# Patient Record
Sex: MALE | Race: BLACK OR AFRICAN AMERICAN | Employment: OTHER | URBAN - METROPOLITAN AREA
[De-identification: names, ages, dates, MRNs, and addresses within clinical notes are randomized per-mention and may not be internally consistent; named-entity substitution may affect disease eponyms.]

---

## 2018-08-28 ENCOUNTER — HOSPITAL ENCOUNTER (OUTPATIENT)
Dept: RADIATION ONCOLOGY | Age: 68
Discharge: HOME | End: 2018-08-28
Payer: OTHER GOVERNMENT

## 2018-08-28 ENCOUNTER — TELEPHONE (OUTPATIENT)
Dept: RADIATION ONCOLOGY | Age: 68
End: 2018-08-28

## 2018-08-28 VITALS
WEIGHT: 172.8 LBS | HEIGHT: 69 IN | TEMPERATURE: 98.4 F | DIASTOLIC BLOOD PRESSURE: 83 MMHG | BODY MASS INDEX: 25.59 KG/M2 | HEART RATE: 78 BPM | SYSTOLIC BLOOD PRESSURE: 131 MMHG

## 2018-08-28 DIAGNOSIS — C80.1 CANCER (HCC): Primary | ICD-10-CM

## 2018-08-28 PROCEDURE — 99205 OFFICE O/P NEW HI 60 MIN: CPT | Performed by: RADIOLOGY

## 2018-08-28 PROCEDURE — 99205 OFFICE O/P NEW HI 60 MIN: CPT

## 2018-08-28 RX ORDER — METOPROLOL TARTRATE 50 MG/1
50 TABLET, FILM COATED ORAL 2 TIMES DAILY
COMMUNITY

## 2018-08-28 RX ORDER — CLOPIDOGREL BISULFATE 75 MG/1
75 TABLET ORAL DAILY
COMMUNITY

## 2018-08-28 RX ORDER — HYDROCODONE BITARTRATE AND ACETAMINOPHEN 5; 325 MG/1; MG/1
1 TABLET ORAL EVERY 6 HOURS PRN
COMMUNITY
End: 2020-09-18 | Stop reason: ALTCHOICE

## 2018-08-28 RX ORDER — PANTOPRAZOLE SODIUM 40 MG/1
40 TABLET, DELAYED RELEASE ORAL DAILY
COMMUNITY

## 2018-08-28 RX ORDER — ACETAMINOPHEN 160 MG
1000 TABLET,DISINTEGRATING ORAL
COMMUNITY
End: 2020-09-18

## 2018-08-28 RX ORDER — OXYCODONE HYDROCHLORIDE 10 MG/1
10 TABLET ORAL EVERY 6 HOURS PRN
Refills: 0 | COMMUNITY
Start: 2018-07-25

## 2018-08-28 NOTE — PROGRESS NOTES
patient  5. Call-light/bell within patient's reach  6. Chair/bed in low position, stretcher/bed with siderails up except when performing patient care activities  7. Educate patient/family/caregiver on falls prevention  8. Falls risk precaution (Yellow sticker Level III) placed on patient chart       MALNUTRITION RISK SCREEN    Instructions:  Assess the patient and enter the appropriate indicators that are present for nutrition risk identification. Total the numbers entered and assign a risk score. Follow the appropriate action for total score listed below. Assessment   Date  8/28/2018     1. Have you lost weight without trying?                                   a. No (0)                       b. Unsure (2)                   0                                  c. Yes-  If yes, how much weight (kg) have                                       you lost?                                        a. 0.5-5.0(1)                                  b. >5.0-10.0 (2)                                  c. >10.0-15.0 (3)                                  D. >15.0-20.0 (4) 0        2. Have you been eating poorly because of a decreased appetite? No (0)                                        Yes (1)   0    3. Do you have a diagnosis of head and neck cancer? A. Yes (1)  B. No (0)  0   TOTAL 0      4. If score 0 or 1 not at risk of malnutrition                  >/=2 at risk of malnutrition, see below          Score of 0-1: No action  Score 2 or greater:  1. For Non-Diabetic Patient: Recommend adding Ensure Complete  2xdaily and provide              patient with Ensure wellness bag with coupons; For Diabetic Patient, Recommend adding Glucerna Shake 2xdaily and provide patient with Glucerna Wellness bag with coupons  2.  Route to the dietitian via [facility]    · Are you having  difficulty performing daily routine tasks  due to fatigue or weakness (ie: bathing/showering, dressing, housework, meal prep, work, child Para Epng): No     · Do you have any arm flexibility/ROM restrictions, swelling or pain that limit activity: No     · Any changes in memory, attention/focus that impact daily activities: No     · Do you avoid participation in leisure/social activity due weakness, fatigue or pain: No     IF ANY OF THE ABOVE ARE ANSWERED YES:   a. Referral request for OT sent to NP? No   · If NO, then why: na   b. NP Name: sasha        PT ASSESSMENT FOR REFERRAL    · Have you had any recent falls in past 2 months: No     · Do you have difficulty  going up/down stairs: No     · Are you having difficulty walking: No     · Do you often hold onto furniture/environmental supports or feel off balance when you are walking: No     · Do you need to take rest breaks when you are walking: No     · Any pain on scale of 1-10 that limits your mobility: No na/10    IF ANY OF THE ABOVE ARE ANSWERED YES:   a. Referral request for PT sent to NP? No   · If NO, then why: na   b. NP Name: sasha         Distress Screening Assessment   1. Completed by the patient? Yes  2. Reviewed by RN? No  3. Triggers met for immediate intervention (score of 6 or more)? No   If Yes: a. What intervention provided: Patient did mention he might need help with transportation assistance. I will send my note to Joan Caceres the Transportation Navigator for assistance. b. Referral made to ? Yes           Patient education given on radiation therapy to the pelvis/prostate. The patient expresses understanding and acceptance of instructions.  Niurka Mejia 8/28/2018 1:22 PM           Niurka Mejia

## 2018-08-28 NOTE — TELEPHONE ENCOUNTER
Patient called with CT Sim appointment for 9/5/2018 @ 9am. Parking instructions given to patient with office location.

## 2018-09-03 ENCOUNTER — HOSPITAL ENCOUNTER (OUTPATIENT)
Dept: RADIATION ONCOLOGY | Age: 68
Discharge: HOME | End: 2018-09-03
Payer: OTHER GOVERNMENT

## 2018-09-05 ENCOUNTER — TELEPHONE (OUTPATIENT)
Dept: ONCOLOGY | Age: 68
End: 2018-09-05

## 2018-09-05 PROCEDURE — 77334 RADIATION TREATMENT AID(S): CPT

## 2018-09-05 NOTE — PROGRESS NOTES
Radiation Oncology      [facility] Juan[facility] [address]   [phone]       Referring Physician: Dr. Josefa Garcia    [facility]ician: [facility]    Diagnosis: [facility]    Service: Radiation Oncology consultation on 9/7/18        HPI:        Mr. Krystal Khan is a pleasant 79year old man with a history of urinary frequency. 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 No nodules were palpable (cT1c), SG II-B. We reviewed the course. Please see the [facility] chart scanned into the Media tab. Román declined any consideration for Lupron but there is less significant benefit in his case anyway with low- IR disease per the NCCN. RECS as below. KPS 80. PSA:    (i) 4/10/18: 8.3        -----      [facility] PATH 4/27/18:    -right gland, 3+4=7, 3/6 cores at 5, 10 and 50% of the tissue  -left- benign      -----          Past Medical History:   Diagnosis Date    Arthritis     CAD (coronary artery disease)     Cancer ([facility] [address]     Hyperlipidemia     Hypertension        Past Surgical History:   Procedure Laterality Date    CORONARY ANGIOPLASTY WITH STENT PLACEMENT      FOOT SURGERY      lt foot    FOOT SURGERY      rt foot as well    PROSTATE BIOPSY           History reviewed. No pertinent family history.         Current Outpatient Prescriptions   Medication Sig Dispense Refill    metoprolol tartrate (LOPRESSOR) 50 MG tablet Take 50 mg by mouth 2 times daily      clopidogrel (PLAVIX) 75 MG tablet Take 75 mg by mouth daily      pantoprazole (PROTONIX) 40 MG tablet Take 40 mg by mouth daily      Cholecalciferol (VITAMIN D3) 2000 units CAPS Take 1,000 Units by mouth      Cetirizine HCl (ZYRTEC PO) Take by mouth      aspirin 81 MG tablet Take 81 mg by mouth daily      HYDROcodone-acetaminophen (NORCO) 5-325 MG per tablet Take 1 tablet by mouth every 6 hours as needed for

## 2018-09-05 NOTE — TELEPHONE ENCOUNTER
Met with patient during his CT Simulation appointment with Dr. Minerva Roberts for radiation therapy for his prostate cancer diagnosis. Introduced myself and explained my role with cancer patients receiving treatment within our cancer centers. He had his consult in [facility] and met with Maria Luisa Oneal at that facility. Patient was friendly and receptive. Reviewed need for full bladder before CT Sim and daily radiation appointments. Patient verbalized understanding. He states that he did drink a bunch of water but by the time he got to the hospital he had to use the rest room. He was given water to drink to ensure that he has a full bladder for his CT simulation appointment. He denies any needs at this time. Provided contact information and instructed patient to call if any assistance is needed during her radiation therapy treatments. Patient appreciative of visit. Will continue to follow.

## 2018-09-07 NOTE — PATIENT INSTRUCTIONS
Paul Plascencia MD [place] [address] Oncology  Cell: [phone]    [facility]:  [phone]   FAX: [phone] [address] [facility]:  [address] [place] [address] Avenue:    [phone]  [address] [place] [address] Road:  [phone]   FAX:  [phone]  Email: [email or web address]

## 2018-09-13 PROCEDURE — 77301 RADIOTHERAPY DOSE PLAN IMRT: CPT

## 2018-09-13 PROCEDURE — 77300 RADIATION THERAPY DOSE PLAN: CPT

## 2018-09-13 PROCEDURE — 77338 DESIGN MLC DEVICE FOR IMRT: CPT

## 2018-09-19 ENCOUNTER — TELEPHONE (OUTPATIENT)
Dept: ONCOLOGY | Age: 68
End: 2018-09-19

## 2018-09-19 ENCOUNTER — TELEPHONE (OUTPATIENT)
Dept: RADIATION ONCOLOGY | Age: 68
End: 2018-09-19

## 2018-09-19 PROCEDURE — 77385 HC NTSTY MODUL RAD TX DLVR SMPL: CPT

## 2018-09-19 NOTE — TELEPHONE ENCOUNTER
Met with patient following  his daily radiation therapy treatment for follow up. Today was the patient's first radiation treatment. Upon inquiring, states that he did well with the treatment today. He states that he had to hurry to get to the bathroom once his treatment was finished. Explained that the first treatment is a little longer due to extra pictures needed and tomorrows would be a little quicker. Provided support and encouragement. Declines any current needs for assistance. Patient appreciative of visit. Will continue to follow.

## 2018-09-19 NOTE — TELEPHONE ENCOUNTER
Spoke with pt re: ACS programs, resources, and role of ACS navigator. Pt stated that he has no needs at this time but would follow up if needs arise. Provided with ACS navigator's contact information.

## 2018-09-20 PROCEDURE — 77385 HC NTSTY MODUL RAD TX DLVR SMPL: CPT

## 2018-09-21 PROCEDURE — 77385 HC NTSTY MODUL RAD TX DLVR SMPL: CPT

## 2018-09-24 PROCEDURE — 77385 HC NTSTY MODUL RAD TX DLVR SMPL: CPT

## 2018-09-25 PROCEDURE — 77336 RADIATION PHYSICS CONSULT: CPT

## 2018-09-25 PROCEDURE — 77385 HC NTSTY MODUL RAD TX DLVR SMPL: CPT

## 2018-09-26 ENCOUNTER — HOSPITAL ENCOUNTER (OUTPATIENT)
Dept: RADIATION ONCOLOGY | Age: 68
Discharge: HOME | End: 2018-09-26
Payer: OTHER GOVERNMENT

## 2018-09-26 VITALS
WEIGHT: 174 LBS | SYSTOLIC BLOOD PRESSURE: 128 MMHG | BODY MASS INDEX: 25.7 KG/M2 | HEART RATE: 59 BPM | DIASTOLIC BLOOD PRESSURE: 88 MMHG

## 2018-09-26 PROCEDURE — 77385 HC NTSTY MODUL RAD TX DLVR SMPL: CPT

## 2018-09-26 NOTE — PROGRESS NOTES
Pablo Crouch  9/26/2018  Wt Readings from Last 3 Encounters:   09/26/18 174 lb (78.9 kg)   08/28/18 172 lb 12.8 oz (78.4 kg)     Body mass index is 25.7 kg/m². Treatment Area:prostate     Patient was seen today for weekly visit. Comfort Alteration  KPS:90%  Fatigue: Mild    Nutritional Alteration  Anorexia: No  Nausea: No  Vomiting: No     Elimination Alterations  Constipation: no  Diarrhea:  no  Urinary Frequency/Urgency: No  Urinary Retention: No  Dysuria: No  Urinary Incontinence: No  Proctitis: No  Nocturia: Yes #/night: 2     Skin Alteration   Sensation:no    Radiation Dermatitis:  no    Emotional  Coping: effective    Sexuality Alteration  absent    Injury, potential bleeding or infection: no    Lab Results   Component Value Date    WBC 9.8 03/01/2016     03/01/2016         /88   Pulse 59   Wt 174 lb (78.9 kg)   BMI 25.70 kg/m²   BP within normal range? yes   -if no, manually recheck in 5-10 min  NEW BP reading:  BP within normal range? yes   -if no, notify attending provider for further instruction      Assessment/Plan: Patient is having some burning and he was instructed to use AZO to help with that.      Nani Jennings

## 2018-09-26 NOTE — PROGRESS NOTES
Fly Velazquez  9/26/2018  12:19 PM      Chief Complaint   Patient presents with    Cancer          Wt Readings from Last 3 Encounters:   09/26/18 174 lb (78.9 kg)   08/28/18 172 lb 12.8 oz (78.4 kg)       Subjective: At 10.8 Marilynn Doles to the prostate and proximal seminal vesicles, patient is doing extremely well with no urinary obstructive symptoms with the exception of some minimal dysuria on initiation of micturition which disappears once the stream has been established. He denies any GI symptoms with no flatulence and lucency frequency of bowel movements or watery diarrhea      Objective: Abdomen: Normal bowel sounds soft nontender nondistended no organomegaly  Skin: No radiation-induced dermatitis      Assessment: Patient is tolerating his radiation treatment exceedingly well without any ill effects      Plan: Continue current management skin care instructions given by nursing staff and Aquaphor was provided.       Electronically signed by Ariela Gutierrez MD on 9/26/18 at 12:19 PM

## 2018-09-27 PROCEDURE — 77385 HC NTSTY MODUL RAD TX DLVR SMPL: CPT

## 2018-09-28 PROCEDURE — 77385 HC NTSTY MODUL RAD TX DLVR SMPL: CPT

## 2018-10-01 ENCOUNTER — HOSPITAL ENCOUNTER (OUTPATIENT)
Dept: RADIATION ONCOLOGY | Age: 68
Discharge: HOME | End: 2018-10-01
Payer: OTHER GOVERNMENT

## 2018-10-01 PROCEDURE — 77385 HC NTSTY MODUL RAD TX DLVR SMPL: CPT

## 2018-10-02 PROCEDURE — 77336 RADIATION PHYSICS CONSULT: CPT

## 2018-10-02 PROCEDURE — 77385 HC NTSTY MODUL RAD TX DLVR SMPL: CPT

## 2018-10-03 PROCEDURE — 77385 HC NTSTY MODUL RAD TX DLVR SMPL: CPT

## 2018-10-04 ENCOUNTER — HOSPITAL ENCOUNTER (OUTPATIENT)
Dept: RADIATION ONCOLOGY | Age: 68
End: 2018-10-04
Payer: OTHER GOVERNMENT

## 2018-10-04 PROCEDURE — 77385 HC NTSTY MODUL RAD TX DLVR SMPL: CPT

## 2018-10-05 ENCOUNTER — APPOINTMENT (OUTPATIENT)
Dept: RADIATION ONCOLOGY | Age: 68
End: 2018-10-05
Payer: OTHER GOVERNMENT

## 2018-10-08 PROCEDURE — 77385 HC NTSTY MODUL RAD TX DLVR SMPL: CPT

## 2018-10-09 PROCEDURE — 77385 HC NTSTY MODUL RAD TX DLVR SMPL: CPT

## 2018-10-10 ENCOUNTER — HOSPITAL ENCOUNTER (OUTPATIENT)
Dept: RADIATION ONCOLOGY | Age: 68
Discharge: HOME | End: 2018-10-10
Payer: OTHER GOVERNMENT

## 2018-10-10 VITALS
WEIGHT: 174 LBS | HEART RATE: 76 BPM | SYSTOLIC BLOOD PRESSURE: 148 MMHG | TEMPERATURE: 98.3 F | OXYGEN SATURATION: 96 % | BODY MASS INDEX: 25.7 KG/M2 | DIASTOLIC BLOOD PRESSURE: 88 MMHG

## 2018-10-10 DIAGNOSIS — C80.1 CANCER (HCC): Primary | ICD-10-CM

## 2018-10-10 PROCEDURE — 99999 PR OFFICE/OUTPT VISIT,PROCEDURE ONLY: CPT | Performed by: RADIOLOGY

## 2018-10-10 PROCEDURE — 77336 RADIATION PHYSICS CONSULT: CPT

## 2018-10-10 PROCEDURE — 77385 HC NTSTY MODUL RAD TX DLVR SMPL: CPT

## 2018-10-10 PROCEDURE — 77386 HC NTSTY MODUL RAD TX DLVR CPLX: CPT

## 2018-10-10 RX ORDER — AMLODIPINE BESYLATE 10 MG/1
10 TABLET ORAL DAILY
COMMUNITY

## 2018-10-11 PROCEDURE — 77385 HC NTSTY MODUL RAD TX DLVR SMPL: CPT

## 2018-10-12 PROCEDURE — 77385 HC NTSTY MODUL RAD TX DLVR SMPL: CPT

## 2018-10-15 PROCEDURE — 77385 HC NTSTY MODUL RAD TX DLVR SMPL: CPT

## 2018-10-16 ENCOUNTER — TELEPHONE (OUTPATIENT)
Dept: ONCOLOGY | Age: 68
End: 2018-10-16

## 2018-10-16 PROCEDURE — 77385 HC NTSTY MODUL RAD TX DLVR SMPL: CPT

## 2018-10-17 ENCOUNTER — HOSPITAL ENCOUNTER (OUTPATIENT)
Dept: RADIATION ONCOLOGY | Age: 68
Discharge: HOME | End: 2018-10-17
Payer: OTHER GOVERNMENT

## 2018-10-17 VITALS
HEART RATE: 90 BPM | BODY MASS INDEX: 25.59 KG/M2 | SYSTOLIC BLOOD PRESSURE: 138 MMHG | DIASTOLIC BLOOD PRESSURE: 90 MMHG | TEMPERATURE: 98.1 F | WEIGHT: 173.3 LBS | OXYGEN SATURATION: 96 %

## 2018-10-17 DIAGNOSIS — C80.1 CANCER (HCC): Primary | ICD-10-CM

## 2018-10-17 PROCEDURE — 77385 HC NTSTY MODUL RAD TX DLVR SMPL: CPT

## 2018-10-17 PROCEDURE — 77336 RADIATION PHYSICS CONSULT: CPT

## 2018-10-17 PROCEDURE — 99999 PR OFFICE/OUTPT VISIT,PROCEDURE ONLY: CPT | Performed by: RADIOLOGY

## 2018-10-18 PROCEDURE — 77385 HC NTSTY MODUL RAD TX DLVR SMPL: CPT

## 2018-10-19 PROCEDURE — 77385 HC NTSTY MODUL RAD TX DLVR SMPL: CPT

## 2018-10-22 PROCEDURE — 77385 HC NTSTY MODUL RAD TX DLVR SMPL: CPT

## 2018-10-23 PROCEDURE — 77385 HC NTSTY MODUL RAD TX DLVR SMPL: CPT

## 2018-10-24 ENCOUNTER — HOSPITAL ENCOUNTER (OUTPATIENT)
Dept: RADIATION ONCOLOGY | Age: 68
Discharge: HOME | End: 2018-10-24
Payer: OTHER GOVERNMENT

## 2018-10-24 VITALS
TEMPERATURE: 98 F | HEART RATE: 67 BPM | DIASTOLIC BLOOD PRESSURE: 82 MMHG | SYSTOLIC BLOOD PRESSURE: 128 MMHG | BODY MASS INDEX: 25.47 KG/M2 | WEIGHT: 172.5 LBS | OXYGEN SATURATION: 97 %

## 2018-10-24 PROCEDURE — 77385 HC NTSTY MODUL RAD TX DLVR SMPL: CPT

## 2018-10-24 PROCEDURE — 77336 RADIATION PHYSICS CONSULT: CPT

## 2018-10-25 PROCEDURE — 77385 HC NTSTY MODUL RAD TX DLVR SMPL: CPT

## 2018-10-26 PROCEDURE — 77385 HC NTSTY MODUL RAD TX DLVR SMPL: CPT

## 2018-10-29 PROCEDURE — 77385 HC NTSTY MODUL RAD TX DLVR SMPL: CPT

## 2018-10-30 PROCEDURE — 77385 HC NTSTY MODUL RAD TX DLVR SMPL: CPT

## 2018-10-31 ENCOUNTER — HOSPITAL ENCOUNTER (OUTPATIENT)
Dept: RADIATION ONCOLOGY | Age: 68
Discharge: HOME | End: 2018-10-31
Payer: OTHER GOVERNMENT

## 2018-10-31 ENCOUNTER — TELEPHONE (OUTPATIENT)
Dept: ONCOLOGY | Age: 68
End: 2018-10-31

## 2018-10-31 VITALS
OXYGEN SATURATION: 97 % | SYSTOLIC BLOOD PRESSURE: 132 MMHG | BODY MASS INDEX: 25.55 KG/M2 | DIASTOLIC BLOOD PRESSURE: 84 MMHG | HEART RATE: 86 BPM | TEMPERATURE: 98.6 F | WEIGHT: 173 LBS

## 2018-10-31 DIAGNOSIS — C80.1 CANCER (HCC): Primary | ICD-10-CM

## 2018-10-31 PROCEDURE — 77336 RADIATION PHYSICS CONSULT: CPT

## 2018-10-31 PROCEDURE — 99999 PR OFFICE/OUTPT VISIT,PROCEDURE ONLY: CPT | Performed by: RADIOLOGY

## 2018-10-31 PROCEDURE — 77385 HC NTSTY MODUL RAD TX DLVR SMPL: CPT

## 2018-10-31 NOTE — PROGRESS NOTES
DEPARTMENT OF RADIATION ONCOLOGY ON TREATMENT VISIT         10/31/2018      NAME:  Rhiannon Green    YOB: 1950      Diagnosis: prostate CA      SUBJECTIVE:   Rhiannon Green has now received 5400 cGy in 30/44 fractions directed to the prostate and SV. Past medical, surgical, social and family histories reviewed and updated as indicated. Pain: controlled      ALLERGIES:  Simvastatin         Current Outpatient Prescriptions   Medication Sig Dispense Refill    amLODIPine (NORVASC) 10 MG tablet Take 10 mg by mouth daily      oxyCODONE HCl (OXY-IR) 10 MG immediate release tablet TK 1 T PO Q 6 H PRN P  0    metoprolol tartrate (LOPRESSOR) 50 MG tablet Take 50 mg by mouth 2 times daily      clopidogrel (PLAVIX) 75 MG tablet Take 75 mg by mouth daily      pantoprazole (PROTONIX) 40 MG tablet Take 40 mg by mouth daily      Cholecalciferol (VITAMIN D3) 2000 units CAPS Take 1,000 Units by mouth      Cetirizine HCl (ZYRTEC PO) Take by mouth      aspirin 81 MG tablet Take 81 mg by mouth daily      HYDROcodone-acetaminophen (NORCO) 5-325 MG per tablet Take 1 tablet by mouth every 6 hours as needed for Pain. [facility] Pramoxine-HC 1-2.5 % LOTN lotion Apply topically 3 times daily as needed       No current facility-administered medications for this encounter. OBJECTIVE:  Alert and fully ambulatory. Pleasant and conversant. Physical Examination: General appearance - alert, well appearing, and in no distress. Wt Readings from Last 3 Encounters:   10/31/18 173 lb (78.5 kg)   10/24/18 172 lb 8 oz (78.2 kg)   10/17/18 173 lb 4.8 oz (78.6 kg)         ASSESSMENT/PLAN:     Patient is tolerating treatments well with expected toxicities. RBA were reviewed prior to first fraction and PRN. Current and planned dose reviewed. Goals of treatment and potential side effects were reviewed with the patient PRN.  Treatment imaging has been personally reviewed for accuracy and

## 2018-11-01 ENCOUNTER — HOSPITAL ENCOUNTER (OUTPATIENT)
Dept: RADIATION ONCOLOGY | Age: 68
Discharge: HOME | End: 2018-11-01
Payer: OTHER GOVERNMENT

## 2018-11-01 PROCEDURE — 77385 HC NTSTY MODUL RAD TX DLVR SMPL: CPT

## 2018-11-02 PROCEDURE — 77385 HC NTSTY MODUL RAD TX DLVR SMPL: CPT

## 2018-11-05 PROCEDURE — 77385 HC NTSTY MODUL RAD TX DLVR SMPL: CPT

## 2018-11-06 PROCEDURE — 77385 HC NTSTY MODUL RAD TX DLVR SMPL: CPT

## 2018-11-06 PROCEDURE — 77412 RADIATION TX DELIVERY LVL 3: CPT

## 2018-11-07 ENCOUNTER — HOSPITAL ENCOUNTER (OUTPATIENT)
Dept: RADIATION ONCOLOGY | Age: 68
Discharge: HOME | End: 2018-11-07
Payer: OTHER GOVERNMENT

## 2018-11-07 DIAGNOSIS — C80.1 CANCER (HCC): Primary | ICD-10-CM

## 2018-11-07 PROCEDURE — 77385 HC NTSTY MODUL RAD TX DLVR SMPL: CPT

## 2018-11-07 PROCEDURE — 99999 PR OFFICE/OUTPT VISIT,PROCEDURE ONLY: CPT | Performed by: RADIOLOGY

## 2018-11-07 PROCEDURE — 77336 RADIATION PHYSICS CONSULT: CPT

## 2018-11-07 NOTE — PROGRESS NOTES
precision. Questions answered to apparent satisfaction. Treatments will continue as planned. Deanna Ridley.  Anita Estrella MD MS DABR  Radiation Oncologist        [facility] ([address] [place] [address]): [phone] /// FAX: [phone]  [place] [facility]): [phone] /// FAX: [phone]  [facility] Debi Gowers): [phone] /// FAX: [phone]

## 2018-11-09 PROCEDURE — 77385 HC NTSTY MODUL RAD TX DLVR SMPL: CPT

## 2018-11-12 ENCOUNTER — TELEPHONE (OUTPATIENT)
Dept: ONCOLOGY | Age: 68
End: 2018-11-12

## 2018-11-12 PROCEDURE — 77385 HC NTSTY MODUL RAD TX DLVR SMPL: CPT

## 2018-11-13 PROCEDURE — 77385 HC NTSTY MODUL RAD TX DLVR SMPL: CPT

## 2018-11-14 ENCOUNTER — HOSPITAL ENCOUNTER (OUTPATIENT)
Dept: RADIATION ONCOLOGY | Age: 68
Discharge: HOME | End: 2018-11-14
Payer: OTHER GOVERNMENT

## 2018-11-14 VITALS
SYSTOLIC BLOOD PRESSURE: 138 MMHG | TEMPERATURE: 98.2 F | BODY MASS INDEX: 25.33 KG/M2 | DIASTOLIC BLOOD PRESSURE: 72 MMHG | OXYGEN SATURATION: 96 % | WEIGHT: 171.5 LBS | HEART RATE: 74 BPM

## 2018-11-14 DIAGNOSIS — C80.1 CANCER (HCC): Primary | ICD-10-CM

## 2018-11-14 PROCEDURE — 99999 PR OFFICE/OUTPT VISIT,PROCEDURE ONLY: CPT | Performed by: RADIOLOGY

## 2018-11-14 PROCEDURE — 77385 HC NTSTY MODUL RAD TX DLVR SMPL: CPT

## 2018-11-14 NOTE — PROGRESS NOTES
Emmett Onofre  11/14/2018  Wt Readings from Last 3 Encounters:   11/14/18 171 lb 8 oz (77.8 kg)   10/31/18 173 lb (78.5 kg)   10/24/18 172 lb 8 oz (78.2 kg)     Body mass index is 25.33 kg/m². Treatment Area:prostate, proximal SV    Patient was seen today for weekly visit. Comfort Alteration  KPS:90%  Fatigue: None    Nutritional Alteration  Anorexia: No  Nausea: yes  Vomiting: No     Elimination Alterations  Constipation: yes  Diarrhea:  no  Urinary Frequency/Urgency: Yes  Urinary Retention: Yes  Dysuria: Yes  Urinary Incontinence: No  Proctitis: No  Nocturia: Yes #/night: 3     Skin Alteration   Sensation:pressure related to need to move bowels    Radiation Dermatitis:  na    Emotional  Coping: effective    Sexuality Alteration  na    Injury, potential bleeding or infection: na    Lab Results   Component Value Date    WBC 9.8 03/01/2016     03/01/2016         /72   Pulse 74   Temp 98.2 °F (36.8 °C) (Oral)   Wt 171 lb 8 oz (77.8 kg)   SpO2 96%   BMI 25.33 kg/m²   BP within normal range? yes        Assessment/Plan:  Patient has received 39/44 fractions, 7020 cGy/7920. Patient states having pressure to rectum related to not have \"good\" bowel movements-only able to have very small BMs-patient feels \"backed up\".   Grzegorz Fox

## 2018-11-14 NOTE — PATIENT INSTRUCTIONS
Continue daily fractionated radiation therapy as scheduled. Please see weekly OTV note and intial consultation letter in [facility] for clinical details. Judy Dial.  Sherine Quinones MD [address] [place] [address]  Radiation Oncology  [place]:  [phone]   FAX:    [phone] [facility]: [address] [place] [address] Road:  [phone]

## 2018-11-15 PROCEDURE — 77385 HC NTSTY MODUL RAD TX DLVR SMPL: CPT

## 2018-11-15 PROCEDURE — 77336 RADIATION PHYSICS CONSULT: CPT

## 2018-11-16 PROCEDURE — 77385 HC NTSTY MODUL RAD TX DLVR SMPL: CPT

## 2018-11-19 PROCEDURE — 77385 HC NTSTY MODUL RAD TX DLVR SMPL: CPT

## 2018-11-20 PROCEDURE — 77385 HC NTSTY MODUL RAD TX DLVR SMPL: CPT

## 2018-11-21 ENCOUNTER — HOSPITAL ENCOUNTER (OUTPATIENT)
Dept: RADIATION ONCOLOGY | Age: 68
Discharge: HOME | End: 2018-11-21
Payer: OTHER GOVERNMENT

## 2018-11-21 ENCOUNTER — TELEPHONE (OUTPATIENT)
Dept: ONCOLOGY | Age: 68
End: 2018-11-21

## 2018-11-21 VITALS — DIASTOLIC BLOOD PRESSURE: 62 MMHG | HEART RATE: 75 BPM | OXYGEN SATURATION: 98 % | SYSTOLIC BLOOD PRESSURE: 108 MMHG

## 2018-11-21 DIAGNOSIS — C80.1 CANCER (HCC): Primary | ICD-10-CM

## 2018-11-21 PROCEDURE — 77336 RADIATION PHYSICS CONSULT: CPT

## 2018-11-21 PROCEDURE — 99999 PR OFFICE/OUTPT VISIT,PROCEDURE ONLY: CPT | Performed by: RADIOLOGY

## 2018-11-21 PROCEDURE — 77385 HC NTSTY MODUL RAD TX DLVR SMPL: CPT

## 2019-01-08 ENCOUNTER — HOSPITAL ENCOUNTER (OUTPATIENT)
Dept: RADIATION ONCOLOGY | Age: 69
Discharge: HOME | End: 2019-01-08
Payer: OTHER GOVERNMENT

## 2019-01-08 ENCOUNTER — TELEPHONE (OUTPATIENT)
Dept: RADIATION ONCOLOGY | Age: 69
End: 2019-01-08

## 2019-01-08 VITALS
BODY MASS INDEX: 25.41 KG/M2 | RESPIRATION RATE: 16 BRPM | DIASTOLIC BLOOD PRESSURE: 78 MMHG | WEIGHT: 172.1 LBS | SYSTOLIC BLOOD PRESSURE: 138 MMHG

## 2019-01-08 DIAGNOSIS — C61 PROSTATE CANCER (HCC): ICD-10-CM

## 2019-01-08 PROCEDURE — 99999 PR OFFICE/OUTPT VISIT,PROCEDURE ONLY: CPT | Performed by: NURSE PRACTITIONER

## 2020-07-16 ENCOUNTER — APPOINTMENT (OUTPATIENT)
Dept: GENERAL RADIOLOGY | Age: 70
End: 2020-07-16
Payer: MEDICARE

## 2020-07-16 ENCOUNTER — HOSPITAL ENCOUNTER (EMERGENCY)
Age: 70
Discharge: HOME | End: 2020-07-16
Payer: MEDICARE

## 2020-07-16 VITALS
SYSTOLIC BLOOD PRESSURE: 154 MMHG | OXYGEN SATURATION: 97 % | HEART RATE: 73 BPM | TEMPERATURE: 97.6 F | WEIGHT: 172 LBS | RESPIRATION RATE: 16 BRPM | DIASTOLIC BLOOD PRESSURE: 90 MMHG | BODY MASS INDEX: 25.4 KG/M2

## 2020-07-16 LAB
ALBUMIN SERPL-MCNC: 4.9 G/DL (ref 3.5–5.2)
ALP BLD-CCNC: 80 U/L (ref 40–129)
ALT SERPL-CCNC: 66 U/L (ref 0–40)
ANION GAP SERPL CALCULATED.3IONS-SCNC: 16 MMOL/L (ref 7–16)
AST SERPL-CCNC: 84 U/L (ref 0–39)
BASOPHILS ABSOLUTE: 0.06 E9/L (ref 0–0.2)
BASOPHILS RELATIVE PERCENT: 0.6 % (ref 0–2)
BILIRUB SERPL-MCNC: 0.7 MG/DL (ref 0–1.2)
BUN BLDV-MCNC: 9 MG/DL (ref 8–23)
CALCIUM SERPL-MCNC: 9.8 MG/DL (ref 8.6–10.2)
CHLORIDE BLD-SCNC: 100 MMOL/L (ref 98–107)
CO2: 23 MMOL/L (ref 22–29)
CREAT SERPL-MCNC: 0.9 MG/DL (ref 0.7–1.2)
EOSINOPHILS ABSOLUTE: 0.27 E9/L (ref 0.05–0.5)
EOSINOPHILS RELATIVE PERCENT: 2.9 % (ref 0–6)
GFR AFRICAN AMERICAN: >60
GFR NON-AFRICAN AMERICAN: >60 ML/MIN/1.73
GLUCOSE BLD-MCNC: 106 MG/DL (ref 74–99)
HCT VFR BLD CALC: 43 % (ref 37–54)
HEMOGLOBIN: 14.3 G/DL (ref 12.5–16.5)
IMMATURE GRANULOCYTES #: 0.03 E9/L
IMMATURE GRANULOCYTES %: 0.3 % (ref 0–5)
LYMPHOCYTES ABSOLUTE: 2.71 E9/L (ref 1.5–4)
LYMPHOCYTES RELATIVE PERCENT: 29.2 % (ref 20–42)
MCH RBC QN AUTO: 32.4 PG (ref 26–35)
MCHC RBC AUTO-ENTMCNC: 33.3 % (ref 32–34.5)
MCV RBC AUTO: 97.5 FL (ref 80–99.9)
MONOCYTES ABSOLUTE: 1.3 E9/L (ref 0.1–0.95)
MONOCYTES RELATIVE PERCENT: 14 % (ref 2–12)
NEUTROPHILS ABSOLUTE: 4.92 E9/L (ref 1.8–7.3)
NEUTROPHILS RELATIVE PERCENT: 53 % (ref 43–80)
PDW BLD-RTO: 12.2 FL (ref 11.5–15)
PLATELET # BLD: 277 E9/L (ref 130–450)
PMV BLD AUTO: 10 FL (ref 7–12)
POTASSIUM SERPL-SCNC: 3.9 MMOL/L (ref 3.5–5)
RBC # BLD: 4.41 E12/L (ref 3.8–5.8)
SODIUM BLD-SCNC: 139 MMOL/L (ref 132–146)
TOTAL PROTEIN: 8.8 G/DL (ref 6.4–8.3)
TROPONIN: <0.01 NG/ML (ref 0–0.03)
WBC # BLD: 9.3 E9/L (ref 4.5–11.5)

## 2020-07-16 PROCEDURE — 80053 COMPREHEN METABOLIC PANEL: CPT

## 2020-07-16 PROCEDURE — 85025 COMPLETE CBC W/AUTO DIFF WBC: CPT

## 2020-07-16 PROCEDURE — 99283 EMERGENCY DEPT VISIT LOW MDM: CPT

## 2020-07-16 PROCEDURE — 71046 X-RAY EXAM CHEST 2 VIEWS: CPT

## 2020-07-16 PROCEDURE — 93005 ELECTROCARDIOGRAM TRACING: CPT | Performed by: PHYSICIAN ASSISTANT

## 2020-07-16 PROCEDURE — 84484 ASSAY OF TROPONIN QUANT: CPT

## 2020-07-16 NOTE — ED NOTES
Bed: [facility].  Expected date:   Expected time:   Means of arrival:   Comments:  Triage      Edwige Christina RN  07/16/20 [id number]

## 2020-07-16 NOTE — ED PROVIDER NOTES
[facility]     Department of Emergency Medicine   ED  Provider Note  Admit Date/RoomTime: 7/16/2020  6:06 PM  ED Room: [facility]   Chief Complaint   Hypertension (states BP has been up all day, on and off lightheadedness, states he is compliant with all medication)    History of Present Illness   Source of history provided by:  patient. History/Exam Limitations: none. Inocencio Carl is a 78 y.o. old male who has a past medical history of:   Past Medical History:   Diagnosis Date    Arthritis     CAD (coronary artery disease)     [facility] [address]     Hyperlipidemia     Hypertension      presents to the emergency department by private vehicle, for evaluation of high blood pressure, which began when he checked it several times today prior to arrival.  Since onset the symptoms have been intermittent. He has a prior history of hypertension. Severity:  How High: 180s/104. Symptoms:  Mild. Current Pregnancy:  NA.  HTN Treatment: compliant. Recent Use of:  No OTC or illegal substances. Associated Signs & Symptoms:    []   Abdominal Pain     []   Back Pain     []   Hematuria     []   Chest Pain     []   Shortness of Breath     []   Palpitations     []   Headache     [x]   Dizziness     []   Blurred Vision     ROS    Pertinent positives and negatives are stated within HPI, all other systems reviewed and are negative. Past Surgical History:   Procedure Laterality Date    CORONARY ANGIOPLASTY WITH STENT PLACEMENT      FOOT SURGERY      lt foot    FOOT SURGERY      rt foot as well    PROSTATE BIOPSY       Social History:  reports that he quit smoking about 6 years ago. He has a 40.00 pack-year smoking history. He has never used smokeless tobacco. He reports current alcohol use. He reports that he does not use drugs. Family History: family history is not on file.   Allergies: Simvastatin    Physical Exam         ED Triage Vitals   BP Temp Temp src Pulse Resp SpO2 Height Weight   07/16/20 1533 07/16/20 1533 -- 07/16/20 1521 07/16/20 1533 07/16/20 1521 -- 07/16/20 1533   (!) 167/91 97.3 °F (36.3 °C)  100 15 95 %  172 lb (78 kg)      Oxygen Saturation Interpretation: Normal.    Constitutional:  Alert, development consistent with age. Eyes:  PERRL, EOMI, no discharge or conjunctival injection. Ears:  External ears without lesions. Throat:   Airway patient. Neck:  Normal ROM. Supple. Respiratory:  Clear to auscultation and breath sounds equal.  CV:  Regular rate and rhythm, normal heart sounds, without pathological murmurs, ectopy, gallops, or rubs. Integument:  Normal turgor. Warm, dry, without visible rash, unless noted elsewhere. Lymphatics: No lymphangitis or adenopathy noted. Neurological:  Oriented. Motor functions intact.     Lab / Imaging Results   (All laboratory and radiology results have been personally reviewed by myself)  Labs:  Results for orders placed or performed during the hospital encounter of 07/16/20   Comprehensive Metabolic Panel   Result Value Ref Range    Sodium 139 132 - 146 mmol/L    Potassium 3.9 3.5 - 5.0 mmol/L    Chloride 100 98 - 107 mmol/L    CO2 23 22 - 29 mmol/L    Anion Gap 16 7 - 16 mmol/L    Glucose 106 (H) 74 - 99 mg/dL    BUN 9 8 - 23 mg/dL    CREATININE 0.9 0.7 - 1.2 mg/dL    GFR Non-African American >60 >=60 mL/min/1.73    GFR African American >60     Calcium 9.8 8.6 - 10.2 mg/dL    Total Protein 8.8 (H) 6.4 - 8.3 g/dL    Alb 4.9 3.5 - 5.2 g/dL    Total Bilirubin 0.7 0.0 - 1.2 mg/dL    Alkaline Phosphatase 80 40 - 129 U/L    ALT 66 (H) 0 - 40 U/L    AST 84 (H) 0 - 39 U/L   CBC Auto Differential   Result Value Ref Range    WBC 9.3 4.5 - 11.5 E9/L    RBC 4.41 3.80 - 5.80 E12/L    Hemoglobin 14.3 12.5 - 16.5 g/dL    Hematocrit 43.0 37.0 - 54.0 %    MCV 97.5 80.0 - 99.9 fL    MCH 32.4 26.0 - 35.0 pg    MCHC 33.3 32.0 - 34.5 %    RDW 12.2 11.5 - 15.0 fL    Platelets 294 035 - 292 E9/L    MPV 10.0 7.0 - 12.0 fL    Neutrophils % 53.0 43.0 - 80.0 %    Immature Granulocytes % 0.3 0.0 - 5.0 %    Lymphocytes % 29.2 20.0 - 42.0 %    Monocytes % 14.0 (H) 2.0 - 12.0 %    Eosinophils % 2.9 0.0 - 6.0 %    Basophils % 0.6 0.0 - 2.0 %    Neutrophils Absolute 4.92 1.80 - 7.30 E9/L    Immature Granulocytes # 0.03 E9/L    Lymphocytes Absolute 2.71 1.50 - 4.00 E9/L    Monocytes Absolute 1.30 (H) 0.10 - 0.95 E9/L    Eosinophils Absolute 0.27 0.05 - 0.50 E9/L    Basophils Absolute 0.06 0.00 - 0.20 E9/L   Troponin   Result Value Ref Range    Troponin <0.01 0.00 - 0.03 ng/mL     Imaging: All Radiology results interpreted by Radiologist unless otherwise noted. XR CHEST STANDARD (2 VW)   Final Result   No acute cardiopulmonary findings. EKG #1:  Interpreted by emergency department physician unless otherwise noted. Time:  1537    Rate: 83  Rhythm: Sinus. Interpretation: no acute changes. Comparison: was normal.    ED Course / Medical Decision Making   Medications - No data to display     Re-Evaluations:  7/16/20      Time: 1815    Saw the patient for the first time. Results discussed. He is currently asymptomatic. Consultations:             None    Procedures:   none    MDM: Patient presents to the emergency department for hypertension, with a blood pressure reading of 167/91 in the emergency department. Patient is well-appearing and during my history and physical examination at 1815, he was asymptomatic. He has been encouraged to call his family doctor tomorrow to schedule a close follow-up appointment. Specific conditions for emergent return have been discussed and the patient verbalized understanding to return immediately for new or worsening symptoms. Counseling:   I have spoken with the patient and discussed todays results, in addition to providing specific details for the plan of care and counseling regarding the diagnosis and prognosis and are agreeable with the plan. Assessment      1. Elevated blood pressure reading    2.  Lightheadedness      This patient's ED course included: a personal history and physicial examination  This patient has remained hemodynamically stable, improved and been closely monitored during their ED course. Plan   Discharge to home. Patient condition is good. New Medications     New Prescriptions    No medications on file     Electronically signed by Bebeto Jones   DD: 7/16/20  **This report was transcribed using voice recognition software. Every effort was made to ensure accuracy; however, inadvertent computerized transcription errors may be present.   END OF PROVIDER NOTE        Adolph Hoang PA-C  07/16/20 [id number]

## 2020-07-17 LAB
EKG ATRIAL RATE: 83 BPM
EKG P AXIS: 72 DEGREES
EKG P-R INTERVAL: 164 MS
EKG Q-T INTERVAL: 366 MS
EKG QRS DURATION: 76 MS
EKG QTC CALCULATION (BAZETT): 430 MS
EKG R AXIS: 64 DEGREES
EKG T AXIS: 39 DEGREES
EKG VENTRICULAR RATE: 83 BPM

## 2020-07-17 PROCEDURE — 93010 ELECTROCARDIOGRAM REPORT: CPT | Performed by: INTERNAL MEDICINE

## 2020-08-19 ENCOUNTER — HOSPITAL ENCOUNTER (OUTPATIENT)
Dept: ULTRASOUND IMAGING | Age: 70
Discharge: HOME | End: 2020-08-21
Payer: MEDICARE

## 2020-08-19 PROCEDURE — 76770 US EXAM ABDO BACK WALL COMP: CPT

## 2020-09-17 ENCOUNTER — TELEPHONE (OUTPATIENT)
Dept: CARDIOLOGY CLINIC | Age: 70
End: 2020-09-17

## 2020-09-18 ENCOUNTER — OFFICE VISIT (OUTPATIENT)
Dept: CARDIOLOGY CLINIC | Age: 70
End: 2020-09-18
Payer: MEDICARE

## 2020-09-18 VITALS
RESPIRATION RATE: 16 BRPM | OXYGEN SATURATION: 97 % | DIASTOLIC BLOOD PRESSURE: 78 MMHG | SYSTOLIC BLOOD PRESSURE: 138 MMHG | HEIGHT: 69 IN | WEIGHT: 163.2 LBS | HEART RATE: 80 BPM | BODY MASS INDEX: 24.17 KG/M2

## 2020-09-18 PROCEDURE — 93000 ELECTROCARDIOGRAM COMPLETE: CPT | Performed by: INTERNAL MEDICINE

## 2020-09-18 PROCEDURE — 99215 OFFICE O/P EST HI 40 MIN: CPT | Performed by: INTERNAL MEDICINE

## 2020-09-18 RX ORDER — ROSUVASTATIN CALCIUM 20 MG/1
20 TABLET, COATED ORAL DAILY
Qty: 90 TABLET | Refills: 3 | Status: CANCELLED | OUTPATIENT
Start: 2020-09-18

## 2020-09-18 RX ORDER — TAMSULOSIN HYDROCHLORIDE 0.4 MG/1
0.4 CAPSULE ORAL 2 TIMES DAILY
COMMUNITY

## 2020-09-18 SDOH — HEALTH STABILITY: MENTAL HEALTH: HOW OFTEN DO YOU HAVE A DRINK CONTAINING ALCOHOL?: 4 OR MORE TIMES A WEEK

## 2020-09-18 SDOH — HEALTH STABILITY: MENTAL HEALTH: HOW MANY STANDARD DRINKS CONTAINING ALCOHOL DO YOU HAVE ON A TYPICAL DAY?: 1 OR 2

## 2020-09-18 NOTE — PATIENT INSTRUCTIONS
1. Lipid panel  2. Liver function test  3. After those are resulted we will start Lipitor 10 mg daily  4. We will recheck your lab work to make sure everything stays okay  5. Echocardiogram (heart ultrasound)  6.  See you back in a year

## 2020-09-18 NOTE — PROGRESS NOTES
[address] [facility]   Heart and Vascular [place]   Clinic Note     Date:9/19/20   Patient Margareth Davenport  YOB: 1950  Age: 78 y.o. Primary Care Provider: Susan Alvarez MD    Subjective     This is a pleasant 71-year-old -American male Who is a new patient to cardiology. He has been doing well. He walks up to 2 miles without dyspnea or chest discomfort. He can go up 6 flights of stairs which he does routinely at work without issues. He has no palpitations, edema, orthopnea, PND, syncope/presyncope. He is compliant with his medical regimen    He has occasional bright red blood per rectum which she has had since radiation therapy to his prostate cancer. A focused history review includes:  1. MI 2002 s/p PCI. Details unknown  2. Hypertension  3. Hyperlipidemia  4. Prostate cancer stage IIB s/p radiotherapy done 11/2018. Occasional hematochezia  5. COPD  6. Stroke 2009 had diplopia; occasional still some issues  7. Former smoker 50 pack-yrs; quit 2013  8. 2-3 beers 3-4 times a week  9. Cocaine use in early 2000s  10. History of hepatitis C  11.  No AAA by ultrasound 8/2020    Past History    Past Medical History:         Diagnosis Date    Arthritis     CAD (coronary artery disease)     [address] (chronic obstructive pulmonary disease) ([address]     Hyperlipidemia     Hypertension     MI (myocardial infarction) ([address]     [facility] [facility] [address] [facility]           Social History:    Social History     Tobacco History     Smoking Status  Former Smoker Quit date  8/28/2013 Smoking Frequency  1 pack/day for 40 years (40 pk yrs)    Smokeless Tobacco Use  Never Used          Alcohol History     Alcohol Use Status  Yes Comment  1-2 beers daily           Drug Use     Drug Use Status  No          Sexual Activity     Sexually Active  Not Asked                    Family History:       Problem Relation Age of Onset    Stroke Mother     Diabetes Mother     Heart Attack Father    Destinee Wen Heart Attack Brother          Review of Systems   General ROS: No weight loss fevers or chills  Psychological ROS: No new depression or anxiety or altered mood  Ophthalmic ROS: No new vision changes or diplopia  Respiratory ROS: No cough, wheezing, shortness of breath, or hemoptysis  Cardiovascular ROS: See HPI  Gastrointestinal ROS: No nausea, vomiting, constipation, diarrhea, hematemesis, melena, or hematochezia  Genito-Urinary ROS: No dysuria, hematuria, or new incontinence  Musculoskeletal ROS: No new muscle pain, joint pain, joint swelling, or back pain  Neurological ROS: No new numbness or paresthesias, no focal weakness, no altered speech, no new memory loss  Dermatological ROS: No new rashes, no pruritus, no skin masses        Medications     Current Outpatient Medications   Medication Sig Dispense Refill    tamsulosin (FLOMAX) 0.4 MG capsule Take 0.4 mg by mouth daily      Multiple Vitamin (MULTI-VITAMIN DAILY PO) Take by mouth daily      mineral oil-hydrophilic petrolatum (AQUAPHOR) ointment Apply topically as needed for Dry Skin A      amLODIPine (NORVASC) 10 MG tablet Take 10 mg by mouth daily      oxyCODONE HCl (OXY-IR) 10 MG immediate release tablet TK 1 T PO Q 6 H PRN P  0    metoprolol tartrate (LOPRESSOR) 50 MG tablet Take 50 mg by mouth 2 times daily      clopidogrel (PLAVIX) 75 MG tablet Take 75 mg by mouth daily      pantoprazole (PROTONIX) 40 MG tablet Take 40 mg by mouth daily      Cetirizine HCl (ZYRTEC PO) Take by mouth daily       aspirin 81 MG tablet Take 81 mg by mouth daily       No current facility-administered medications for this visit.             Physical Examination      /78   Pulse 80   Resp 16   Ht 5' 9\" (1.753 m)   Wt 163 lb 3.2 oz (74 kg)   SpO2 97%   BMI 24.10 kg/m²     General: No acute distress, appears as stated age, nonicteric  Head: Atraumatic, no gross abnormalities or bruises  Neck: Supple and nontender, no carotid bruits, no JVP  Lungs: Clear to auscultation bilaterally, no wheezes, rales, or rhonchi  Heart: Regular rate and rhythm, no murmurs, rubs, or gallops  Abdomen: Soft, nontender, nondistended, normal bowel sounds  Extremities: No obvious deformities, no cyanosis, no edema  Neurological: Alert and oriented x3, EOMI, moving all extremities x4  Psychological: Normal mood and affect, cooperative  Skin: Color, texture, and turgor normal for age          Labs/Imaging/Diagnostics     Lab Results   Component Value Date     07/16/2020    K 3.9 07/16/2020     07/16/2020    CO2 23 07/16/2020    BUN 9 07/16/2020    CREATININE 0.9 07/16/2020    GLUCOSE 106 07/16/2020    CALCIUM 9.8 07/16/2020        Estimated Creatinine Clearance: 79 mL/min (based on SCr of 0.9 mg/dL). Lab Results   Component Value Date    WBC 9.3 07/16/2020    HGB 14.3 07/16/2020    HCT 43.0 07/16/2020    MCV 97.5 07/16/2020     07/16/2020       Lab Results   Component Value Date    ALT 66 (H) 07/16/2020    AST 84 (H) 07/16/2020    ALKPHOS 80 07/16/2020    BILITOT 0.7 07/16/2020       Lab Results   Component Value Date    LABALBU 4.9 07/16/2020       No results found for: CHOL  No results found for: TRIG  No results found for: HDL  No results found for: LDLCHOLESTEROL, LDLCALC  No results found for: LABVLDL, VLDL  No results found for: CHOLHDLRATIO    Lab Results   Component Value Date    TROPONINI <0.01 07/16/2020       No results found for: BNP      No results found for: LABA1C  No results found for: EAG     Pertinent Cardiovascular Studies:  EKG: Normal sinus rhythm with age-indeterminate inferior infarct. 2 unifocal PVCs that appear to originate from the basal inferior wall       Assessment and Plan:        72-year-old -American male with a history above. He appears to have good functional capacity and has no symptoms of angina and no signs or symptoms of heart failure.   He has occasional PVCs that appear to originate from a basal inferior focus probably related to his old MI. He reports having had hepatotoxicity with Rosuvastatin and simvastatin although the details are unknown. He says he had hepatitis C that is cured. Diagnosis Orders   1. Coronary artery disease involving native coronary artery of native heart without angina pectoris  EKG 12 Lead    Hepatic Function Panel    CK    LIPID PANEL    ECHO COMPLETE   2. History of MI (myocardial infarction)     3. Asymptomatic PVCs     4. Essential hypertension     5. Dyslipidemia        · Baseline CK and LFTs. If normal then start atorvastatin 10 mg daily with a goal of reaching 80 mg daily by doubling the dose every 2 weeks and checking follow-up LFTs every 4 weeks until maximum dose is reached. · Lipid panel  · Echocardiogram due to the history of myocardial infarction, hypertension, PVCs     Follow up with me in 12 months    We appreciate the opportunity to participate in His care!       Gina Randolph MD  Interventional Cardiology/Structural Heart Disease  Cell: [phone]

## 2020-09-19 PROBLEM — I10 ESSENTIAL HYPERTENSION: Status: ACTIVE | Noted: 2020-09-19

## 2020-09-19 PROBLEM — I25.10 CORONARY ARTERY DISEASE INVOLVING NATIVE CORONARY ARTERY OF NATIVE HEART WITHOUT ANGINA PECTORIS: Status: ACTIVE | Noted: 2020-09-19

## 2020-09-19 PROBLEM — I49.3 ASYMPTOMATIC PVCS: Status: ACTIVE | Noted: 2020-09-19

## 2020-09-19 PROBLEM — E78.5 DYSLIPIDEMIA: Status: ACTIVE | Noted: 2020-09-19

## 2020-09-19 PROBLEM — I25.2 HISTORY OF MI (MYOCARDIAL INFARCTION): Status: ACTIVE | Noted: 2020-09-19

## 2020-09-21 ENCOUNTER — HOSPITAL ENCOUNTER (OUTPATIENT)
Age: 70
Discharge: HOME | End: 2020-09-21
Payer: MEDICARE

## 2020-09-21 LAB
ALBUMIN SERPL-MCNC: 4.5 G/DL (ref 3.5–5.2)
ALP BLD-CCNC: 79 U/L (ref 40–129)
ALT SERPL-CCNC: 63 U/L (ref 0–40)
AST SERPL-CCNC: 93 U/L (ref 0–39)
BILIRUB SERPL-MCNC: 0.8 MG/DL (ref 0–1.2)
BILIRUBIN DIRECT: 0.3 MG/DL (ref 0–0.3)
BILIRUBIN, INDIRECT: 0.5 MG/DL (ref 0–1)
CHOLESTEROL, TOTAL: 170 MG/DL (ref 0–199)
HDLC SERPL-MCNC: 64 MG/DL
LDL CHOLESTEROL CALCULATED: 88 MG/DL (ref 0–99)
TOTAL CK: 359 U/L (ref 20–200)
TOTAL PROTEIN: 8.3 G/DL (ref 6.4–8.3)
TRIGL SERPL-MCNC: 90 MG/DL (ref 0–149)
VLDLC SERPL CALC-MCNC: 18 MG/DL

## 2020-09-21 PROCEDURE — 36415 COLL VENOUS BLD VENIPUNCTURE: CPT

## 2020-09-21 PROCEDURE — 80061 LIPID PANEL: CPT

## 2020-09-21 PROCEDURE — 80076 HEPATIC FUNCTION PANEL: CPT

## 2020-09-21 PROCEDURE — 82550 ASSAY OF CK (CPK): CPT

## 2020-09-22 NOTE — RESULT ENCOUNTER NOTE
Let us hold off on starting a statin at this point. Will forward this to Dr. Payton Feliciano for appropriate work-up for the elevated CK and transaminases.

## 2020-09-30 ENCOUNTER — TELEPHONE (OUTPATIENT)
Dept: VASCULAR SURGERY | Age: 70
End: 2020-09-30

## 2020-09-30 ENCOUNTER — OFFICE VISIT (OUTPATIENT)
Dept: VASCULAR SURGERY | Age: 70
End: 2020-09-30
Payer: MEDICARE

## 2020-09-30 VITALS
WEIGHT: 164 LBS | DIASTOLIC BLOOD PRESSURE: 62 MMHG | BODY MASS INDEX: 24.29 KG/M2 | RESPIRATION RATE: 16 BRPM | SYSTOLIC BLOOD PRESSURE: 120 MMHG | HEIGHT: 69 IN

## 2020-09-30 PROCEDURE — G8420 CALC BMI NORM PARAMETERS: HCPCS | Performed by: SURGERY

## 2020-09-30 PROCEDURE — 99204 OFFICE O/P NEW MOD 45 MIN: CPT | Performed by: SURGERY

## 2020-09-30 PROCEDURE — 4040F PNEUMOC VAC/ADMIN/RCVD: CPT | Performed by: SURGERY

## 2020-09-30 PROCEDURE — 3017F COLORECTAL CA SCREEN DOC REV: CPT | Performed by: SURGERY

## 2020-09-30 PROCEDURE — 1036F TOBACCO NON-USER: CPT | Performed by: SURGERY

## 2020-09-30 PROCEDURE — 1123F ACP DISCUSS/DSCN MKR DOCD: CPT | Performed by: SURGERY

## 2020-09-30 PROCEDURE — G8427 DOCREV CUR MEDS BY ELIG CLIN: HCPCS | Performed by: SURGERY

## 2020-10-28 ENCOUNTER — HOSPITAL ENCOUNTER (OUTPATIENT)
Dept: ULTRASOUND IMAGING | Age: 70
Discharge: HOME | End: 2020-10-30
Payer: MEDICARE

## 2020-10-28 ENCOUNTER — TELEPHONE (OUTPATIENT)
Dept: CARDIOLOGY | Age: 70
End: 2020-10-28

## 2020-10-28 ENCOUNTER — HOSPITAL ENCOUNTER (OUTPATIENT)
Dept: INTERVENTIONAL RADIOLOGY/VASCULAR | Age: 70
Discharge: HOME | End: 2020-10-30
Payer: MEDICARE

## 2020-10-28 PROCEDURE — 93922 UPR/L XTREMITY ART 2 LEVELS: CPT

## 2020-10-28 PROCEDURE — 93880 EXTRACRANIAL BILAT STUDY: CPT | Performed by: RADIOLOGY

## 2020-10-28 PROCEDURE — 76775 US EXAM ABDO BACK WALL LIM: CPT

## 2020-10-28 PROCEDURE — 93880 EXTRACRANIAL BILAT STUDY: CPT

## 2020-10-28 PROCEDURE — 76775 US EXAM ABDO BACK WALL LIM: CPT | Performed by: RADIOLOGY

## 2020-10-30 ENCOUNTER — HOSPITAL ENCOUNTER (OUTPATIENT)
Dept: CARDIOLOGY | Age: 70
Discharge: HOME | End: 2020-10-30
Payer: MEDICARE

## 2020-10-30 ENCOUNTER — TELEPHONE (OUTPATIENT)
Dept: VASCULAR SURGERY | Age: 70
End: 2020-10-30

## 2020-10-30 LAB
LV EF: 53 %
LVEF MODALITY: NORMAL

## 2020-10-30 PROCEDURE — 93306 TTE W/DOPPLER COMPLETE: CPT

## 2020-10-30 NOTE — TELEPHONE ENCOUNTER
Left message that JOSÉ MIGUEL was normal, no aneurysm on aorta ultrasound and no significant carotid artery stenosis. Asked him to call with any questions or increasing symptoms.

## 2020-12-31 PROBLEM — M79.604 PAIN IN BOTH LOWER EXTREMITIES: Chronic | Status: ACTIVE | Noted: 2020-12-31

## 2020-12-31 PROBLEM — M79.605 PAIN IN BOTH LOWER EXTREMITIES: Chronic | Status: ACTIVE | Noted: 2020-12-31

## 2020-12-31 NOTE — PROGRESS NOTES
Vascular Surgery Outpatient Consultation        Reason for Consult:    Chief Complaint   Patient presents with   Bibi Kirkpatrick     new pt. PAD       Requesting Physician:  No referring provider defined for this encounter. Chief Complaint   Patient presents with   Bibi Leggett Consultation     new pt. PAD       HISTORY OF PRESENT ILLNESS:                The patient is a 78 y.o. male who is referred for evaluation of patient with a history of intermittent lower extremity pain and discomfort. Overall he is doing well. He denies any lower extremity rest pain or ulcerations. He was sent over for an evaluation for potential peripheral vascular disease. He denies any chest pain shortness of breath or discomfort. He denies any right-sided left-sided weakness numbness or vision changes. He denies any lower extremity ulcerations. He gets some intermittent pain and discomfort with walking but is not lifestyle limiting. He has no other current issues or current complaints. .    Past Medical History:        Diagnosis Date    Arthritis     CAD (coronary artery disease)     [facility]     Hyperlipidemia     Hypertension     MI [facility]myocardial infarction) [facility]     Prostate cancer ([facility] [facility]      Past Surgical History:        Procedure Laterality Date    CORONARY ANGIOPLASTY WITH STENT PLACEMENT  2002    @ [facility]    FOOT SURGERY      lt foot    FOOT SURGERY      rt foot as well    PROSTATE BIOPSY       Current Medications:   Prior to Admission medications    Medication Sig Start Date End Date Taking?  Authorizing Provider   tamsulosin (FLOMAX) 0.4 MG capsule Take 0.4 mg by mouth daily   Yes Historical Provider, MD   Multiple Vitamin (MULTI-VITAMIN DAILY PO) Take by mouth daily   Yes Historical Provider, MD   mineral oil-hydrophilic petrolatum (AQUAPHOR) ointment Apply topically as needed for Dry Skin A   Yes Historical Provider, MD   amLODIPine (NORVASC) 10 MG tablet Take 10 mg by mouth daily Yes Historical Provider, MD   oxyCODONE HCl (OXY-IR) 10 MG immediate release tablet TK 1 T PO Q 6 H PRN P 18  Yes Historical Provider, MD   metoprolol tartrate (LOPRESSOR) 50 MG tablet Take 50 mg by mouth 2 times daily   Yes Historical Provider, MD   clopidogrel (PLAVIX) 75 MG tablet Take 75 mg by mouth daily   Yes Historical Provider, MD   pantoprazole (PROTONIX) 40 MG tablet Take 40 mg by mouth daily   Yes Historical Provider, MD   Cetirizine HCl (ZYRTEC PO) Take by mouth daily    Yes Historical Provider, MD   aspirin 81 MG tablet Take 81 mg by mouth daily   Yes Historical Provider, MD     Allergies:  Crestor [rosuvastatin] and Simvastatin    Social History     Socioeconomic History    Marital status: Single     Spouse name: Not on file    Number of children: Not on file    Years of education: Not on file    Highest education level: Not on file   Occupational History    Not on file   Social Needs    Financial resource strain: Not on file    Food insecurity     Worry: Not on file     Inability: Not on file    Transportation needs     Medical: Not on file     Non-medical: Not on file   Tobacco Use    Smoking status: Former Smoker     Packs/day: 1.00     Years: 40.00     Pack years: 40.00     Types: Cigarettes     Quit date: 2013     Years since quittin.3    Smokeless tobacco: Never Used   Substance and Sexual Activity    Alcohol use: Yes     Frequency: 4 or more times a week     Drinks per session: 1 or 2     Binge frequency: Never     Comment: 1-2 beers daily     Drug use: Not Currently     Types: Cocaine     Comment: none since     Sexual activity: Not on file   Lifestyle    Physical activity     Days per week: Not on file     Minutes per session: Not on file    Stress: Not on file   Relationships    Social connections     Talks on phone: Not on file     Gets together: Not on file     Attends Rastafari service: Not on file     Active member of club or organization: Not on file Attends meetings of clubs or organizations: Not on file     Relationship status: Not on file    Intimate partner violence     Fear of current or ex partner: Not on file     Emotionally abused: Not on file     Physically abused: Not on file     Forced sexual activity: Not on file   Other Topics Concern    Not on file   Social History Narrative    Drinks 2 cups of coffee daily.          Family History   Problem Relation Age of Onset    Stroke Mother     Diabetes Mother     Heart Attack Father     Heart Attack Brother        REVIEW OF SYSTEMS (New symptoms):    Eyes:      Blurred vision:  No [x]/Yes []               Diplopia:   No [x]/Yes []               Vision loss:       No [x]/Yes []   Ears, nose, throat:             Hearing loss:    No [x]/Yes []      Vertigo:   No [x]/Yes []                       Swallowing problem:  No [x]/Yes []               Nose bleeds:   No [x]/Yes []      Voice hoarseness:  No [x]/Yes []  Respiratory:             Cough:   No [x]/Yes []      Pleuritic chest pain:  No [x]/Yes []                        Dyspnea:   No [x]/Yes []      Wheezing:   No [x]/Yes []  Cardiovascular:             Angina:   No [x]/Yes []      Palpitations:   No [x]/Yes []          Claudication:    No [x]/Yes []      Leg swelling:   No [x]/Yes []  Gastrointestinal:             Nausea or vomiting:  No [x]/Yes []               Abdominal pain:  No [x]/Yes []                     Intestinal bleeding: No [x]/Yes []  Musculoskeletal:             Leg pain:   No [x]/Yes []      Back pain:   No [x]/Yes []                    Weakness:   No [x]/Yes []  Neurologic:             Numbness:   No [x]/Yes []      Paralysis:   No [x]/Yes []                       Headaches:   No [x]/Yes []  Hematologic, lymphatic:   Anemia:   No [x]/Yes []              Bleeding or bruising:  No [x]/Yes []              Fevers or chills: No [x]/Yes []  Endocrine:             Temp intolerance:   No [x]/Yes []                       Polydipsia, polyuria:  No [x]/Yes []  Skin:              Rash:    No [x]/Yes []      Ulcers:   No [x]/Yes []              Abnorm pigment: No [x]/Yes []  :              Frequency/urgency:  No [x]/Yes []      Hematuria:    No [x]/Yes []                      Incontinence:    No [x]/Yes []  History of present illness  PHYSICAL EXAM:  Vitals:    20 1027   BP: 120/62   Resp: 16     General Appearance: alert and oriented to person, place and time, well developed and well- nourished, in no acute distress  Skin: warm and dry, no rash or erythema  Head: normocephalic and atraumatic  Eyes: extraocular eye movements intact, conjunctivae normal  ENT: external ear and ear canal normal bilaterally, nose without deformity  Pulmonary/Chest: clear to auscultation bilaterally- no wheezes, rales or rhonchi, normal air movement, no respiratory distress  Cardiovascular: normal rate, regular rhythm, normal S1 and S2, no murmurs, no carotid bruits  Abdomen: soft, non-tender, non-distended, normal bowel sounds, no masses or organomegaly  Musculoskeletal: normal range of motion, no joint swelling, deformity or tenderness  Neurologic: no cranial nerve deficit, gait, coordination and speech normal  Extremities: no leg edema bilaterally    PULSE EXAM      Right      Left   Brachial 3 3   Radial 3 3   Femoral 3 3   Popliteal     Dorsalis Pedis 1 1   Posterior Tibial 1 1   (3=normal, 2=diminished, 1=barely palpable, 4=widened)  Narrative   Patient MRN:  [id number]   : 1950   Age: 78 years   Gender: Male   Order Date:  10/28/2020 10:40 AM   EXAM: US CAROTID ARTERY BILATERAL   NUMBER OF IMAGES:  51   INDICATION: I65.23 Bilateral carotid artery stenosis    carotid artery stenosis   What reading provider will be dictating this exam?->[facility]   COMPARISON: None       FINDINGS:   Velocities are within normal limits   ICA\CCA ratios are  within normal limits   The right vertebral artery doppler images demonstrate  antegrade flow.       The left vertebral artery doppler images demonstrate  antegrade flow. Grey scale images demonstrate mild plaque identified in the right and   left carotid arteries.               Impression   Atherosclerotic disease. No hemodynamically significant stenosis is   identified   Estimated stenosis by NASCET criteria in the proximal right carotid   artery is between 0% and 49%. Estimated stenosis by NASCET criteria in the proximal left carotid   artery is between 0% and 49%.             Problem List Items Addressed This Visit     Pain in both lower extremities (Chronic)          #1 lower extremity leg pain and discomfort. This is not lifestyle limiting. He has normal ABIs. With multiphasic waveforms and preserved digital pressures bilaterally. #2 mild carotid disease. Bilateral less than 50%. I personally reviewed all the imaging. He has some calcific atherosclerotic disease noted on B-mode imaging. But the velocities are essentially unremarkable. His aortic scan was also unremarkable for aortic aneurysmal disease    He can follow-up with his primary care physician. They can call if there is any questions or concerns or any issues. No follow-ups on file.

## 2021-02-15 ENCOUNTER — APPOINTMENT (OUTPATIENT)
Dept: CT IMAGING | Age: 71
End: 2021-02-15
Payer: MEDICARE

## 2021-02-15 ENCOUNTER — HOSPITAL ENCOUNTER (EMERGENCY)
Age: 71
Discharge: HOME | End: 2021-02-15
Attending: EMERGENCY MEDICINE
Payer: MEDICARE

## 2021-02-15 VITALS
DIASTOLIC BLOOD PRESSURE: 85 MMHG | HEART RATE: 70 BPM | HEIGHT: 69 IN | OXYGEN SATURATION: 97 % | SYSTOLIC BLOOD PRESSURE: 148 MMHG | WEIGHT: 164 LBS | RESPIRATION RATE: 15 BRPM | TEMPERATURE: 97.6 F | BODY MASS INDEX: 24.29 KG/M2

## 2021-02-15 DIAGNOSIS — N30.01 ACUTE CYSTITIS WITH HEMATURIA: Primary | ICD-10-CM

## 2021-02-15 LAB
ALBUMIN SERPL-MCNC: 4.3 G/DL (ref 3.5–5.2)
ALP BLD-CCNC: 76 U/L (ref 40–129)
ALT SERPL-CCNC: 55 U/L (ref 0–40)
ANION GAP SERPL CALCULATED.3IONS-SCNC: 10 MMOL/L (ref 7–16)
AST SERPL-CCNC: 87 U/L (ref 0–39)
BACTERIA: NORMAL /HPF
BASOPHILS ABSOLUTE: 0.08 E9/L (ref 0–0.2)
BASOPHILS RELATIVE PERCENT: 0.9 % (ref 0–2)
BILIRUB SERPL-MCNC: 0.8 MG/DL (ref 0–1.2)
BILIRUBIN URINE: NEGATIVE
BLOOD, URINE: ABNORMAL
BUN BLDV-MCNC: 10 MG/DL (ref 8–23)
CALCIUM SERPL-MCNC: 9.1 MG/DL (ref 8.6–10.2)
CHLORIDE BLD-SCNC: 99 MMOL/L (ref 98–107)
CLARITY: ABNORMAL
CO2: 27 MMOL/L (ref 22–29)
COLOR: ABNORMAL
CREAT SERPL-MCNC: 1 MG/DL (ref 0.7–1.2)
EOSINOPHILS ABSOLUTE: 0.71 E9/L (ref 0.05–0.5)
EOSINOPHILS RELATIVE PERCENT: 8.1 % (ref 0–6)
GFR AFRICAN AMERICAN: >60
GFR NON-AFRICAN AMERICAN: >60 ML/MIN/1.73
GLUCOSE BLD-MCNC: 93 MG/DL (ref 74–99)
GLUCOSE URINE: NEGATIVE MG/DL
HCT VFR BLD CALC: 40.3 % (ref 37–54)
HEMOGLOBIN: 13.8 G/DL (ref 12.5–16.5)
IMMATURE GRANULOCYTES #: 0.04 E9/L
IMMATURE GRANULOCYTES %: 0.5 % (ref 0–5)
KETONES, URINE: ABNORMAL MG/DL
LEUKOCYTE ESTERASE, URINE: ABNORMAL
LYMPHOCYTES ABSOLUTE: 3.06 E9/L (ref 1.5–4)
LYMPHOCYTES RELATIVE PERCENT: 35.1 % (ref 20–42)
MCH RBC QN AUTO: 33.3 PG (ref 26–35)
MCHC RBC AUTO-ENTMCNC: 34.2 % (ref 32–34.5)
MCV RBC AUTO: 97.3 FL (ref 80–99.9)
MONOCYTES ABSOLUTE: 1.16 E9/L (ref 0.1–0.95)
MONOCYTES RELATIVE PERCENT: 13.3 % (ref 2–12)
NEUTROPHILS ABSOLUTE: 3.67 E9/L (ref 1.8–7.3)
NEUTROPHILS RELATIVE PERCENT: 42.1 % (ref 43–80)
NITRITE, URINE: POSITIVE
PDW BLD-RTO: 11.9 FL (ref 11.5–15)
PH UA: 7 (ref 5–9)
PLATELET # BLD: 264 E9/L (ref 130–450)
PMV BLD AUTO: 10 FL (ref 7–12)
POTASSIUM SERPL-SCNC: 3.7 MMOL/L (ref 3.5–5)
PROTEIN UA: >=300 MG/DL
RBC # BLD: 4.14 E12/L (ref 3.8–5.8)
RBC UA: NORMAL /HPF (ref 0–2)
SODIUM BLD-SCNC: 136 MMOL/L (ref 132–146)
SPECIFIC GRAVITY UA: <=1.005 (ref 1–1.03)
TOTAL PROTEIN: 8.2 G/DL (ref 6.4–8.3)
UROBILINOGEN, URINE: 2 E.U./DL
WBC # BLD: 8.7 E9/L (ref 4.5–11.5)
WBC UA: NORMAL /HPF (ref 0–5)

## 2021-02-15 PROCEDURE — 6370000000 HC RX 637 (ALT 250 FOR IP): Performed by: STUDENT IN AN ORGANIZED HEALTH CARE EDUCATION/TRAINING PROGRAM

## 2021-02-15 PROCEDURE — 85025 COMPLETE CBC W/AUTO DIFF WBC: CPT

## 2021-02-15 PROCEDURE — 36415 COLL VENOUS BLD VENIPUNCTURE: CPT

## 2021-02-15 PROCEDURE — 6360000004 HC RX CONTRAST MEDICATION: Performed by: RADIOLOGY

## 2021-02-15 PROCEDURE — 74177 CT ABD & PELVIS W/CONTRAST: CPT

## 2021-02-15 PROCEDURE — 51798 US URINE CAPACITY MEASURE: CPT

## 2021-02-15 PROCEDURE — 2580000003 HC RX 258: Performed by: STUDENT IN AN ORGANIZED HEALTH CARE EDUCATION/TRAINING PROGRAM

## 2021-02-15 PROCEDURE — 80053 COMPREHEN METABOLIC PANEL: CPT

## 2021-02-15 PROCEDURE — 99284 EMERGENCY DEPT VISIT MOD MDM: CPT

## 2021-02-15 PROCEDURE — 81001 URINALYSIS AUTO W/SCOPE: CPT

## 2021-02-15 RX ORDER — CEFDINIR 300 MG/1
300 CAPSULE ORAL 2 TIMES DAILY
Qty: 14 CAPSULE | Refills: 0 | Status: SHIPPED | OUTPATIENT
Start: 2021-02-15 | End: 2021-02-22

## 2021-02-15 RX ORDER — 0.9 % SODIUM CHLORIDE 0.9 %
1000 INTRAVENOUS SOLUTION INTRAVENOUS ONCE
Status: COMPLETED | OUTPATIENT
Start: 2021-02-15 | End: 2021-02-15

## 2021-02-15 RX ORDER — CEFDINIR 300 MG/1
300 CAPSULE ORAL ONCE
Status: COMPLETED | OUTPATIENT
Start: 2021-02-15 | End: 2021-02-15

## 2021-02-15 RX ADMIN — CEFDINIR 300 MG: 300 CAPSULE ORAL at 18:53

## 2021-02-15 RX ADMIN — SODIUM CHLORIDE 1000 ML: 9 INJECTION, SOLUTION INTRAVENOUS at 18:33

## 2021-02-15 RX ADMIN — IOPAMIDOL 75 ML: 755 INJECTION, SOLUTION INTRAVENOUS at 17:36

## 2021-02-15 ASSESSMENT — ENCOUNTER SYMPTOMS
ABDOMINAL DISTENTION: 1
NAUSEA: 0
SORE THROAT: 0
DIARRHEA: 0
CHEST TIGHTNESS: 0
PHOTOPHOBIA: 0
BLOOD IN STOOL: 0
ABDOMINAL PAIN: 0
BACK PAIN: 0
CONSTIPATION: 0
SHORTNESS OF BREATH: 0
RHINORRHEA: 0
VOMITING: 0
ANAL BLEEDING: 0

## 2021-02-15 NOTE — ED PROVIDER NOTES
Neurological: Negative for dizziness, light-headedness and headaches. Psychiatric/Behavioral: Negative for confusion. All other systems reviewed and are negative. Physical Exam  Vitals signs and nursing note reviewed. Constitutional:       General: He is not in acute distress. Appearance: Normal appearance. He is not ill-appearing. HENT:      Head: Normocephalic and atraumatic. Mouth/Throat:      Mouth: Mucous membranes are moist.   Eyes:      Extraocular Movements: Extraocular movements intact. Pupils: Pupils are equal, round, and reactive to light. Cardiovascular:      Rate and Rhythm: Normal rate and regular rhythm. Pulses: Normal pulses. Pulmonary:      Effort: Pulmonary effort is normal.      Breath sounds: Normal breath sounds. No wheezing or rhonchi. Abdominal:      General: There is distension. Palpations: Abdomen is soft. Tenderness: There is no abdominal tenderness. There is no guarding or rebound. Musculoskeletal:      Right lower leg: No edema. Left lower leg: No edema. Lymphadenopathy:      Cervical: No cervical adenopathy. Skin:     General: Skin is warm and dry. Neurological:      Mental Status: He is alert and oriented to person, place, and time. Psychiatric:         Mood and Affect: Mood normal.         Behavior: Behavior normal.          Procedures     MDM  Number of Diagnoses or Management Options  Acute cystitis with hematuria  Diagnosis management comments: Patient is a 62-year-old male that presents emergency department for evaluation of hematuria. Patient resting comfortably in no obvious distress on exam.  Does have bright red blood in the urine however it is clear appearing at this time. Of postvoid residual showed urine residual 126. Urinalysis consistent with urinary tract infection. Patient was started on Omnicef. Remainder blood work was unremarkable as unremarkable.   Discussed possible [facility] placement however patient is adamantly against this and will follow up with urology as an outpatient. Symptoms for return to the emergency room were discussed with patient.            --------------------------------------------- PAST HISTORY ---------------------------------------------  Past Medical History:  has a past medical history of Arthritis, CAD (coronary artery disease), [facility] Hyperlipidemia, Hypertension, MI (myocardial infarction) ([facility] and Prostate cancer ([facility] Past Surgical History:  has a past surgical history that includes Prostate biopsy; Foot surgery; Foot surgery; and Coronary angioplasty with stent (2002). Social History:  reports that he quit smoking about 7 years ago. His smoking use included cigarettes. He has a 40.00 pack-year smoking history. He has never used smokeless tobacco. He reports current alcohol use. He reports previous drug use. Drug: Cocaine. Family History: family history includes Diabetes in his mother; Heart Attack in his brother and father; Stroke in his mother. The patients home medications have been reviewed.     Allergies: Crestor [rosuvastatin] and Simvastatin    -------------------------------------------------- RESULTS -------------------------------------------------  Labs:  Results for orders placed or performed during the hospital encounter of 02/15/21   CBC Auto Differential   Result Value Ref Range    WBC 8.7 4.5 - 11.5 E9/L    RBC 4.14 3.80 - 5.80 E12/L    Hemoglobin 13.8 12.5 - 16.5 g/dL    Hematocrit 40.3 37.0 - 54.0 %    MCV 97.3 80.0 - 99.9 fL    MCH 33.3 26.0 - 35.0 pg    MCHC 34.2 32.0 - 34.5 %    RDW 11.9 11.5 - 15.0 fL    Platelets 461 044 - 346 E9/L    MPV 10.0 7.0 - 12.0 fL    Neutrophils % 42.1 (L) 43.0 - 80.0 %    Immature Granulocytes % 0.5 0.0 - 5.0 %    Lymphocytes % 35.1 20.0 - 42.0 %    Monocytes % 13.3 (H) 2.0 - 12.0 %    Eosinophils % 8.1 (H) 0.0 - 6.0 %    Basophils % 0.9 0.0 - 2.0 %    Neutrophils Absolute 3. 67 1.80 - 7.30 E9/L    Immature Granulocytes # 0.04 E9/L    Lymphocytes Absolute 3.06 1.50 - 4.00 E9/L    Monocytes Absolute 1.16 (H) 0.10 - 0.95 E9/L    Eosinophils Absolute 0.71 (H) 0.05 - 0.50 E9/L    Basophils Absolute 0.08 0.00 - 0.20 E9/L   Comprehensive Metabolic Panel   Result Value Ref Range    Sodium 136 132 - 146 mmol/L    Potassium 3.7 3.5 - 5.0 mmol/L    Chloride 99 98 - 107 mmol/L    CO2 27 22 - 29 mmol/L    Anion Gap 10 7 - 16 mmol/L    Glucose 93 74 - 99 mg/dL    BUN 10 8 - 23 mg/dL    CREATININE 1.0 0.7 - 1.2 mg/dL    GFR Non-African American >60 >=60 mL/min/1.73    GFR African American >60     Calcium 9.1 8.6 - 10.2 mg/dL    Total Protein 8.2 6.4 - 8.3 g/dL    Albumin 4.3 3.5 - 5.2 g/dL    Total Bilirubin 0.8 0.0 - 1.2 mg/dL    Alkaline Phosphatase 76 40 - 129 U/L    ALT 55 (H) 0 - 40 U/L    AST 87 (H) 0 - 39 U/L   Urinalysis   Result Value Ref Range    Color, UA BLOODY Straw/Yellow    Clarity, UA SL CLOUDY Clear    Glucose, Ur Negative Negative mg/dL    Bilirubin Urine Negative Negative    Ketones, Urine TRACE (A) Negative mg/dL    Specific Gravity, UA <=1.005 1.005 - 1.030    Blood, Urine LARGE (A) Negative    pH, UA 7.0 5.0 - 9.0    Protein, UA >=300 (A) Negative mg/dL    Urobilinogen, Urine 2.0 (A) <2.0 E.U./dL    Nitrite, Urine POSITIVE (A) Negative    Leukocyte Esterase, Urine MODERATE (A) Negative   Microscopic Urinalysis   Result Value Ref Range    WBC, UA 2-5 0 - 5 /HPF    RBC, UA PACKED 0 - 2 /HPF    Bacteria, UA NONE SEEN None Seen /HPF       Radiology:  CT ABDOMEN PELVIS W IV CONTRAST Additional Contrast? None   Final Result   Dilated fluid-filled small bowel loops concerning for ileus/enteritis. There   is no bowel obstruction.    Enlarged prostate gland with mass effect on the urinary bladder which is   somewhat thickened and hazy concerning for cystitis.          ------------------------- NURSING NOTES AND VITALS REVIEWED ---------------------------  Date / Time Roomed:  2/15/2021 discussed the case, including pertinent history (medical, surgical, family and social) and exam findings with the Resident and the Nurse assigned to Adan Cuellar. I have personally performed and/or participated in the history, exam, medical decision making, and procedures and agree with all pertinent clinical information. I have reviewed my findings and recommendations with Adan Cuellar and members of family present at the time of disposition. I, Dr. Keyla Nick am the primary physician of record for this patient. MDM: The patient is 73 y.o. male  with a past medical history of       Diagnosis Date    Arthritis     CAD (coronary artery disease)     [facility]ronic obstructive [facility]) [facility]     Hyperlipidemia     Hypertension     MI (myocardial infarction) ([facility]     [facility]      presenting to the emergency department with a chief complaint of hematuria. Differential diagnosis includes but not limited to, urinary tract infection, anemia. The patient did have labs which were reviewed, CBC unremarkable, CMP unremarkable, patient with urinary tract infection revealing acute cystitis. Patient will be discharged with antibiotics. Follow-up outpatient. My findings/plan: The encounter diagnosis was Acute cystitis with hematuria.   Discharge Medication List as of 2/15/2021  6:48 PM      START taking these medications    Details   cefdinir (OMNICEF) 300 MG capsule Take 1 capsule by mouth 2 times daily for 7 days, Disp-14 capsule, R-0Print           Gretta Bolden, [address] [place] [address],   02/19/21 6198

## 2021-02-19 ASSESSMENT — ENCOUNTER SYMPTOMS
EYE REDNESS: 0
RECTAL PAIN: 0

## 2021-02-28 ENCOUNTER — HOSPITAL ENCOUNTER (EMERGENCY)
Age: 71
Discharge: HOME | End: 2021-02-28
Attending: EMERGENCY MEDICINE
Payer: MEDICARE

## 2021-02-28 VITALS
BODY MASS INDEX: 24.29 KG/M2 | DIASTOLIC BLOOD PRESSURE: 92 MMHG | WEIGHT: 164 LBS | HEIGHT: 69 IN | OXYGEN SATURATION: 98 % | TEMPERATURE: 97 F | HEART RATE: 115 BPM | RESPIRATION RATE: 16 BRPM | SYSTOLIC BLOOD PRESSURE: 140 MMHG

## 2021-02-28 DIAGNOSIS — N30.01 ACUTE CYSTITIS WITH HEMATURIA: Primary | ICD-10-CM

## 2021-02-28 DIAGNOSIS — R33.9 URINARY RETENTION: ICD-10-CM

## 2021-02-28 LAB
ALBUMIN SERPL-MCNC: 4.4 G/DL (ref 3.5–5.2)
ALP BLD-CCNC: 91 U/L (ref 40–129)
ALT SERPL-CCNC: 64 U/L (ref 0–40)
ANION GAP SERPL CALCULATED.3IONS-SCNC: 17 MMOL/L (ref 7–16)
AST SERPL-CCNC: 93 U/L (ref 0–39)
ATYPICAL LYMPHOCYTE RELATIVE PERCENT: 1.7 % (ref 0–4)
BACTERIA: ABNORMAL /HPF
BASOPHILS ABSOLUTE: 0.09 E9/L (ref 0–0.2)
BASOPHILS RELATIVE PERCENT: 0.9 % (ref 0–2)
BILIRUB SERPL-MCNC: 0.8 MG/DL (ref 0–1.2)
BILIRUBIN URINE: ABNORMAL
BLOOD, URINE: ABNORMAL
BUN BLDV-MCNC: 7 MG/DL (ref 8–23)
CALCIUM SERPL-MCNC: 9.5 MG/DL (ref 8.6–10.2)
CHLORIDE BLD-SCNC: 103 MMOL/L (ref 98–107)
CLARITY: ABNORMAL
CO2: 20 MMOL/L (ref 22–29)
COLOR: ABNORMAL
CREAT SERPL-MCNC: 1 MG/DL (ref 0.7–1.2)
EOSINOPHILS ABSOLUTE: 0.26 E9/L (ref 0.05–0.5)
EOSINOPHILS RELATIVE PERCENT: 2.6 % (ref 0–6)
GFR AFRICAN AMERICAN: >60
GFR NON-AFRICAN AMERICAN: >60 ML/MIN/1.73
GLUCOSE BLD-MCNC: 104 MG/DL (ref 74–99)
GLUCOSE URINE: NEGATIVE MG/DL
HCT VFR BLD CALC: 43.3 % (ref 37–54)
HEMOGLOBIN: 14.3 G/DL (ref 12.5–16.5)
KETONES, URINE: ABNORMAL MG/DL
LACTIC ACID: 3 MMOL/L (ref 0.5–2.2)
LEUKOCYTE ESTERASE, URINE: ABNORMAL
LYMPHOCYTES ABSOLUTE: 4.9 E9/L (ref 1.5–4)
LYMPHOCYTES RELATIVE PERCENT: 47 % (ref 20–42)
MAGNESIUM: 1.5 MG/DL (ref 1.6–2.6)
MCH RBC QN AUTO: 31.9 PG (ref 26–35)
MCHC RBC AUTO-ENTMCNC: 33 % (ref 32–34.5)
MCV RBC AUTO: 96.7 FL (ref 80–99.9)
MONOCYTES ABSOLUTE: 1 E9/L (ref 0.1–0.95)
MONOCYTES RELATIVE PERCENT: 9.6 % (ref 2–12)
NEUTROPHILS ABSOLUTE: 3.8 E9/L (ref 1.8–7.3)
NEUTROPHILS RELATIVE PERCENT: 38.3 % (ref 43–80)
NITRITE, URINE: POSITIVE
PDW BLD-RTO: 11.8 FL (ref 11.5–15)
PH UA: 6.5 (ref 5–9)
PLATELET # BLD: 308 E9/L (ref 130–450)
PMV BLD AUTO: 10.1 FL (ref 7–12)
POLYCHROMASIA: ABNORMAL
POTASSIUM REFLEX MAGNESIUM: 3.3 MMOL/L (ref 3.5–5)
PROTEIN UA: >=300 MG/DL
RBC # BLD: 4.48 E12/L (ref 3.8–5.8)
RBC UA: ABNORMAL /HPF (ref 0–2)
SODIUM BLD-SCNC: 140 MMOL/L (ref 132–146)
SPECIFIC GRAVITY UA: 1.01 (ref 1–1.03)
TOTAL PROTEIN: 8.8 G/DL (ref 6.4–8.3)
UROBILINOGEN, URINE: 4 E.U./DL
WBC # BLD: 10 E9/L (ref 4.5–11.5)
WBC UA: ABNORMAL /HPF (ref 0–5)

## 2021-02-28 PROCEDURE — 83605 ASSAY OF LACTIC ACID: CPT

## 2021-02-28 PROCEDURE — 99283 EMERGENCY DEPT VISIT LOW MDM: CPT

## 2021-02-28 PROCEDURE — 51798 US URINE CAPACITY MEASURE: CPT

## 2021-02-28 PROCEDURE — 83735 ASSAY OF MAGNESIUM: CPT

## 2021-02-28 PROCEDURE — 81001 URINALYSIS AUTO W/SCOPE: CPT

## 2021-02-28 PROCEDURE — 87040 BLOOD CULTURE FOR BACTERIA: CPT

## 2021-02-28 PROCEDURE — 85025 COMPLETE CBC W/AUTO DIFF WBC: CPT

## 2021-02-28 PROCEDURE — 6370000000 HC RX 637 (ALT 250 FOR IP): Performed by: STUDENT IN AN ORGANIZED HEALTH CARE EDUCATION/TRAINING PROGRAM

## 2021-02-28 PROCEDURE — 87088 URINE BACTERIA CULTURE: CPT

## 2021-02-28 PROCEDURE — 80053 COMPREHEN METABOLIC PANEL: CPT

## 2021-02-28 RX ORDER — SULFAMETHOXAZOLE AND TRIMETHOPRIM 800; 160 MG/1; MG/1
1 TABLET ORAL 2 TIMES DAILY
COMMUNITY
Start: 2021-02-15 | End: 2021-02-28

## 2021-02-28 RX ORDER — POTASSIUM CHLORIDE 20 MEQ/1
20 TABLET, EXTENDED RELEASE ORAL ONCE
Status: COMPLETED | OUTPATIENT
Start: 2021-02-28 | End: 2021-02-28

## 2021-02-28 RX ORDER — CEFDINIR 300 MG/1
300 CAPSULE ORAL 2 TIMES DAILY
Qty: 14 CAPSULE | Refills: 0 | Status: SHIPPED | OUTPATIENT
Start: 2021-02-28 | End: 2021-03-07

## 2021-02-28 RX ORDER — CEFDINIR 300 MG/1
300 CAPSULE ORAL 2 TIMES DAILY
COMMUNITY
End: 2021-02-28

## 2021-02-28 RX ADMIN — POTASSIUM CHLORIDE 20 MEQ: 1500 TABLET, EXTENDED RELEASE ORAL at 18:38

## 2021-02-28 ASSESSMENT — ENCOUNTER SYMPTOMS
NAUSEA: 0
COUGH: 0
EYE DISCHARGE: 0
VOMITING: 0
CONSTIPATION: 0
BACK PAIN: 0
RHINORRHEA: 0
ABDOMINAL PAIN: 1
CHEST TIGHTNESS: 0
SINUS PAIN: 0
DIARRHEA: 0
ABDOMINAL DISTENTION: 0
SHORTNESS OF BREATH: 0
SINUS PRESSURE: 0
ANAL BLEEDING: 0

## 2021-02-28 NOTE — ED TRIAGE NOTES
FIRST PROVIDER CONTACT ASSESSMENT NOTE      Department of Emergency Medicine   Admit Date: No admission date for patient encounter. Chief Complaint: No chief complaint on file. History of Present Illness:    Murphy Mayfield is a 73 y.o. male who presents to the ED for urinary retention and hematuria. He states he started passing blood about a week ago and then it stopped. It resumed this morning and now he cannot urinate and was passing clots.          -----------------END OF FIRST PROVIDER CONTACT ASSESSMENT NOTE--------------  Electronically signed by Katarina Echevarria PA-C   DD: 2/28/21

## 2021-02-28 NOTE — ED PROVIDER NOTES
HPI     Patient is a 73 y.o. [place] a past medical history of prostate cancer who presents with a chief complaint of difficulty urinating  This has been occurring for few weeks. Patient states that it gets better with nothing. Patient states that it gets worse with nothing. Patient states that it is severe in severity. Patient presents with chief complaint of hematuria. Patient has known prostate cancer. Patient stated that he has been passing blood clots for the past couple weeks. Patient notes that he was in the ED 2 weeks ago and was sent home on antibiotics. Patient states that she was able to urinate this morning. Patient was able to urinate since being in the ED. Notes that there was multiple blood clots. Patient states that otherwise he feels well. States that his abdomen is now benign. Patient [place] any fevers, chills, nausea, vomiting, chest pain, shortness of breath, abdominal pain, change in in bowel habits. Patient states that he has a urology appointment on the fifth. Review of Systems   Constitutional: Negative for activity change, appetite change, fatigue and fever. HENT: Negative for congestion, rhinorrhea, sinus pressure and sinus pain. Eyes: Negative for discharge. Respiratory: Negative for cough, chest tightness and shortness of breath. Cardiovascular: Negative for chest pain and palpitations. Gastrointestinal: Positive for abdominal pain. Negative for abdominal distention, anal bleeding, constipation, diarrhea, nausea and vomiting. Endocrine: Negative for polydipsia and polyuria. Genitourinary: Positive for frequency. Negative for decreased urine volume, difficulty urinating, enuresis, flank pain and urgency. Musculoskeletal: Negative for arthralgias, back pain and neck stiffness. Skin: Negative for rash and wound. Neurological: Negative for dizziness, weakness, light-headedness and headaches.    Psychiatric/Behavioral: Negative for agitation, behavioral problems and confusion. Physical Exam  Vitals signs and nursing note reviewed. Constitutional:       Appearance: He is well-developed. HENT:      Head: Normocephalic and atraumatic. Eyes:      Conjunctiva/sclera: Conjunctivae normal.   Neck:      Musculoskeletal: Normal range of motion and neck supple. Cardiovascular:      Rate and Rhythm: Normal rate and regular rhythm. Heart sounds: Normal heart sounds. No murmur. Pulmonary:      Effort: Pulmonary effort is normal. No respiratory distress. Breath sounds: Normal breath sounds. No wheezing or rales. Abdominal:      General: Bowel sounds are normal.      Palpations: Abdomen is soft. Tenderness: There is no abdominal tenderness. There is no guarding or rebound. Comments: Patient's abdomen is benign. No obvious palpable mass in the lower quadrant. Musculoskeletal:         General: No tenderness or deformity. Skin:     General: Skin is warm and dry. Neurological:      Mental Status: He is alert and oriented to person, place, and time. Cranial Nerves: No cranial nerve deficit. Coordination: Coordination normal.          Procedures     MDM     ED Course as of Feb 28 2124   Sun Feb 28, 2021   1600 Patient was bladder scanned 20 minutes post void. Approximately a 200 cc of fluid. [JM]   5629 Patient had a another urination while in the ED. Significantly bloody. [JM]   3579 Spoke contact Novemberpoke to urology who stated that patient can be discharged with follow up. [JM]      ED Course User Index  [JM] Ralph Arriaga MD      Patient is a 73 y.o. male presenting with hematuria and decreased bladder frequency. Patient states that he has a history of prostate cancer. Patient still with this issue in the past.  Patient basic work-up. Patient's kidney function is benign. Patient noted to have a acute cystitis. Patient was previously on Bactrim. Patient will be switched to [facility].   Patient had a post void bladder scan that indicated approximately 200 cc of urine 45 minutes after urinating. Will discuss case with urology. Patient was able to urinate again while in the ED. Urology stated that patient follow-up as an outpatient. Patient was noted to have a UTI. Patient stated that his previous urinary tract infection improved significantly with Omnicef. Patient will be discharged home on [address] Patient will follow closely with his urologist.    Patient was given return precautions. Patient will follow up with their primary care provider. Patient is agreeable to this plan. Patient has remained stable throughout their stay in the ED. Patient was seen and evaluated by myself and my attending Joel Garcia DO. Assessment and Plan discussed with attending provider, please see attestation for final plan of care. This note was done using dictation software and there may be some grammatical errors associated with this. Zeyad Abdullahi MD         ED Course as of Feb 28 2126   Sun Feb 28, 2021   1600 Patient was bladder scanned 20 minutes post void. Approximately a 200 cc of fluid. []   0464 Patient had a another urination while in the ED. Significantly bloody. []   1223 Spoke contact Novemberpo to urology who stated that patient can be discharged with follow up. []      ED Course User Index  [JM] Zeyad Abdullahi MD       --------------------------------------------- PAST HISTORY ---------------------------------------------  Past Medical History:  has a past medical history of Arthritis, CAD (coronary artery disease), COPD (chronic obstructive pulmonary disease) ([facility] Hyperlipidemia, Hypertension, MI (myocardial infarction) ([facility] and [facility] Past Surgical History:  has a past surgical history that includes Prostate biopsy; Foot surgery; Foot surgery; and Coronary angioplasty with stent (2002). Social History:  reports that he quit smoking about 7 years ago. His smoking use included cigarettes. He has a 40.00 pack-year smoking history. He has never used smokeless tobacco. He reports current alcohol use. He reports previous drug use. Drug: Cocaine. Family History: family history includes Diabetes in his mother; Heart Attack in his brother and father; Stroke in his mother. The patients home medications have been reviewed.     Allergies: Crestor [rosuvastatin] and Simvastatin    -------------------------------------------------- RESULTS -------------------------------------------------  Labs:  Results for orders placed or performed during the hospital encounter of 02/28/21   CBC Auto Differential   Result Value Ref Range    WBC 10.0 4.5 - 11.5 E9/L    RBC 4.48 3.80 - 5.80 E12/L    Hemoglobin 14.3 12.5 - 16.5 g/dL    Hematocrit 43.3 37.0 - 54.0 %    MCV 96.7 80.0 - 99.9 fL    MCH 31.9 26.0 - 35.0 pg    MCHC 33.0 32.0 - 34.5 %    RDW 11.8 11.5 - 15.0 fL    Platelets 413 325 - 530 E9/L    MPV 10.1 7.0 - 12.0 fL    Neutrophils % 38.3 (L) 43.0 - 80.0 %    Lymphocytes % 47.0 (H) 20.0 - 42.0 %    Monocytes % 9.6 2.0 - 12.0 %    Eosinophils % 2.6 0.0 - 6.0 %    Basophils % 0.9 0.0 - 2.0 %    Neutrophils Absolute 3.80 1.80 - 7.30 E9/L    Lymphocytes Absolute 4.90 (H) 1.50 - 4.00 E9/L    Monocytes Absolute 1.00 (H) 0.10 - 0.95 E9/L    Eosinophils Absolute 0.26 0.05 - 0.50 E9/L    Basophils Absolute 0.09 0.00 - 0.20 E9/L    Atypical Lymphocytes Relative 1.7 0.0 - 4.0 %    Polychromasia 1+    Comprehensive Metabolic Panel w/ Reflex to MG   Result Value Ref Range    Sodium 140 132 - 146 mmol/L    Potassium reflex Magnesium 3.3 (L) 3.5 - 5.0 mmol/L    Chloride 103 98 - 107 mmol/L    CO2 20 (L) 22 - 29 mmol/L    Anion Gap 17 (H) 7 - 16 mmol/L    Glucose 104 (H) 74 - 99 mg/dL    BUN 7 (L) 8 - 23 mg/dL    CREATININE 1.0 0.7 - 1.2 mg/dL    GFR Non-African American >60 >=60 mL/min/1.73    GFR African American >60     Calcium 9.5 8.6 - 10.2 mg/dL    Total Protein 8.8 (H) 6.4 - 8.3 g/dL Albumin 4.4 3.5 - 5.2 g/dL    Total Bilirubin 0.8 0.0 - 1.2 mg/dL    Alkaline Phosphatase 91 40 - 129 U/L    ALT 64 (H) 0 - 40 U/L    AST 93 (H) 0 - 39 U/L   Urinalysis, reflex to microscopic   Result Value Ref Range    Color, UA RED (A) Straw/Yellow    Clarity, UA CLOUDY (A) Clear    Glucose, Ur Negative Negative mg/dL    Bilirubin Urine MODERATE (A) Negative    Ketones, Urine TRACE (A) Negative mg/dL    Specific Gravity, UA 1.010 1.005 - 1.030    Blood, Urine LARGE (A) Negative    pH, UA 6.5 5.0 - 9.0    Protein, UA >=300 (A) Negative mg/dL    Urobilinogen, Urine 4.0 (A) <2.0 E.U./dL    Nitrite, Urine POSITIVE (A) Negative    Leukocyte Esterase, Urine MODERATE (A) Negative   Lactic Acid, Plasma   Result Value Ref Range    Lactic Acid 3.0 (H) 0.5 - 2.2 mmol/L   Microscopic Urinalysis   Result Value Ref Range    WBC, UA 10-20 (A) 0 - 5 /HPF    RBC, UA PACKED 0 - 2 /HPF    Bacteria, UA MODERATE (A) None Seen /HPF   Magnesium   Result Value Ref Range    Magnesium 1.5 (L) 1.6 - 2.6 mg/dL       Radiology:  No orders to display       ------------------------- NURSING NOTES AND VITALS REVIEWED ---------------------------  Date / Time Roomed:  2/28/2021  3:25 PM  ED Bed Assignment:  39/39    The nursing notes within the ED encounter and vital signs as below have been reviewed. BP (!) 140/92   Pulse 115   Temp 97 °F (36.1 °C)   Resp 16   Ht 5' 9\" (1.753 m)   Wt 164 lb (74.4 kg)   SpO2 98%   BMI 24.22 kg/m²   Oxygen Saturation Interpretation: Normal      ------------------------------------------ PROGRESS NOTES ------------------------------------------  9:26 PM EST  I have spoken with the patient and discussed todays results, in addition to providing specific details for the plan of care and counseling regarding the diagnosis and prognosis. Their questions are answered at this time and they are agreeable with the plan. I discussed at length with them reasons for immediate return here for re evaluation.  They will followup with their primary care physician by calling their office tomorrow. --------------------------------- ADDITIONAL PROVIDER NOTES ---------------------------------  At this time the patient is without objective evidence of an acute process requiring hospitalization or inpatient management. They have remained hemodynamically stable throughout their entire ED visit and are stable for discharge with outpatient follow-up. The plan has been discussed in detail and they are aware of the specific conditions for emergent return, as well as the importance of follow-up. Discharge Medication List as of 2/28/2021  6:29 PM          Diagnosis:  1. Acute cystitis with hematuria    2. Urinary retention        Disposition:  Patient's disposition: Discharge to home  Patient's condition is stable.        Tio Moe MD  Resident  02/28/21 3065

## 2021-03-02 LAB — URINE CULTURE, ROUTINE: NORMAL

## 2021-03-05 LAB
BLOOD CULTURE, ROUTINE: NORMAL
CULTURE, BLOOD 2: NORMAL

## 2021-03-12 RX ORDER — ALBUTEROL SULFATE 90 UG/1
1 AEROSOL, METERED RESPIRATORY (INHALATION) 4 TIMES DAILY PRN
COMMUNITY
Start: 2021-02-10

## 2021-03-12 RX ORDER — FLUTICASONE PROPIONATE 50 MCG
1 SPRAY, SUSPENSION (ML) NASAL DAILY
COMMUNITY
Start: 2020-10-27

## 2021-03-12 RX ORDER — SILDENAFIL 100 MG/1
TABLET, FILM COATED ORAL
COMMUNITY
Start: 2020-05-15

## 2021-03-12 NOTE — PROGRESS NOTES
Have you been tested for COVID  No    To be completed 03/17/21       Have you been told you were positive for COVID No  Have you had any known exposure to someone that is positive for COVID No  Do you have a cough                   No              Do you have shortness of breath No                 Do you have a sore throat            No                Are you having chills                    No                Are you having muscle aches. No                      Please come to the hospital wearing a mask and have your significant other wear a mask as well. Both of you should check your temperature before leaving to come here,  if it is 100 or higher please call [phone] for instruction.

## 2021-03-12 NOTE — PROGRESS NOTES
[facility] PRE-ADMISSION TESTING INSTRUCTIONS    The Preadmission Testing patient is instructed accordingly using the following criteria (check applicable):    ARRIVAL INSTRUCTIONS:  [x] Parking the day of Surgery is located in the Main Entrance lot. Upon entering the door, make an immediate right to the surgery reception desk    [x] Bring photo ID and insurance card    [] Bring in a copy of Living will or Durable Power of  papers. [x] Please be sure to arrange for responsible adult to provide transportation to and from the hospital    [x] Please arrange for responsible adult to be with you for the 24 hour period post procedure due to having anesthesia      GENERAL INSTRUCTIONS:    [x] Nothing by mouth after midnight, including gum, candy, mints or water    [x] You may brush your teeth, but do not swallow any water    [x] Take medications as instructed with 1-2 oz of water    [x] Stop herbal supplements and vitamins 5 days prior to procedure    [x] Follow preop dosing of blood thinners per physician instructions    [] Take 1/2 dose of evening insulin, but no insulin after midnight    [] No oral diabetic medications after midnight    [] If diabetic and have low blood sugar or feel symptomatic, take 1-2oz apple juice only    [x] Bring inhalers day of surgery    [] Bring C-PAP/ Bi-Pap day of surgery    [] Bring urine specimen day of surgery    [x] Shower or bath with soap, lather and rinse well, AM of Surgery, no lotion, powders or creams to surgical site    [] Follow bowel prep as instructed per surgeon    [x] No tobacco products within 24 hours of surgery     [x] No alcohol or illegal drug use within 24 hours of surgery.     [x] Jewelry, body piercing's, eyeglasses, contact lenses and dentures are not permitted into surgery (bring cases)      [] Please do not wear any nail polish, make up or hair products on the day of surgery    [x] You can expect a call the business day prior to procedure to notify you if your arrival time changes    [x] If you receive a survey after surgery we would greatly appreciate your comments    [] Parent/guardian of a minor must accompany their child and remain on the premises  the entire time they are under our care     [] Pediatric patients may bring favorite toy, blanket or comfort item with them    [] A caregiver or family member must remain with the patient during their stay if they are mentally handicapped, have dementia, disoriented or unable to use a call light or would be a safety concern if left unattended    [x] Please notify surgeon if you develop any illness between now and time of surgery (cold, cough, sore throat, fever, nausea, vomiting) or any signs of infections  including skin, wounds, and dental.    [x]  The Outpatient Pharmacy is available to fill your prescription here on your day of surgery, ask your preop nurse for details    [] Other instructions    EDUCATIONAL MATERIALS PROVIDED:    [] PAT Preoperative Education Packet/Booklet     [] Medication List    [] Transfusion bracelet applied with instructions    [] Shower with soap, lather and rinse well, and use CHG wipes provided the evening before surgery as instructed    [] Incentive spirometer with instructions

## 2021-03-16 ENCOUNTER — PREP FOR PROCEDURE (OUTPATIENT)
Dept: UROLOGY | Age: 71
End: 2021-03-16

## 2021-03-16 RX ORDER — SODIUM CHLORIDE 0.9 % (FLUSH) 0.9 %
10 SYRINGE (ML) INJECTION EVERY 12 HOURS SCHEDULED
Status: CANCELLED | OUTPATIENT
Start: 2021-03-16

## 2021-03-16 RX ORDER — SODIUM CHLORIDE 0.9 % (FLUSH) 0.9 %
10 SYRINGE (ML) INJECTION PRN
Status: CANCELLED | OUTPATIENT
Start: 2021-03-16

## 2021-03-16 RX ORDER — SODIUM CHLORIDE 9 MG/ML
INJECTION, SOLUTION INTRAVENOUS CONTINUOUS
Status: CANCELLED | OUTPATIENT
Start: 2021-03-16

## 2021-03-17 ENCOUNTER — HOSPITAL ENCOUNTER (OUTPATIENT)
Age: 71
Discharge: HOME | End: 2021-03-19
Payer: MEDICARE

## 2021-03-17 DIAGNOSIS — Z01.818 PREOP TESTING: ICD-10-CM

## 2021-03-17 PROCEDURE — U0003 INFECTIOUS AGENT DETECTION BY NUCLEIC ACID (DNA OR RNA); SEVERE ACUTE RESPIRATORY SYNDROME CORONAVIRUS 2 (SARS-COV-2) (CORONAVIRUS DISEASE [COVID-19]), AMPLIFIED PROBE TECHNIQUE, MAKING USE OF HIGH THROUGHPUT TECHNOLOGIES AS DESCRIBED BY CMS-2020-01-R: HCPCS

## 2021-03-18 LAB
SARS-COV-2: NOT DETECTED
SOURCE: NORMAL

## 2021-03-22 ENCOUNTER — ANESTHESIA EVENT (OUTPATIENT)
Dept: OPERATING ROOM | Age: 71
End: 2021-03-22
Payer: MEDICARE

## 2021-03-22 ENCOUNTER — ANESTHESIA (OUTPATIENT)
Dept: OPERATING ROOM | Age: 71
End: 2021-03-22
Payer: MEDICARE

## 2021-03-22 ENCOUNTER — HOSPITAL ENCOUNTER (OUTPATIENT)
Age: 71
Setting detail: OUTPATIENT SURGERY
Discharge: HOME | End: 2021-03-22
Attending: STUDENT IN AN ORGANIZED HEALTH CARE EDUCATION/TRAINING PROGRAM | Admitting: STUDENT IN AN ORGANIZED HEALTH CARE EDUCATION/TRAINING PROGRAM
Payer: MEDICARE

## 2021-03-22 ENCOUNTER — HOSPITAL ENCOUNTER (OUTPATIENT)
Dept: GENERAL RADIOLOGY | Age: 71
Discharge: HOME | End: 2021-03-24
Attending: STUDENT IN AN ORGANIZED HEALTH CARE EDUCATION/TRAINING PROGRAM
Payer: MEDICARE

## 2021-03-22 VITALS
RESPIRATION RATE: 16 BRPM | SYSTOLIC BLOOD PRESSURE: 142 MMHG | HEART RATE: 82 BPM | BODY MASS INDEX: 24.73 KG/M2 | DIASTOLIC BLOOD PRESSURE: 82 MMHG | HEIGHT: 69 IN | TEMPERATURE: 96.8 F | OXYGEN SATURATION: 96 % | WEIGHT: 167 LBS

## 2021-03-22 VITALS — SYSTOLIC BLOOD PRESSURE: 115 MMHG | OXYGEN SATURATION: 97 % | DIASTOLIC BLOOD PRESSURE: 71 MMHG

## 2021-03-22 DIAGNOSIS — Z01.818 PREOP TESTING: Primary | ICD-10-CM

## 2021-03-22 DIAGNOSIS — R73.09 OTHER ABNORMAL GLUCOSE: ICD-10-CM

## 2021-03-22 PROCEDURE — 3600000012 HC SURGERY LEVEL 2 ADDTL 15MIN: Performed by: STUDENT IN AN ORGANIZED HEALTH CARE EDUCATION/TRAINING PROGRAM

## 2021-03-22 PROCEDURE — 7100000010 HC PHASE II RECOVERY - FIRST 15 MIN: Performed by: STUDENT IN AN ORGANIZED HEALTH CARE EDUCATION/TRAINING PROGRAM

## 2021-03-22 PROCEDURE — 2500000003 HC RX 250 WO HCPCS: Performed by: NURSE ANESTHETIST, CERTIFIED REGISTERED

## 2021-03-22 PROCEDURE — 2709999900 HC NON-CHARGEABLE SUPPLY: Performed by: STUDENT IN AN ORGANIZED HEALTH CARE EDUCATION/TRAINING PROGRAM

## 2021-03-22 PROCEDURE — C1769 GUIDE WIRE: HCPCS | Performed by: STUDENT IN AN ORGANIZED HEALTH CARE EDUCATION/TRAINING PROGRAM

## 2021-03-22 PROCEDURE — 6360000002 HC RX W HCPCS: Performed by: NURSE PRACTITIONER

## 2021-03-22 PROCEDURE — 88305 TISSUE EXAM BY PATHOLOGIST: CPT

## 2021-03-22 PROCEDURE — 2720000010 HC SURG SUPPLY STERILE: Performed by: STUDENT IN AN ORGANIZED HEALTH CARE EDUCATION/TRAINING PROGRAM

## 2021-03-22 PROCEDURE — 6360000002 HC RX W HCPCS: Performed by: NURSE ANESTHETIST, CERTIFIED REGISTERED

## 2021-03-22 PROCEDURE — 7100000011 HC PHASE II RECOVERY - ADDTL 15 MIN: Performed by: STUDENT IN AN ORGANIZED HEALTH CARE EDUCATION/TRAINING PROGRAM

## 2021-03-22 PROCEDURE — 3700000001 HC ADD 15 MINUTES (ANESTHESIA): Performed by: STUDENT IN AN ORGANIZED HEALTH CARE EDUCATION/TRAINING PROGRAM

## 2021-03-22 PROCEDURE — 3600000002 HC SURGERY LEVEL 2 BASE: Performed by: STUDENT IN AN ORGANIZED HEALTH CARE EDUCATION/TRAINING PROGRAM

## 2021-03-22 PROCEDURE — 3700000000 HC ANESTHESIA ATTENDED CARE: Performed by: STUDENT IN AN ORGANIZED HEALTH CARE EDUCATION/TRAINING PROGRAM

## 2021-03-22 PROCEDURE — 6370000000 HC RX 637 (ALT 250 FOR IP): Performed by: ANESTHESIOLOGY

## 2021-03-22 PROCEDURE — 3209999900 FLUORO FOR SURGICAL PROCEDURES

## 2021-03-22 PROCEDURE — 2580000003 HC RX 258: Performed by: NURSE ANESTHETIST, CERTIFIED REGISTERED

## 2021-03-22 PROCEDURE — C1758 CATHETER, URETERAL: HCPCS | Performed by: STUDENT IN AN ORGANIZED HEALTH CARE EDUCATION/TRAINING PROGRAM

## 2021-03-22 RX ORDER — SODIUM CHLORIDE 9 MG/ML
INJECTION, SOLUTION INTRAVENOUS CONTINUOUS
Status: DISCONTINUED | OUTPATIENT
Start: 2021-03-22 | End: 2021-03-22 | Stop reason: HOSPADM

## 2021-03-22 RX ORDER — SODIUM CHLORIDE 9 MG/ML
INJECTION, SOLUTION INTRAVENOUS CONTINUOUS PRN
Status: DISCONTINUED | OUTPATIENT
Start: 2021-03-22 | End: 2021-03-22

## 2021-03-22 RX ORDER — FENTANYL CITRATE 50 UG/ML
INJECTION, SOLUTION INTRAMUSCULAR; INTRAVENOUS PRN
Status: DISCONTINUED | OUTPATIENT
Start: 2021-03-22 | End: 2021-03-22 | Stop reason: SDUPTHER

## 2021-03-22 RX ORDER — SODIUM CHLORIDE 0.9 % (FLUSH) 0.9 %
10 SYRINGE (ML) INJECTION PRN
Status: DISCONTINUED | OUTPATIENT
Start: 2021-03-22 | End: 2021-03-22 | Stop reason: HOSPADM

## 2021-03-22 RX ORDER — SODIUM CHLORIDE 9 MG/ML
INJECTION, SOLUTION INTRAVENOUS CONTINUOUS PRN
Status: DISCONTINUED | OUTPATIENT
Start: 2021-03-22 | End: 2021-03-22 | Stop reason: SDUPTHER

## 2021-03-22 RX ORDER — LIDOCAINE HYDROCHLORIDE 20 MG/ML
INJECTION, SOLUTION EPIDURAL; INFILTRATION; INTRACAUDAL; PERINEURAL PRN
Status: DISCONTINUED | OUTPATIENT
Start: 2021-03-22 | End: 2021-03-22 | Stop reason: SDUPTHER

## 2021-03-22 RX ORDER — PROPOFOL 10 MG/ML
INJECTION, EMULSION INTRAVENOUS PRN
Status: DISCONTINUED | OUTPATIENT
Start: 2021-03-22 | End: 2021-03-22 | Stop reason: SDUPTHER

## 2021-03-22 RX ORDER — PROPOFOL 10 MG/ML
INJECTION, EMULSION INTRAVENOUS CONTINUOUS PRN
Status: DISCONTINUED | OUTPATIENT
Start: 2021-03-22 | End: 2021-03-22 | Stop reason: SDUPTHER

## 2021-03-22 RX ORDER — OXYCODONE HYDROCHLORIDE 5 MG/1
10 TABLET ORAL ONCE
Status: COMPLETED | OUTPATIENT
Start: 2021-03-22 | End: 2021-03-22

## 2021-03-22 RX ORDER — SODIUM CHLORIDE 0.9 % (FLUSH) 0.9 %
10 SYRINGE (ML) INJECTION EVERY 12 HOURS SCHEDULED
Status: DISCONTINUED | OUTPATIENT
Start: 2021-03-22 | End: 2021-03-22 | Stop reason: HOSPADM

## 2021-03-22 RX ADMIN — PROPOFOL 150 MCG/KG/MIN: 10 INJECTION, EMULSION INTRAVENOUS at 10:30

## 2021-03-22 RX ADMIN — FENTANYL CITRATE 50 MCG: 50 INJECTION, SOLUTION INTRAMUSCULAR; INTRAVENOUS at 10:45

## 2021-03-22 RX ADMIN — PROPOFOL 50 MG: 10 INJECTION, EMULSION INTRAVENOUS at 10:45

## 2021-03-22 RX ADMIN — LIDOCAINE HYDROCHLORIDE 20 MG: 20 INJECTION, SOLUTION EPIDURAL; INFILTRATION; INTRACAUDAL; PERINEURAL at 10:38

## 2021-03-22 RX ADMIN — OXYCODONE HYDROCHLORIDE 10 MG: 5 TABLET ORAL at 11:43

## 2021-03-22 RX ADMIN — FENTANYL CITRATE 50 MCG: 50 INJECTION, SOLUTION INTRAMUSCULAR; INTRAVENOUS at 10:38

## 2021-03-22 RX ADMIN — SODIUM CHLORIDE: 9 INJECTION, SOLUTION INTRAVENOUS at 09:36

## 2021-03-22 RX ADMIN — Medication 2 G: at 10:30

## 2021-03-22 RX ADMIN — PROPOFOL 50 MG: 10 INJECTION, EMULSION INTRAVENOUS at 10:30

## 2021-03-22 RX ADMIN — LIDOCAINE HYDROCHLORIDE 40 MG: 20 INJECTION, SOLUTION EPIDURAL; INFILTRATION; INTRACAUDAL; PERINEURAL at 10:45

## 2021-03-22 ASSESSMENT — PULMONARY FUNCTION TESTS
PIF_VALUE: 1
PIF_VALUE: 2
PIF_VALUE: 1
PIF_VALUE: 2

## 2021-03-22 ASSESSMENT — PAIN SCALES - GENERAL: PAINLEVEL_OUTOF10: 7

## 2021-03-22 ASSESSMENT — PAIN DESCRIPTION - DESCRIPTORS: DESCRIPTORS: BURNING

## 2021-03-22 ASSESSMENT — PAIN - FUNCTIONAL ASSESSMENT: PAIN_FUNCTIONAL_ASSESSMENT: 0-10

## 2021-03-22 NOTE — ANESTHESIA PRE PROCEDURE
mouth daily    Yes Historical Provider, MD   aspirin 81 MG tablet Take 81 mg by mouth daily   Yes Historical Provider, MD       Current medications:    Current Facility-Administered Medications   Medication Dose Route Frequency Provider Last Rate Last Admin    0.9 % sodium chloride infusion   Intravenous Continuous DAREN Enamorado CNP        ceFAZolin (ANCEF) 2000 mg in sterile water 20 mL IV syringe  2,000 mg Intravenous On Call to [address], APRN - CNP        sodium chloride flush 0.9 % injection 10 mL  10 mL Intravenous 2 times per day DAREN Enamorado CNP        sodium chloride flush 0.9 % injection 10 mL  10 mL Intravenous PRN DAREN Enamorado CNP         Facility-Administered Medications Ordered in Other Encounters   Medication Dose Route Frequency Provider Last Rate Last Admin    0.9 % sodium chloride infusion    Continuous PRN DAREN Amaya CRNA   New Bag at 03/22/21 0845       Allergies:     Allergies   Allergen Reactions    Simvastatin Other (See Comments)     Cramps per pt    Crestor [Rosuvastatin]        Problem List:    Patient Active Problem List   Diagnosis Code    Prostate cancer ([facility] [address] C61    Dyslipidemia E78.5    Essential hypertension I10    Asymptomatic PVCs I49.3    History of MI (myocardial infarction) I25.2    Coronary artery disease involving native coronary artery of native heart without angina pectoris I25.10    Pain in both lower extremities M79.604, M79.605       Past Medical History:        Diagnosis Date    Acid reflux     Arthritis     CAD (coronary artery disease)     follows with PCP    Cerebral artery occlusion with cerebral infarction ([facility] [address]     COPD (chronic obstructive pulmonary disease) ([facility] [facility]     Hematuria     Hyperlipidemia     Hypertension     MI (myocardial infarction) ([facility] [address]     Prostate cancer ([facility] [address]     Weak urinary stream     Wheezing        Past Surgical History:        Procedure Laterality Date    CORONARY ANGIOPLASTY WITH STENT PLACEMENT      @ [address] [employer] [address]      lt foot    FOOT SURGERY      rt foot as well    PROSTATE BIOPSY         Social History:    Social History     Tobacco Use    Smoking status: Former Smoker     Packs/day: 1.00     Years: 40.00     Pack years: 40.00     Types: Cigarettes     Quit date: 2013     Years since quittin.5    Smokeless tobacco: Never Used   Substance Use Topics    Alcohol use: Yes     Frequency: 4 or more times a week     Drinks per session: 1 or 2     Binge frequency: Never     Comment: 1-2 beers daily                                 Counseling given: Not Answered      Vital Signs (Current):   Vitals:    21 1014 21 0925   BP:  (!) 157/92   Pulse:  75   Resp:  18   Temp:  97.1 °F (36.2 °C)   TempSrc:  Temporal   SpO2:  97%   Weight: 167 lb (75.8 kg) 167 lb (75.8 kg)   Height: 5' 9\" (1.753 m) 5' 9\" (1.753 m)                                              BP Readings from Last 3 Encounters:   21 (!) 157/92   21 (!) 140/92   02/15/21 (!) 148/85       NPO Status:                                                                                 BMI:   Wt Readings from Last 3 Encounters:   21 167 lb (75.8 kg)   21 164 lb (74.4 kg)   02/15/21 164 lb (74.4 kg)     Body mass index is 24.66 kg/m².     CBC:   Lab Results   Component Value Date    WBC 10.0 2021    RBC 4.48 2021    HGB 14.3 2021    HCT 43.3 2021    MCV 96.7 2021    RDW 11.8 2021     2021       CMP:   Lab Results   Component Value Date     2021    K 3.3 2021     2021    CO2 20 2021    BUN 7 2021    CREATININE 1.0 2021    GFRAA >60 2021    LABGLOM >60 2021    GLUCOSE 104 2021    PROT 8.8 2021    CALCIUM 9.5 2021    BILITOT 0.8 2021    ALKPHOS 91 2021    AST 93 2021    ALT 64 2021       POC Tests: No results for input(s): POCGLU, POCNA, POCK, POCCL, POCBUN, POCHEMO, POCHCT in the last 72 hours. Coags:   Lab Results   Component Value Date    PROTIME 12.4 03/01/2016    INR 1.1 03/01/2016    APTT 29.1 03/01/2016       HCG (If Applicable): No results found for: PREGTESTUR, PREGSERUM, HCG, HCGQUANT     ABGs: No results found for: PHART, PO2ART, BCM8BBE, DXB9VVR, BEART, C1MNLZRH     Type & Screen (If Applicable):  No results found for: LABABO, LABRH    Drug/Infectious Status (If Applicable):  No results found for: HIV, HEPCAB    COVID-19 Screening (If Applicable):   Lab Results   Component Value Date    COVID19 Not Detected 03/17/2021           Anesthesia Evaluation  Patient summary reviewed  Airway: Mallampati: II  TM distance: >3 FB   Neck ROM: full  Mouth opening: > = 3 FB Dental:    (+) upper dentures and lower dentures      Pulmonary: breath sounds clear to auscultation  (+) COPD:                            ROS comment: Former smoker   Cardiovascular:  Exercise tolerance: no interval change,   (+) hypertension:, past MI: > 6 months, CAD: obstructive, CABG/stent:, hyperlipidemia      ECG reviewed  Rhythm: regular  Rate: normal  Echocardiogram reviewed                  Neuro/Psych:   (+) CVA: no interval change,             GI/Hepatic/Renal:   (+) GERD: well controlled,      (-) no renal disease and no morbid obesity       Endo/Other:        (-) diabetes mellitus, hypothyroidism               Abdominal:         (-) obese     Vascular: negative vascular ROS.  + PVD, aortic or cerebral, . Anesthesia Plan      MAC     ASA 3       Induction: intravenous. MIPS: Postoperative opioids intended and Prophylactic antiemetics administered. Anesthetic plan and risks discussed with patient and spouse. Plan discussed with CRNA.                   Carola Petit MD   3/22/2021

## 2021-03-22 NOTE — PROGRESS NOTES
Discharge and rodrigeus  instructions provided to patient and cousin, verbalized understanding. Vs stable.

## 2021-03-22 NOTE — OP NOTE
then used [facility] dilators over the wire inserted with 12 [employer] [address]ia and went all the way up to 24 [employer] [address]ia. At that time we were then able to place the cystoscope through this dense stricture and into the bladder. Full cystoscopic examination of the bladder revealed inflammatory masslike structure on the floor of the bladder in between the ureteral orifices. I was able to identify the right ureteral orifice however I was not able to identify the left ureteral orifice. There was no other tumors or abnormality seen within the bladder. Retrograde pyelogram on the right did not reveal any filling defects no hydronephrosis no hydroureter. This was a normal right retrograde pyelogram.  I was unable to find the left ureteral orifice and therefore a left ureteral retrograde pyelogram was not performed. At that time I then exchanged the cystoscope for the transurethral resectoscope which was placed under direct visualization using the visual obturator. At that time I then did resect the bladder mass on the floor of the bladder down to the level of the detrusor. These fragments were then sent for pathologic review. Hemostasis was maintained. I did not do an extensive resection as I am not sure whether this is radiation-induced changes or bladder tumor versus prostate cancer going up into the bladder. At that time should he need repeat resection based on pathology we will can come back. At that time I then left the patient's bladder full maintain the 0.035 Glidewire and a 20 Western Kia [place] catheter was placed over the wire for clear urine. This was irrigated and found to be in the bladder and with clear urine return. At that time is to terminate the procedure the patient taught the procedure well there is no complication the patient was awoke from anesthesia and taken the PACU in stable condition. Plan:    Bladder tissue for pathologic diagnosis.   Follow-up on Thursday for Millan removal  Follow-up with me in 1 week    Desiree Albright MD    Electronically signed by Rajwinder Jett MD on 3/22/2021 at 11:23 AM

## 2021-03-22 NOTE — ANESTHESIA PRE PROCEDURE
mouth daily    Yes Historical Provider, MD   aspirin 81 MG tablet Take 81 mg by mouth daily   Yes Historical Provider, MD       Current medications:    Current Facility-Administered Medications   Medication Dose Route Frequency Provider Last Rate Last Admin    0.9 % sodium chloride infusion   Intravenous Continuous DAREN Enamorado CNP        ceFAZolin (ANCEF) 2000 mg in sterile water 20 mL IV syringe  2,000 mg Intravenous On Call to [address], APRN - CNP        sodium chloride flush 0.9 % injection 10 mL  10 mL Intravenous 2 times per day DAREN Enamorado CNP        sodium chloride flush 0.9 % injection 10 mL  10 mL Intravenous PRN DAREN Enamorado CNP         Facility-Administered Medications Ordered in Other Encounters   Medication Dose Route Frequency Provider Last Rate Last Admin    0.9 % sodium chloride infusion    Continuous PRN DAREN Adams CRNA   New Bag at 03/22/21 0845       Allergies:     Allergies   Allergen Reactions    Simvastatin Other (See Comments)     Cramps per pt    Crestor [Rosuvastatin]        Problem List:    Patient Active Problem List   Diagnosis Code    Prostate cancer ([facility] [address] C61    Dyslipidemia E78.5    Essential hypertension I10    Asymptomatic PVCs I49.3    History of MI (myocardial infarction) I25.2    Coronary artery disease involving native coronary artery of native heart without angina pectoris I25.10    Pain in both lower extremities M79.604, M79.605       Past Medical History:        Diagnosis Date    Acid reflux     Arthritis     CAD (coronary artery disease)     follows with PCP    Cerebral artery occlusion with cerebral infarction ([facility] [address]     COPD (chronic obstructive pulmonary disease) ([facility] [address]     Hematuria     Hyperlipidemia     Hypertension     MI (myocardial infarction) ([facility] [address]     Prostate cancer ([facility] [address]     Weak urinary stream     Wheezing        Past Surgical History:        Procedure Laterality Date    [facility]    FOOT SURGERY      lt foot    FOOT SURGERY      rt foot as well    PROSTATE BIOPSY         Social History:    Social History     Tobacco Use    Smoking status: Former Smoker     Packs/day: 1.00     Years: 40.00     Pack years: 40.00     Types: Cigarettes     Quit date: 2013     Years since quittin.5    Smokeless tobacco: Never Used   Substance Use Topics    Alcohol use: Yes     Frequency: 4 or more times a week     Drinks per session: 1 or 2     Binge frequency: Never     Comment: 1-2 beers daily                                 Counseling given: Not Answered      Vital Signs (Current):   Vitals:    21 1014 21 0925   BP:  (!) 157/92   Pulse:  75   Resp:  18   Temp:  97.1 °F (36.2 °C)   TempSrc:  Temporal   SpO2:  97%   Weight: 167 lb (75.8 kg) 167 lb (75.8 kg)   Height: 5' 9\" (1.753 m) 5' 9\" (1.753 m)                                              BP Readings from Last 3 Encounters:   21 (!) 157/92   21 (!) 140/92   02/15/21 (!) 148/85       NPO Status: Time of last liquid consumption: 2300                                                                               BMI:   Wt Readings from Last 3 Encounters:   21 167 lb (75.8 kg)   21 164 lb (74.4 kg)   02/15/21 164 lb (74.4 kg)     Body mass index is 24.66 kg/m².     CBC:   Lab Results   Component Value Date    WBC 10.0 2021    RBC 4.48 2021    HGB 14.3 2021    HCT 43.3 2021    MCV 96.7 2021    RDW 11.8 2021     2021       CMP:   Lab Results   Component Value Date     2021    K 3.3 2021     2021    CO2 20 2021    BUN 7 2021    CREATININE 1.0 2021    GFRAA >60 2021    LABGLOM >60 2021    GLUCOSE 104 2021    PROT 8.8 2021    CALCIUM 9.5 2021    BILITOT 0.8 2021    ALKPHOS 91 2021    AST 93 2021    ALT 64 2021 POC Tests: No results for input(s): POCGLU, POCNA, POCK, POCCL, POCBUN, POCHEMO, POCHCT in the last 72 hours. Coags:   Lab Results   Component Value Date    PROTIME 12.4 03/01/2016    INR 1.1 03/01/2016    APTT 29.1 03/01/2016       HCG (If Applicable): No results found for: PREGTESTUR, PREGSERUM, HCG, HCGQUANT     ABGs: No results found for: PHART, PO2ART, BNJ5HME, ATA9EWT, BEART, B6XTRJHK     Type & Screen (If Applicable):  No results found for: LABABO, LABRH    Drug/Infectious Status (If Applicable):  No results found for: HIV, HEPCAB    COVID-19 Screening (If Applicable):   Lab Results   Component Value Date    COVID19 Not Detected 03/17/2021           Anesthesia Evaluation  Patient summary reviewed no history of anesthetic complications:   Airway: Mallampati: I  TM distance: >3 FB   Neck ROM: full   Dental:    (+) lower dentures and upper dentures      Pulmonary: breath sounds clear to auscultation  (+) COPD:                            ROS comment: Former Smoker   Cardiovascular:    (+) hypertension:, valvular problems/murmurs: MR, past MI: > 6 months, CAD: obstructive, CABG/stent:, hyperlipidemia        Rhythm: regular  Rate: normal                    Neuro/Psych:   (+) CVA:,             GI/Hepatic/Renal:   (+) GERD:,           Endo/Other:    (+) : arthritis: OA., malignancy/cancer (Prostate cancer ([facility] [address]). Abdominal:           Vascular: negative vascular ROS. Anesthesia Plan      MAC     ASA 3       Induction: intravenous. Anesthetic plan and risks discussed with patient. Plan discussed with CRNA.                   Jeremiah Colbert MD   3/22/2021

## 2021-03-22 NOTE — H&P
Sera Fields is a 73 y.o. male with prostate cancer status post radiation at the [address] [facility] [address] On office cystoscopy the patient had a dense urethral stricture and I was unable to pass the cystoscope through this. I did discuss with the patient the need for dilation due to his gross hematuria. All risk benefits alternatives were discussed with the patient informed consent was obtained and signed please see paper H&P for full details    Past Medical History:   Diagnosis Date    Acid reflux     Arthritis     CAD (coronary artery disease)     follows with PCP    Cerebral artery occlusion with cerebral infarction ([facility] [address]     COPD (chronic obstructive pulmonary disease) ([facility] [facility]     Hematuria     Hyperlipidemia     Hypertension     MI (myocardial infarction) ([facility] [address]     Prostate cancer ([facility] [address]     Weak urinary stream     Wheezing        Past Surgical History:   Procedure Laterality Date    CORONARY ANGIOPLASTY WITH STENT PLACEMENT  2002    [facility]    FOOT SURGERY      lt foot    FOOT SURGERY      rt foot as well    PROSTATE BIOPSY         Family History   Problem Relation Age of Onset    Stroke Mother     Diabetes Mother     Heart Attack Father     Heart Attack Brother        Prior to Admission medications    Medication Sig Start Date End Date Taking? Authorizing Provider   albuterol sulfate  (90 Base) MCG/ACT inhaler INHALE 1 PUFF BY MOUTH FOUR TIMES A DAY AS NEEDED FOR SHORTNESS OF BREATH OR WHEEZING. 2/10/21  Yes Historical Provider, MD   fluticasone (FLONASE) 50 MCG/ACT nasal spray USE 1 SPRAY IN EACH NOSTRIL EVERY DAY 10/27/20  Yes Historical Provider, MD   sildenafil (VIAGRA) 100 MG tablet TAKE ONE TABLET BY MOUTH AN HOUR_BEFORE SEX.  (NO MORE THAN 1 DOSE PER 24 HOURS) NO NITRATES 5/15/20  Yes Historical Provider, MD   mupirocin (BACTROBAN) 2 % ointment APPLY A SUFFICIENT AMOUNT EXTERNALLY THREE TIMES A DAY TO THE WOUND AS DIRECTED 7/21/20  Yes Historical Provider, MD   tamsulosin (FLOMAX) 0.4 MG capsule Take 0.4 mg by mouth daily   Yes Historical Provider, MD   Multiple Vitamin (MULTI-VITAMIN DAILY PO) Take 1 tablet by mouth daily Ld    Yes Historical Provider, MD   mineral oil-hydrophilic petrolatum (AQUAPHOR) ointment Apply topically as needed for Dry Skin A   Yes Historical Provider, MD   amLODIPine (NORVASC) 10 MG tablet Take 10 mg by mouth daily   Yes Historical Provider, MD   oxyCODONE HCl (OXY-IR) 10 MG immediate release tablet TK 1 T PO Q 6 H PRN P 18  Yes Historical Provider, MD   metoprolol tartrate (LOPRESSOR) 50 MG tablet Take 50 mg by mouth 2 times daily   Yes Historical Provider, MD   clopidogrel (PLAVIX) 75 MG tablet Take 75 mg by mouth daily   Yes Historical Provider, MD   pantoprazole (PROTONIX) 40 MG tablet Take 40 mg by mouth daily   Yes Historical Provider, MD   Cetirizine HCl (ZYRTEC PO) Take by mouth daily    Yes Historical Provider, MD   aspirin 81 MG tablet Take 81 mg by mouth daily   Yes Historical Provider, MD        Allergies: Simvastatin and Crestor [rosuvastatin]    Social History     Tobacco Use    Smoking status: Former Smoker     Packs/day: 1.00     Years: 40.00     Pack years: 40.00     Types: Cigarettes     Quit date: 2013     Years since quittin.5    Smokeless tobacco: Never Used   Substance Use Topics    Alcohol use: Yes     Frequency: 4 or more times a week     Drinks per session: 1 or 2     Binge frequency: Never     Comment: 1-2 beers daily         Review of Systems:  Respiratory: negative for cough and hemoptysis  Cardiovascular: negative for chest pain and dyspnea  Gastrointestinal: negative for abdominal pain, diarrhea, nausea and vomiting  Genitourinary: as per HPI, otherwise negative.   Derm: negative for rash and skin lesion(s)  Neurological: negative for seizures and tremors  Endocrine: negative for diabetic symptoms including polydipsia and polyuria  All other systems negative    Physical Exam:  Vitals:    21 0925   BP: (!) 157/92   Pulse: 75 Resp: 18   Temp: 97.1 °F (36.2 °C)   SpO2: 97%      Skin:  Warm and dry. No rash or bruises  HEENT:  PERRLA, EOMI  Neck:  No JVD, No thyromegaly  Cardiac:  RRR  Lungs:  No audible wheezes, symmetric respirations, non-labored  Abdomen: Soft, non-tender, non-distended  Extremities:  No clubbing, edema or cyanosis  Neurological:  Moves all extremities, normal DTR    Lab Results   Component Value Date    WBC 10.0 02/28/2021    HGB 14.3 02/28/2021    HCT 43.3 02/28/2021    MCV 96.7 02/28/2021     02/28/2021       Lab Results   Component Value Date    CREATININE 1.0 02/28/2021       No results found for: PSA    No results for input(s): LABURIN in the last 72 hours. No results for input(s): BC in the last 72 hours. No results for input(s): Jerlyn Sly in the last 72 hours. Assessment and Plan:  1.   Cystoscopy, retrograde pyelograms, urethral dilation    Isaías Morrell MD

## 2021-03-23 NOTE — ANESTHESIA POSTPROCEDURE EVALUATION
Department of Anesthesiology  Postprocedure Note    Patient: Shanita Denney  MRN: [id number]  YOB: 1950  Date of evaluation: 3/22/2021  Time:  8:52 PM     Procedure Summary     Date: 03/22/21 Room / Location: [facility] / [facility]    Anesthesia Start: 1030 Anesthesia Stop: 1114    Procedure: CYSTOSCOPY, RETROGRADE PYELOGRAM, URETHRAL DIALATION, TURBT (N/A ) Diagnosis: (URETHRAL STRICTURE)    Surgeons: Archie Campuzano MD Responsible Provider: Clayton Vazquez MD    Anesthesia Type: MAC ASA Status: 3          Anesthesia Type: MAC    Jose Phase I: Jose Score: 10    Jose Phase II: Jose Score: 10    Last vitals: Reviewed and per EMR flowsheets.        Anesthesia Post Evaluation    Patient location during evaluation: PACU  Patient participation: complete - patient participated  Level of consciousness: awake and alert  Airway patency: patent  Nausea & Vomiting: no nausea and no vomiting  Complications: no  Cardiovascular status: hemodynamically stable  Respiratory status: acceptable  Hydration status: stable

## 2021-05-12 ENCOUNTER — HOSPITAL ENCOUNTER (EMERGENCY)
Age: 71
Discharge: HOME | End: 2021-05-13
Payer: MEDICARE

## 2021-05-12 DIAGNOSIS — R31.0 GROSS HEMATURIA: Primary | ICD-10-CM

## 2021-05-12 DIAGNOSIS — R33.9 URINARY RETENTION: ICD-10-CM

## 2021-05-12 PROCEDURE — 51702 INSERT TEMP BLADDER CATH: CPT

## 2021-05-12 PROCEDURE — 99284 EMERGENCY DEPT VISIT MOD MDM: CPT

## 2021-05-12 RX ORDER — LIDOCAINE HYDROCHLORIDE 20 MG/ML
JELLY TOPICAL PRN
Status: DISCONTINUED | OUTPATIENT
Start: 2021-05-12 | End: 2021-05-13 | Stop reason: HOSPADM

## 2021-05-12 ASSESSMENT — PAIN SCALES - GENERAL: PAINLEVEL_OUTOF10: 10

## 2021-05-13 ENCOUNTER — HOSPITAL ENCOUNTER (INPATIENT)
Age: 71
LOS: 1 days | Discharge: HOME | DRG: 700 | End: 2021-05-14
Attending: EMERGENCY MEDICINE | Admitting: INTERNAL MEDICINE
Payer: MEDICARE

## 2021-05-13 VITALS
DIASTOLIC BLOOD PRESSURE: 82 MMHG | SYSTOLIC BLOOD PRESSURE: 146 MMHG | HEART RATE: 97 BPM | OXYGEN SATURATION: 97 % | TEMPERATURE: 97.2 F | RESPIRATION RATE: 18 BRPM

## 2021-05-13 DIAGNOSIS — D64.9 ANEMIA, UNSPECIFIED TYPE: ICD-10-CM

## 2021-05-13 DIAGNOSIS — R31.0 HEMATURIA, GROSS: Primary | ICD-10-CM

## 2021-05-13 PROBLEM — R31.9 HEMATURIA: Status: ACTIVE | Noted: 2021-05-13

## 2021-05-13 LAB
ALBUMIN SERPL-MCNC: 4 G/DL (ref 3.5–5.2)
ALP BLD-CCNC: 66 U/L (ref 40–129)
ALT SERPL-CCNC: 41 U/L (ref 0–40)
ANION GAP SERPL CALCULATED.3IONS-SCNC: 11 MMOL/L (ref 7–16)
APTT: 24.5 SEC (ref 24.5–35.1)
AST SERPL-CCNC: 80 U/L (ref 0–39)
BACTERIA: ABNORMAL /HPF
BASOPHILS ABSOLUTE: 0.04 E9/L (ref 0–0.2)
BASOPHILS RELATIVE PERCENT: 0.4 % (ref 0–2)
BILIRUB SERPL-MCNC: 0.8 MG/DL (ref 0–1.2)
BILIRUBIN URINE: ABNORMAL
BLOOD, URINE: ABNORMAL
BUN BLDV-MCNC: 8 MG/DL (ref 6–23)
CALCIUM SERPL-MCNC: 8.8 MG/DL (ref 8.6–10.2)
CHLORIDE BLD-SCNC: 102 MMOL/L (ref 98–107)
CLARITY: ABNORMAL
CO2: 22 MMOL/L (ref 22–29)
COLOR: ABNORMAL
CREAT SERPL-MCNC: 1 MG/DL (ref 0.7–1.2)
EOSINOPHILS ABSOLUTE: 0.03 E9/L (ref 0.05–0.5)
EOSINOPHILS RELATIVE PERCENT: 0.3 % (ref 0–6)
GFR AFRICAN AMERICAN: >60
GFR NON-AFRICAN AMERICAN: >60 ML/MIN/1.73
GLUCOSE BLD-MCNC: 110 MG/DL (ref 74–99)
GLUCOSE URINE: 100 MG/DL
HCT VFR BLD CALC: 26.1 % (ref 37–54)
HEMOGLOBIN: 8.6 G/DL (ref 12.5–16.5)
IMMATURE GRANULOCYTES #: 0.05 E9/L
IMMATURE GRANULOCYTES %: 0.5 % (ref 0–5)
INR BLD: 1.4
KETONES, URINE: 15 MG/DL
LEUKOCYTE ESTERASE, URINE: ABNORMAL
LYMPHOCYTES ABSOLUTE: 1.96 E9/L (ref 1.5–4)
LYMPHOCYTES RELATIVE PERCENT: 19 % (ref 20–42)
MCH RBC QN AUTO: 32.1 PG (ref 26–35)
MCHC RBC AUTO-ENTMCNC: 33 % (ref 32–34.5)
MCV RBC AUTO: 97.4 FL (ref 80–99.9)
MONOCYTES ABSOLUTE: 1.22 E9/L (ref 0.1–0.95)
MONOCYTES RELATIVE PERCENT: 11.8 % (ref 2–12)
NEUTROPHILS ABSOLUTE: 7.02 E9/L (ref 1.8–7.3)
NEUTROPHILS RELATIVE PERCENT: 68 % (ref 43–80)
NITRITE, URINE: POSITIVE
PDW BLD-RTO: 12.6 FL (ref 11.5–15)
PH UA: 6.5 (ref 5–9)
PLATELET # BLD: 288 E9/L (ref 130–450)
PMV BLD AUTO: 9.8 FL (ref 7–12)
POTASSIUM REFLEX MAGNESIUM: 4.1 MMOL/L (ref 3.5–5)
PROTEIN UA: >=300 MG/DL
PROTHROMBIN TIME: 14.6 SEC (ref 9.3–12.4)
RBC # BLD: 2.68 E12/L (ref 3.8–5.8)
RBC UA: ABNORMAL /HPF (ref 0–2)
SODIUM BLD-SCNC: 135 MMOL/L (ref 132–146)
SPECIFIC GRAVITY UA: 1.01 (ref 1–1.03)
TOTAL PROTEIN: 7.2 G/DL (ref 6.4–8.3)
UROBILINOGEN, URINE: >=8 E.U./DL
WBC # BLD: 10.3 E9/L (ref 4.5–11.5)
WBC UA: ABNORMAL /HPF (ref 0–5)

## 2021-05-13 PROCEDURE — 99223 1ST HOSP IP/OBS HIGH 75: CPT | Performed by: INTERNAL MEDICINE

## 2021-05-13 PROCEDURE — 80053 COMPREHEN METABOLIC PANEL: CPT

## 2021-05-13 PROCEDURE — 85025 COMPLETE CBC W/AUTO DIFF WBC: CPT

## 2021-05-13 PROCEDURE — 96374 THER/PROPH/DIAG INJ IV PUSH: CPT

## 2021-05-13 PROCEDURE — 1200000000 HC SEMI PRIVATE

## 2021-05-13 PROCEDURE — 6370000000 HC RX 637 (ALT 250 FOR IP): Performed by: INTERNAL MEDICINE

## 2021-05-13 PROCEDURE — 97165 OT EVAL LOW COMPLEX 30 MIN: CPT

## 2021-05-13 PROCEDURE — 51702 INSERT TEMP BLADDER CATH: CPT

## 2021-05-13 PROCEDURE — 87088 URINE BACTERIA CULTURE: CPT

## 2021-05-13 PROCEDURE — 6370000000 HC RX 637 (ALT 250 FOR IP): Performed by: NURSE PRACTITIONER

## 2021-05-13 PROCEDURE — G0378 HOSPITAL OBSERVATION PER HR: HCPCS

## 2021-05-13 PROCEDURE — 99284 EMERGENCY DEPT VISIT MOD MDM: CPT

## 2021-05-13 PROCEDURE — 2580000003 HC RX 258: Performed by: INTERNAL MEDICINE

## 2021-05-13 PROCEDURE — 96376 TX/PRO/DX INJ SAME DRUG ADON: CPT

## 2021-05-13 PROCEDURE — 6360000002 HC RX W HCPCS: Performed by: INTERNAL MEDICINE

## 2021-05-13 PROCEDURE — 85610 PROTHROMBIN TIME: CPT

## 2021-05-13 PROCEDURE — 81001 URINALYSIS AUTO W/SCOPE: CPT

## 2021-05-13 PROCEDURE — 85730 THROMBOPLASTIN TIME PARTIAL: CPT

## 2021-05-13 PROCEDURE — 97535 SELF CARE MNGMENT TRAINING: CPT

## 2021-05-13 RX ORDER — HYDROCODONE BITARTRATE AND ACETAMINOPHEN 5; 325 MG/1; MG/1
1 TABLET ORAL ONCE
Status: COMPLETED | OUTPATIENT
Start: 2021-05-13 | End: 2021-05-13

## 2021-05-13 RX ORDER — ACETAMINOPHEN 650 MG/1
650 SUPPOSITORY RECTAL EVERY 6 HOURS PRN
Status: DISCONTINUED | OUTPATIENT
Start: 2021-05-13 | End: 2021-05-14 | Stop reason: HOSPADM

## 2021-05-13 RX ORDER — METOPROLOL TARTRATE 50 MG/1
50 TABLET, FILM COATED ORAL 2 TIMES DAILY
Status: DISCONTINUED | OUTPATIENT
Start: 2021-05-13 | End: 2021-05-14 | Stop reason: HOSPADM

## 2021-05-13 RX ORDER — ACETAMINOPHEN 325 MG/1
650 TABLET ORAL EVERY 6 HOURS PRN
Status: DISCONTINUED | OUTPATIENT
Start: 2021-05-13 | End: 2021-05-14 | Stop reason: HOSPADM

## 2021-05-13 RX ORDER — FLUTICASONE PROPIONATE 50 MCG
1 SPRAY, SUSPENSION (ML) NASAL DAILY
Status: DISCONTINUED | OUTPATIENT
Start: 2021-05-13 | End: 2021-05-14 | Stop reason: HOSPADM

## 2021-05-13 RX ORDER — ALBUTEROL SULFATE 2.5 MG/3ML
2.5 SOLUTION RESPIRATORY (INHALATION) 4 TIMES DAILY PRN
Status: DISCONTINUED | OUTPATIENT
Start: 2021-05-13 | End: 2021-05-14 | Stop reason: HOSPADM

## 2021-05-13 RX ORDER — ONDANSETRON 2 MG/ML
4 INJECTION INTRAMUSCULAR; INTRAVENOUS EVERY 6 HOURS PRN
Status: DISCONTINUED | OUTPATIENT
Start: 2021-05-13 | End: 2021-05-14 | Stop reason: HOSPADM

## 2021-05-13 RX ORDER — PANTOPRAZOLE SODIUM 40 MG/1
40 TABLET, DELAYED RELEASE ORAL DAILY
Status: DISCONTINUED | OUTPATIENT
Start: 2021-05-13 | End: 2021-05-14 | Stop reason: HOSPADM

## 2021-05-13 RX ORDER — TAMSULOSIN HYDROCHLORIDE 0.4 MG/1
0.4 CAPSULE ORAL 2 TIMES DAILY
Status: DISCONTINUED | OUTPATIENT
Start: 2021-05-13 | End: 2021-05-14 | Stop reason: HOSPADM

## 2021-05-13 RX ORDER — SODIUM CHLORIDE 0.9 % (FLUSH) 0.9 %
5-40 SYRINGE (ML) INJECTION EVERY 12 HOURS SCHEDULED
Status: DISCONTINUED | OUTPATIENT
Start: 2021-05-13 | End: 2021-05-14 | Stop reason: HOSPADM

## 2021-05-13 RX ORDER — SODIUM CHLORIDE 9 MG/ML
INJECTION, SOLUTION INTRAVENOUS PRN
Status: DISCONTINUED | OUTPATIENT
Start: 2021-05-13 | End: 2021-05-14 | Stop reason: HOSPADM

## 2021-05-13 RX ORDER — AMLODIPINE BESYLATE 10 MG/1
10 TABLET ORAL DAILY
Status: DISCONTINUED | OUTPATIENT
Start: 2021-05-13 | End: 2021-05-14 | Stop reason: HOSPADM

## 2021-05-13 RX ORDER — PROMETHAZINE HYDROCHLORIDE 25 MG/1
12.5 TABLET ORAL EVERY 6 HOURS PRN
Status: DISCONTINUED | OUTPATIENT
Start: 2021-05-13 | End: 2021-05-14 | Stop reason: HOSPADM

## 2021-05-13 RX ORDER — ALBUTEROL SULFATE 90 UG/1
1 AEROSOL, METERED RESPIRATORY (INHALATION) 4 TIMES DAILY PRN
Status: DISCONTINUED | OUTPATIENT
Start: 2021-05-13 | End: 2021-05-13 | Stop reason: CLARIF

## 2021-05-13 RX ORDER — ATROPA BELLADONNA AND OPIUM 16.2; 6 MG/1; MG/1
60 SUPPOSITORY RECTAL EVERY 8 HOURS PRN
Status: DISCONTINUED | OUTPATIENT
Start: 2021-05-13 | End: 2021-05-14 | Stop reason: HOSPADM

## 2021-05-13 RX ORDER — POLYETHYLENE GLYCOL 3350 17 G/17G
17 POWDER, FOR SOLUTION ORAL DAILY PRN
Status: DISCONTINUED | OUTPATIENT
Start: 2021-05-13 | End: 2021-05-14 | Stop reason: HOSPADM

## 2021-05-13 RX ORDER — SODIUM CHLORIDE 0.9 % (FLUSH) 0.9 %
5-40 SYRINGE (ML) INJECTION PRN
Status: DISCONTINUED | OUTPATIENT
Start: 2021-05-13 | End: 2021-05-14 | Stop reason: HOSPADM

## 2021-05-13 RX ORDER — SODIUM CHLORIDE 9 MG/ML
25 INJECTION, SOLUTION INTRAVENOUS PRN
Status: DISCONTINUED | OUTPATIENT
Start: 2021-05-13 | End: 2021-05-14 | Stop reason: HOSPADM

## 2021-05-13 RX ORDER — MORPHINE SULFATE 2 MG/ML
1 INJECTION, SOLUTION INTRAMUSCULAR; INTRAVENOUS
Status: DISCONTINUED | OUTPATIENT
Start: 2021-05-13 | End: 2021-05-14 | Stop reason: HOSPADM

## 2021-05-13 RX ORDER — ASPIRIN 81 MG/1
81 TABLET, CHEWABLE ORAL DAILY
Status: DISCONTINUED | OUTPATIENT
Start: 2021-05-13 | End: 2021-05-14 | Stop reason: HOSPADM

## 2021-05-13 RX ADMIN — TAMSULOSIN HYDROCHLORIDE 0.4 MG: 0.4 CAPSULE ORAL at 15:16

## 2021-05-13 RX ADMIN — Medication 10 ML: at 21:58

## 2021-05-13 RX ADMIN — HYDROCODONE BITARTRATE AND ACETAMINOPHEN 1 TABLET: 5; 325 TABLET ORAL at 01:06

## 2021-05-13 RX ADMIN — METOPROLOL TARTRATE 50 MG: 50 TABLET, FILM COATED ORAL at 20:29

## 2021-05-13 RX ADMIN — MORPHINE SULFATE 1 MG: 2 INJECTION, SOLUTION INTRAMUSCULAR; INTRAVENOUS at 20:29

## 2021-05-13 RX ADMIN — MORPHINE SULFATE 1 MG: 2 INJECTION, SOLUTION INTRAMUSCULAR; INTRAVENOUS at 16:58

## 2021-05-13 RX ADMIN — TAMSULOSIN HYDROCHLORIDE 0.4 MG: 0.4 CAPSULE ORAL at 20:29

## 2021-05-13 RX ADMIN — ATROPA BELLADONNA AND OPIUM 60 MG: 16.2; 6 SUPPOSITORY RECTAL at 17:58

## 2021-05-13 RX ADMIN — LIDOCAINE HYDROCHLORIDE: 20 JELLY TOPICAL at 00:45

## 2021-05-13 ASSESSMENT — ENCOUNTER SYMPTOMS
DIARRHEA: 0
CONSTIPATION: 0
BLOOD IN STOOL: 0
VOMITING: 0
SORE THROAT: 0
SHORTNESS OF BREATH: 0
TROUBLE SWALLOWING: 0
COUGH: 0
NAUSEA: 0
RHINORRHEA: 0
ABDOMINAL PAIN: 0
WHEEZING: 0

## 2021-05-13 ASSESSMENT — PAIN DESCRIPTION - LOCATION: LOCATION: ABDOMEN

## 2021-05-13 ASSESSMENT — PAIN DESCRIPTION - ORIENTATION: ORIENTATION: LOWER;MID

## 2021-05-13 ASSESSMENT — PAIN DESCRIPTION - PAIN TYPE: TYPE: ACUTE PAIN

## 2021-05-13 ASSESSMENT — PAIN SCALES - GENERAL
PAINLEVEL_OUTOF10: 7
PAINLEVEL_OUTOF10: 0

## 2021-05-13 ASSESSMENT — PAIN DESCRIPTION - ONSET: ONSET: ON-GOING

## 2021-05-13 ASSESSMENT — PAIN DESCRIPTION - PROGRESSION: CLINICAL_PROGRESSION: NOT CHANGED

## 2021-05-13 ASSESSMENT — PAIN - FUNCTIONAL ASSESSMENT: PAIN_FUNCTIONAL_ASSESSMENT: PREVENTS OR INTERFERES SOME ACTIVE ACTIVITIES AND ADLS

## 2021-05-13 ASSESSMENT — PAIN DESCRIPTION - FREQUENCY: FREQUENCY: CONTINUOUS

## 2021-05-13 ASSESSMENT — PAIN DESCRIPTION - DESCRIPTORS: DESCRIPTORS: SORE

## 2021-05-13 NOTE — H&P
OF SYSTEMS:  CONSTITUTIONAL:  negative for  fevers and chills  EYES:  negative for  contacts and glasses  HEENT:  negative for  hearing loss and tinnitus  RESPIRATORY:  negative for  dry cough and cough with sputum  CARDIOVASCULAR:  negative for  chest pain, dyspnea  GASTROINTESTINAL:  negative for nausea and vomiting  HEMATOLOGIC/LYMPHATIC:  negative for easy bruising and bleeding  ENDOCRINE:  negative for heat intolerance  MUSCULOSKELETAL:  negative for  myalgias and arthralgias  NEUROLOGICAL:  negative for headaches and syncope  BEHAVIOR/PSYCH:  negative for poor appetite and increased appetite  PHYSICAL EXAM:    Vitals:  BP (!) 141/84   Pulse 94   Temp 98 °F (36.7 °C)   Resp 18   Ht 5' 9\" (1.753 m)   Wt 167 lb (75.8 kg)   SpO2 96%   BMI 24.66 kg/m²     CONSTITUTIONAL:  awake, alert, cooperative, no apparent distress, and appears stated age  EYES:  Lids and lashes normal, pupils equal, round and reactive to light, extra ocular muscles intact, sclera clear, conjunctiva normal  ENT:  Normocephalic, without obvious abnormality, atraumatic, sinuses nontender on palpation, external ears without lesions, oral pharynx with moist mucus membranes, tonsils without erythema or exudates, gums normal and good dentition.   NECK:  Supple, symmetrical, trachea midline, no adenopathy, thyroid symmetric, not enlarged and no tenderness, skin normal  LUNGS:  No increased work of breathing, good air exchange, clear to auscultation bilaterally, no crackles or wheezing  CARDIOVASCULAR:  Normal apical impulse, regular rate and rhythm, normal S1 and S2, no S3 or S4, and no murmur noted  ABDOMEN:  No scars, normal bowel sounds, soft, non-distended, non-tender, no masses palpated, no hepatosplenomegally  MUSCULOSKELETAL:  there is no redness, warmth, or swelling of the joints  NEUROLOGIC:  No focal neuro deficit  SKIN:  no bruising or bleeding    DATA:  CBC:   Lab Results   Component Value Date    WBC 10.3 05/13/2021    RBC 2.68 05/13/2021    HGB 8.6 05/13/2021    HCT 26.1 05/13/2021    MCV 97.4 05/13/2021    MCH 32.1 05/13/2021    MCHC 33.0 05/13/2021    RDW 12.6 05/13/2021     05/13/2021    MPV 9.8 05/13/2021     CMP:    Lab Results   Component Value Date     05/13/2021    K 4.1 05/13/2021     05/13/2021    CO2 22 05/13/2021    BUN 8 05/13/2021    CREATININE 1.0 05/13/2021    GFRAA >60 05/13/2021    LABGLOM >60 05/13/2021    GLUCOSE 110 05/13/2021    PROT 7.2 05/13/2021    LABALBU 4.0 05/13/2021    CALCIUM 8.8 05/13/2021    BILITOT 0.8 05/13/2021    ALKPHOS 66 05/13/2021    AST 80 05/13/2021    ALT 41 05/13/2021     ASSESSMENT AND PLAN:        1. Hematuria: this may be related to complication of radiation. On bladder irrigation system  Continue to monitor  Noted drop in patient hgb about 6 units since February 2021  H/h q12 hours as patient's vital is stable. 2. Acute blood loss anemia:  Type and screen  Continue to monitor  Will transfuse if patient's hgb drops. Monitor patient's vital    3. Hypertension Essential: resume home medications    4. Hx of CAD/CVA:  Hold plavix for now due to Hematuria    5. GERD: on PPI    6.   BPH

## 2021-05-13 NOTE — ED PROVIDER NOTES
FIRST PROVIDER CONTACT ASSESSMENT NOTE      Department of Emergency Medicine   5/12/21  11:18 PM EDT    Chief Complaint: Urinary Retention (unable to urinate since this afternoon, hx of urinary issues, procedure in March. )      History of Present Illness:   Timo Franco is a 73 y.o. male who presents to the ED for    Medical History:  has a past medical history of Acid reflux, Arthritis, CAD (coronary artery disease), Cerebral artery occlusion with cerebral infarction ([address] COPD (chronic obstructive pulmonary disease) ([address] Hematuria, Hyperlipidemia, Hypertension, MI (myocardial infarction) ([address] Prostate cancer ([address] Weak urinary stream, and Wheezing. Surgical History:  has a past surgical history that includes Prostate biopsy; Foot surgery; Foot surgery; Coronary angioplasty with stent (2002); and Cystoscopy (N/A, 3/22/2021). Social History:  reports that he quit smoking about 7 years ago. His smoking use included cigarettes. He has a 40.00 pack-year smoking history. He has never used smokeless tobacco. He reports current alcohol use. He reports previous drug use. Drug: Cocaine. Family History: family history includes Diabetes in his mother; Heart Attack in his brother and father; Stroke in his mother.     *ALLERGIES*     Simvastatin and Crestor [rosuvastatin]     Physical Exam:      VS:  Pulse 126   Temp 97.2 °F (36.2 °C)   Resp 18   SpO2 97%      Initial Plan of Care:  Initiate Treatment-Testing, Proceed toTreatment Area When Bed Available for ED Attending/MLP to Continue Care    -----------------END OF FIRST PROVIDER CONTACT ASSESSMENT NOTE--------------  Electronically signed by DAREN Baugh CNP   DD: 5/12/21             DAREN Hunt CNP  05/12/21 6358

## 2021-05-13 NOTE — CONSULTS
5/13/2021 4:48 PM  Service: Urology  Group: [facility] (Chris/Omid/Madeleine)    Lucita Ziegler  [id number]     Chief Complaint:    Bharat Celis Hematuria    History of Present Illness: The patient is a 73 y.o. male patient who presented to the ER today with complaints of gross hematuria. He had been seen in the ER yesterday evening with urinary retention and a Millan catheter was inserted for approximately 650 mL of urine. He then presented to our office this morning with complaints of gross hematuria and clot retention. His Millan catheter was changed out to a [address] [employer] three-way Millan catheter. He was hand irrigated in the office but unable to clear with the gross hematuria. He was then told to present to the hospital for admission overnight for CBI and irrigations. He has history of prostate cancer diagnosed in 2018 by the [place]. He underwent radiation therapy. PSA at time of diagnosis was 8.3. On 3/22/2021 he underwent cystoscopy, urethral dilation, and TURBT. Pathology from this was negative.        Past Medical History:   Diagnosis Date    Acid reflux     Arthritis     CAD (coronary artery disease)     follows with PCP    Cerebral artery occlusion with cerebral infarction ([address]     COPD (chronic obstructive pulmonary disease) (HCC)     Hematuria     Hyperlipidemia     Hypertension     MI (myocardial infarction) ([address]     Prostate cancer ([address]     Weak urinary stream     Wheezing          Past Surgical History:   Procedure Laterality Date    CORONARY ANGIOPLASTY WITH STENT PLACEMENT  2002    @ [facility]    CYSTOSCOPY N/A 3/22/2021    CYSTOSCOPY, RETROGRADE PYELOGRAM, URETHRAL DIALATION, TURBT performed by Juan Carlos Ty MD at [address] [place] [address] foot    FOOT SURGERY      rt foot as well    PROSTATE BIOPSY         Medications Prior to Admission:    Medications Prior to Admission: albuterol sulfate  (90 Base) MCG/ACT inhaler, Inhale 1 puff into the lungs 4 times daily as needed for Wheezing or Shortness of Breath   fluticasone (FLONASE) 50 MCG/ACT nasal spray, 1 spray by Nasal route daily   sildenafil (VIAGRA) 100 MG tablet, TAKE ONE TABLET BY MOUTH AN HOUR_BEFORE SEX. (NO MORE THAN 1 DOSE PER 24 HOURS) NO NITRATES  tamsulosin (FLOMAX) 0.4 MG capsule, Take 0.4 mg by mouth 2 times daily   Multiple Vitamin (MULTI-VITAMIN DAILY PO), Take 1 tablet by mouth daily   mineral oil-hydrophilic petrolatum (AQUAPHOR) ointment, Apply topically as needed for Dry Skin A  amLODIPine (NORVASC) 10 MG tablet, Take 10 mg by mouth daily  oxyCODONE HCl (OXY-IR) 10 MG immediate release tablet, Take 10 mg by mouth every 6 hours as needed for Pain.   metoprolol tartrate (LOPRESSOR) 50 MG tablet, Take 50 mg by mouth 2 times daily  clopidogrel (PLAVIX) 75 MG tablet, Take 75 mg by mouth daily  pantoprazole (PROTONIX) 40 MG tablet, Take 40 mg by mouth daily  Cetirizine HCl (ZYRTEC PO), Take 10 mg by mouth daily   aspirin 81 MG tablet, Take 81 mg by mouth daily    Allergies:    Simvastatin and Crestor [rosuvastatin]    Social History:    reports that he quit smoking about 7 years ago. His smoking use included cigarettes. He started smoking about 50 years ago. He has a 40.00 pack-year smoking history. He has never used smokeless tobacco. He reports current alcohol use. He reports previous drug use. Family History:   Non-contributory to this Urological problem  family history includes Diabetes in his mother; Heart Attack in his brother and father; Stroke in his mother.     Review of Systems:  Constitutional: No fever or chills   Respiratory: negative for cough and hemoptysis  Cardiovascular: negative for chest pain and dyspnea  Gastrointestinal: negative for abdominal pain, diarrhea, nausea and vomiting   Derm: negative for rash and skin lesion(s)  Neurological: negative for seizures and tremors  Musculoskeletal: Negative    Psychiatric: Negative   : As above in the HPI, otherwise negative  All other reviews are negative    Physical Exam:     Vitals:  BP (!) 152/77   Pulse 98   Temp 98.2 °F (36.8 °C) (Oral)   Resp 18   Ht 5' 9\" (1.753 m)   Wt 160 lb 8 oz (72.8 kg)   SpO2 98%   BMI 23.70 kg/m²     General:  Awake, alert, oriented X 3. No apparent distress. HEENT:  Normocephalic, atraumatic. Lungs:  Respirations symmetric and non-labored. Abdomen:  soft, nontender, no masses  Extremities:  No clubbing, cyanosis, or edema  Skin:  Warm and dry, no open lesions or rashes  Neuro: There are no motor or sensory deficits in the 4 quadrant extremities   Rectal: deferred  Genitourinary:  Millan catheter draining light pink urine with CBI at moderate rate  Labs:     Recent Labs     05/13/21  1106   WBC 10.3   RBC 2.68*   HGB 8.6*   HCT 26.1*   MCV 97.4   MCH 32.1   MCHC 33.0   RDW 12.6      MPV 9.8         Recent Labs     05/13/21  1106   CREATININE 1.0           Assessment/plan:  Urinary retention   Prostate Cancer s/p Radiation at the [place]   Gross Hematuria   Urethral Stricture     Creatinine stable  Continue to watch the hemoglobin  Transfusions per primary  UA reviewed  Urine culture pending  Antibiotics per primary  Cont the CBI  Cont the manual irrigations every 2 hours and PRN   Hold anticoagulation if okay with primary  We will continue to follow      Electronically signed by DAREN Moran CNP on 5/13/2021 at 4:48 PM     Agree with above  Seen and examined  Agree with the plan and treatment    [place], DO

## 2021-05-13 NOTE — ED NOTES
Manual bladder irrigation performed. Produced minimal blood clots. CBI started per order. Urine pink and catheter patent.       Fitz Yoon RN  05/13/21 6581

## 2021-05-13 NOTE — PROGRESS NOTES
urinary catheter. Patient able to don L sock with increased time. Mod I / Independent - with use of AE, as needed/appropriate   Bathing Min A  Mod I / Independent - with use of AE/DME, as needed/appropriate   Toileting SBA  Mod I / Independent   Bed Mobility  Supine-to-Sit: SBA      Functional Transfers Sit-to-Stand: SBA   from EOB and toilet  Cues given to maximize safety. Independent   Functional Mobility SBA   (without device) within patient's room and bathroom. Assistance given with management of IV pole. Mod I / Independent with functional mobility (with device, as needed/appropriate) in order to maximize independence with ADLs/IADLs and other functional tasks. Balance Sitting: Good  (at EOB)  Standing: Good-  (without device)  Good dynamic standing balance during completion of ADLs/IADLs and other functional tasks. Activity Tolerance Fair+  Patient will demonstrate Good understanding and consistent implementation of energy conservation techniques and work simplification techniques into ADL/IADL routines. Visual/  Perceptual WFL     N/A   B UE Strength 4/5  Patient will demonstrate 4+/5 B UE strength in order to maximize independence with ADLs/IADLs and functional transfers. Long-Term Goal: Patient will increase functional independence to PLOF in order to allow patient to live in least restrictive environment. ROM: Additional Information:    R UE  WFL    L UE WFL      Hearing: [facility]  Sensation:No complaints of numbness/tingling in B UEs. Tone: WFL  Edema: No    Comments: RN approved patient's participation in [address] [place] [address] activities. Upon arrival, patient supine in bed. At end of session, patient seated in bedside jim with call light and phone within reach, waffle cushion in place, B LEs elevated on ottoman, and all lines and tubes intact.  Patient would benefit from continued skilled OT to increase safety and independence with completion of ADL/IADL tasks for functional independence and quality of safety during transfers and ADLs   []Splinting/positioning needs to maintain joint/skin integrity and prevent contractures   [x]Therapeutic activity to improve functional performance during ADLs/IADLs  [x]Therapeutic exercise to improve tolerance and functional strength for ADLs/IADLs  [x]Balance retraining/tolerance tasks for facilitation of postural control with dynamic challenges during ADLs   [x]Neuromuscular re-education: facilitate righting/equilibrium reactions, midline orientation, scapular stability/mobility, normalize muscle tone, and facilitate active functional movement/attention  []Delirium prevention/treatment   []Positioning to improve functional independence and prevent skin breakdown   []Other:     Rehab Potential: Good for established goals. Patient / Family Goal: Patient noted that he anticipates returning home. Patient and/or family were instructed on functional diagnosis, prognosis/goals, and OT plan of care. Demonstrated Good understanding. Eval Complexity: Low    Time In: 1603  Time Out: 1624  Total Treatment Time: 10 minutes      Minutes Units   OT Eval Low 88496 11 1   OT Eval Medium 35230     OT Eval High 09514     OT Re-Eval [id number]     Therapeutic Ex 46061     Therapeutic Activities 17851     ADL/Self Care 66069 10 1   Orthotic Management 26244     Neuro Re-Ed 79879     Non-Billable Time N/A ---     Evaluation time includes thorough review of current medical information, gathering information on past medical history/social history and prior level of function, completion of standardized testing/informal observation of tasks, assessment of data, and education on plan of care and goals. Shayla Becker, OTR/L  License Number: [id number]

## 2021-05-13 NOTE — ED PROVIDER NOTES
82-year-old male presented to ER with hematuria. Patient stated that he saw Dr. Hank Lowery  office this morning. He had catheter placed, and was recommended follow-up in the ER. Patient stated that he has been having a lot of discomfort. He has a history of prostate cancer. With radiation done November 2018. Patient also stated of some urgency and left-sided back pain. He was seen at [facility] yesterday and was recommended to follow-up with urology. The patient does complain of hematuria. This is moderate in severity. Nothing makes it better. Nothing makes worse. He did have catheter placed prior to arrival.  This been constant since onset. He denies fever, chills, chest pain, shortness of breath, cough, fall, injury,           Review of Systems   Constitutional: Negative for chills and fever. HENT: Negative for rhinorrhea, sneezing, sore throat and trouble swallowing. Eyes: Negative for visual disturbance. Respiratory: Negative for cough, shortness of breath and wheezing. Cardiovascular: Negative for chest pain, palpitations and leg swelling. Gastrointestinal: Negative for abdominal pain, blood in stool, constipation, diarrhea, nausea and vomiting. Endocrine: Negative for polyuria. Genitourinary: Positive for dysuria and hematuria. Musculoskeletal: Negative for arthralgias and myalgias. Skin: Negative for rash and wound. Neurological: Negative for dizziness, tremors and light-headedness. Psychiatric/Behavioral: Negative for behavioral problems and sleep disturbance. Physical Exam  Vitals signs and nursing note reviewed. Constitutional:       Appearance: Normal appearance. HENT:      Head: Normocephalic and atraumatic. Right Ear: There is no impacted cerumen. Left Ear: There is no impacted cerumen. Nose: Nose normal.      Mouth/Throat:      Mouth: Mucous membranes are moist.   Eyes:      General: No scleral icterus.   Neck:      Musculoskeletal: Normal range of motion and neck supple. Cardiovascular:      Rate and Rhythm: Normal rate and regular rhythm. Pulses: Normal pulses. Heart sounds: Normal heart sounds. No murmur. No friction rub. No gallop. Pulmonary:      Effort: Pulmonary effort is normal.      Breath sounds: Normal breath sounds. No wheezing, rhonchi or rales. Abdominal:      General: Abdomen is flat. Bowel sounds are normal.      Palpations: Abdomen is soft. Tenderness: There is abdominal tenderness in the right lower quadrant, suprapubic area and left lower quadrant. Comments: Catheter in place with large gross hematuria. Musculoskeletal: Normal range of motion. Right lower leg: No edema. Left lower leg: No edema. Skin:     General: Skin is warm. Findings: No lesion or rash. Neurological:      General: No focal deficit present. Mental Status: He is alert and oriented to person, place, and time. Psychiatric:         Mood and Affect: Mood normal.         Behavior: Behavior normal.          Procedures     MDM         Patient presents to the ED for gross hematuria. Differential diagnoses included but not limited to UTI. Workup in the ED revealed hemoglobin 8.6. Catheter was irrigated. Patient requires continued workup and management of their symptoms and will be admitted to the hospital for further evaluation and treatment.         --------------------------------------------- PAST HISTORY ---------------------------------------------  Past Medical History:  has a past medical history of Acid reflux, Arthritis, CAD (coronary artery disease), Cerebral artery occlusion with cerebral infarction ([facility] COPD (chronic obstructive pulmonary disease) ([facility] Hematuria, Hyperlipidemia, Hypertension, MI (myocardial infarction) ([facility] Prostate cancer ([facility] Weak urinary stream, and Wheezing. Past Surgical History:  has a past surgical history that includes Prostate biopsy; Foot surgery;  Foot surgery; Coronary decision making and emergency management    This patient has remained hemodynamically stable during their ED course. Diagnosis:  1. Hematuria, gross    2. Anemia, unspecified type        Disposition:  Patient's disposition: Admit to med/surg floor  Patient's condition is stable. Savi Hines MD  Resident  05/13/21 7971    ATTENDING PROVIDER ATTESTATION:     Gianni Metcalf presented to the emergency department for evaluation of Hematuria (has a rodrigues in)    I have reviewed and discussed the case, including pertinent history (medical, surgical, family and social) and exam findings with the Resident and the Nurse assigned to Gianni Tea. I have personally performed and/or participated in the history, exam, medical decision making, and procedures and agree with all pertinent clinical information. I have reviewed my findings and recommendations with Gianni JoseTea and members of family present at the time of disposition. I, Dr. Tierra Wesley am the primary physician of record for this patient. MDM: The patient is 73 y.o. male  with a past medical history of       Diagnosis Date    Acid reflux     Arthritis     CAD (coronary artery disease)     follows with PCP    Cerebral artery occlusion with cerebral infarction ([address]     COPD (chronic obstructive pulmonary disease) (HCC)     Hematuria     Hyperlipidemia     Hypertension     MI (myocardial infarction) ([address]     Prostate cancer [facility]     Weak urinary stream     Wheezing      presenting to the emergency department with a chief complaint of hematuria. Differential diagnosis includes but not limited to, radiation prostatitis, anemia. The patient did have Rodrigues catheter which was irrigated in the emergency department, the patient had a CBC remarkable for anemia hemoglobin 8.6, CMP unremarkable. The patient will be admitted for further treatment and evaluation. My findings/plan: The primary encounter diagnosis was Hematuria, gross.  A diagnosis of Anemia, unspecified type was also pertinent to this visit.   Discharge Medication List as of 5/14/2021  6:27 PM        Jenn Post, [address], DO  05/17/21 182

## 2021-05-13 NOTE — PROGRESS NOTES
Database complete. Medications reconciled. Care plans and education initiated. Known to Dr. Setph Lindquist for Urology. Known to Dr. Nancy Rose at [address] [place] [address].

## 2021-05-13 NOTE — ED PROVIDER NOTES
[address]  Department of Emergency Medicine   ED  Encounter Note  Admit Date/RoomTime: 2021 11:25 PM  ED Room:     NAME: Marie Castillo  : 1950  MRN: [id number]     Chief Complaint:  Urinary Retention (unable to urinate since this afternoon, hx of urinary issues, procedure in March. )    History of Present Illness       Marie Castillo is a 73 y.o. old male who presents to the emergency department by private vehicle, for hematuria and urinary retention, which occured 10 hour(s) prior to arrival.  Since onset the symptoms have been gradually worsening and moderate to severe in severity. Symptoms are associated with bladder pain. He has a history of Cystoscopy and urethral dilation. He denies any headache, fever, chills, chest pain, shortness of breath, cough, abdominal pain, nausea, vomiting, diarrhea, constipation, rash, or trauma. ROS   Pertinent positives and negatives are stated within HPI, all other systems reviewed and are negative. Past Medical History:  has a past medical history of Acid reflux, Arthritis, CAD (coronary artery disease), Cerebral artery occlusion with cerebral infarction ([address] COPD (chronic obstructive pulmonary disease) ([address] Hematuria, Hyperlipidemia, Hypertension, MI (myocardial infarction) ([address] Prostate cancer ([address] Weak urinary stream, and Wheezing. Surgical History:  has a past surgical history that includes Prostate biopsy; Foot surgery; Foot surgery; Coronary angioplasty with stent (); and Cystoscopy (N/A, 3/22/2021). Social History:  reports that he quit smoking about 7 years ago. His smoking use included cigarettes. He has a 40.00 pack-year smoking history. He has never used smokeless tobacco. He reports current alcohol use. He reports previous drug use. Drug: Cocaine. Family History: family history includes Diabetes in his mother; Heart Attack in his brother and father; Stroke in his mother. Allergies: Simvastatin and Crestor [rosuvastatin]    Physical Exam   Oxygen Saturation Interpretation: Normal.        ED Triage Vitals   BP Temp Temp src Pulse Resp SpO2 Height Weight   05/12/21 2318 05/12/21 2302 -- 05/12/21 2302 05/12/21 2302 05/12/21 2302 -- --   (!) 163/84 97.2 °F (36.2 °C)  126 18 97 %           Constitutional:  Alert, development consistent with age. HEENT:  NC/NT. Airway patent. Neck:  Normal ROM. Supple. Respiratory:  Lungs Clear to auscultation and breath sounds equal.  CV:  Regular rate and rhythm, normal heart sounds, without pathological murmurs, ectopy, gallops, or rubs. GI:  normal appearing, non-distended with no visible hernias. Bowel sounds: normal bowel sounds. Tenderness: No abdominal tenderness, guarding, rebound, rigidity or pulsatile mass. .        Liver: non-tender. Spleen:  non-tender and non-palpable. : (chaperone present during examination). not indicated / deferred. Back: CVA Tenderness: No.  Integument:  Normal turgor. Warm, dry, without visible rash, unless noted elsewhere. Lymphatics: No lymphangitis or adenopathy noted. Neurological:  Oriented. Motor functions intact.     Lab / Imaging Results   (All laboratory and radiology results have been personally reviewed by myself)  Labs:  Results for orders placed or performed during the hospital encounter of 05/12/21   Urinalysis   Result Value Ref Range    Color, UA RED (A) Straw/Yellow    Clarity, UA TURBID (A) Clear    Glucose, Ur 100 (A) Negative mg/dL    Bilirubin Urine MODERATE (A) Negative    Ketones, Urine 15 (A) Negative mg/dL    Specific Gravity, UA 1.010 1.005 - 1.030    Blood, Urine LARGE (A) Negative    pH, UA 6.5 5.0 - 9.0    Protein, UA >=300 (A) Negative mg/dL    Urobilinogen, Urine >=8.0 (A) <2.0 E.U./dL    Nitrite, Urine POSITIVE (A) Negative    Leukocyte Esterase, Urine MODERATE (A) Negative   Microscopic Urinalysis   Result Value Ref Range    WBC, UA NONE 0 - 5 /HPF    RBC, UA PACKED 0 - 2 /HPF    Bacteria, UA FEW (A) None Seen /HPF       Imaging: All Radiology results interpreted by Radiologist unless otherwise noted. No orders to display     ED Course / Medical Decision Making     Medications   lidocaine (XYLOCAINE) 2 % jelly ( Topical Given 5/13/21 0045)   HYDROcodone-acetaminophen (NORCO) 5-325 MG per tablet 1 tablet (1 tablet Oral Given 5/13/21 0106)        Re-examination:  5/12/21       Time: 0040    Patients symptoms are improving with treatment. 12 Lao Millan catheter placed by nursing staff. 500 mls of bloody urine drainage with one small clot noted. 0200: Patient resting in no distress. States he is more comfortable. Another 150 mL of cherry colored urine drained. It is becoming clear. No clots noted. Consults:   None    Procedures:   none    Medical Decision Making:    Patient presented with urinary retention. Millan catheter was placed and approximately 650 mL of urine was drained. At first there was gross blood and urine became clear as it drained. Patient symptoms greatly improved with treatment. He appeared well nontoxic. Urinalysis was negative for acute urinary tract infection. He is instructed on Millan catheter care at home. He is established with a urologist.  He is appropriate for discharge outpatient follow-up. Instructed to return to the emergency department any new or worsening symptoms. Counseling:  I reviewed today's visit with the patient in addition to providing specific details for the plan of care and counseling regarding the diagnosis and prognosis. Questions are answered at this time and are agreeable with the plan. Assessment      1. Gross hematuria    2. Urinary retention      Plan   Disposition:   Discharge home.   Patient condition is good    New Medications     New Prescriptions    No medications on file     Electronically signed by DAREN Figueredo CNP   DD: 5/12/21  **This report was transcribed using voice recognition software. Every effort was made to ensure accuracy; however, inadvertent computerized transcription errors may be present.   END OF ED PROVIDER NOTE         DAREN Cox CNP  05/13/21 5982

## 2021-05-13 NOTE — ED NOTES
Called to triage, patient unable to wait to go to bathroom, stating he had to have bm and has not had urine output since this afternoon      Maxim Currie RN  05/12/21 3425

## 2021-05-14 VITALS
SYSTOLIC BLOOD PRESSURE: 129 MMHG | HEART RATE: 99 BPM | HEIGHT: 69 IN | RESPIRATION RATE: 16 BRPM | TEMPERATURE: 98.2 F | BODY MASS INDEX: 23.77 KG/M2 | WEIGHT: 160.5 LBS | OXYGEN SATURATION: 98 % | DIASTOLIC BLOOD PRESSURE: 78 MMHG

## 2021-05-14 LAB
ABO/RH: NORMAL
ALBUMIN SERPL-MCNC: 3.7 G/DL (ref 3.5–5.2)
ALP BLD-CCNC: 57 U/L (ref 40–129)
ALT SERPL-CCNC: 32 U/L (ref 0–40)
ANION GAP SERPL CALCULATED.3IONS-SCNC: 11 MMOL/L (ref 7–16)
ANISOCYTOSIS: ABNORMAL
ANTIBODY SCREEN: NORMAL
AST SERPL-CCNC: 67 U/L (ref 0–39)
BASOPHILS ABSOLUTE: 0.07 E9/L (ref 0–0.2)
BASOPHILS RELATIVE PERCENT: 0.9 % (ref 0–2)
BILIRUB SERPL-MCNC: 0.9 MG/DL (ref 0–1.2)
BUN BLDV-MCNC: 7 MG/DL (ref 6–23)
CALCIUM SERPL-MCNC: 8.4 MG/DL (ref 8.6–10.2)
CHLORIDE BLD-SCNC: 103 MMOL/L (ref 98–107)
CO2: 24 MMOL/L (ref 22–29)
CREAT SERPL-MCNC: 1 MG/DL (ref 0.7–1.2)
EOSINOPHILS ABSOLUTE: 0.07 E9/L (ref 0.05–0.5)
EOSINOPHILS RELATIVE PERCENT: 0.9 % (ref 0–6)
GFR AFRICAN AMERICAN: >60
GFR NON-AFRICAN AMERICAN: >60 ML/MIN/1.73
GLUCOSE BLD-MCNC: 115 MG/DL (ref 74–99)
HCT VFR BLD CALC: 22.3 % (ref 37–54)
HCT VFR BLD CALC: 25.7 % (ref 37–54)
HEMOGLOBIN: 7.5 G/DL (ref 12.5–16.5)
HEMOGLOBIN: 8.3 G/DL (ref 12.5–16.5)
HYPOCHROMIA: ABNORMAL
LYMPHOCYTES ABSOLUTE: 3.04 E9/L (ref 1.5–4)
LYMPHOCYTES RELATIVE PERCENT: 40.3 % (ref 20–42)
MAGNESIUM: 1.4 MG/DL (ref 1.6–2.6)
MCH RBC QN AUTO: 32.3 PG (ref 26–35)
MCHC RBC AUTO-ENTMCNC: 33.6 % (ref 32–34.5)
MCV RBC AUTO: 96.1 FL (ref 80–99.9)
MONOCYTES ABSOLUTE: 0.99 E9/L (ref 0.1–0.95)
MONOCYTES RELATIVE PERCENT: 13.2 % (ref 2–12)
NEUTROPHILS ABSOLUTE: 3.42 E9/L (ref 1.8–7.3)
NEUTROPHILS RELATIVE PERCENT: 44.7 % (ref 43–80)
NUCLEATED RED BLOOD CELLS: 0 /100 WBC
PDW BLD-RTO: 12.6 FL (ref 11.5–15)
PLATELET # BLD: 226 E9/L (ref 130–450)
PMV BLD AUTO: 9.4 FL (ref 7–12)
POIKILOCYTES: ABNORMAL
POLYCHROMASIA: ABNORMAL
POTASSIUM REFLEX MAGNESIUM: 3.5 MMOL/L (ref 3.5–5)
RBC # BLD: 2.32 E12/L (ref 3.8–5.8)
SODIUM BLD-SCNC: 138 MMOL/L (ref 132–146)
TARGET CELLS: ABNORMAL
TOTAL PROTEIN: 6.4 G/DL (ref 6.4–8.3)
WBC # BLD: 7.6 E9/L (ref 4.5–11.5)

## 2021-05-14 PROCEDURE — 85025 COMPLETE CBC W/AUTO DIFF WBC: CPT

## 2021-05-14 PROCEDURE — 6370000000 HC RX 637 (ALT 250 FOR IP): Performed by: INTERNAL MEDICINE

## 2021-05-14 PROCEDURE — 2580000003 HC RX 258: Performed by: INTERNAL MEDICINE

## 2021-05-14 PROCEDURE — 6360000002 HC RX W HCPCS: Performed by: INTERNAL MEDICINE

## 2021-05-14 PROCEDURE — 86900 BLOOD TYPING SEROLOGIC ABO: CPT

## 2021-05-14 PROCEDURE — 86923 COMPATIBILITY TEST ELECTRIC: CPT

## 2021-05-14 PROCEDURE — G0378 HOSPITAL OBSERVATION PER HR: HCPCS

## 2021-05-14 PROCEDURE — 96376 TX/PRO/DX INJ SAME DRUG ADON: CPT

## 2021-05-14 PROCEDURE — 86901 BLOOD TYPING SEROLOGIC RH(D): CPT

## 2021-05-14 PROCEDURE — 80053 COMPREHEN METABOLIC PANEL: CPT

## 2021-05-14 PROCEDURE — 97161 PT EVAL LOW COMPLEX 20 MIN: CPT

## 2021-05-14 PROCEDURE — 86850 RBC ANTIBODY SCREEN: CPT

## 2021-05-14 PROCEDURE — 99239 HOSP IP/OBS DSCHRG MGMT >30: CPT | Performed by: INTERNAL MEDICINE

## 2021-05-14 PROCEDURE — 36415 COLL VENOUS BLD VENIPUNCTURE: CPT

## 2021-05-14 PROCEDURE — 85014 HEMATOCRIT: CPT

## 2021-05-14 PROCEDURE — 85018 HEMOGLOBIN: CPT

## 2021-05-14 PROCEDURE — 83735 ASSAY OF MAGNESIUM: CPT

## 2021-05-14 RX ADMIN — ASPIRIN 81 MG: 81 TABLET, CHEWABLE ORAL at 08:20

## 2021-05-14 RX ADMIN — MORPHINE SULFATE 1 MG: 2 INJECTION, SOLUTION INTRAMUSCULAR; INTRAVENOUS at 02:50

## 2021-05-14 RX ADMIN — FLUTICASONE PROPIONATE 1 SPRAY: 50 SPRAY, METERED NASAL at 12:01

## 2021-05-14 RX ADMIN — AMLODIPINE BESYLATE 10 MG: 10 TABLET ORAL at 08:19

## 2021-05-14 RX ADMIN — TAMSULOSIN HYDROCHLORIDE 0.4 MG: 0.4 CAPSULE ORAL at 08:19

## 2021-05-14 RX ADMIN — PANTOPRAZOLE SODIUM 40 MG: 40 TABLET, DELAYED RELEASE ORAL at 08:19

## 2021-05-14 RX ADMIN — MORPHINE SULFATE 1 MG: 2 INJECTION, SOLUTION INTRAMUSCULAR; INTRAVENOUS at 09:45

## 2021-05-14 RX ADMIN — Medication 10 ML: at 08:20

## 2021-05-14 RX ADMIN — METOPROLOL TARTRATE 50 MG: 50 TABLET, FILM COATED ORAL at 08:19

## 2021-05-14 RX ADMIN — POLYETHYLENE GLYCOL 3350 17 G: 17 POWDER, FOR SOLUTION ORAL at 12:00

## 2021-05-14 RX ADMIN — MORPHINE SULFATE 1 MG: 2 INJECTION, SOLUTION INTRAMUSCULAR; INTRAVENOUS at 05:52

## 2021-05-14 RX ADMIN — SODIUM CHLORIDE, PRESERVATIVE FREE 10 ML: 5 INJECTION INTRAVENOUS at 16:43

## 2021-05-14 RX ADMIN — MORPHINE SULFATE 1 MG: 2 INJECTION, SOLUTION INTRAMUSCULAR; INTRAVENOUS at 16:42

## 2021-05-14 ASSESSMENT — PAIN - FUNCTIONAL ASSESSMENT: PAIN_FUNCTIONAL_ASSESSMENT: ACTIVITIES ARE NOT PREVENTED

## 2021-05-14 ASSESSMENT — PAIN DESCRIPTION - ORIENTATION: ORIENTATION: LOWER;MID

## 2021-05-14 ASSESSMENT — PAIN SCALES - GENERAL
PAINLEVEL_OUTOF10: 6
PAINLEVEL_OUTOF10: 6
PAINLEVEL_OUTOF10: 7

## 2021-05-14 ASSESSMENT — PAIN DESCRIPTION - FREQUENCY: FREQUENCY: CONTINUOUS

## 2021-05-14 ASSESSMENT — PAIN DESCRIPTION - DESCRIPTORS: DESCRIPTORS: DISCOMFORT;SORE

## 2021-05-14 ASSESSMENT — PAIN DESCRIPTION - PROGRESSION
CLINICAL_PROGRESSION: NOT CHANGED
CLINICAL_PROGRESSION: NOT CHANGED

## 2021-05-14 ASSESSMENT — PAIN DESCRIPTION - ONSET: ONSET: ON-GOING

## 2021-05-14 ASSESSMENT — PAIN DESCRIPTION - LOCATION: LOCATION: ABDOMEN

## 2021-05-14 ASSESSMENT — PAIN DESCRIPTION - PAIN TYPE: TYPE: ACUTE PAIN

## 2021-05-14 NOTE — CARE COORDINATION
Met with  This pleasant pt; states lives alone independently; PCP ; pharmacy [facility]. admitted for hematuria; rodrigues inserted; CBI; cleared. Rodrigues d/c'd today. if able to void, plan for discharge back to home. Family can  Transport . Will follow. [place].

## 2021-05-14 NOTE — PROGRESS NOTES
Spoke with Dr. Stacey Lawson when he was on the unit. States to stop CBI and that rodrigues catheter can be removed later today if no blood/clots in urine. States that pt is ok to be discharged later today per urology.

## 2021-05-14 NOTE — DISCHARGE SUMMARY
[facility] Physician Discharge Summary       No follow-up provider specified. Activity level: As tolerated     Dispo: home    Condition on discharge: Stable     Patient ID:  Sacha Oleary  [id number]  73 y.o.  1950    Admit date: 5/13/2021    Discharge date and time:  5/14/2021  4:45 PM    Admission Diagnoses: Active Problems:    Hematuria  Resolved Problems:    * No resolved hospital problems. *      Discharge Diagnoses: Active Problems:    Hematuria  Resolved Problems:    * No resolved hospital problems. *      Consults:  IP CONSULT TO IV TEAM      Hospital Course:   Patient Sacha Oleary is a 73 y.o. presented with Hematuria [R31.9] that has been going on for 2 months, followed with urology who wanted the patient to be admitted for eval.    1.  Hematuria, questioning radiation cystitis in the setting of plavix, CBI was place and hematuria resolved, patient was evaluated by urology who was ok with discharging the patient after removal of Millan on plavix to follow up as outpatient for cystoscopy. 2.  Acute on chronic anemia, due to hematuria, presented with hg of 8.6 down from baseline 13-14, trended down to 7.5 repeated was stable at 8.3, patient didn't required blood transfusion. 3.  HTN, BP is controlled, continue on amlodipine and metoprolol  4. Hx of CAD and cVA, was on placvix, that was held during this admission, urology was ok with resuming plavix, the patient is hesitant to resume it. Continue asa and BB, patient is not on statin due to allergies.     Discharge Exam:    General Appearance: alert and oriented to person, place and time and in no acute distress  Skin: warm and dry  Head: normocephalic and atraumatic  Eyes: pupils equal, round, and reactive to light, extraocular eye movements intact, conjunctivae normal  Neck: neck supple and non tender without mass   Pulmonary/Chest: clear to auscultation bilaterally- no wheezes, rales or rhonchi, normal air movement, no respiratory distress  Cardiovascular: normal rate, normal S1 and S2 and no carotid bruits  Abdomen: soft, non-tender, non-distended, normal bowel sounds, no masses or organomegaly  Extremities: no cyanosis, no clubbing and no edema  Neurologic: no cranial nerve deficit and speech normal    I/O last 3 completed shifts: In: 250 [P.O.:240; I.V.:10]  Out: 12850 [Urine:29154]  I/O this shift:  In: -   Out: 400 [Urine:400]      LABS:  Recent Labs     05/13/21  1106 05/14/21  0100    138   K 4.1 3.5    103   CO2 22 24   BUN 8 7   CREATININE 1.0 1.0   GLUCOSE 110* 115*   CALCIUM 8.8 8.4*       Recent Labs     05/13/21  1106 05/14/21  0100 05/14/21  1215   WBC 10.3 7.6  --    RBC 2.68* 2.32*  --    HGB 8.6* 7.5* 8.3*   HCT 26.1* 22.3* 25.7*   MCV 97.4 96.1  --    MCH 32.1 32.3  --    MCHC 33.0 33.6  --    RDW 12.6 12.6  --     226  --    MPV 9.8 9.4  --        No results for input(s): POCGLU in the last 72 hours. Imaging:  No results found.     Patient Instructions:      Medication List      CONTINUE taking these medications    albuterol sulfate  (90 Base) MCG/ACT inhaler     amLODIPine 10 MG tablet  Commonly known as: NORVASC     aspirin 81 MG tablet     clopidogrel 75 MG tablet  Commonly known as: PLAVIX     fluticasone 50 MCG/ACT nasal spray  Commonly known as: FLONASE     metoprolol tartrate 50 MG tablet  Commonly known as: LOPRESSOR     mineral oil-hydrophilic petrolatum ointment     MULTI-VITAMIN DAILY PO     oxyCODONE HCl 10 MG immediate release tablet  Commonly known as: OXY-IR     pantoprazole 40 MG tablet  Commonly known as: PROTONIX     sildenafil 100 MG tablet  Commonly known as: VIAGRA     tamsulosin 0.4 MG capsule  Commonly known as: FLOMAX     ZYRTEC PO              Note that more than 30 minutes was spent in preparing discharge papers, discussing discharge with patient, medication review, etc.    Signed:  Electronically signed by Og Isidro MD on 5/14/2021 at 4:45 PM

## 2021-05-14 NOTE — PROGRESS NOTES
Physical Therapy    Facility/Department: [address] MED SURG/TELE  Initial Assessment    NAME: Marie Castillo  : 1950  MRN: [id number]    Date of Service: 2021      Attending Provider:  Raisa Goddard MD    Evaluating PT:  Jose Ann. Landa Merlin., P.T. Room #:  0530/0530-A  Diagnosis:  Hematuria  Precautions:  CBI    SUBJECTIVE:    Pt lives with alone in a 3rd floor apt with 6+6 stairs and 2 rails to enter. Pt ambulated with no AD PTA. OBJECTIVE:   Initial Evaluation  Date: 21   Was pt agreeable to Eval/treatment? yes   Does pt have pain? No c/o pain   Bed Mobility  NA, pt was found and left sitting up in chair   Transfers Sit to stand: Independent  Stand to sit: Independent  Stand pivot: Independent   Ambulation   300 feet with no AD Independent    Stair negotiation: ascended and descended 4 steps with 1 rail Independent    AM-PAC 6 Clicks 64/11     BLE ROM is WFL. BLE strength is grossly WFL. Sensation:  Pt denies numbness and tingling to extremities  Edema:  None noted  Balance: sitting is Independent and standing with no AD is Independent   Endurance: fair+    ASSESSMENT:    Comments:  Pt is Independent with functional mobility at this time and has no skilled PT needs. Pt was left sitting up in chair on waffle cushion with call light left by patient. Pt's/ family goals   1. To go home. Patient and or family understand(s) diagnosis, prognosis, and plan of care. PLAN OF CARE:    PT will discharge pt from our service at this time. Time in  07:30  Time out  07:40    Evaluation Time includes thorough review of current medical information, gathering information on past medical history/social history and prior level of function, completion of standardized testing/informal observation of tasks, assessment of data and education on plan of care and goals.     CPT codes:  [x] Low Complexity PT evaluation 34919  [] Moderate Complexity PT evaluation 28779  [] High Complexity PT evaluation [id number]  [] PT Re-evaluation [id number]  [] Gait training 14766 ** minutes  [] Manual therapy 61156 ** minutes  [] Therapeutic activities 74215 ** minutes  [] Therapeutic exercises 22548 ** minutes  [] Neuromuscular reeducation 01601 ** minutes     Timothy B. Landa Merlin., P.T.   License Number: [id number]

## 2021-05-14 NOTE — PLAN OF CARE
Problem: Pain:  Goal: Pain level will decrease  Description: Pain level will decrease  Outcome: Completed  Goal: Control of acute pain  Description: Control of acute pain  Outcome: Completed  Goal: Control of chronic pain  Description: Control of chronic pain  Outcome: Completed     Problem: Falls - Risk of:  Goal: Will remain free from falls  Description: Will remain free from falls  Outcome: Completed  Goal: Absence of physical injury  Description: Absence of physical injury  Outcome: Completed     Problem: Urinary Elimination - Impaired  Goal: No urinary complication  Outcome: Completed     Problem: Bleeding:  Goal: Absence of active bleeding  Outcome: Completed

## 2021-05-14 NOTE — PROGRESS NOTES
[facility] Progress Note    Admitting Date and Time: 5/13/2021 10:02 AM  Admit Dx: Hematuria [R31.9]    Subjective:  Patient is being followed for Hematuria [R31.9]   Pt feels ok, no more hematuria    ROS: denies fever, chills, cp, sob, n/v, HA unless stated above.       amLODIPine  10 mg Oral Daily    aspirin  81 mg Oral Daily    fluticasone  1 spray Nasal Daily    metoprolol tartrate  50 mg Oral BID    pantoprazole  40 mg Oral Daily    tamsulosin  0.4 mg Oral BID    sodium chloride flush  5-40 mL Intravenous 2 times per day     sodium chloride flush, 5-40 mL, PRN  sodium chloride, 25 mL, PRN  promethazine, 12.5 mg, Q6H PRN    Or  ondansetron, 4 mg, Q6H PRN  polyethylene glycol, 17 g, Daily PRN  acetaminophen, 650 mg, Q6H PRN    Or  acetaminophen, 650 mg, Q6H PRN  sodium chloride, , PRN  albuterol, 2.5 mg, 4x Daily PRN  opium-belladonna, 60 mg, Q8H PRN  morphine, 1 mg, Q3H PRN         Objective:    /78   Pulse 99   Temp 98.2 °F (36.8 °C) (Oral)   Resp 16   Ht 5' 9\" (1.753 m)   Wt 160 lb 8 oz (72.8 kg)   SpO2 98%   BMI 23.70 kg/m²     General Appearance: alert and oriented x3  Skin: warm and dry  Head: normocephalic and atraumatic  Eyes: pupils equal, round, and reactive to light, extraocular eye movements intact, conjunctivae normal  Neck: neck supple and non tender without mass   Pulmonary/Chest: clear to auscultation bilaterally- no wheezes, rales or rhonchi, normal air movement, no respiratory distress  Cardiovascular: normal rate and rhythm, normal S1 and S2 and no carotid bruits  Abdomen: soft, non-tender, non-distended, normal bowel sounds, no masses or organomegaly  Extremities: no cyanosis, no clubbing and +2 edema  Neurologic: no cranial nerve deficit and speech normal        Recent Labs     05/13/21  1106 05/14/21  0100    138   K 4.1 3.5    103   CO2 22 24   BUN 8 7   CREATININE 1.0 1.0   GLUCOSE 110* 115*   CALCIUM 8.8 8.4*       Recent Labs 05/13/21  1106 05/14/21  0100   WBC 10.3 7.6   RBC 2.68* 2.32*   HGB 8.6* 7.5*   HCT 26.1* 22.3*   MCV 97.4 96.1   MCH 32.1 32.3   MCHC 33.0 33.6   RDW 12.6 12.6    226   MPV 9.8 9.4         Assessment:    Active Problems:    Hematuria  Resolved Problems:    * No resolved hospital problems. *      Plan:  1. 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 2.  Acute on chronic anemia, due to hematuria, presented with hg of 8.6 down from baseline 13-14, continues to trend down to 7.5, transfuse if below 7.  3.  HTN, BP is controlled, continue on amlodipine and metoprolol  4. Hx of CAD and cVA, was on placvix, we will hold. Continue asa and BB, patient is not on statin      NOTE: This report was transcribed using voice recognition software. Every effort was made to ensure accuracy; however, inadvertent computerized transcription errors may be present.   Electronically signed by Gissell Hinkle MD on 5/14/2021 at 9:41 AM

## 2021-05-15 LAB
BLOOD BANK DISPENSE STATUS: NORMAL
BLOOD BANK PRODUCT CODE: NORMAL
BPU ID: NORMAL
DESCRIPTION BLOOD BANK: NORMAL
URINE CULTURE, ROUTINE: NORMAL

## 2021-05-29 LAB — URINE CULTURE, ROUTINE: NORMAL

## 2021-10-25 ENCOUNTER — HOSPITAL ENCOUNTER (OUTPATIENT)
Age: 71
Discharge: HOME | End: 2021-10-25
Payer: MEDICARE

## 2021-10-25 LAB
ALBUMIN SERPL-MCNC: 4.5 G/DL (ref 3.5–5.2)
ALP BLD-CCNC: 89 U/L (ref 40–129)
ALT SERPL-CCNC: 64 U/L (ref 0–40)
ANION GAP SERPL CALCULATED.3IONS-SCNC: 18 MMOL/L (ref 7–16)
AST SERPL-CCNC: 121 U/L (ref 0–39)
BILIRUB SERPL-MCNC: 1 MG/DL (ref 0–1.2)
BUN BLDV-MCNC: 9 MG/DL (ref 6–23)
CALCIUM SERPL-MCNC: 9.4 MG/DL (ref 8.6–10.2)
CHLORIDE BLD-SCNC: 99 MMOL/L (ref 98–107)
CO2: 20 MMOL/L (ref 22–29)
CREAT SERPL-MCNC: 1.4 MG/DL (ref 0.7–1.2)
FERRITIN: 16 NG/ML
FOLATE: >20 NG/ML (ref 4.8–24.2)
GFR AFRICAN AMERICAN: >60
GFR NON-AFRICAN AMERICAN: >60 ML/MIN/1.73
GLUCOSE BLD-MCNC: 116 MG/DL (ref 74–99)
HBA1C MFR BLD: 5 % (ref 4–5.6)
HCT VFR BLD CALC: 32 % (ref 37–54)
HEMOGLOBIN: 9.6 G/DL (ref 12.5–16.5)
IRON SATURATION: 10 % (ref 20–55)
IRON: 48 MCG/DL (ref 59–158)
MAGNESIUM: 1.1 MG/DL (ref 1.6–2.6)
MCH RBC QN AUTO: 21.1 PG (ref 26–35)
MCHC RBC AUTO-ENTMCNC: 30 % (ref 32–34.5)
MCV RBC AUTO: 70.3 FL (ref 80–99.9)
PDW BLD-RTO: 20 FL (ref 11.5–15)
PLATELET # BLD: 308 E9/L (ref 130–450)
PMV BLD AUTO: 10.4 FL (ref 7–12)
POTASSIUM SERPL-SCNC: 3.9 MMOL/L (ref 3.5–5)
RBC # BLD: 4.55 E12/L (ref 3.8–5.8)
SODIUM BLD-SCNC: 137 MMOL/L (ref 132–146)
TOTAL IRON BINDING CAPACITY: 483 MCG/DL (ref 250–450)
TOTAL PROTEIN: 8.7 G/DL (ref 6.4–8.3)
VITAMIN B-12: 841 PG/ML (ref 211–946)
WBC # BLD: 7.8 E9/L (ref 4.5–11.5)

## 2021-10-25 PROCEDURE — 82728 ASSAY OF FERRITIN: CPT

## 2021-10-25 PROCEDURE — 83540 ASSAY OF IRON: CPT

## 2021-10-25 PROCEDURE — 83735 ASSAY OF MAGNESIUM: CPT

## 2021-10-25 PROCEDURE — 36415 COLL VENOUS BLD VENIPUNCTURE: CPT

## 2021-10-25 PROCEDURE — 82746 ASSAY OF FOLIC ACID SERUM: CPT

## 2021-10-25 PROCEDURE — 82607 VITAMIN B-12: CPT

## 2021-10-25 PROCEDURE — 85027 COMPLETE CBC AUTOMATED: CPT

## 2021-10-25 PROCEDURE — 83036 HEMOGLOBIN GLYCOSYLATED A1C: CPT

## 2021-10-25 PROCEDURE — 83550 IRON BINDING TEST: CPT

## 2021-10-25 PROCEDURE — 80053 COMPREHEN METABOLIC PANEL: CPT

## 2022-05-10 ENCOUNTER — HOSPITAL ENCOUNTER (OUTPATIENT)
Age: 72
Discharge: HOME | End: 2022-05-10
Payer: MEDICARE

## 2022-05-10 LAB — PROSTATE SPECIFIC ANTIGEN: 0.15 NG/ML (ref 0–4)

## 2022-05-10 PROCEDURE — 36415 COLL VENOUS BLD VENIPUNCTURE: CPT

## 2022-05-10 PROCEDURE — 84153 ASSAY OF PSA TOTAL: CPT

## 2022-09-26 ENCOUNTER — HOSPITAL ENCOUNTER (OUTPATIENT)
Age: 72
Discharge: HOME | End: 2022-09-26
Payer: MEDICARE

## 2022-09-26 LAB
ALBUMIN SERPL-MCNC: 4.3 G/DL (ref 3.5–5.2)
ALP BLD-CCNC: 106 U/L (ref 40–129)
ALT SERPL-CCNC: 51 U/L (ref 0–40)
ANION GAP SERPL CALCULATED.3IONS-SCNC: 13 MMOL/L (ref 7–16)
AST SERPL-CCNC: 118 U/L (ref 0–39)
BILIRUB SERPL-MCNC: 1 MG/DL (ref 0–1.2)
BUN BLDV-MCNC: 6 MG/DL (ref 6–23)
CALCIUM SERPL-MCNC: 9.3 MG/DL (ref 8.6–10.2)
CHLORIDE BLD-SCNC: 101 MMOL/L (ref 98–107)
CHOLESTEROL, TOTAL: 170 MG/DL (ref 0–199)
CO2: 24 MMOL/L (ref 22–29)
CREAT SERPL-MCNC: 1 MG/DL (ref 0.7–1.2)
GFR AFRICAN AMERICAN: >60
GFR NON-AFRICAN AMERICAN: >60 ML/MIN/1.73
GLUCOSE BLD-MCNC: 96 MG/DL (ref 74–99)
HBA1C MFR BLD: 5 % (ref 4–5.6)
HCT VFR BLD CALC: 41.4 % (ref 37–54)
HDLC SERPL-MCNC: 65 MG/DL
HEMOGLOBIN: 14.3 G/DL (ref 12.5–16.5)
LDL CHOLESTEROL CALCULATED: 84 MG/DL (ref 0–99)
MAGNESIUM: 1.5 MG/DL (ref 1.6–2.6)
MCH RBC QN AUTO: 33.9 PG (ref 26–35)
MCHC RBC AUTO-ENTMCNC: 34.5 % (ref 32–34.5)
MCV RBC AUTO: 98.1 FL (ref 80–99.9)
PDW BLD-RTO: 12.4 FL (ref 11.5–15)
PLATELET # BLD: 209 E9/L (ref 130–450)
PMV BLD AUTO: 10.3 FL (ref 7–12)
POTASSIUM SERPL-SCNC: 3.8 MMOL/L (ref 3.5–5)
PROSTATE SPECIFIC ANTIGEN: 0.13 NG/ML (ref 0–4)
RBC # BLD: 4.22 E12/L (ref 3.8–5.8)
SODIUM BLD-SCNC: 138 MMOL/L (ref 132–146)
TOTAL PROTEIN: 8.8 G/DL (ref 6.4–8.3)
TRIGL SERPL-MCNC: 103 MG/DL (ref 0–149)
VLDLC SERPL CALC-MCNC: 21 MG/DL
WBC # BLD: 6.2 E9/L (ref 4.5–11.5)

## 2022-09-26 PROCEDURE — 80053 COMPREHEN METABOLIC PANEL: CPT

## 2022-09-26 PROCEDURE — 80061 LIPID PANEL: CPT

## 2022-09-26 PROCEDURE — 84153 ASSAY OF PSA TOTAL: CPT

## 2022-09-26 PROCEDURE — 83735 ASSAY OF MAGNESIUM: CPT

## 2022-09-26 PROCEDURE — 83036 HEMOGLOBIN GLYCOSYLATED A1C: CPT

## 2022-09-26 PROCEDURE — 36415 COLL VENOUS BLD VENIPUNCTURE: CPT

## 2022-09-26 PROCEDURE — 85027 COMPLETE CBC AUTOMATED: CPT

## 2022-09-26 PROCEDURE — 83695 ASSAY OF LIPOPROTEIN(A): CPT

## 2022-09-26 PROCEDURE — G0103 PSA SCREENING: HCPCS

## 2022-09-28 LAB — LIPOPROTEIN (A): 31 MG/DL

## 2022-11-04 ENCOUNTER — HOSPITAL ENCOUNTER (OUTPATIENT)
Age: 72
Discharge: HOME | End: 2022-11-04
Payer: MEDICARE

## 2022-11-04 LAB — PROSTATE SPECIFIC ANTIGEN: 0.1 NG/ML (ref 0–4)

## 2022-11-04 PROCEDURE — 84153 ASSAY OF PSA TOTAL: CPT

## 2022-11-04 PROCEDURE — 36415 COLL VENOUS BLD VENIPUNCTURE: CPT

## 2023-03-20 NOTE — PROGRESS NOTES
[facility] PRE-ADMISSION TESTING INSTRUCTIONS    The Preadmission Testing patient is instructed accordingly using the following criteria (check applicable):    ARRIVAL INSTRUCTIONS:  [x] Parking the day of Surgery is located in the Main Entrance lot. Upon entering the door, make an immediate right to the surgery reception desk    [x] Bring photo ID and insurance card    [] Bring in a copy of Living will or Durable Power of  papers. [x] Please be sure to arrange for responsible adult to provide transportation to and from the hospital    [x] Please arrange for responsible adult to be with you for the 24 hour period post procedure due to having anesthesia    [x] If you awake am of surgery not feeling well or have temperature >100 please call [phone]    GENERAL INSTRUCTIONS:    [x] Nothing by mouth after midnight, including gum, candy, mints or water    [x] You may brush your teeth, but do not swallow any water    [x] Take medications as instructed with 1-2 oz of water    [x] Stop herbal supplements and vitamins 5 days prior to procedure    [x] Follow preop dosing of blood thinners per physician instructions    [] Take 1/2 dose of evening insulin, but no insulin after midnight    [] No oral diabetic medications after midnight    [] If diabetic and have low blood sugar or feel symptomatic, take 1-2oz apple juice only    [] Bring inhalers day of surgery    [] Bring C-PAP/ Bi-Pap day of surgery    [] Bring urine specimen day of surgery    [x] Shower or bath with soap, lather and rinse well, AM of Surgery, no lotion, powders or creams to surgical site    [] Follow bowel prep as instructed per surgeon    [x] No tobacco products within 24 hours of surgery     [x] No alcohol or illegal drug use within 24 hours of surgery.     [x] Jewelry, body piercing's, eyeglasses, contact lenses and dentures are not permitted into surgery (bring cases)      [] Please do not wear any nail polish,

## 2023-03-21 ENCOUNTER — PREP FOR PROCEDURE (OUTPATIENT)
Dept: UROLOGY | Age: 73
End: 2023-03-21

## 2023-03-21 RX ORDER — SODIUM CHLORIDE 9 MG/ML
INJECTION, SOLUTION INTRAVENOUS PRN
Status: CANCELLED | OUTPATIENT
Start: 2023-03-21

## 2023-03-21 RX ORDER — SODIUM CHLORIDE 0.9 % (FLUSH) 0.9 %
5-40 SYRINGE (ML) INJECTION EVERY 12 HOURS SCHEDULED
Status: CANCELLED | OUTPATIENT
Start: 2023-03-21

## 2023-03-21 RX ORDER — SODIUM CHLORIDE 0.9 % (FLUSH) 0.9 %
5-40 SYRINGE (ML) INJECTION PRN
Status: CANCELLED | OUTPATIENT
Start: 2023-03-21

## 2023-03-21 RX ORDER — SODIUM CHLORIDE 9 MG/ML
INJECTION, SOLUTION INTRAVENOUS CONTINUOUS
Status: CANCELLED | OUTPATIENT
Start: 2023-03-21

## 2023-03-22 ENCOUNTER — HOSPITAL ENCOUNTER (OUTPATIENT)
Age: 73
Setting detail: OUTPATIENT SURGERY
Discharge: HOME | End: 2023-03-22
Attending: STUDENT IN AN ORGANIZED HEALTH CARE EDUCATION/TRAINING PROGRAM | Admitting: STUDENT IN AN ORGANIZED HEALTH CARE EDUCATION/TRAINING PROGRAM
Payer: MEDICARE

## 2023-03-22 ENCOUNTER — HOSPITAL ENCOUNTER (OUTPATIENT)
Dept: GENERAL RADIOLOGY | Age: 73
Setting detail: OUTPATIENT SURGERY
Discharge: HOME | End: 2023-03-24
Attending: STUDENT IN AN ORGANIZED HEALTH CARE EDUCATION/TRAINING PROGRAM
Payer: MEDICARE

## 2023-03-22 ENCOUNTER — ANESTHESIA EVENT (OUTPATIENT)
Dept: OPERATING ROOM | Age: 73
End: 2023-03-22
Payer: MEDICARE

## 2023-03-22 ENCOUNTER — ANESTHESIA (OUTPATIENT)
Dept: OPERATING ROOM | Age: 73
End: 2023-03-22
Payer: MEDICARE

## 2023-03-22 VITALS
SYSTOLIC BLOOD PRESSURE: 122 MMHG | OXYGEN SATURATION: 98 % | TEMPERATURE: 97.5 F | HEIGHT: 69 IN | WEIGHT: 162 LBS | BODY MASS INDEX: 23.99 KG/M2 | RESPIRATION RATE: 20 BRPM | DIASTOLIC BLOOD PRESSURE: 73 MMHG | HEART RATE: 65 BPM

## 2023-03-22 DIAGNOSIS — R52 PAIN: ICD-10-CM

## 2023-03-22 PROCEDURE — 6360000002 HC RX W HCPCS: Performed by: NURSE ANESTHETIST, CERTIFIED REGISTERED

## 2023-03-22 PROCEDURE — 6360000002 HC RX W HCPCS: Performed by: STUDENT IN AN ORGANIZED HEALTH CARE EDUCATION/TRAINING PROGRAM

## 2023-03-22 PROCEDURE — 2709999900 HC NON-CHARGEABLE SUPPLY: Performed by: STUDENT IN AN ORGANIZED HEALTH CARE EDUCATION/TRAINING PROGRAM

## 2023-03-22 PROCEDURE — 3700000001 HC ADD 15 MINUTES (ANESTHESIA): Performed by: STUDENT IN AN ORGANIZED HEALTH CARE EDUCATION/TRAINING PROGRAM

## 2023-03-22 PROCEDURE — 2580000003 HC RX 258: Performed by: NURSE PRACTITIONER

## 2023-03-22 PROCEDURE — 7100000001 HC PACU RECOVERY - ADDTL 15 MIN: Performed by: STUDENT IN AN ORGANIZED HEALTH CARE EDUCATION/TRAINING PROGRAM

## 2023-03-22 PROCEDURE — 7100000000 HC PACU RECOVERY - FIRST 15 MIN: Performed by: STUDENT IN AN ORGANIZED HEALTH CARE EDUCATION/TRAINING PROGRAM

## 2023-03-22 PROCEDURE — 7100000010 HC PHASE II RECOVERY - FIRST 15 MIN: Performed by: STUDENT IN AN ORGANIZED HEALTH CARE EDUCATION/TRAINING PROGRAM

## 2023-03-22 PROCEDURE — 6360000004 HC RX CONTRAST MEDICATION: Performed by: STUDENT IN AN ORGANIZED HEALTH CARE EDUCATION/TRAINING PROGRAM

## 2023-03-22 PROCEDURE — 2500000003 HC RX 250 WO HCPCS: Performed by: NURSE ANESTHETIST, CERTIFIED REGISTERED

## 2023-03-22 PROCEDURE — 6370000000 HC RX 637 (ALT 250 FOR IP): Performed by: STUDENT IN AN ORGANIZED HEALTH CARE EDUCATION/TRAINING PROGRAM

## 2023-03-22 PROCEDURE — 3700000000 HC ANESTHESIA ATTENDED CARE: Performed by: STUDENT IN AN ORGANIZED HEALTH CARE EDUCATION/TRAINING PROGRAM

## 2023-03-22 PROCEDURE — 3600000013 HC SURGERY LEVEL 3 ADDTL 15MIN: Performed by: STUDENT IN AN ORGANIZED HEALTH CARE EDUCATION/TRAINING PROGRAM

## 2023-03-22 PROCEDURE — 6360000002 HC RX W HCPCS: Performed by: NURSE PRACTITIONER

## 2023-03-22 PROCEDURE — 3600000003 HC SURGERY LEVEL 3 BASE: Performed by: STUDENT IN AN ORGANIZED HEALTH CARE EDUCATION/TRAINING PROGRAM

## 2023-03-22 PROCEDURE — C1769 GUIDE WIRE: HCPCS | Performed by: STUDENT IN AN ORGANIZED HEALTH CARE EDUCATION/TRAINING PROGRAM

## 2023-03-22 PROCEDURE — 2580000003 HC RX 258: Performed by: NURSE ANESTHETIST, CERTIFIED REGISTERED

## 2023-03-22 PROCEDURE — 74420 UROGRAPHY RTRGR +-KUB: CPT

## 2023-03-22 PROCEDURE — 7100000011 HC PHASE II RECOVERY - ADDTL 15 MIN: Performed by: STUDENT IN AN ORGANIZED HEALTH CARE EDUCATION/TRAINING PROGRAM

## 2023-03-22 RX ORDER — LIDOCAINE HYDROCHLORIDE 20 MG/ML
INJECTION, SOLUTION INFILTRATION; PERINEURAL PRN
Status: DISCONTINUED | OUTPATIENT
Start: 2023-03-22 | End: 2023-03-22 | Stop reason: SDUPTHER

## 2023-03-22 RX ORDER — LABETALOL HYDROCHLORIDE 5 MG/ML
10 INJECTION, SOLUTION INTRAVENOUS
Status: DISCONTINUED | OUTPATIENT
Start: 2023-03-22 | End: 2023-03-22 | Stop reason: HOSPADM

## 2023-03-22 RX ORDER — ONDANSETRON 2 MG/ML
4 INJECTION INTRAMUSCULAR; INTRAVENOUS
Status: DISCONTINUED | OUTPATIENT
Start: 2023-03-22 | End: 2023-03-22 | Stop reason: HOSPADM

## 2023-03-22 RX ORDER — SODIUM CHLORIDE 9 MG/ML
INJECTION, SOLUTION INTRAVENOUS CONTINUOUS PRN
Status: DISCONTINUED | OUTPATIENT
Start: 2023-03-22 | End: 2023-03-22 | Stop reason: SDUPTHER

## 2023-03-22 RX ORDER — SODIUM CHLORIDE 0.9 % (FLUSH) 0.9 %
5-40 SYRINGE (ML) INJECTION EVERY 12 HOURS SCHEDULED
Status: DISCONTINUED | OUTPATIENT
Start: 2023-03-22 | End: 2023-03-22 | Stop reason: HOSPADM

## 2023-03-22 RX ORDER — PROPOFOL 10 MG/ML
INJECTION, EMULSION INTRAVENOUS CONTINUOUS PRN
Status: DISCONTINUED | OUTPATIENT
Start: 2023-03-22 | End: 2023-03-22 | Stop reason: SDUPTHER

## 2023-03-22 RX ORDER — OXYCODONE HYDROCHLORIDE 5 MG/1
5 TABLET ORAL
Status: COMPLETED | OUTPATIENT
Start: 2023-03-22 | End: 2023-03-22

## 2023-03-22 RX ORDER — SODIUM CHLORIDE 9 MG/ML
INJECTION, SOLUTION INTRAVENOUS PRN
Status: DISCONTINUED | OUTPATIENT
Start: 2023-03-22 | End: 2023-03-22 | Stop reason: HOSPADM

## 2023-03-22 RX ORDER — HYDRALAZINE HYDROCHLORIDE 20 MG/ML
10 INJECTION INTRAMUSCULAR; INTRAVENOUS
Status: DISCONTINUED | OUTPATIENT
Start: 2023-03-22 | End: 2023-03-22 | Stop reason: HOSPADM

## 2023-03-22 RX ORDER — SODIUM CHLORIDE 9 MG/ML
INJECTION, SOLUTION INTRAVENOUS CONTINUOUS
Status: DISCONTINUED | OUTPATIENT
Start: 2023-03-22 | End: 2023-03-22 | Stop reason: HOSPADM

## 2023-03-22 RX ORDER — SODIUM CHLORIDE 0.9 % (FLUSH) 0.9 %
5-40 SYRINGE (ML) INJECTION PRN
Status: DISCONTINUED | OUTPATIENT
Start: 2023-03-22 | End: 2023-03-22 | Stop reason: HOSPADM

## 2023-03-22 RX ORDER — DEXTROSE MONOHYDRATE 100 MG/ML
INJECTION, SOLUTION INTRAVENOUS CONTINUOUS PRN
Status: DISCONTINUED | OUTPATIENT
Start: 2023-03-22 | End: 2023-03-22 | Stop reason: HOSPADM

## 2023-03-22 RX ORDER — DROPERIDOL 2.5 MG/ML
0.62 INJECTION, SOLUTION INTRAMUSCULAR; INTRAVENOUS
Status: DISCONTINUED | OUTPATIENT
Start: 2023-03-22 | End: 2023-03-22 | Stop reason: HOSPADM

## 2023-03-22 RX ORDER — SODIUM CHLORIDE, SODIUM LACTATE, POTASSIUM CHLORIDE, CALCIUM CHLORIDE 600; 310; 30; 20 MG/100ML; MG/100ML; MG/100ML; MG/100ML
INJECTION, SOLUTION INTRAVENOUS CONTINUOUS
Status: DISCONTINUED | OUTPATIENT
Start: 2023-03-22 | End: 2023-03-22 | Stop reason: HOSPADM

## 2023-03-22 RX ORDER — MIDAZOLAM HYDROCHLORIDE 1 MG/ML
INJECTION INTRAMUSCULAR; INTRAVENOUS PRN
Status: DISCONTINUED | OUTPATIENT
Start: 2023-03-22 | End: 2023-03-22 | Stop reason: SDUPTHER

## 2023-03-22 RX ORDER — ACETAMINOPHEN 325 MG/1
650 TABLET ORAL
Status: DISCONTINUED | OUTPATIENT
Start: 2023-03-22 | End: 2023-03-22 | Stop reason: HOSPADM

## 2023-03-22 RX ORDER — DIPHENHYDRAMINE HYDROCHLORIDE 50 MG/ML
12.5 INJECTION INTRAMUSCULAR; INTRAVENOUS
Status: DISCONTINUED | OUTPATIENT
Start: 2023-03-22 | End: 2023-03-22 | Stop reason: HOSPADM

## 2023-03-22 RX ORDER — FENTANYL CITRATE 50 UG/ML
INJECTION, SOLUTION INTRAMUSCULAR; INTRAVENOUS PRN
Status: DISCONTINUED | OUTPATIENT
Start: 2023-03-22 | End: 2023-03-22 | Stop reason: SDUPTHER

## 2023-03-22 RX ORDER — PROPOFOL 10 MG/ML
INJECTION, EMULSION INTRAVENOUS PRN
Status: DISCONTINUED | OUTPATIENT
Start: 2023-03-22 | End: 2023-03-22 | Stop reason: SDUPTHER

## 2023-03-22 RX ADMIN — OXYCODONE HYDROCHLORIDE 5 MG: 5 TABLET ORAL at 11:53

## 2023-03-22 RX ADMIN — PROPOFOL 50 MG: 10 INJECTION, EMULSION INTRAVENOUS at 09:47

## 2023-03-22 RX ADMIN — HYDROMORPHONE HYDROCHLORIDE 0.5 MG: 1 INJECTION, SOLUTION INTRAMUSCULAR; INTRAVENOUS; SUBCUTANEOUS at 10:35

## 2023-03-22 RX ADMIN — FENTANYL CITRATE 50 MCG: 50 INJECTION, SOLUTION INTRAMUSCULAR; INTRAVENOUS at 09:57

## 2023-03-22 RX ADMIN — WATER 2000 MG: 1 INJECTION INTRAMUSCULAR; INTRAVENOUS; SUBCUTANEOUS at 09:37

## 2023-03-22 RX ADMIN — PROPOFOL 150 MCG/KG/MIN: 10 INJECTION, EMULSION INTRAVENOUS at 09:47

## 2023-03-22 RX ADMIN — HYDROMORPHONE HYDROCHLORIDE 0.5 MG: 1 INJECTION, SOLUTION INTRAMUSCULAR; INTRAVENOUS; SUBCUTANEOUS at 10:47

## 2023-03-22 RX ADMIN — LIDOCAINE HYDROCHLORIDE 40 MG: 20 INJECTION, SOLUTION INFILTRATION; PERINEURAL at 09:47

## 2023-03-22 RX ADMIN — OXYCODONE HYDROCHLORIDE 5 MG: 5 TABLET ORAL at 12:00

## 2023-03-22 RX ADMIN — FENTANYL CITRATE 50 MCG: 50 INJECTION, SOLUTION INTRAMUSCULAR; INTRAVENOUS at 09:47

## 2023-03-22 RX ADMIN — MIDAZOLAM 1 MG: 1 INJECTION INTRAMUSCULAR; INTRAVENOUS at 09:37

## 2023-03-22 RX ADMIN — SODIUM CHLORIDE: 9 INJECTION, SOLUTION INTRAVENOUS at 08:15

## 2023-03-22 RX ADMIN — MIDAZOLAM 1 MG: 1 INJECTION INTRAMUSCULAR; INTRAVENOUS at 09:57

## 2023-03-22 RX ADMIN — PROPOFOL 50 MG: 10 INJECTION, EMULSION INTRAVENOUS at 09:57

## 2023-03-22 ASSESSMENT — PAIN DESCRIPTION - ORIENTATION
ORIENTATION: RIGHT
ORIENTATION: RIGHT
ORIENTATION: INNER
ORIENTATION: RIGHT
ORIENTATION: RIGHT

## 2023-03-22 ASSESSMENT — PAIN DESCRIPTION - LOCATION
LOCATION: ABDOMEN
LOCATION: ABDOMEN
LOCATION: PENIS
LOCATION: ABDOMEN
LOCATION: PENIS
LOCATION: ABDOMEN

## 2023-03-22 ASSESSMENT — PAIN SCALES - GENERAL
PAINLEVEL_OUTOF10: 4
PAINLEVEL_OUTOF10: 7
PAINLEVEL_OUTOF10: 4
PAINLEVEL_OUTOF10: 4
PAINLEVEL_OUTOF10: 7
PAINLEVEL_OUTOF10: 4
PAINLEVEL_OUTOF10: 2
PAINLEVEL_OUTOF10: 3
PAINLEVEL_OUTOF10: 4
PAINLEVEL_OUTOF10: 4
PAINLEVEL_OUTOF10: 7
PAINLEVEL_OUTOF10: 3
PAINLEVEL_OUTOF10: 3

## 2023-03-22 ASSESSMENT — PAIN DESCRIPTION - DESCRIPTORS
DESCRIPTORS: DISCOMFORT
DESCRIPTORS: DULL
DESCRIPTORS: SHOOTING
DESCRIPTORS: DISCOMFORT;DULL
DESCRIPTORS: DISCOMFORT
DESCRIPTORS: ACHING
DESCRIPTORS: SHOOTING

## 2023-03-22 ASSESSMENT — PAIN DESCRIPTION - PAIN TYPE
TYPE: ACUTE PAIN;SURGICAL PAIN
TYPE: SURGICAL PAIN;ACUTE PAIN
TYPE: SURGICAL PAIN
TYPE: ACUTE PAIN;SURGICAL PAIN
TYPE: SURGICAL PAIN
TYPE: SURGICAL PAIN
TYPE: ACUTE PAIN;SURGICAL PAIN
TYPE: ACUTE PAIN;SURGICAL PAIN
TYPE: SURGICAL PAIN

## 2023-03-22 ASSESSMENT — PAIN DESCRIPTION - FREQUENCY
FREQUENCY: CONTINUOUS
FREQUENCY: INTERMITTENT
FREQUENCY: CONTINUOUS
FREQUENCY: INTERMITTENT
FREQUENCY: CONTINUOUS
FREQUENCY: INTERMITTENT
FREQUENCY: CONTINUOUS

## 2023-03-22 ASSESSMENT — PAIN - FUNCTIONAL ASSESSMENT
PAIN_FUNCTIONAL_ASSESSMENT: 0-10
PAIN_FUNCTIONAL_ASSESSMENT: ACTIVITIES ARE NOT PREVENTED

## 2023-03-22 NOTE — ANESTHESIA PRE PROCEDURE
Department of Anesthesiology  Preprocedure Note       Name:  Maxime Montano   Age:  72 y.o.  :  1950                                          MRN:  [id number]         Date:  3/22/2023      Surgeon: Tj Glaser):  Katarzyna Wilkisnon MD    Procedure: Procedure(s):  CYSTOSCOPY RETROGRADE PYELOGRAM POSSIBLE URETHERAL DILATION    Medications prior to admission:   Prior to Admission medications    Medication Sig Start Date End Date Taking? Authorizing Provider   albuterol sulfate  (90 Base) MCG/ACT inhaler Inhale 1 puff into the lungs 4 times daily as needed for Wheezing or Shortness of Breath  2/10/21   Historical Provider, MD   fluticasone (FLONASE) 50 MCG/ACT nasal spray 1 spray by Nasal route daily  10/27/20   Historical Provider, MD   sildenafil (VIAGRA) 100 MG tablet TAKE ONE TABLET BY MOUTH AN HOUR_BEFORE SEX. (NO MORE THAN 1 DOSE PER 24 HOURS) NO NITRATES 5/15/20   Historical Provider, MD   tamsulosin (FLOMAX) 0.4 MG capsule Take 0.4 mg by mouth 2 times daily     Historical Provider, MD   Multiple Vitamin (MULTI-VITAMIN DAILY PO) Take 1 tablet by mouth daily     Historical Provider, MD   mineral oil-hydrophilic petrolatum (AQUAPHOR) ointment Apply topically as needed for Dry Skin A    Historical Provider, MD   amLODIPine (NORVASC) 10 MG tablet Take 10 mg by mouth daily    Historical Provider, MD   oxyCODONE HCl (OXY-IR) 10 MG immediate release tablet Take 10 mg by mouth every 6 hours as needed for Pain.   18   Historical Provider, MD   metoprolol tartrate (LOPRESSOR) 50 MG tablet Take 50 mg by mouth 2 times daily    Historical Provider, MD   pantoprazole (PROTONIX) 40 MG tablet Take 40 mg by mouth daily    Historical Provider, MD   aspirin 81 MG tablet Take 81 mg by mouth daily    Historical Provider, MD       Current medications:    Current Facility-Administered Medications   Medication Dose Route Frequency Provider Last Rate Last Admin    0.9 % sodium chloride infusion   IntraVENous Continuous Sylvia HUSSEIN

## 2023-03-22 NOTE — DISCHARGE INSTRUCTIONS
appointments, and call your doctor if you are having problems. It's also a good idea to know your test results and keep a list of the medicines you take. Where can you learn more? Go to http://www.woods.com/ and enter U010 to learn more about \"Learning About Indwelling Urinary Catheter Care to Prevent Infection. \"  Current as of: June 16, 2022               Content Version: 13.6  © 2006-2023 [employer]. Care instructions adapted under license by [facility] ([facility]). If you have questions about a medical condition or this instruction, always ask your healthcare professional. [address] any warranty or liability for your use of this information.

## 2023-03-22 NOTE — PROGRESS NOTES
RECEIVED PT IN PACU 2, FROM PACU REPORT RECEIVED ASSESSMENT AND VS OBTAINED , NOURISHMENT GIVEN , FAMILY CALLED TO BE WITH PT   1150 MEDICATING PT FOR PAIN SEE MAR   1230 DISCHARGE INSTRUCTIONS GIVEN TO PT AND FRIEND BOTH VERBALIZED UNDERSTANDING OF INSTRUCTIONS  PAMPHLETS GIVEN PT ALREADY ON PAIN MED AT HOME , REVIEWED SUAREZ CARE WITH AND HIS FRIEND , BOTH FOR LEG BAG AND STANDARD SUAREZ BAG AND CLEANSING AND KEEPING CLEAN PT STATES PLAN TO GO TO DR OFFICE ON Monday TO HAVE SUAREZ REMOVED THAT'S WHAT HE DID BEFORE , INSTRUCTED PT TO CALL OFFICE FIRST TO GET TIME TO COME IN AND ALSO TO  SCHEDULE FOLLOW UP APPOINTMTNET FOR 2 WEEKS

## 2023-03-22 NOTE — ANESTHESIA POSTPROCEDURE EVALUATION
Department of Anesthesiology  Postprocedure Note    Patient: Jareth Metcalf  MRN: [id number]  YOB: 1950  Date of evaluation: 3/22/2023      Procedure Summary     Date: 03/22/23 Room / Location: [facility] OR 06 / [facility]    Anesthesia Start: 1368 Anesthesia Stop:     Procedure: CYSTOSCOPY RETROGRADE PYELOGRAM URETHERAL DILATION SUAREZ INSERTION (Penis) Diagnosis:       Bulbous urethral stricture      (Bulbous urethral stricture [N35.912])    Surgeons: Mario Hines MD Responsible Provider: James Leal MD    Anesthesia Type: MAC ASA Status: 3          Anesthesia Type: No value filed.     Jose Phase I: Jose Score: 10    Jose Phase II:        Anesthesia Post Evaluation    Patient location during evaluation: PACU  Patient participation: complete - patient participated  Level of consciousness: awake  Airway patency: patent  Nausea & Vomiting: no nausea and no vomiting  Complications: no  Cardiovascular status: hemodynamically stable  Respiratory status: acceptable  Hydration status: euvolemic

## 2023-03-22 NOTE — OP NOTE
Operative Note      Patient: Bam Scott  YOB: 1950  MRN: [id number]    Date of Procedure: 3/22/2023    Pre-Op Diagnosis: Bulbous urethral stricture [N35.912]    Post-Op Diagnosis: Same       Procedure(s):  CYSTOSCOPY URETHERAL DILATION, rodrigues catheter placement    Surgeon(s):  Angelica Gerber MD    Assistant:   * No surgical staff found *    Anesthesia: Monitor Anesthesia Care    Estimated Blood Loss (mL): Minimal    Complications: None    Specimens:   * No specimens in log *    Implants:  * No implants in log *      Drains:   Urinary Catheter 03/22/23 Rodrigues (Active)       Findings: see op note    Detailed Description of Procedure:     Patient is a 72-year-old male known with a bulbous urethral stricture. He has had an increase in his inability to void. With bouts of possible urinary retention. Discussed with the patient the need for cystoscopy and possible urethral dilation in the OR all risk benefits alternatives were discussed with the patient informed consent was obtained and signed. Operation:    Patient was wheeled back into the operative suite. Upon entering the operative suite the patient was identified using name band and number. The patient was identified the placed in the supine position. General anesthesia was induced without a complication the patient was then prepped and draped in usual sterile fashion. 25 Turkmen cystoscope was inserted we are unable to go past the bulbous urethral stricture. A wire was then placed and we then used Lynnette dilators and dilated from 14 Western Kia up to 25 Western Kia. Cystoscope was then able to be placed there was a very dense stricture in the bulbous urethra. There was clear urine within the bladder. At that time the bladder was then drained the wire remained and an 25 Western Kia Rodrigues catheter was then placed the patient was then awoke from anesthesia and taken to the PACU in stable condition. Plan:     Rodrigues removal on Monday  Follow-up in 2

## 2023-03-22 NOTE — H&P
09/26/2022       Lab Results   Component Value Date    CREATININE 1.0 09/26/2022       Lab Results   Component Value Date    PSA 0.10 11/04/2022    PSA 0.13 09/26/2022    PSA 0.15 05/10/2022       No results for input(s): LABURIN in the last 72 hours. No results for input(s): BC in the last 72 hours. No results for input(s): Beau Pac in the last 72 hours. Assessment and Plan:  1. Cystoscopy, urethral dilation.      Liana Sesay MD

## 2023-05-16 ENCOUNTER — HOSPITAL ENCOUNTER (OUTPATIENT)
Dept: GENERAL RADIOLOGY | Age: 73
Discharge: HOME | End: 2023-05-18
Payer: MEDICARE

## 2023-05-16 ENCOUNTER — HOSPITAL ENCOUNTER (OUTPATIENT)
Dept: HYPERBARIC MEDICINE | Age: 73
Discharge: HOME | End: 2023-05-16
Payer: MEDICARE

## 2023-05-16 VITALS
TEMPERATURE: 98 F | HEART RATE: 84 BPM | DIASTOLIC BLOOD PRESSURE: 80 MMHG | SYSTOLIC BLOOD PRESSURE: 136 MMHG | RESPIRATION RATE: 15 BRPM

## 2023-05-16 DIAGNOSIS — N30.41 HEMATURIA DUE TO IRRADIATION CYSTITIS: ICD-10-CM

## 2023-05-16 DIAGNOSIS — Z87.891 HISTORY OF TOBACCO USE: ICD-10-CM

## 2023-05-16 DIAGNOSIS — T66.XXXS LATE EFFECT OF RADIATION: ICD-10-CM

## 2023-05-16 DIAGNOSIS — Z92.3 PERSONAL HISTORY OF RADIATION THERAPY: ICD-10-CM

## 2023-05-16 DIAGNOSIS — Z87.891 HISTORY OF TOBACCO USE: Primary | ICD-10-CM

## 2023-05-16 PROCEDURE — 71046 X-RAY EXAM CHEST 2 VIEWS: CPT

## 2023-05-16 PROCEDURE — 99212 OFFICE O/P EST SF 10 MIN: CPT

## 2023-05-16 PROCEDURE — 99204 OFFICE O/P NEW MOD 45 MIN: CPT | Performed by: SURGERY

## 2023-05-16 RX ORDER — CETIRIZINE HYDROCHLORIDE 5 MG/1
5 TABLET ORAL DAILY
COMMUNITY

## 2023-05-16 NOTE — CONSULTS
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 Blood    Patient denies any history of fever or chills    Patient denies any chest pain or shortness of breath, does have history of underlying coronary artery disease, denies any history of congestive heart failure    Patient has no difficulty with's ears      Patient denies any focal lateralizing neurological symptoms like loss of speech, vision or loss of function of extremity    Patient can walk a few blocks, and denies any symptoms of rest pain    Allergies   Allergen Reactions    Simvastatin Other (See Comments)     Cramps per pt    Crestor [Rosuvastatin]        Current Outpatient Medications   Medication Sig Dispense Refill    cetirizine (ZYRTEC) 5 MG tablet Take 1 tablet by mouth daily      albuterol sulfate  (90 Base) MCG/ACT inhaler Inhale 1 puff into the lungs 4 times daily as needed for Wheezing or Shortness of Breath      fluticasone (FLONASE) 50 MCG/ACT nasal spray 1 spray by Nasal route daily      sildenafil (VIAGRA) 100 MG tablet TAKE ONE TABLET BY MOUTH AN HOUR_BEFORE SEX.  (NO MORE THAN 1 DOSE PER 24 HOURS) NO NITRATES      tamsulosin (FLOMAX) 0.4 MG capsule Take 1 capsule by mouth 2 times daily      Multiple Vitamin (MULTI-VITAMIN DAILY PO) Take 1 tablet by mouth daily       mineral

## 2023-05-16 NOTE — PROGRESS NOTES
Hyperbaric Oxygen Therapy   Patient Orientation      NAME: Ramon Condon  MEDICAL RECORD NUMBER:  [id number]  AGE: 72 y.o. GENDER: male  : 1950  EPISODE DATE:  2023    HBO Orientation Completed with:  Patient         INTRODUCTION   Welcome to the [facility],[address]. This guide will assist in answering any questions which you might have concerning your scheduled hyperbaric oxygen treatment and if appropriate, any wound care you might need. During your stay with us you will hear your treatment called a dive. This is just a nickname given to the procedure and does not refer to being in water. You will only be exposed to air pressure changes during your treatments. HYPERBARIC OXYGEN THERAPY  Hyperbaric oxygen treatment is a means of providing additional oxygen to your body tissues. By increasing the oxygen in the tissues, healing is enhanced. Two important effects on difficult wounds are first, to speed new microscopic blood vessel growth into the wound and second, to improve the ability of your white blood cells to kill germs. It is important to know that hyperbaric oxygen is an additional (adjunctive) therapy in addition to the current medical or surgical care you are receiving. It is also essential that you understand that this is not a cure-all but a part of your total medical care. THE STAFF  [facility] staff consists of fully trained physicians, nursing staff, and chamber operators. There will always be a physician, as well as other staff members with you in the department while you are having your hyperbaric oxygen therapy treatment. Please feel free to ask for help from any of the staff members while you are here. THE CHAMBER  The hyperbaric chamber located at the [address] [place] [address] Road is a monoplace, seamless acrylic pressure chamber designed to treat one patient at a time. You will be lying down with your head slightly elevated for your treatments.   A communication

## 2023-05-22 ENCOUNTER — HOSPITAL ENCOUNTER (OUTPATIENT)
Dept: HYPERBARIC MEDICINE | Age: 73
Discharge: HOME | End: 2023-05-22
Payer: MEDICARE

## 2023-05-22 VITALS
RESPIRATION RATE: 18 BRPM | DIASTOLIC BLOOD PRESSURE: 86 MMHG | SYSTOLIC BLOOD PRESSURE: 144 MMHG | TEMPERATURE: 96.7 F | HEART RATE: 84 BPM

## 2023-05-22 DIAGNOSIS — N30.41 HEMATURIA DUE TO IRRADIATION CYSTITIS: ICD-10-CM

## 2023-05-22 DIAGNOSIS — T66.XXXS LATE EFFECT OF RADIATION: Primary | ICD-10-CM

## 2023-05-22 PROCEDURE — G0277 HBOT, FULL BODY CHAMBER, 30M: HCPCS

## 2023-05-22 NOTE — DISCHARGE INSTRUCTIONS
Discharge Instructions for  Hyperbaric Oxygen Therapy  Ezequiel Munguia [address]  [address] [place] [address]. [place], [address]  Telephone: [phone]    NAME:  Abimael Peña OF BIRTH:  1950  MEDICAL RECORD NUMBER:  [id number]  DATE:  5/22/2023    You have been treated with 100% oxygen in the Hyperbaric Chamber at the [address] [place] [address]. There are usually no adverse effects or problems which follow this treatment. However, for comfort and safety, we strongly recommend these guidelines are followed: It is perfectly normal to feel tired after a hyperbaric treatment. This tiredness should go away 2 to 3 days after your last treatment. Avoid heavy exertion, exercise or other unnecessary activity if you are feeling tired. Some people develop a feeling of fullness or stuffiness in their ears. This is a result of a small amount of fluid build-up in the middle ear. You may take an over the counter decongestant if approved by your doctor. Follow the instructions in the medicine package for the amount to take. If this problem lasts for more than 48 hours, please call your personal doctor. You also may call or return to the [address] [place] [address] Hyperbaric Medicine Department and have a Hyperbaric physician examine you. In rare cases, patients may have so called late effects after the treatments. Please call the Hyperbaric Medicine Department if you have any of these symptoms:     [x]Headache that is not relieved by over the counter pain medicine. [x]Changes in vision. During the course of many hyperbaric treatments (20 or more) some patients may complain of a temporary problem with sharp focus on distant objects. This is a result of changes in the shape of the lens. Your vision should return to normal in 6 to 8 weeks. [x]Severe pain in your ears. [x]Nausea or vomiting. [x]Difficulty concentrating.   [x]Clumsiness, difficulty

## 2023-05-22 NOTE — PROGRESS NOTES
Hyperbarics Provider Reassessment     Deng Dinh is a 72 y.o. 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 Hyperbaric oxygen therapy physician orders,plan of care, vascular and nutritional status reviewed, addressed and updated as needed. Pt is tolerating hyperbaric oxygen therapy  well without complications. All wound related documentation/correspondences from the [address] [place] [address] and/or referring/collaborating physicians has been reviewed. Wound:       [] Non-Wound Related Diagnosis for HBOT    The wounds are responding to hyperbaric oxygen therapy. Based on all available clinical documentation, we will  start hyperbaric oxygen therapy.           Ashley Goldman RN  5/22/2023  2:51 PM

## 2023-05-22 NOTE — PLAN OF CARE
Problem: Cognitive:  Goal: Understand HBO therapy goals, procedures, and potential hazards. Description: Understand HBO therapy goals, procedures, and potential hazards.   Outcome: Progressing     Problem: Physical Regulation:  Goal: Tolerate HBO therapy and barotrauma will be prevented  Description: Tolerate HBO therapy and barotrauma will be prevented  Outcome: Progressing

## 2023-05-23 ENCOUNTER — HOSPITAL ENCOUNTER (OUTPATIENT)
Dept: HYPERBARIC MEDICINE | Age: 73
Discharge: HOME | End: 2023-05-23
Payer: MEDICARE

## 2023-05-23 VITALS — HEART RATE: 79 BPM | TEMPERATURE: 97.4 F | SYSTOLIC BLOOD PRESSURE: 143 MMHG | DIASTOLIC BLOOD PRESSURE: 74 MMHG

## 2023-05-23 DIAGNOSIS — T66.XXXS LATE EFFECT OF RADIATION: Primary | ICD-10-CM

## 2023-05-23 DIAGNOSIS — N30.41 HEMATURIA DUE TO IRRADIATION CYSTITIS: ICD-10-CM

## 2023-05-23 PROCEDURE — 99183 HYPERBARIC OXYGEN THERAPY: CPT | Performed by: SURGERY

## 2023-05-23 PROCEDURE — G0277 HBOT, FULL BODY CHAMBER, 30M: HCPCS

## 2023-05-23 NOTE — DISCHARGE INSTRUCTIONS
Discharge Instructions for  Hyperbaric Oxygen Therapy  Addijasmin Tomamari Munguia [address]  [address] [place] [address]. [place], [address]  Telephone: [phone]    NAME:  Armando Joseph OF BIRTH:  1950  MEDICAL RECORD NUMBER:  [id number]  DATE:  5/23/2023    You have been treated with 100% oxygen in the Hyperbaric Chamber at the [address] [place] [address]. There are usually no adverse effects or problems which follow this treatment. However, for comfort and safety, we strongly recommend these guidelines are followed: It is perfectly normal to feel tired after a hyperbaric treatment. This tiredness should go away 2 to 3 days after your last treatment. Avoid heavy exertion, exercise or other unnecessary activity if you are feeling tired. Some people develop a feeling of fullness or stuffiness in their ears. This is a result of a small amount of fluid build-up in the middle ear. You may take an over the counter decongestant if approved by your doctor. Follow the instructions in the medicine package for the amount to take. If this problem lasts for more than 48 hours, please call your personal doctor. You also may call or return to the [address] [place] [address] Hyperbaric Medicine Department and have a Hyperbaric physician examine you. In rare cases, patients may have so called late effects after the treatments. Please call the Hyperbaric Medicine Department if you have any of these symptoms:     [x]Headache that is not relieved by over the counter pain medicine. [x]Changes in vision. During the course of many hyperbaric treatments (20 or more) some patients may complain of a temporary problem with sharp focus on distant objects. This is a result of changes in the shape of the lens. Your vision should return to normal in 6 to 8 weeks. [x]Severe pain in your ears. [x]Nausea or vomiting. [x]Difficulty concentrating.   [x]Clumsiness, difficulty

## 2023-05-23 NOTE — PROGRESS NOTES
Ezequiel Munguia 476  Hyperbaric Oxygen Therapy   Progress Note    NAME: Earl Luo  MEDICAL RECORD NUMBER:  [id number]  AGE: 72 y.o. 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 See doc flowsheets for documentation. Objective       No results for input(s): GLUMET in the last 72 hours. Pre treatment Vital Signs       Temp: 97.3 °F (36.3 °C)     Pulse: 77           BP: 136/77     Post treatment Vital Signs  Temp: 97.4 °F (36.3 °C)  Pulse: 79     BP: (!) 143/74  Assessment      Physical Exam:  General Appearance:  alert and oriented to person, place and time, well-developed and well-nourished, in no acute distress  ENT:  tympanic membranes intact bilaterally  Pulmonary/Chest:  clear to auscultation bilaterally- no wheezes, rales or rhonchi, normal air movement, no respiratory distress  Cardiovascular:  regular rate and rhythm  Chamber #: 39  Treatment Start Time: 1133     Pressure Reached Time: 1148  TALHA : 2.5  Number of Air Breaks:  Treatment Status: Back to oxygen     Decompression Time: 1329   Treatment End Time: 9660  Length of Treatment: 90 Minutes  Symptoms Noted During Treatment: None  Total Treatment Time (min): 130    Adverse Event: no    I was present on these premises and immediately available to furnish assistance & direction throughout the procedure. Plan      Earl Luo is a 72 y.o. male  did successfully complete today's hyperbaric oxygen treatment at [address] [place] [address]     In my clinical judgement,

## 2023-05-24 ENCOUNTER — HOSPITAL ENCOUNTER (OUTPATIENT)
Dept: HYPERBARIC MEDICINE | Age: 73
Discharge: HOME | End: 2023-05-24
Payer: MEDICARE

## 2023-05-24 VITALS
DIASTOLIC BLOOD PRESSURE: 68 MMHG | RESPIRATION RATE: 18 BRPM | SYSTOLIC BLOOD PRESSURE: 138 MMHG | TEMPERATURE: 97.4 F | HEART RATE: 81 BPM

## 2023-05-24 DIAGNOSIS — T66.XXXS LATE EFFECT OF RADIATION: Primary | ICD-10-CM

## 2023-05-24 DIAGNOSIS — N30.41 HEMATURIA DUE TO IRRADIATION CYSTITIS: ICD-10-CM

## 2023-05-24 PROCEDURE — G0277 HBOT, FULL BODY CHAMBER, 30M: HCPCS

## 2023-05-24 NOTE — PROGRESS NOTES
Reviewed instructions with patient and offered to print a copy. Patient denied copy of instructions.

## 2023-05-24 NOTE — DISCHARGE INSTRUCTIONS
Discharge Instructions for  Hyperbaric Oxygen Therapy  Ezequiel Munguia [address]  [address] [place] [address]. [place], [address]  Telephone: [phone]    NAME:  Silvio Blas OF BIRTH:  1950  MEDICAL RECORD NUMBER:  [id number]  DATE:  5/24/2023    You have been treated with 100% oxygen in the Hyperbaric Chamber at the [address] [place] [address]. There are usually no adverse effects or problems which follow this treatment. However, for comfort and safety, we strongly recommend these guidelines are followed: It is perfectly normal to feel tired after a hyperbaric treatment. This tiredness should go away 2 to 3 days after your last treatment. Avoid heavy exertion, exercise or other unnecessary activity if you are feeling tired. Some people develop a feeling of fullness or stuffiness in their ears. This is a result of a small amount of fluid build-up in the middle ear. You may take an over the counter decongestant if approved by your doctor. Follow the instructions in the medicine package for the amount to take. If this problem lasts for more than 48 hours, please call your personal doctor. You also may call or return to the [address] [place] [address] Hyperbaric Medicine Department and have a Hyperbaric physician examine you. In rare cases, patients may have so called late effects after the treatments. Please call the Hyperbaric Medicine Department if you have any of these symptoms:     [x]Headache that is not relieved by over the counter pain medicine. [x]Changes in vision. During the course of many hyperbaric treatments (20 or more) some patients may complain of a temporary problem with sharp focus on distant objects. This is a result of changes in the shape of the lens. Your vision should return to normal in 6 to 8 weeks. [x]Severe pain in your ears. [x]Nausea or vomiting. [x]Difficulty concentrating.   [x]Clumsiness, difficulty

## 2023-05-25 ENCOUNTER — HOSPITAL ENCOUNTER (OUTPATIENT)
Dept: HYPERBARIC MEDICINE | Age: 73
Discharge: HOME | End: 2023-05-25
Payer: MEDICARE

## 2023-05-25 VITALS
RESPIRATION RATE: 16 BRPM | TEMPERATURE: 97.6 F | HEART RATE: 70 BPM | SYSTOLIC BLOOD PRESSURE: 155 MMHG | DIASTOLIC BLOOD PRESSURE: 84 MMHG

## 2023-05-25 DIAGNOSIS — T66.XXXS LATE EFFECT OF RADIATION: Primary | ICD-10-CM

## 2023-05-25 DIAGNOSIS — N30.41 HEMATURIA DUE TO IRRADIATION CYSTITIS: ICD-10-CM

## 2023-05-25 PROCEDURE — G0277 HBOT, FULL BODY CHAMBER, 30M: HCPCS

## 2023-05-25 NOTE — PROGRESS NOTES
Ezequiel Eldridge [facility]  Hyperbaric Oxygen Therapy   Progress Note    NAME: Brian Osuna  MEDICAL RECORD NUMBER:  [id number]  AGE: 72 y.o. 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 See doc flowsheets for documentation. Objective       No results for input(s): GLUMET in the last 72 hours. Pre treatment Vital Signs       Temp: 97.6 °F (36.4 °C)     Pulse: 87     Respirations: 16     BP: 133/75     Post treatment Vital Signs  Temp: 97.4 °F (36.3 °C)  Pulse: 81  Respirations: 18  BP: 138/68  Assessment      Physical Exam:  General Appearance:  alert and oriented to person, place and time, well-developed and well-nourished, in no acute distress  ENT:  tympanic membranes intact bilaterally  Pulmonary/Chest:  clear to auscultation bilaterally- no wheezes, rales or rhonchi, normal air movement, no respiratory distress  Cardiovascular:  S1S2  Chamber #: 38  Treatment Start Time: 1129     Pressure Reached Time: 1142  TALHA : 2.5  Number of Air Breaks:  Treatment Status: Back to oxygen     Decompression Time: 1325   Treatment End Time: 8526  Length of Treatment: 90 Minutes  Symptoms Noted During Treatment: None  Total Treatment Time (min): 129    Adverse Event: no    I was present on these premises and immediately available to furnish assistance & direction throughout the procedure. Plan      Brian Osuna is a 72 y.o. male  did successfully complete today's hyperbaric oxygen treatment at [address] [place] [address]. In my clinical judgement, ongoing HBO therapy is  necessary at this time, given a threat to patient function, limb or life from the current condition.       Supervision and attendance of Hyperbaric Oxygen

## 2023-05-25 NOTE — DISCHARGE INSTRUCTIONS
Discharge Instructions for  Hyperbaric Oxygen Therapy  Ezequiel Santosamari Penelopemnany Munguia [address]  [address] [place] [address]. [place], [address]  Telephone: [phone]    NAME:  Katiy Galicia OF BIRTH:  1950  MEDICAL RECORD NUMBER:  [id number]  DATE:  5/25/2023    You have been treated with 100% oxygen in the Hyperbaric Chamber at the [address] [place] [address]. There are usually no adverse effects or problems which follow this treatment. However, for comfort and safety, we strongly recommend these guidelines are followed: It is perfectly normal to feel tired after a hyperbaric treatment. This tiredness should go away 2 to 3 days after your last treatment. Avoid heavy exertion, exercise or other unnecessary activity if you are feeling tired. Some people develop a feeling of fullness or stuffiness in their ears. This is a result of a small amount of fluid build-up in the middle ear. You may take an over the counter decongestant if approved by your doctor. Follow the instructions in the medicine package for the amount to take. If this problem lasts for more than 48 hours, please call your personal doctor. You also may call or return to the [address] [place] [address] Hyperbaric Medicine Department and have a Hyperbaric physician examine you. In rare cases, patients may have so called late effects after the treatments. Please call the Hyperbaric Medicine Department if you have any of these symptoms:     [x]Headache that is not relieved by over the counter pain medicine. [x]Changes in vision. During the course of many hyperbaric treatments (20 or more) some patients may complain of a temporary problem with sharp focus on distant objects. This is a result of changes in the shape of the lens. Your vision should return to normal in 6 to 8 weeks. [x]Severe pain in your ears. [x]Nausea or vomiting. [x]Difficulty concentrating.   [x]Clumsiness, difficulty

## 2023-05-26 ENCOUNTER — HOSPITAL ENCOUNTER (OUTPATIENT)
Dept: HYPERBARIC MEDICINE | Age: 73
Discharge: HOME | End: 2023-05-26
Payer: MEDICARE

## 2023-05-26 VITALS
HEART RATE: 88 BPM | DIASTOLIC BLOOD PRESSURE: 83 MMHG | RESPIRATION RATE: 16 BRPM | SYSTOLIC BLOOD PRESSURE: 158 MMHG | TEMPERATURE: 97.5 F

## 2023-05-26 DIAGNOSIS — T66.XXXS LATE EFFECT OF RADIATION: Primary | ICD-10-CM

## 2023-05-26 DIAGNOSIS — N30.41 HEMATURIA DUE TO IRRADIATION CYSTITIS: ICD-10-CM

## 2023-05-26 PROCEDURE — G0277 HBOT, FULL BODY CHAMBER, 30M: HCPCS

## 2023-05-26 NOTE — PROGRESS NOTES
Ezequiel Munguia 476  Hyperbaric Oxygen Therapy   Progress Note    NAME: Richard Neal  MEDICAL RECORD NUMBER:  [id number]  AGE: 72 y.o. 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 See doc flowsheets for documentation. Objective       No results for input(s): GLUMET in the last 72 hours. Pre treatment Vital Signs       Temp: 97 °F (36.1 °C)     Pulse: 83     Respirations: 16     BP: 127/84     Post treatment Vital Signs  Temp: 97.5 °F (36.4 °C)  Pulse: 88  Respirations: 16  BP: (!) 158/83  Assessment      Physical Exam:  General Appearance:  alert and oriented to person, place and time, well-developed and well-nourished, in no acute distress  ENT:  tympanic membranes intact bilaterally  Pulmonary/Chest:  clear to auscultation bilaterally- no wheezes, rales or rhonchi, normal air movement, no respiratory distress  Cardiovascular:  regular rate and rhythm  Chamber #: 39  Treatment Start Time: 1154     Pressure Reached Time: 1209  TALHA : 2.5  Number of Air Breaks:  Treatment Status: Back to oxygen     Decompression Time: 1350   Treatment End Time: 1406  Length of Treatment: 90 Minutes  Symptoms Noted During Treatment: None  Total Treatment Time (min): 132    Adverse Event: no    I was present on these premises and immediately available to furnish assistance & direction throughout the procedure.    Francisco Neal is a 72 y.o. male  did

## 2023-05-26 NOTE — PROGRESS NOTES
[place] [address]  Hyperbaric Oxygen Therapy   Progress Note    NAME: Amber Arcos  MEDICAL RECORD NUMBER:  [id number]  AGE: 72 y.o. 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 See doc flowsheets for documentation. Objective       No results for input(s): GLUMET in the last 72 hours. Pre treatment Vital Signs       Temp: 97.2 °F (36.2 °C)     Pulse: 86     Respirations: 18     BP: 124/70     Post treatment Vital Signs  Temp: 97.6 °F (36.4 °C)  Pulse: 70  Respirations: 16  BP: (!) 155/84  Assessment      Physical Exam:  General Appearance:  alert and oriented to person, place and time, well-developed and well-nourished, in no acute distress  ENT:  tympanic membranes intact bilaterally  Pulmonary/Chest:  clear to auscultation bilaterally- no wheezes, rales or rhonchi, normal air movement, no respiratory distress  Cardiovascular:  regular rate and rhythm  Chamber #: 38  Treatment Start Time: 1148     Pressure Reached Time: 1205  TALHA : 2.5  Number of Air Breaks:  Treatment Status: Air break X 2 for 5 minutes each     Decompression Time: 1346   Treatment End Time: 4655  Length of Treatment: 90 Minutes  Symptoms Noted During Treatment: None  Total Treatment Time (min): 131    Adverse Event: no    I was present on these premises and immediately available to furnish assistance & direction throughout the procedure.    Plan      Amber Arcos is a 72 y.o. male  did successfully complete today's hyperbaric oxygen treatment at [place] [address]

## 2023-05-26 NOTE — DISCHARGE INSTRUCTIONS
Discharge Instructions for  Hyperbaric Oxygen Therapy  Addijasmin Tomamari Munguia [address]  [address] [place] [address]. [place], [address]  Telephone: [phone]    NAME:  Sherri Riley OF BIRTH:  1950  MEDICAL RECORD NUMBER:  [id number]  DATE:  5/26/2023    You have been treated with 100% oxygen in the Hyperbaric Chamber at the [address] [place] [address]. There are usually no adverse effects or problems which follow this treatment. However, for comfort and safety, we strongly recommend these guidelines are followed: It is perfectly normal to feel tired after a hyperbaric treatment. This tiredness should go away 2 to 3 days after your last treatment. Avoid heavy exertion, exercise or other unnecessary activity if you are feeling tired. Some people develop a feeling of fullness or stuffiness in their ears. This is a result of a small amount of fluid build-up in the middle ear. You may take an over the counter decongestant if approved by your doctor. Follow the instructions in the medicine package for the amount to take. If this problem lasts for more than 48 hours, please call your personal doctor. You also may call or return to the [address] [place] [address] Hyperbaric Medicine Department and have a Hyperbaric physician examine you. In rare cases, patients may have so called late effects after the treatments. Please call the Hyperbaric Medicine Department if you have any of these symptoms:     [x]Headache that is not relieved by over the counter pain medicine. [x]Changes in vision. During the course of many hyperbaric treatments (20 or more) some patients may complain of a temporary problem with sharp focus on distant objects. This is a result of changes in the shape of the lens. Your vision should return to normal in 6 to 8 weeks. [x]Severe pain in your ears. [x]Nausea or vomiting. [x]Difficulty concentrating.   [x]Clumsiness, difficulty

## 2023-05-30 ENCOUNTER — HOSPITAL ENCOUNTER (OUTPATIENT)
Dept: HYPERBARIC MEDICINE | Age: 73
Discharge: HOME | End: 2023-05-30
Payer: MEDICARE

## 2023-05-30 VITALS
TEMPERATURE: 97.9 F | HEART RATE: 84 BPM | DIASTOLIC BLOOD PRESSURE: 84 MMHG | RESPIRATION RATE: 16 BRPM | SYSTOLIC BLOOD PRESSURE: 144 MMHG

## 2023-05-30 DIAGNOSIS — T66.XXXS LATE EFFECT OF RADIATION: Primary | ICD-10-CM

## 2023-05-30 DIAGNOSIS — N30.41 HEMATURIA DUE TO IRRADIATION CYSTITIS: ICD-10-CM

## 2023-05-30 PROCEDURE — G0277 HBOT, FULL BODY CHAMBER, 30M: HCPCS

## 2023-05-30 PROCEDURE — 99183 HYPERBARIC OXYGEN THERAPY: CPT | Performed by: SURGERY

## 2023-05-30 NOTE — DISCHARGE INSTRUCTIONS
Discharge Instructions for  Hyperbaric Oxygen Therapy  Ezequiel Munguia 596  [address] [place] [address]. [place], [address]  Telephone: [phone]    NAME:  Julia Henao OF BIRTH:  1950  MEDICAL RECORD NUMBER:  [id number]  DATE:  5/30/2023    You have been treated with 100% oxygen in the Hyperbaric Chamber at the [address] [place] [address]. There are usually no adverse effects or problems which follow this treatment. However, for comfort and safety, we strongly recommend these guidelines are followed: It is perfectly normal to feel tired after a hyperbaric treatment. This tiredness should go away 2 to 3 days after your last treatment. Avoid heavy exertion, exercise or other unnecessary activity if you are feeling tired. Some people develop a feeling of fullness or stuffiness in their ears. This is a result of a small amount of fluid build-up in the middle ear. You may take an over the counter decongestant if approved by your doctor. Follow the instructions in the medicine package for the amount to take. If this problem lasts for more than 48 hours, please call your personal doctor. You also may call or return to the [address] [place] [address] Hyperbaric Medicine Department and have a Hyperbaric physician examine you. In rare cases, patients may have so called late effects after the treatments. Please call the Hyperbaric Medicine Department if you have any of these symptoms:     [x]Headache that is not relieved by over the counter pain medicine. [x]Changes in vision. During the course of many hyperbaric treatments (20 or more) some patients may complain of a temporary problem with sharp focus on distant objects. This is a result of changes in the shape of the lens. Your vision should return to normal in 6 to 8 weeks. [x]Severe pain in your ears. [x]Nausea or vomiting. [x]Difficulty concentrating.   [x]Clumsiness, difficulty

## 2023-05-30 NOTE — PROGRESS NOTES
[place] [address]  Hyperbaric Oxygen Therapy   Progress Note    NAME: Erika Olson  MEDICAL RECORD NUMBER:  [id number]  AGE: 72 y.o. 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 See doc flowsheets for documentation. Objective       No results for input(s): GLUMET in the last 72 hours. Pre treatment Vital Signs       Temp: 96.9 °F (36.1 °C)     Pulse: 85     Respirations: 15     BP: (!) 143/71     Post treatment Vital Signs  Temp: 97.9 °F (36.6 °C)  Pulse: 84  Respirations: 16  BP: (!) 144/84  Assessment      Physical Exam:  General Appearance:  alert and oriented to person, place and time, well-developed and well-nourished, in no acute distress  ENT:  tympanic membranes intact bilaterally  Pulmonary/Chest:  clear to auscultation bilaterally- no wheezes, rales or rhonchi, normal air movement, no respiratory distress  Cardiovascular:  regular rate and rhythm  Chamber #: 39  Treatment Start Time: 1200     Pressure Reached Time: 1215  TALHA : 2.5  Number of Air Breaks:  Treatment Status: Air break X 2 for 5 minutes each     Decompression Time: 1357   Treatment End Time: 1413  Length of Treatment: 90 Minutes  Symptoms Noted During Treatment: None  Total Treatment Time (min): 133    Adverse Event: no    I was present on these premises and immediately available to furnish assistance & direction throughout the procedure.    Plan      Erika Olson is a 72 y.o. male  did successfully complete today's hyperbaric oxygen treatment at [place] [address]

## 2023-05-31 ENCOUNTER — HOSPITAL ENCOUNTER (OUTPATIENT)
Dept: HYPERBARIC MEDICINE | Age: 73
Discharge: HOME | End: 2023-05-31
Payer: MEDICARE

## 2023-05-31 VITALS
DIASTOLIC BLOOD PRESSURE: 68 MMHG | HEART RATE: 82 BPM | RESPIRATION RATE: 18 BRPM | SYSTOLIC BLOOD PRESSURE: 128 MMHG | TEMPERATURE: 96.8 F

## 2023-05-31 DIAGNOSIS — N30.41 HEMATURIA DUE TO IRRADIATION CYSTITIS: ICD-10-CM

## 2023-05-31 DIAGNOSIS — T66.XXXS LATE EFFECT OF RADIATION: Primary | ICD-10-CM

## 2023-05-31 PROCEDURE — G0277 HBOT, FULL BODY CHAMBER, 30M: HCPCS

## 2023-05-31 NOTE — PROGRESS NOTES
Ezequiel Munguia [address]  Hyperbaric Oxygen Therapy   Progress Note    NAME: Antony Baumgarten  MEDICAL RECORD NUMBER:  [id number]  AGE: 72 y.o. 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 See doc flowsheets for documentation. Objective       No results for input(s): GLUMET in the last 72 hours. Pre treatment Vital Signs       Temp: (!) 96.7 °F (35.9 °C)     Pulse: 72     Respirations: 16     BP: 130/72     Post treatment Vital Signs  Temp: 96.8 °F (36 °C)  Pulse: 82  Respirations: 18  BP: 128/68  Assessment      Physical Exam:  General Appearance:  alert and oriented to person, place and time, well-developed and well-nourished, in no acute distress  ENT:  tympanic membranes intact bilaterally  Pulmonary/Chest:  clear to auscultation bilaterally- no wheezes, rales or rhonchi, normal air movement, no respiratory distress  Cardiovascular:  S1S2  Chamber #: 39  Treatment Start Time: 1158     Pressure Reached Time: 1211  TALHA : 2.5  Number of Air Breaks:  Treatment Status: Air break X 2 for 5 minutes each     Decompression Time: 1354   Treatment End Time: 1408  Length of Treatment: 90 Minutes  Symptoms Noted During Treatment: None  Total Treatment Time (min): 130    Adverse Event: no    I was present on these premises and immediately available to furnish assistance & direction throughout the procedure. Plan      Antony Baumgarten is a 72 y.o. male  did successfully complete today's hyperbaric oxygen treatment at [address] [place] [address]     In my clinical judgement, ongoing

## 2023-05-31 NOTE — DISCHARGE INSTRUCTIONS
Discharge Instructions for  Hyperbaric Oxygen Therapy  Ezequiel Santosamari [address]  [address] [place] [address]. [place], [address]  Telephone: [phone]    NAME:  Jeovanny Car OF BIRTH:  1950  MEDICAL RECORD NUMBER:  [id number]  DATE:  5/31/2023    You have been treated with 100% oxygen in the Hyperbaric Chamber at the [address] [place] [address]. There are usually no adverse effects or problems which follow this treatment. However, for comfort and safety, we strongly recommend these guidelines are followed: It is perfectly normal to feel tired after a hyperbaric treatment. This tiredness should go away 2 to 3 days after your last treatment. Avoid heavy exertion, exercise or other unnecessary activity if you are feeling tired. Some people develop a feeling of fullness or stuffiness in their ears. This is a result of a small amount of fluid build-up in the middle ear. You may take an over the counter decongestant if approved by your doctor. Follow the instructions in the medicine package for the amount to take. If this problem lasts for more than 48 hours, please call your personal doctor. You also may call or return to the [address] [place] [address] Hyperbaric Medicine Department and have a Hyperbaric physician examine you. In rare cases, patients may have so called late effects after the treatments. Please call the Hyperbaric Medicine Department if you have any of these symptoms:     [x]Headache that is not relieved by over the counter pain medicine. [x]Changes in vision. During the course of many hyperbaric treatments (20 or more) some patients may complain of a temporary problem with sharp focus on distant objects. This is a result of changes in the shape of the lens. Your vision should return to normal in 6 to 8 weeks. [x]Severe pain in your ears. [x]Nausea or vomiting. [x]Difficulty concentrating.   [x]Clumsiness, difficulty

## 2023-06-01 ENCOUNTER — HOSPITAL ENCOUNTER (OUTPATIENT)
Dept: HYPERBARIC MEDICINE | Age: 73
Discharge: HOME | End: 2023-06-01
Payer: MEDICARE

## 2023-06-01 VITALS
DIASTOLIC BLOOD PRESSURE: 72 MMHG | HEART RATE: 82 BPM | SYSTOLIC BLOOD PRESSURE: 150 MMHG | TEMPERATURE: 97 F | RESPIRATION RATE: 16 BRPM

## 2023-06-01 DIAGNOSIS — N30.41 HEMATURIA DUE TO IRRADIATION CYSTITIS: ICD-10-CM

## 2023-06-01 DIAGNOSIS — T66.XXXS LATE EFFECT OF RADIATION: Primary | ICD-10-CM

## 2023-06-01 PROCEDURE — G0277 HBOT, FULL BODY CHAMBER, 30M: HCPCS

## 2023-06-01 NOTE — PROGRESS NOTES
Ezequiel Munguia [address]  Hyperbaric Oxygen Therapy   Progress Note    NAME: Melina Stanton  MEDICAL RECORD NUMBER:  [id number]  AGE: 72 y.o. 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 See doc flowsheets for documentation. Objective       No results for input(s): GLUMET in the last 72 hours. Pre treatment Vital Signs       Temp: 97.2 °F (36.2 °C)     Pulse: 69     Respirations: 16     BP: 136/70     Post treatment Vital Signs  Temp: 97 °F (36.1 °C)  Pulse: 82  Respirations: 16  BP: (!) 150/72  Assessment      Physical Exam:  General Appearance:  alert and oriented to person, place and time, well-developed and well-nourished, in no acute distress  ENT:  tympanic membranes intact bilaterally  Pulmonary/Chest:  clear to auscultation bilaterally- no wheezes, rales or rhonchi, normal air movement, no respiratory distress  Cardiovascular:  regular rate and rhythm  Chamber #: 39  Treatment Start Time: 1150     Pressure Reached Time: 1203  TALHA : 2.5  Number of Air Breaks:  Treatment Status: Back to oxygen     Decompression Time: 1344   Treatment End Time: 1576  Length of Treatment: 90 Minutes  Symptoms Noted During Treatment: None  Total Treatment Time (min): 128    Adverse Event: no    I was present on these premises and immediately available to furnish assistance & direction throughout the procedure. Plan      Melina Stanton is a 72 y.o. male  did successfully complete today's hyperbaric oxygen treatment at [address] [place] [address]     In

## 2023-06-01 NOTE — DISCHARGE INSTRUCTIONS
Discharge Instructions for  Hyperbaric Oxygen Therapy  Ezequiel Munguia [address]  [address] [place] [address]. [place], [address]  Telephone: [phone]    NAME:  Braden Centeno OF BIRTH:  1950  MEDICAL RECORD NUMBER:  [id number]  DATE:  6/1/2023    You have been treated with 100% oxygen in the Hyperbaric Chamber at the [address] [place] [address]. There are usually no adverse effects or problems which follow this treatment. However, for comfort and safety, we strongly recommend these guidelines are followed: It is perfectly normal to feel tired after a hyperbaric treatment. This tiredness should go away 2 to 3 days after your last treatment. Avoid heavy exertion, exercise or other unnecessary activity if you are feeling tired. Some people develop a feeling of fullness or stuffiness in their ears. This is a result of a small amount of fluid build-up in the middle ear. You may take an over the counter decongestant if approved by your doctor. Follow the instructions in the medicine package for the amount to take. If this problem lasts for more than 48 hours, please call your personal doctor. You also may call or return to the [address] [place] [address] Hyperbaric Medicine Department and have a Hyperbaric physician examine you. In rare cases, patients may have so called late effects after the treatments. Please call the Hyperbaric Medicine Department if you have any of these symptoms:     [x]Headache that is not relieved by over the counter pain medicine. [x]Changes in vision. During the course of many hyperbaric treatments (20 or more) some patients may complain of a temporary problem with sharp focus on distant objects. This is a result of changes in the shape of the lens. Your vision should return to normal in 6 to 8 weeks. [x]Severe pain in your ears. [x]Nausea or vomiting. [x]Difficulty concentrating.   [x]Clumsiness, difficulty

## 2023-06-02 ENCOUNTER — HOSPITAL ENCOUNTER (OUTPATIENT)
Dept: HYPERBARIC MEDICINE | Age: 73
Discharge: HOME | End: 2023-06-02
Payer: MEDICARE

## 2023-06-02 VITALS
HEART RATE: 84 BPM | TEMPERATURE: 96.9 F | SYSTOLIC BLOOD PRESSURE: 147 MMHG | RESPIRATION RATE: 16 BRPM | DIASTOLIC BLOOD PRESSURE: 84 MMHG

## 2023-06-02 DIAGNOSIS — T66.XXXS LATE EFFECT OF RADIATION: Primary | ICD-10-CM

## 2023-06-02 DIAGNOSIS — N30.41 HEMATURIA DUE TO IRRADIATION CYSTITIS: ICD-10-CM

## 2023-06-02 PROCEDURE — G0277 HBOT, FULL BODY CHAMBER, 30M: HCPCS

## 2023-06-02 NOTE — DISCHARGE INSTRUCTIONS
Discharge Instructions for  Hyperbaric Oxygen Therapy  Ezequiel Santosamari M[address]  [address] [place] [address]. [place], [address]  Telephone: [phone]    NAME:  Emerson Manner OF BIRTH:  1950  MEDICAL RECORD NUMBER:  [id number]  DATE:  6/2/2023    You have been treated with 100% oxygen in the Hyperbaric Chamber at the [address] [place] [address]. There are usually no adverse effects or problems which follow this treatment. However, for comfort and safety, we strongly recommend these guidelines are followed: It is perfectly normal to feel tired after a hyperbaric treatment. This tiredness should go away 2 to 3 days after your last treatment. Avoid heavy exertion, exercise or other unnecessary activity if you are feeling tired. Some people develop a feeling of fullness or stuffiness in their ears. This is a result of a small amount of fluid build-up in the middle ear. You may take an over the counter decongestant if approved by your doctor. Follow the instructions in the medicine package for the amount to take. If this problem lasts for more than 48 hours, please call your personal doctor. You also may call or return to the [address] [place] [address] Hyperbaric Medicine Department and have a Hyperbaric physician examine you. In rare cases, patients may have so called late effects after the treatments. Please call the Hyperbaric Medicine Department if you have any of these symptoms:     [x]Headache that is not relieved by over the counter pain medicine. [x]Changes in vision. During the course of many hyperbaric treatments (20 or more) some patients may complain of a temporary problem with sharp focus on distant objects. This is a result of changes in the shape of the lens. Your vision should return to normal in 6 to 8 weeks. [x]Severe pain in your ears. [x]Nausea or vomiting. [x]Difficulty concentrating.   [x]Clumsiness, difficulty

## 2023-06-03 NOTE — PROGRESS NOTES
Discharge instructions were reviewed with the patient and he declined a printed copy
Supervision and attendance of Hyperbaric Oxygen Therapy provided. Continue HBO treatment as outlined in the treatment plan. Hyperbaric Oxygen: Grecia Chand tolerated Treatment Number: 9 well today without complications.     Discharge Instructions were explained and given to Mr. Joe Nino     Electronically signed by Herminia Abdullahi MD on 6/2/2023 at 7:21 AM

## 2023-06-05 ENCOUNTER — HOSPITAL ENCOUNTER (OUTPATIENT)
Dept: HYPERBARIC MEDICINE | Age: 73
Discharge: HOME | End: 2023-06-05
Payer: MEDICARE

## 2023-06-05 VITALS
HEART RATE: 87 BPM | TEMPERATURE: 97.1 F | DIASTOLIC BLOOD PRESSURE: 68 MMHG | SYSTOLIC BLOOD PRESSURE: 140 MMHG | RESPIRATION RATE: 15 BRPM

## 2023-06-05 DIAGNOSIS — T66.XXXS LATE EFFECT OF RADIATION: Primary | ICD-10-CM

## 2023-06-05 DIAGNOSIS — N30.41 HEMATURIA DUE TO IRRADIATION CYSTITIS: ICD-10-CM

## 2023-06-05 PROCEDURE — G0277 HBOT, FULL BODY CHAMBER, 30M: HCPCS

## 2023-06-05 NOTE — PROGRESS NOTES
Hyperbarics Provider Reassessment     Carlos Avila is a 72 y.o. male has been receiving hyperbaric oxygen treatment for management of    HBO Diagnosis:   Problem List Items Addressed This Visit    None      Heidi Kidd has completed   out of a treatment protocol of  . Hyperbaric oxygen therapy physician orders,plan of care, vascular and nutritional status reviewed, addressed and updated as needed. Pt is tolerating hyperbaric oxygen therapy  well without complications. All wound related documentation/correspondences from the [address] [place] [address] and/or referring/collaborating physicians has been reviewed. Wound:       [] Non-Wound Related Diagnosis for HBOT    The wounds are responding to hyperbaric oxygen therapy. Based on all available clinical documentation, we will  continue hyperbaric oxygen therapy.           Cecilia Torres RN  6/5/2023  8:17 AM

## 2023-06-05 NOTE — DISCHARGE INSTRUCTIONS
walking or numbness and tingling of your arms and legs. [x]If you are on prescription medicines from your personal doctor, you may continue to take these as directed. Hyperbaric Medicine Department Information:    If you have questions or develop any of the symptoms mentioned, please contact the Hyperbaric Department at [address] [facility] 8:30 am - 4:30 pm.  If you need help outside these hours and cannot wait until we are again available, contact your PCP or go to the hospital emergency room. Additional Instructions:    No    Patient Signature:_______________________Date:_________Time:________    [] Patient unable to sign Discharge Instructions given to ECF/Jermeiah/POA    The information contained in the After Visit Summary has been reviewed with me, the patient and/or responsible adult, by my health care provider(s). I had the opportunity to ask questions regarding this information.   I have elected to receive;  []  After Visit Summary  [x]  Comprehensive Discharge Instruction      Nurse Signature:_______________________Date:_________Time:_________    Electronically signed by Peewee Bucio LPN on 4/9/8558 at 4:15 PM

## 2023-06-06 ENCOUNTER — HOSPITAL ENCOUNTER (OUTPATIENT)
Dept: HYPERBARIC MEDICINE | Age: 73
Discharge: HOME | End: 2023-06-06
Payer: MEDICARE

## 2023-06-06 VITALS
DIASTOLIC BLOOD PRESSURE: 70 MMHG | RESPIRATION RATE: 16 BRPM | HEART RATE: 70 BPM | TEMPERATURE: 97.2 F | SYSTOLIC BLOOD PRESSURE: 144 MMHG

## 2023-06-06 DIAGNOSIS — N30.41 HEMATURIA DUE TO IRRADIATION CYSTITIS: ICD-10-CM

## 2023-06-06 DIAGNOSIS — T66.XXXS LATE EFFECT OF RADIATION: Primary | ICD-10-CM

## 2023-06-06 PROCEDURE — 99183 HYPERBARIC OXYGEN THERAPY: CPT | Performed by: SURGERY

## 2023-06-06 PROCEDURE — G0277 HBOT, FULL BODY CHAMBER, 30M: HCPCS

## 2023-06-06 NOTE — PROGRESS NOTES
Ezequiel Munguia [address]  Hyperbaric Oxygen Therapy   Progress Note    NAME: Doris Daigle  MEDICAL RECORD NUMBER:  [id number]  AGE: 72 y.o. 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 See doc flowsheets for documentation. Objective       No results for input(s): GLUMET in the last 72 hours. Pre treatment Vital Signs       Temp: 97.4 °F (36.3 °C)     Pulse: 80     Respirations: 16     BP: (!) 140/73     Post treatment Vital Signs  Temp: 97.1 °F (36.2 °C)  Pulse: 87  Respirations: 15  BP: (!) 140/68  Assessment      Physical Exam:  General Appearance:  alert and oriented to person, place and time, well-developed and well-nourished, in no acute distress  ENT:  tympanic membranes intact bilaterally, normal color, normal light reflex bilaterally  Pulmonary/Chest:  clear to auscultation bilaterally- no wheezes, rales or rhonchi, normal air movement, no respiratory distress  Cardiovascular:  regular rate and rhythm  Chamber #: 38  Treatment Start Time: 1205     Pressure Reached Time: 1218  TALHA : 2.5  Number of Air Breaks: 2  Treatment Status: Back to oxygen     Decompression Time: 1359   Treatment End Time: 1411  Length of Treatment: 90 Minutes  Symptoms Noted During Treatment: None  Total Treatment Time (min): 126    Adverse Event: no    I was present on these premises and immediately available to furnish assistance & direction throughout the procedure. Plan      Doris Daigle is a 72 y.o. male  did successfully complete today's hyperbaric oxygen treatment at [address] [place] [address].     In my clinical judgement, ongoing HBO therapy is necessary at this time, given a threat to patient function, limb or

## 2023-06-06 NOTE — DISCHARGE INSTRUCTIONS
Discharge Instructions for  Hyperbaric Oxygen Therapy  Ezequiel Santosamari Munguia [address] [facility]. [place], [address]  Telephone: [phone]    NAME:  Danya Camacho OF BIRTH:  1950  MEDICAL RECORD NUMBER:  [id number]  DATE:  6/6/2023    You have been treated with 100% oxygen in the Hyperbaric Chamber at the [address] [place] [address]. There are usually no adverse effects or problems which follow this treatment. However, for comfort and safety, we strongly recommend these guidelines are followed: It is perfectly normal to feel tired after a hyperbaric treatment. This tiredness should go away 2 to 3 days after your last treatment. Avoid heavy exertion, exercise or other unnecessary activity if you are feeling tired. Some people develop a feeling of fullness or stuffiness in their ears. This is a result of a small amount of fluid build-up in the middle ear. You may take an over the counter decongestant if approved by your doctor. Follow the instructions in the medicine package for the amount to take. If this problem lasts for more than 48 hours, please call your personal doctor. You also may call or return to the [address] [place] [address] Hyperbaric Medicine Department and have a Hyperbaric physician examine you. In rare cases, patients may have so called late effects after the treatments. Please call the Hyperbaric Medicine Department if you have any of these symptoms:     [x]Headache that is not relieved by over the counter pain medicine. [x]Changes in vision. During the course of many hyperbaric treatments (20 or more) some patients may complain of a temporary problem with sharp focus on distant objects. This is a result of changes in the shape of the lens. Your vision should return to normal in 6 to 8 weeks. [x]Severe pain in your ears. [x]Nausea or vomiting. [x]Difficulty concentrating.   [x]Clumsiness, difficulty

## 2023-06-07 ENCOUNTER — HOSPITAL ENCOUNTER (OUTPATIENT)
Dept: HYPERBARIC MEDICINE | Age: 73
Discharge: HOME | End: 2023-06-07
Payer: MEDICARE

## 2023-06-07 VITALS
TEMPERATURE: 97.7 F | RESPIRATION RATE: 16 BRPM | DIASTOLIC BLOOD PRESSURE: 82 MMHG | SYSTOLIC BLOOD PRESSURE: 145 MMHG | HEART RATE: 70 BPM

## 2023-06-07 DIAGNOSIS — N30.41 HEMATURIA DUE TO IRRADIATION CYSTITIS: ICD-10-CM

## 2023-06-07 DIAGNOSIS — T66.XXXS LATE EFFECT OF RADIATION: Primary | ICD-10-CM

## 2023-06-07 PROCEDURE — G0277 HBOT, FULL BODY CHAMBER, 30M: HCPCS

## 2023-06-07 NOTE — PROGRESS NOTES
Reviewed discharge instructions with pt,he declined a printed copy.
current condition. Supervision and attendance of Hyperbaric Oxygen Therapy provided. Continue HBO treatment as outlined in the treatment plan. Hyperbaric Oxygen: Jordan Judge tolerated Treatment Number: 66 well today without complications.     Discharge Instructions were explained and given to  Wendy Larry     Electronically signed by Sp Carrero MD on 6/6/2023 at 6:19 AM

## 2023-06-07 NOTE — DISCHARGE INSTRUCTIONS
Discharge Instructions for  Hyperbaric Oxygen Therapy  Ezequiel Munguia [address]  [address] [place] [address]. [place], [address]  Telephone: [phone]    NAME:  Erna Nunez OF BIRTH:  1950  MEDICAL RECORD NUMBER:  [id number]  DATE:  6/7/2023    You have been treated with 100% oxygen in the Hyperbaric Chamber at the [address] [place] [address]. There are usually no adverse effects or problems which follow this treatment. However, for comfort and safety, we strongly recommend these guidelines are followed: It is perfectly normal to feel tired after a hyperbaric treatment. This tiredness should go away 2 to 3 days after your last treatment. Avoid heavy exertion, exercise or other unnecessary activity if you are feeling tired. Some people develop a feeling of fullness or stuffiness in their ears. This is a result of a small amount of fluid build-up in the middle ear. You may take an over the counter decongestant if approved by your doctor. Follow the instructions in the medicine package for the amount to take. If this problem lasts for more than 48 hours, please call your personal doctor. You also may call or return to the [address] [place] [address] Hyperbaric Medicine Department and have a Hyperbaric physician examine you. In rare cases, patients may have so called late effects after the treatments. Please call the Hyperbaric Medicine Department if you have any of these symptoms:     [x]Headache that is not relieved by over the counter pain medicine. [x]Changes in vision. During the course of many hyperbaric treatments (20 or more) some patients may complain of a temporary problem with sharp focus on distant objects. This is a result of changes in the shape of the lens. Your vision should return to normal in 6 to 8 weeks. [x]Severe pain in your ears. [x]Nausea or vomiting. [x]Difficulty concentrating.   [x]Clumsiness, difficulty

## 2023-06-07 NOTE — PROGRESS NOTES
Ezequiel Munguia 476  Hyperbaric Oxygen Therapy   Progress Note    NAME: Franklin Runner  MEDICAL RECORD NUMBER:  [id number]  AGE: 72 y.o. 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 See doc flowsheets for documentation. Objective       No results for input(s): GLUMET in the last 72 hours. Pre treatment Vital Signs       Temp: 97.5 °F (36.4 °C)     Pulse: 68     Respirations: 18     BP: 138/77     Post treatment Vital Signs  Temp: 97.7 °F (36.5 °C)  Pulse: 70  Respirations: 16  BP: (!) 145/82  Assessment      Physical Exam:  General Appearance:  alert and oriented to person, place and time, well-developed and well-nourished, in no acute distress  ENT:  tympanic membranes intact bilaterally  Pulmonary/Chest:  clear to auscultation bilaterally- no wheezes, rales or rhonchi, normal air movement, no respiratory distress  Cardiovascular:  S1S2  Chamber #: 39  Treatment Start Time: 1152     Pressure Reached Time: 1214  TALHA : 2.5  Number of Air Breaks:  Treatment Status: Air break X 2 for 5 minutes each     Decompression Time: 1357   Treatment End Time: 1410  Length of Treatment: 90 Minutes  Symptoms Noted During Treatment: None  Total Treatment Time (min): 138    Adverse Event: no    I was present on these premises and immediately available to furnish assistance & direction throughout the procedure. Plan      Franklin Runner is a 72 y.o. male  did successfully complete today's hyperbaric oxygen treatment at [address] [place] [address]     In my clinical judgement,

## 2023-06-08 ENCOUNTER — HOSPITAL ENCOUNTER (OUTPATIENT)
Dept: HYPERBARIC MEDICINE | Age: 73
Discharge: HOME | End: 2023-06-08
Payer: MEDICARE

## 2023-06-08 VITALS
SYSTOLIC BLOOD PRESSURE: 134 MMHG | RESPIRATION RATE: 16 BRPM | HEART RATE: 86 BPM | DIASTOLIC BLOOD PRESSURE: 74 MMHG | TEMPERATURE: 97.7 F

## 2023-06-08 DIAGNOSIS — N30.41 HEMATURIA DUE TO IRRADIATION CYSTITIS: ICD-10-CM

## 2023-06-08 DIAGNOSIS — T66.XXXS LATE EFFECT OF RADIATION: Primary | ICD-10-CM

## 2023-06-08 PROCEDURE — G0277 HBOT, FULL BODY CHAMBER, 30M: HCPCS

## 2023-06-08 NOTE — DISCHARGE INSTRUCTIONS
walking or numbness and tingling of your arms and legs. [x]If you are on prescription medicines from your personal doctor, you may continue to take these as directed. Hyperbaric Medicine Department Information:    If you have questions or develop any of the symptoms mentioned, please contact the Hyperbaric Department at [address] [facility] 8:30 am - 4:30 pm.  If you need help outside these hours and cannot wait until we are again available, contact your PCP or go to the hospital emergency room. Additional Instructions:    No    Patient Signature:_______________________Date:_________Time:________    [] Patient unable to sign Discharge Instructions given to ECF/Jeremiah/POA    The information contained in the After Visit Summary has been reviewed with me, the patient and/or responsible adult, by my health care provider(s). I had the opportunity to ask questions regarding this information.   I have elected to receive;  []  After Visit Summary  [x]  Comprehensive Discharge Instruction      Nurse Signature:_______________________Date:_________Time:_________    Electronically signed by Kriss Gudino LPN on 0/8/1317 at 4:07 PM

## 2023-06-08 NOTE — PROGRESS NOTES
Discharge instructions were reviewed with the patient and he declined a printed copy
[facility]. In my clinical judgement, ongoing HBO therapy is  necessary at this time, given a threat to patient function, limb or life from the current condition. Supervision and attendance of Hyperbaric Oxygen Therapy provided. Continue HBO treatment as outlined in the treatment plan. Hyperbaric Oxygen: Alyssa Alvarado tolerated Treatment Number: 73 well today without complications.     Discharge Instructions were explained and given to  Ambreen Almaz     Electronically signed by Manan Rollins MD on 6/8/2023 at 4:08 PM

## 2023-06-09 ENCOUNTER — HOSPITAL ENCOUNTER (OUTPATIENT)
Dept: HYPERBARIC MEDICINE | Age: 73
Discharge: HOME | End: 2023-06-09
Payer: MEDICARE

## 2023-06-09 VITALS
SYSTOLIC BLOOD PRESSURE: 140 MMHG | HEART RATE: 84 BPM | RESPIRATION RATE: 16 BRPM | DIASTOLIC BLOOD PRESSURE: 84 MMHG | TEMPERATURE: 97.4 F

## 2023-06-09 DIAGNOSIS — N30.41 HEMATURIA DUE TO IRRADIATION CYSTITIS: ICD-10-CM

## 2023-06-09 DIAGNOSIS — T66.XXXS LATE EFFECT OF RADIATION: Primary | ICD-10-CM

## 2023-06-09 PROCEDURE — G0277 HBOT, FULL BODY CHAMBER, 30M: HCPCS

## 2023-06-09 NOTE — PROGRESS NOTES
[address]. In my clinical judgement, ongoing HBO therapy is  necessary at this time, given a threat to patient function, limb or life from the current condition. Supervision and attendance of Hyperbaric Oxygen Therapy provided. Continue HBO treatment as outlined in the treatment plan. Hyperbaric Oxygen: Carlos Avila tolerated Treatment Number: 14 well today without complications.     Discharge Instructions were explained and given to Mr. Heidi Kidd     Electronically signed by Marcellus Schmitz MD on 6/9/2023 at 2:58 PM

## 2023-06-09 NOTE — DISCHARGE INSTRUCTIONS
walking or numbness and tingling of your arms and legs. [x]If you are on prescription medicines from your personal doctor, you may continue to take these as directed. Hyperbaric Medicine Department Information:    If you have questions or develop any of the symptoms mentioned, please contact the Hyperbaric Department at [address] [facility] 8:30 am - 4:30 pm.  If you need help outside these hours and cannot wait until we are again available, contact your PCP or go to the hospital emergency room. Additional Instructions:    No    Patient Signature:_______________________Date:_________Time:________    [] Patient unable to sign Discharge Instructions given to ECF/Jeremiah/POA    The information contained in the After Visit Summary has been reviewed with me, the patient and/or responsible adult, by my health care provider(s). I had the opportunity to ask questions regarding this information.   I have elected to receive;  []  After Visit Summary  [x]  Comprehensive Discharge Instruction      Nurse Signature:_______________________Date:_________Time:_________    Electronically signed by Kristine Beltran LPN on 7/8/1654 at 8:78 PM

## 2023-06-19 ENCOUNTER — HOSPITAL ENCOUNTER (OUTPATIENT)
Dept: HYPERBARIC MEDICINE | Age: 73
Discharge: HOME | End: 2023-06-19
Payer: MEDICARE

## 2023-06-19 VITALS
DIASTOLIC BLOOD PRESSURE: 72 MMHG | SYSTOLIC BLOOD PRESSURE: 144 MMHG | RESPIRATION RATE: 16 BRPM | TEMPERATURE: 97.4 F | HEART RATE: 88 BPM

## 2023-06-19 DIAGNOSIS — N30.41 HEMATURIA DUE TO IRRADIATION CYSTITIS: ICD-10-CM

## 2023-06-19 DIAGNOSIS — T66.XXXS LATE EFFECT OF RADIATION: Primary | ICD-10-CM

## 2023-06-19 PROCEDURE — G0277 HBOT, FULL BODY CHAMBER, 30M: HCPCS

## 2023-06-19 NOTE — DISCHARGE INSTRUCTIONS
Discharge Instructions for  Hyperbaric Oxygen Therapy  E[facility]equiel [address]ung[address]  [address] [place] [address]. [place], [address]  Telephone: [phone]    NAME:  Mumtaz Alcantara OF BIRTH:  1950  MEDICAL RECORD NUMBER:  [id number]  DATE:  6/19/2023    You have been treated with 100% oxygen in the Hyperbaric Chamber at the [address] [place] [address]. There are usually no adverse effects or problems which follow this treatment. However, for comfort and safety, we strongly recommend these guidelines are followed: It is perfectly normal to feel tired after a hyperbaric treatment. This tiredness should go away 2 to 3 days after your last treatment. Avoid heavy exertion, exercise or other unnecessary activity if you are feeling tired. Some people develop a feeling of fullness or stuffiness in their ears. This is a result of a small amount of fluid build-up in the middle ear. You may take an over the counter decongestant if approved by your doctor. Follow the instructions in the medicine package for the amount to take. If this problem lasts for more than 48 hours, please call your personal doctor. You also may call or return to the [address] [place] [address] Hyperbaric Medicine Department and have a Hyperbaric physician examine you. In rare cases, patients may have so called late effects after the treatments. Please call the Hyperbaric Medicine Department if you have any of these symptoms:     [x]Headache that is not relieved by over the counter pain medicine. [x]Changes in vision. During the course of many hyperbaric treatments (20 or more) some patients may complain of a temporary problem with sharp focus on distant objects. This is a result of changes in the shape of the lens. Your vision should return to normal in 6 to 8 weeks. [x]Severe pain in your ears. [x]Nausea or vomiting. [x]Difficulty concentrating.   [x]Clumsiness, difficulty

## 2023-06-20 ENCOUNTER — HOSPITAL ENCOUNTER (OUTPATIENT)
Dept: HYPERBARIC MEDICINE | Age: 73
Discharge: HOME | End: 2023-06-20
Payer: MEDICARE

## 2023-06-20 VITALS
DIASTOLIC BLOOD PRESSURE: 74 MMHG | RESPIRATION RATE: 14 BRPM | HEART RATE: 80 BPM | SYSTOLIC BLOOD PRESSURE: 140 MMHG | TEMPERATURE: 97.4 F

## 2023-06-20 DIAGNOSIS — N30.41 HEMATURIA DUE TO IRRADIATION CYSTITIS: ICD-10-CM

## 2023-06-20 DIAGNOSIS — T66.XXXS LATE EFFECT OF RADIATION: Primary | ICD-10-CM

## 2023-06-20 PROCEDURE — G0277 HBOT, FULL BODY CHAMBER, 30M: HCPCS

## 2023-06-20 PROCEDURE — 99183 HYPERBARIC OXYGEN THERAPY: CPT | Performed by: SURGERY

## 2023-06-20 NOTE — DISCHARGE INSTRUCTIONS
Discharge Instructions for  Hyperbaric Oxygen Therapy  Ezequiel Tom[address]ari [address]  [address] [place] [address]. [place], [address]  Telephone: [phone]    NAME:  Alexandro Figueroa OF BIRTH:  1950  MEDICAL RECORD NUMBER:  [id number]  DATE:  6/20/2023    You have been treated with 100% oxygen in the Hyperbaric Chamber at the [address] [place] [address]. There are usually no adverse effects or problems which follow this treatment. However, for comfort and safety, we strongly recommend these guidelines are followed: It is perfectly normal to feel tired after a hyperbaric treatment. This tiredness should go away 2 to 3 days after your last treatment. Avoid heavy exertion, exercise or other unnecessary activity if you are feeling tired. Some people develop a feeling of fullness or stuffiness in their ears. This is a result of a small amount of fluid build-up in the middle ear. You may take an over the counter decongestant if approved by your doctor. Follow the instructions in the medicine package for the amount to take. If this problem lasts for more than 48 hours, please call your personal doctor. You also may call or return to the [address] [place] [address] Hyperbaric Medicine Department and have a Hyperbaric physician examine you. In rare cases, patients may have so called late effects after the treatments. Please call the Hyperbaric Medicine Department if you have any of these symptoms:     [x]Headache that is not relieved by over the counter pain medicine. [x]Changes in vision. During the course of many hyperbaric treatments (20 or more) some patients may complain of a temporary problem with sharp focus on distant objects. This is a result of changes in the shape of the lens. Your vision should return to normal in 6 to 8 weeks. [x]Severe pain in your ears. [x]Nausea or vomiting. [x]Difficulty concentrating.   [x]Clumsiness, difficulty

## 2023-06-20 NOTE — PROGRESS NOTES
[place] [address]  Hyperbaric Oxygen Therapy   Progress Note    NAME: Debbi Francisco  MEDICAL RECORD NUMBER:  [id number]  AGE: 72 y.o. 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 See doc flowsheets for documentation. Objective       No results for input(s): GLUMET in the last 72 hours. Pre treatment Vital Signs       Temp: 97.5 °F (36.4 °C)     Pulse: 74     Respirations: 16     BP: 134/73     Post treatment Vital Signs  Temp: 97.4 °F (36.3 °C)  Pulse: 80  Respirations: 14  BP: (!) 140/74  Assessment      Physical Exam:  General Appearance:  alert and oriented to person, place and time, well-developed and well-nourished, in no acute distress  ENT:  tympanic membranes intact bilaterally  Pulmonary/Chest:  clear to auscultation bilaterally- no wheezes, rales or rhonchi, normal air movement, no respiratory distress  Cardiovascular:  regular rate and rhythm  Chamber #: 39  Treatment Start Time: 1149     Pressure Reached Time: 1203  TALHA : 2.5  Number of Air Breaks:  Treatment Status: Air break X 2 for 5 minutes each     Decompression Time: 1345   Treatment End Time: 7006  Length of Treatment: 90 Minutes  Symptoms Noted During Treatment: None  Total Treatment Time (min): 128    Adverse Event: no    I was present on these premises and immediately available to furnish assistance & direction throughout the procedure.    Plan      Debbi Francisco is a 72 y.o. male  did successfully complete today's hyperbaric oxygen treatment at [place] [address]

## 2023-06-21 ENCOUNTER — HOSPITAL ENCOUNTER (OUTPATIENT)
Dept: HYPERBARIC MEDICINE | Age: 73
Discharge: HOME | End: 2023-06-21
Payer: MEDICARE

## 2023-06-21 VITALS
SYSTOLIC BLOOD PRESSURE: 145 MMHG | DIASTOLIC BLOOD PRESSURE: 88 MMHG | RESPIRATION RATE: 16 BRPM | HEART RATE: 81 BPM | TEMPERATURE: 97.6 F

## 2023-06-21 DIAGNOSIS — N30.41 HEMATURIA DUE TO IRRADIATION CYSTITIS: ICD-10-CM

## 2023-06-21 DIAGNOSIS — T66.XXXS LATE EFFECT OF RADIATION: Primary | ICD-10-CM

## 2023-06-21 PROCEDURE — G0277 HBOT, FULL BODY CHAMBER, 30M: HCPCS

## 2023-06-21 NOTE — PROGRESS NOTES
Ezequiel [address]  Hyperbaric Oxygen Therapy   Progress Note    NAME: Seble Murphy  MEDICAL RECORD NUMBER:  [id number]  AGE: 72 y.o. 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 See doc flowsheets for documentation. Objective       No results for input(s): GLUMET in the last 72 hours. Pre treatment Vital Signs       Temp: 98.1 °F (36.7 °C)     Pulse: 76     Respirations: 16     BP: 123/74     Post treatment Vital Signs  Temp: 97.6 °F (36.4 °C)  Pulse: 81  Respirations: 16  BP: (!) 145/88  Assessment      Physical Exam:  General Appearance:  alert and oriented to person, place and time, well-developed and well-nourished, in no acute distress  ENT:  tympanic membranes intact bilaterally  Pulmonary/Chest:  clear to auscultation bilaterally- no wheezes, rales or rhonchi, normal air movement, no respiratory distress  Cardiovascular:  S1S2  Chamber #: 39  Treatment Start Time: 1200     Pressure Reached Time: 1215  TALHA : 2.5  Number of Air Breaks:  Treatment Status: Back to oxygen     Decompression Time: 1356   Treatment End Time: 1410  Length of Treatment: 90 Minutes  Symptoms Noted During Treatment: None  Total Treatment Time (min): 130    Adverse Event: no    I was present on these premises and immediately available to furnish assistance & direction throughout the procedure. Plan      Seble Murphy is a 72 y.o. male  did successfully complete today's hyperbaric oxygen treatment at [address] [place] [address].     In my clinical judgement, ongoing HBO therapy is

## 2023-06-21 NOTE — PROGRESS NOTES
Tatiana Michel is a 72 y.o. male has been receiving hyperbaric oxygen treatment for management of: Indication  Indications: Late Effect of Radiation (radiation cystitis). Mr. Harvey Hernández has completed Treatment Number: 22 out of a treatment protocol of Total Treatments: 30.    Problem List:  Patient Active Problem List   Diagnosis Code    [facility] C61    Dyslipidemia E78.5    Essential hypertension I10    Asymptomatic PVCs I49.3    History of MI (myocardial infarction) I25.2    Coronary artery disease involving native coronary artery of native heart without angina pectoris I25.10    Pain in both lower extremities M79.604, M79.605    Hematuria R31.9    Late effect of radiation T66. 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 Pt is tolerating hyperbaric oxygen therapy  well without complications. All wound related documentation/correspondences from the [address] [place] [address] and/or referring/collaborating physicians has been reviewed. Wound:       [] Non-Wound Related Diagnosis for HBOT    The wounds are responding to hyperbaric oxygen therapy. Based on all available clinical documentation, we will  continue hyperbaric oxygen therapy.           Ernst Ching MD  6/21/2023  4:44 PM 98

## 2023-06-21 NOTE — PROGRESS NOTES
[facility] [address]  Hyperbaric Oxygen Therapy   Progress Note    NAME: Hilary Campoverde  MEDICAL RECORD NUMBER:  [id number]  AGE: 72 y.o. 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 See doc flowsheets for documentation. Objective       No results for input(s): GLUMET in the last 72 hours. Pre treatment Vital Signs       Temp: 98.1 °F (36.7 °C)     Pulse: 76     Respirations: 16     BP: 123/74     Post treatment Vital Signs  Temp: 97.6 °F (36.4 °C)  Pulse: 81  Respirations: 16  BP: (!) 145/88  Assessment      Physical Exam:  General Appearance:  alert and oriented to person, place and time, well-developed and well-nourished, in no acute distress  ENT:  tympanic membranes intact bilaterally  Pulmonary/Chest:  clear to auscultation bilaterally- no wheezes, rales or rhonchi, normal air movement, no respiratory distress  Cardiovascular:  S1S2  Chamber #: 39  Treatment Start Time: 1200     Pressure Reached Time: 1215  TALHA : 2.5  Number of Air Breaks:  Treatment Status: Back to oxygen     Decompression Time: 1356   Treatment End Time: 1410  Length of Treatment: 90 Minutes  Symptoms Noted During Treatment: None  Total Treatment Time (min): 130    Adverse Event: no    I was present on these premises and immediately available to furnish assistance & direction throughout the procedure. Plan      Hilary Campoverde is a 72 y.o. male  did successfully complete today's hyperbaric oxygen treatment at [address] [place] [address].     In my clinical judgement, ongoing HBO therapy is

## 2023-06-21 NOTE — DISCHARGE INSTRUCTIONS
Discharge instructions were reviewed with the patient and printed instructions were declined Discharge Instructions for  Hyperbaric Oxygen Therapy  E[facility]iel [facility] [address] [place] [address]. [place], [address]  Telephone: [phone]    NAME:  Betsy Canterbury OF BIRTH:  1950  MEDICAL RECORD NUMBER:  [id number]  DATE:  6/21/2023    You have been treated with 100% oxygen in the Hyperbaric Chamber at the [address] [place] [address]. There are usually no adverse effects or problems which follow this treatment. However, for comfort and safety, we strongly recommend these guidelines are followed: It is perfectly normal to feel tired after a hyperbaric treatment. This tiredness should go away 2 to 3 days after your last treatment. Avoid heavy exertion, exercise or other unnecessary activity if you are feeling tired. Some people develop a feeling of fullness or stuffiness in their ears. This is a result of a small amount of fluid build-up in the middle ear. You may take an over the counter decongestant if approved by your doctor. Follow the instructions in the medicine package for the amount to take. If this problem lasts for more than 48 hours, please call your personal doctor. You also may call or return to the [address] [place] [address] Hyperbaric Medicine Department and have a Hyperbaric physician examine you. In rare cases, patients may have so called late effects after the treatments. Please call the Hyperbaric Medicine Department if you have any of these symptoms:     [x]Headache that is not relieved by over the counter pain medicine. [x]Changes in vision. During the course of many hyperbaric treatments (20 or more) some patients may complain of a temporary problem with sharp focus on distant objects. This is a result of changes in the shape of the lens. Your vision should return to normal in 6 to 8 weeks.   [x]Severe pain in

## 2023-06-22 ENCOUNTER — HOSPITAL ENCOUNTER (OUTPATIENT)
Dept: HYPERBARIC MEDICINE | Age: 73
Discharge: HOME | End: 2023-06-22
Payer: MEDICARE

## 2023-06-22 VITALS
DIASTOLIC BLOOD PRESSURE: 70 MMHG | RESPIRATION RATE: 18 BRPM | SYSTOLIC BLOOD PRESSURE: 140 MMHG | HEART RATE: 88 BPM | TEMPERATURE: 96.8 F

## 2023-06-22 DIAGNOSIS — T66.XXXS LATE EFFECT OF RADIATION: Primary | ICD-10-CM

## 2023-06-22 DIAGNOSIS — N30.41 HEMATURIA DUE TO IRRADIATION CYSTITIS: ICD-10-CM

## 2023-06-22 PROCEDURE — G0277 HBOT, FULL BODY CHAMBER, 30M: HCPCS

## 2023-06-22 NOTE — DISCHARGE INSTRUCTIONS
walking or numbness and tingling of your arms and legs. [x]If you are on prescription medicines from your personal doctor, you may continue to take these as directed. Hyperbaric Medicine Department Information:    If you have questions or develop any of the symptoms mentioned, please contact the Hyperbaric Department at [address] [facility] 8:30 am - 4:30 pm.  If you need help outside these hours and cannot wait until we are again available, contact your PCP or go to the hospital emergency room. Additional Instructions:    No    Patient Signature:_______________________Date:_________Time:________    [] Patient unable to sign Discharge Instructions given to ECF/Jeremiah/POA    The information contained in the After Visit Summary has been reviewed with me, the patient and/or responsible adult, by my health care provider(s). I had the opportunity to ask questions regarding this information.   I have elected to receive;  []  After Visit Summary  [x]  Comprehensive Discharge Instruction      Nurse Signature:_______________________Date:_________Time:_________    Electronically signed by Tor Moreland LPN on 8/74/0806 at 5:75 PM

## 2023-06-22 NOTE — PROGRESS NOTES
[place] [address]  Hyperbaric Oxygen Therapy   Progress Note    NAME: Som Arizmendi  MEDICAL RECORD NUMBER:  [id number]  AGE: 72 y.o. 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 See doc flowsheets for documentation. Objective       No results for input(s): GLUMET in the last 72 hours. Pre treatment Vital Signs       Temp: 98.1 °F (36.7 °C)     Pulse: 86     Respirations: 19     BP: (!) 140/68     Post treatment Vital Signs  Temp: 96.8 °F (36 °C)  Pulse: 88  Respirations: 18  BP: (!) 140/70  Assessment      Physical Exam:  General Appearance:  alert and oriented to person, place and time, well-developed and well-nourished, in no acute distress  ENT:  tympanic membranes intact bilaterally  Pulmonary/Chest:  clear to auscultation bilaterally- no wheezes, rales or rhonchi, normal air movement, no respiratory distress  Cardiovascular:  regular rate and rhythm  Chamber #: 39  Treatment Start Time: 1149     Pressure Reached Time: 1202  TALHA : 2.5  Number of Air Breaks:  Treatment Status: Air break X 2 for 5 minutes each     Decompression Time: 1342   Treatment End Time: 5241  Length of Treatment: 90 Minutes  Symptoms Noted During Treatment: None  Total Treatment Time (min): 128    Adverse Event: no    I was present on these premises and immediately available to furnish assistance & direction throughout the procedure.    Plan      Som Arizmendi is a 72 y.o. male  did successfully complete today's hyperbaric oxygen treatment at [place] [address]

## 2023-06-22 NOTE — PROGRESS NOTES
4/5/2019    Valentina Bland is a 78 [facility]    Discharge instructions were reviewed with the patient and printed instructions were declined Historical Provider, MD   10/03/2019    Potassium monitoring  10/03/2019    Creatinine monitoring  10/03/[facility]     

## 2023-06-22 NOTE — PROGRESS NOTES
Hyperbarics Provider Reassessment     Elina Delvalle is a 72 y.o. male has been receiving hyperbaric oxygen treatment for management of    HBO Diagnosis:   Problem List Items Addressed This Visit    None      Salvador Yee has completed   out of a treatment protocol of  . Hyperbaric oxygen therapy physician orders,plan of care, vascular and nutritional status reviewed, addressed and updated as needed. Pt is tolerating hyperbaric oxygen therapy  well without complications. All wound related documentation/correspondences from the [address] [place] [address] and/or referring/collaborating physicians has been reviewed. Wound:       [] Non-Wound Related Diagnosis for HBOT    The wounds are responding to hyperbaric oxygen therapy. Based on all available clinical documentation, we will  continue hyperbaric oxygen therapy.           Debbie Bishop RN  6/22/2023  7:26 AM

## 2023-06-23 ENCOUNTER — HOSPITAL ENCOUNTER (OUTPATIENT)
Dept: HYPERBARIC MEDICINE | Age: 73
Discharge: HOME | End: 2023-06-23
Payer: MEDICARE

## 2023-06-23 VITALS
DIASTOLIC BLOOD PRESSURE: 60 MMHG | HEART RATE: 82 BPM | SYSTOLIC BLOOD PRESSURE: 142 MMHG | TEMPERATURE: 98.8 F | RESPIRATION RATE: 16 BRPM

## 2023-06-23 DIAGNOSIS — N30.41 HEMATURIA DUE TO IRRADIATION CYSTITIS: ICD-10-CM

## 2023-06-23 DIAGNOSIS — T66.XXXS LATE EFFECT OF RADIATION: Primary | ICD-10-CM

## 2023-06-23 PROCEDURE — G0277 HBOT, FULL BODY CHAMBER, 30M: HCPCS

## 2023-06-23 NOTE — PROGRESS NOTES
Ezequiel Munguia [facility]  Hyperbaric Oxygen Therapy   Progress Note    NAME: Simon Doyle  MEDICAL RECORD NUMBER:  [id number]  AGE: 72 y.o. 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 See doc flowsheets for documentation. Objective       No results for input(s): GLUMET in the last 72 hours. Pre treatment Vital Signs       Temp: 98.2 °F (36.8 °C)     Pulse: 84     Respirations: 16     BP: 136/70     Post treatment Vital Signs  Temp: 98.8 °F (37.1 °C)  Pulse: 82  Respirations: 16  BP: (!) 142/60  Assessment      Physical Exam:  General Appearance:  alert and oriented to person, place and time, well-developed and well-nourished, in no acute distress  ENT:  tympanic membranes intact bilaterally  Pulmonary/Chest:  clear to auscultation bilaterally- no wheezes, rales or rhonchi, normal air movement, no respiratory distress  Cardiovascular:  regular rate and rhythm  Chamber #: 39  Treatment Start Time: 1201     Pressure Reached Time: 1214  TALHA : 2  Number of Air Breaks:  Treatment Status: Back to oxygen     Decompression Time: 1354   Treatment End Time: 1407  Length of Treatment: 90 Minutes  Symptoms Noted During Treatment: None  Total Treatment Time (min): 126    Adverse Event: yes    I was present on these premises and immediately available to furnish assistance & direction throughout the procedure.    Plan      Simon Doyle is a 72 y.o. male  did successfully complete today's hyperbaric oxygen treatment at [facility]

## 2023-06-23 NOTE — DISCHARGE INSTRUCTIONS
Discharge Instructions for  Hyperbaric Oxygen Therapy  Ezequiel Santosamari Penelopemanny Munguia [address]  [address] [place] [address]. [place], [address]  Telephone: [phone]    NAME:  Elina Shown OF BIRTH:  1950  MEDICAL RECORD NUMBER:  [id number]  DATE:  6/23/2023    You have been treated with 100% oxygen in the Hyperbaric Chamber at the [address] [place] [address]. There are usually no adverse effects or problems which follow this treatment. However, for comfort and safety, we strongly recommend these guidelines are followed: It is perfectly normal to feel tired after a hyperbaric treatment. This tiredness should go away 2 to 3 days after your last treatment. Avoid heavy exertion, exercise or other unnecessary activity if you are feeling tired. Some people develop a feeling of fullness or stuffiness in their ears. This is a result of a small amount of fluid build-up in the middle ear. You may take an over the counter decongestant if approved by your doctor. Follow the instructions in the medicine package for the amount to take. If this problem lasts for more than 48 hours, please call your personal doctor. You also may call or return to the [address] [place] [address] Hyperbaric Medicine Department and have a Hyperbaric physician examine you. In rare cases, patients may have so called late effects after the treatments. Please call the Hyperbaric Medicine Department if you have any of these symptoms:     [x]Headache that is not relieved by over the counter pain medicine. [x]Changes in vision. During the course of many hyperbaric treatments (20 or more) some patients may complain of a temporary problem with sharp focus on distant objects. This is a result of changes in the shape of the lens. Your vision should return to normal in 6 to 8 weeks. [x]Severe pain in your ears. [x]Nausea or vomiting. [x]Difficulty concentrating.   [x]Clumsiness, difficulty

## 2023-06-26 ENCOUNTER — HOSPITAL ENCOUNTER (OUTPATIENT)
Dept: HYPERBARIC MEDICINE | Age: 73
Discharge: HOME | End: 2023-06-26
Payer: MEDICARE

## 2023-06-26 VITALS
TEMPERATURE: 97.3 F | DIASTOLIC BLOOD PRESSURE: 80 MMHG | RESPIRATION RATE: 16 BRPM | HEART RATE: 88 BPM | SYSTOLIC BLOOD PRESSURE: 142 MMHG

## 2023-06-26 DIAGNOSIS — T66.XXXS LATE EFFECT OF RADIATION: Primary | ICD-10-CM

## 2023-06-26 DIAGNOSIS — N30.41 HEMATURIA DUE TO IRRADIATION CYSTITIS: ICD-10-CM

## 2023-06-26 PROCEDURE — G0277 HBOT, FULL BODY CHAMBER, 30M: HCPCS

## 2023-06-27 ENCOUNTER — HOSPITAL ENCOUNTER (OUTPATIENT)
Dept: HYPERBARIC MEDICINE | Age: 73
Discharge: HOME | End: 2023-06-27
Payer: MEDICARE

## 2023-06-27 VITALS
HEART RATE: 84 BPM | RESPIRATION RATE: 16 BRPM | DIASTOLIC BLOOD PRESSURE: 88 MMHG | SYSTOLIC BLOOD PRESSURE: 158 MMHG | TEMPERATURE: 97 F

## 2023-06-27 DIAGNOSIS — N30.41 HEMATURIA DUE TO IRRADIATION CYSTITIS: ICD-10-CM

## 2023-06-27 DIAGNOSIS — T66.XXXS LATE EFFECT OF RADIATION: Primary | ICD-10-CM

## 2023-06-27 PROCEDURE — G0277 HBOT, FULL BODY CHAMBER, 30M: HCPCS

## 2023-06-27 PROCEDURE — 99183 HYPERBARIC OXYGEN THERAPY: CPT | Performed by: SURGERY

## 2023-06-28 ENCOUNTER — HOSPITAL ENCOUNTER (OUTPATIENT)
Dept: HYPERBARIC MEDICINE | Age: 73
Discharge: HOME | End: 2023-06-28
Payer: MEDICARE

## 2023-06-28 VITALS
DIASTOLIC BLOOD PRESSURE: 82 MMHG | SYSTOLIC BLOOD PRESSURE: 148 MMHG | HEART RATE: 80 BPM | RESPIRATION RATE: 18 BRPM | TEMPERATURE: 97.2 F

## 2023-06-28 DIAGNOSIS — T66.XXXS LATE EFFECT OF RADIATION: Primary | ICD-10-CM

## 2023-06-28 DIAGNOSIS — N30.41 HEMATURIA DUE TO IRRADIATION CYSTITIS: ICD-10-CM

## 2023-06-28 PROCEDURE — G0277 HBOT, FULL BODY CHAMBER, 30M: HCPCS

## 2023-06-29 ENCOUNTER — HOSPITAL ENCOUNTER (OUTPATIENT)
Dept: HYPERBARIC MEDICINE | Age: 73
Discharge: HOME | End: 2023-06-29
Payer: MEDICARE

## 2023-06-29 VITALS
RESPIRATION RATE: 16 BRPM | DIASTOLIC BLOOD PRESSURE: 60 MMHG | HEART RATE: 91 BPM | TEMPERATURE: 97.5 F | SYSTOLIC BLOOD PRESSURE: 146 MMHG

## 2023-06-29 DIAGNOSIS — N30.41 HEMATURIA DUE TO IRRADIATION CYSTITIS: ICD-10-CM

## 2023-06-29 DIAGNOSIS — T66.XXXS LATE EFFECT OF RADIATION: Primary | ICD-10-CM

## 2023-06-29 PROCEDURE — G0277 HBOT, FULL BODY CHAMBER, 30M: HCPCS

## 2023-06-30 ENCOUNTER — HOSPITAL ENCOUNTER (OUTPATIENT)
Dept: HYPERBARIC MEDICINE | Age: 73
Discharge: HOME | End: 2023-06-30
Payer: MEDICARE

## 2023-06-30 VITALS
TEMPERATURE: 98 F | HEART RATE: 86 BPM | SYSTOLIC BLOOD PRESSURE: 146 MMHG | DIASTOLIC BLOOD PRESSURE: 80 MMHG | RESPIRATION RATE: 16 BRPM

## 2023-06-30 DIAGNOSIS — T66.XXXS LATE EFFECT OF RADIATION: Primary | ICD-10-CM

## 2023-06-30 DIAGNOSIS — N30.41 HEMATURIA DUE TO IRRADIATION CYSTITIS: ICD-10-CM

## 2023-06-30 PROCEDURE — G0277 HBOT, FULL BODY CHAMBER, 30M: HCPCS

## 2023-07-03 ENCOUNTER — HOSPITAL ENCOUNTER (OUTPATIENT)
Dept: HYPERBARIC MEDICINE | Age: 73
Discharge: HOME | End: 2023-07-03

## 2023-07-05 ENCOUNTER — HOSPITAL ENCOUNTER (OUTPATIENT)
Dept: HYPERBARIC MEDICINE | Age: 73
Discharge: HOME | End: 2023-07-05
Payer: MEDICARE

## 2023-07-05 VITALS
DIASTOLIC BLOOD PRESSURE: 68 MMHG | TEMPERATURE: 98.2 F | SYSTOLIC BLOOD PRESSURE: 144 MMHG | HEART RATE: 94 BPM | RESPIRATION RATE: 16 BRPM

## 2023-07-05 DIAGNOSIS — T66.XXXS LATE EFFECT OF RADIATION: Primary | ICD-10-CM

## 2023-07-05 DIAGNOSIS — N30.41 HEMATURIA DUE TO IRRADIATION CYSTITIS: ICD-10-CM

## 2023-07-05 PROCEDURE — G0277 HBOT, FULL BODY CHAMBER, 30M: HCPCS

## 2023-07-18 ENCOUNTER — TELEPHONE (OUTPATIENT)
Dept: PHARMACY | Facility: CLINIC | Age: 73
End: 2023-07-18

## 2023-07-18 NOTE — TELEPHONE ENCOUNTER
Dr. Gertrude Ferro,    Please submit the following CMS allowable diagnoses within 7/20/23 visit encounter to exclude from care gap for 2023 and close any further statin therapy reminders to you or patient this year if appropriate:  Myalgia M79.1 OR Myalgia due to statin (M79.10, T46.6X5A)     Scott Cummings has an appointment with you 7/20/23. Patient was identified statin use in persons with cardiovascular disease care gap per [place] ([facility]). Unfortunately, each year a DX code approved by CMS must be entered into an Office visit to exclude from this care gap. Per chart review:  Patient has allergy listed to statins. Note patient also has elevated LFTs    Please let me know if you have any questions! Diane Kearns, PharmD, [address] [place] [address] Pharmacist  Department, toll free: [phone], option 1   ========================================================      POPULATION HEALTH CLINICAL PHARMACY: STATIN THERAPY REVIEW  Identified statin use in persons with cardiovascular disease care gap per [place] [facility]). Records dated: 7/8/23. Future Appointments   Date Time Provider 4600  46[facility]   7/20/2023 10:30 AM Hung Morataya MD [facility]     ASSESSMENT of Statin in Persons with Cardiovascular Disease Care Gap    Scott Cummings  has been identified as having a diagnosis for clinical ASCVD or event (e.g., inpatient hospitalization for MI, CABG, PCI or other revascularization procedures) in the measurement year and not currently filling a moderate or high intensity statin. Patients included in this care gap are males age 18-72 and females age 37-78.      Currently Prescribed a statin: no  Per chart review: see below- allergies  Also note, patient has elevated LFTs  Allergies   Allergen Reactions    Simvastatin Other (See Comments)     Cramps per pt    Crestor [Rosuvastatin]        Lab Results   Component Value Date    CHOL 170 09/26/2022    TRIG 103 09/26/2022    HDL 65 09/26/2022    LDLCALC 84 09/26/2022     ALT

## 2023-07-20 ENCOUNTER — OFFICE VISIT (OUTPATIENT)
Dept: CARDIOLOGY CLINIC | Age: 73
End: 2023-07-20
Payer: MEDICARE

## 2023-07-20 VITALS
BODY MASS INDEX: 24.11 KG/M2 | SYSTOLIC BLOOD PRESSURE: 140 MMHG | OXYGEN SATURATION: 94 % | HEIGHT: 69 IN | HEART RATE: 82 BPM | RESPIRATION RATE: 18 BRPM | WEIGHT: 162.8 LBS | DIASTOLIC BLOOD PRESSURE: 64 MMHG

## 2023-07-20 DIAGNOSIS — I25.10 CORONARY ARTERY DISEASE INVOLVING NATIVE CORONARY ARTERY OF NATIVE HEART WITHOUT ANGINA PECTORIS: Primary | ICD-10-CM

## 2023-07-20 DIAGNOSIS — I34.0 NONRHEUMATIC MITRAL VALVE REGURGITATION: ICD-10-CM

## 2023-07-20 PROCEDURE — 3017F COLORECTAL CA SCREEN DOC REV: CPT | Performed by: INTERNAL MEDICINE

## 2023-07-20 PROCEDURE — 1123F ACP DISCUSS/DSCN MKR DOCD: CPT | Performed by: INTERNAL MEDICINE

## 2023-07-20 PROCEDURE — 3077F SYST BP >= 140 MM HG: CPT | Performed by: INTERNAL MEDICINE

## 2023-07-20 PROCEDURE — G8420 CALC BMI NORM PARAMETERS: HCPCS | Performed by: INTERNAL MEDICINE

## 2023-07-20 PROCEDURE — G8427 DOCREV CUR MEDS BY ELIG CLIN: HCPCS | Performed by: INTERNAL MEDICINE

## 2023-07-20 PROCEDURE — 93000 ELECTROCARDIOGRAM COMPLETE: CPT | Performed by: INTERNAL MEDICINE

## 2023-07-20 PROCEDURE — 3078F DIAST BP <80 MM HG: CPT | Performed by: INTERNAL MEDICINE

## 2023-07-20 PROCEDURE — 99214 OFFICE O/P EST MOD 30 MIN: CPT | Performed by: INTERNAL MEDICINE

## 2023-07-20 PROCEDURE — 1036F TOBACCO NON-USER: CPT | Performed by: INTERNAL MEDICINE

## 2023-07-20 ASSESSMENT — ENCOUNTER SYMPTOMS
BACK PAIN: 0
COUGH: 0
NAUSEA: 0
BLOOD IN STOOL: 0
DIARRHEA: 0
ABDOMINAL PAIN: 0
SHORTNESS OF BREATH: 0
WHEEZING: 0
VOMITING: 0
CONSTIPATION: 0

## 2023-07-20 NOTE — PROGRESS NOTES
OUTPATIENT CARDIOLOGY FOLLOW-UP    HPI:    Name: Ina Schwab    Age: 72 y.o. Primary Care Physician: Mac Ramirez MD    Date of Service: 7/20/2023    Chief Complaint:   Chief Complaint   Patient presents with    Coronary Artery Disease     Np- Pt has some swelling in feet        History of present illness :     53-year-old ex-smoker -American male who comes today to undergo cardiac clearance prior to reconstructive surgery of his urethra at [facility]. He was last seen by Dr. Kendal Giang on 9/18/2020. He has a history of myocardial infarction in 2002, status post PCI, PVCs, hypertension, hyperlipidemia, prostate cancer, status post radiation therapy, COPD, status post CVA in 2009, cocaine abuse in early 2000, GERD, untreated hepatitis C, hematuria due to radiation cystitis, status post hyperbaric oxygen therapy. Patient continues to drink a few beers a day. He denies smoking. He is active and can walk couple of miles a day with no chest discomfort or dyspnea on exertion. He denies palpitations. He feels lightheaded at times but denies syncope. He has chronic left foot edema. EKG done today revealed sinus rhythm at 82 bpm, left atrial enlargement, inferior myocardial infarction of unknown age, poor R wave progression, left ventricular hypertrophy and artifacts. Review of Systems:   Review of Systems   Constitutional:  Negative for chills, fatigue and fever. HENT:  Negative for nosebleeds. Respiratory:  Negative for cough, shortness of breath and wheezing. Gastrointestinal:  Negative for abdominal pain, blood in stool, constipation, diarrhea, nausea and vomiting. GERD. Genitourinary:  Negative for dysuria and hematuria. Musculoskeletal:  Negative for back pain, joint swelling and myalgias. Aching. Neurological:  Negative for syncope and light-headedness. Psychiatric/Behavioral:  The patient is not nervous/anxious.          Past Medical History:  Past Medical History:

## 2023-07-24 NOTE — TELEPHONE ENCOUNTER
For Pharmacy Admin Tracking Only    Program: Gonzalo in place:  No  Recommendation Provided To: Provider: 1 via Note to Provider  Intervention Accepted By: Provider: 0  Gap Closed?: No   Time Spent (min): 10

## 2023-09-06 ENCOUNTER — HOSPITAL ENCOUNTER (OUTPATIENT)
Dept: CARDIOLOGY | Age: 73
Discharge: HOME | End: 2023-09-06
Payer: MEDICARE

## 2023-09-06 DIAGNOSIS — I25.10 CORONARY ARTERY DISEASE INVOLVING NATIVE CORONARY ARTERY OF NATIVE HEART WITHOUT ANGINA PECTORIS: ICD-10-CM

## 2023-09-06 DIAGNOSIS — I34.0 NONRHEUMATIC MITRAL VALVE REGURGITATION: ICD-10-CM

## 2023-09-06 PROCEDURE — 93306 TTE W/DOPPLER COMPLETE: CPT

## 2023-11-08 ENCOUNTER — HOSPITAL ENCOUNTER (OUTPATIENT)
Dept: ULTRASOUND IMAGING | Age: 73
Discharge: HOME | End: 2023-11-10
Payer: OTHER GOVERNMENT

## 2023-11-08 DIAGNOSIS — B18.1 HEPATITIS B CARRIER (HCC): ICD-10-CM

## 2023-11-08 PROCEDURE — 76705 ECHO EXAM OF ABDOMEN: CPT

## 2023-11-10 ENCOUNTER — HOSPITAL ENCOUNTER (OUTPATIENT)
Age: 73
Discharge: HOME | End: 2023-11-10
Payer: OTHER GOVERNMENT

## 2023-11-10 LAB — PSA SERPL-MCNC: 0.05 NG/ML (ref 0–4)

## 2023-11-10 PROCEDURE — G0103 PSA SCREENING: HCPCS

## 2024-03-04 ENCOUNTER — HOSPITAL ENCOUNTER (OUTPATIENT)
Age: 74
Discharge: HOME | End: 2024-03-04
Payer: COMMERCIAL

## 2024-03-04 LAB
ALBUMIN SERPL-MCNC: 3.9 G/DL (ref 3.5–5.2)
ALP SERPL-CCNC: 138 U/L (ref 40–129)
ALT SERPL-CCNC: 29 U/L (ref 0–40)
ANION GAP SERPL CALCULATED.3IONS-SCNC: 17 MMOL/L (ref 7–16)
AST SERPL-CCNC: 101 U/L (ref 0–39)
BASOPHILS # BLD: 0 K/UL (ref 0–0.2)
BASOPHILS NFR BLD: 0 % (ref 0–2)
BILIRUB SERPL-MCNC: 2.2 MG/DL (ref 0–1.2)
BUN SERPL-MCNC: 8 MG/DL (ref 6–23)
CALCIUM SERPL-MCNC: 9.2 MG/DL (ref 8.6–10.2)
CHLORIDE SERPL-SCNC: 101 MMOL/L (ref 98–107)
CO2 SERPL-SCNC: 20 MMOL/L (ref 22–29)
CREAT SERPL-MCNC: 0.8 MG/DL (ref 0.7–1.2)
EOSINOPHIL # BLD: 0 K/UL (ref 0.05–0.5)
EOSINOPHILS RELATIVE PERCENT: 0 % (ref 0–6)
ERYTHROCYTE [DISTWIDTH] IN BLOOD BY AUTOMATED COUNT: 19.1 % (ref 11.5–15)
FERRITIN SERPL-MCNC: 33 NG/ML
GFR SERPL CREATININE-BSD FRML MDRD: >60 ML/MIN/1.73M2
GLUCOSE SERPL-MCNC: 101 MG/DL (ref 74–99)
HCT VFR BLD AUTO: 32.9 % (ref 37–54)
HGB BLD-MCNC: 10.9 G/DL (ref 12.5–16.5)
IGA SERPL-MCNC: 1409 MG/DL (ref 70–400)
IRON SATN MFR SERPL: 27 % (ref 20–55)
IRON SERPL-MCNC: 101 UG/DL (ref 59–158)
LYMPHOCYTES NFR BLD: 1.08 K/UL (ref 1.5–4)
LYMPHOCYTES RELATIVE PERCENT: 14 % (ref 20–42)
MCH RBC QN AUTO: 27.9 PG (ref 26–35)
MCHC RBC AUTO-ENTMCNC: 33.1 G/DL (ref 32–34.5)
MCV RBC AUTO: 84.1 FL (ref 80–99.9)
MONOCYTES NFR BLD: 1.55 K/UL (ref 0.1–0.95)
MONOCYTES NFR BLD: 20 % (ref 2–12)
NEUTROPHILS NFR BLD: 66 % (ref 43–80)
NEUTS SEG NFR BLD: 4.98 K/UL (ref 1.8–7.3)
PLATELET # BLD AUTO: 237 K/UL (ref 130–450)
PMV BLD AUTO: 10.4 FL (ref 7–12)
POTASSIUM SERPL-SCNC: 3.7 MMOL/L (ref 3.5–5)
PROT SERPL-MCNC: 9.3 G/DL (ref 6.4–8.3)
RBC # BLD AUTO: 3.91 M/UL (ref 3.8–5.8)
RBC # BLD: ABNORMAL 10*6/UL
SODIUM SERPL-SCNC: 138 MMOL/L (ref 132–146)
TIBC SERPL-MCNC: 375 UG/DL (ref 250–450)
TSH SERPL DL<=0.05 MIU/L-ACNC: 1.37 UIU/ML (ref 0.27–4.2)
WBC # BLD: ABNORMAL 10*3/UL
WBC OTHER # BLD: 7.6 K/UL (ref 4.5–11.5)

## 2024-03-04 PROCEDURE — 82728 ASSAY OF FERRITIN: CPT

## 2024-03-04 PROCEDURE — 82784 ASSAY IGA/IGD/IGG/IGM EACH: CPT

## 2024-03-04 PROCEDURE — 83516 IMMUNOASSAY NONANTIBODY: CPT

## 2024-03-04 PROCEDURE — 82103 ALPHA-1-ANTITRYPSIN TOTAL: CPT

## 2024-03-04 PROCEDURE — 36415 COLL VENOUS BLD VENIPUNCTURE: CPT

## 2024-03-04 PROCEDURE — 80053 COMPREHEN METABOLIC PANEL: CPT

## 2024-03-04 PROCEDURE — 84443 ASSAY THYROID STIM HORMONE: CPT

## 2024-03-04 PROCEDURE — 83550 IRON BINDING TEST: CPT

## 2024-03-04 PROCEDURE — 85025 COMPLETE CBC W/AUTO DIFF WBC: CPT

## 2024-03-04 PROCEDURE — 83540 ASSAY OF IRON: CPT

## 2024-03-04 PROCEDURE — 86803 HEPATITIS C AB TEST: CPT

## 2024-03-05 LAB — HCV AB SERPL QL IA: REACTIVE

## 2024-03-06 LAB — A1AT SERPL-MCNC: 188 MG/DL (ref 90–200)

## 2024-03-07 LAB — TTG IGA SER IA-ACNC: 5.1 U/ML

## 2024-03-08 LAB
GLIADIN IGA SER IA-ACNC: 6.7 U/ML
GLIADIN IGG SER IA-ACNC: 2 U/ML
TISSUE TRANSGLUTAMINASE ANTIBODY IGG: 2.2 U/ML

## 2024-05-10 ENCOUNTER — HOSPITAL ENCOUNTER (OUTPATIENT)
Age: 74
Discharge: HOME | End: 2024-05-10
Payer: COMMERCIAL

## 2024-05-10 LAB
ALBUMIN SERPL-MCNC: 3.8 G/DL (ref 3.5–5.2)
ALP SERPL-CCNC: 139 U/L (ref 40–129)
ALT SERPL-CCNC: 24 U/L (ref 0–40)
ANION GAP SERPL CALCULATED.3IONS-SCNC: 16 MMOL/L (ref 7–16)
AST SERPL-CCNC: 98 U/L (ref 0–39)
BILIRUB SERPL-MCNC: 2.9 MG/DL (ref 0–1.2)
BUN SERPL-MCNC: 7 MG/DL (ref 6–23)
CALCIUM SERPL-MCNC: 9.3 MG/DL (ref 8.6–10.2)
CHLORIDE SERPL-SCNC: 101 MMOL/L (ref 98–107)
CO2 SERPL-SCNC: 20 MMOL/L (ref 22–29)
CREAT SERPL-MCNC: 0.8 MG/DL (ref 0.7–1.2)
GFR, ESTIMATED: >90 ML/MIN/1.73M2
GLUCOSE SERPL-MCNC: 121 MG/DL (ref 74–99)
HBV SURFACE AB SERPL IA-ACNC: <3.1 MIU/ML (ref 0–9.99)
HBV SURFACE AG SERPL QL IA: REACTIVE
HCV GENTYP SERPL NAA+PROBE: NORMAL
HIV 1+2 AB+HIV1 P24 AG SERPL QL IA: NONREACTIVE
INR PPP: 1.7
PARTIAL THROMBOPLASTIN TIME: 37.3 SEC (ref 24.5–35.1)
POTASSIUM SERPL-SCNC: 3.3 MMOL/L (ref 3.5–5)
PROT SERPL-MCNC: 8.9 G/DL (ref 6.4–8.3)
PROTHROMBIN TIME: 18.9 SEC (ref 9.3–12.4)
SEND OUT REPORT: NORMAL
SODIUM SERPL-SCNC: 137 MMOL/L (ref 132–146)
TEST NAME: NORMAL

## 2024-05-10 PROCEDURE — 87902 NFCT AGT GNTYP ALYS HEP C: CPT

## 2024-05-10 PROCEDURE — 87522 HEPATITIS C REVRS TRNSCRPJ: CPT

## 2024-05-10 PROCEDURE — 87517 HEPATITIS B DNA QUANT: CPT

## 2024-05-10 PROCEDURE — 87350 HEPATITIS BE AG IA: CPT

## 2024-05-10 PROCEDURE — 86704 HEP B CORE ANTIBODY TOTAL: CPT

## 2024-05-10 PROCEDURE — 86707 HEPATITIS BE ANTIBODY: CPT

## 2024-05-10 PROCEDURE — 80053 COMPREHEN METABOLIC PANEL: CPT

## 2024-05-10 PROCEDURE — 87340 HEPATITIS B SURFACE AG IA: CPT

## 2024-05-10 PROCEDURE — 36415 COLL VENOUS BLD VENIPUNCTURE: CPT

## 2024-05-10 PROCEDURE — 85730 THROMBOPLASTIN TIME PARTIAL: CPT

## 2024-05-10 PROCEDURE — 87389 HIV-1 AG W/HIV-1&-2 AB AG IA: CPT

## 2024-05-10 PROCEDURE — 85610 PROTHROMBIN TIME: CPT

## 2024-05-10 PROCEDURE — 86317 IMMUNOASSAY INFECTIOUS AGENT: CPT

## 2024-05-11 LAB — HBV CORE AB SER QL: REACTIVE

## 2024-05-12 LAB
HBV E AB SERPL QL IA: POSITIVE
HBV E AG SERPL QL IA: NEGATIVE
HCV RNA # SERPL NAA+PROBE: NOT DETECTED {COPIES}/ML
SPECIMEN SOURCE: NORMAL

## 2024-05-14 LAB — HCV GENTYP SERPL NAA+PROBE: NORMAL

## 2024-05-15 LAB
HBV IU/ML: 19 IU/ML
HBV LOG 10 IU/ML: 1.27 LOG IU/ML
INTERPRETATION: DETECTED

## 2024-05-18 LAB
SEND OUT REPORT: NORMAL
TEST NAME: NORMAL

## 2024-07-09 ENCOUNTER — HOSPITAL ENCOUNTER (OUTPATIENT)
Age: 74
Discharge: HOME | End: 2024-07-09
Payer: COMMERCIAL

## 2024-07-09 LAB
ERYTHROCYTE [DISTWIDTH] IN BLOOD BY AUTOMATED COUNT: 18.3 % (ref 11.5–15)
FERRITIN SERPL-MCNC: 30 NG/ML
FOLATE SERPL-MCNC: 7.6 NG/ML (ref 4.8–24.2)
HBA1C MFR BLD: 4.7 % (ref 4–5.6)
HCT VFR BLD AUTO: 26.7 % (ref 37–54)
HGB BLD-MCNC: 9 G/DL (ref 12.5–16.5)
MCH RBC QN AUTO: 26.5 PG (ref 26–35)
MCHC RBC AUTO-ENTMCNC: 33.7 G/DL (ref 32–34.5)
MCV RBC AUTO: 78.8 FL (ref 80–99.9)
PLATELET # BLD AUTO: 147 K/UL (ref 130–450)
PMV BLD AUTO: 10.4 FL (ref 7–12)
RBC # BLD AUTO: 3.39 M/UL (ref 3.8–5.8)
URATE SERPL-MCNC: 5.9 MG/DL (ref 3.4–7)
VIT B12 SERPL-MCNC: 726 PG/ML (ref 211–946)
WBC OTHER # BLD: 6.4 K/UL (ref 4.5–11.5)

## 2024-07-09 PROCEDURE — 82728 ASSAY OF FERRITIN: CPT

## 2024-07-09 PROCEDURE — 84550 ASSAY OF BLOOD/URIC ACID: CPT

## 2024-07-09 PROCEDURE — 82746 ASSAY OF FOLIC ACID SERUM: CPT

## 2024-07-09 PROCEDURE — 83036 HEMOGLOBIN GLYCOSYLATED A1C: CPT

## 2024-07-09 PROCEDURE — 82607 VITAMIN B-12: CPT

## 2024-07-09 PROCEDURE — 36415 COLL VENOUS BLD VENIPUNCTURE: CPT

## 2024-07-09 PROCEDURE — 85027 COMPLETE CBC AUTOMATED: CPT

## 2024-09-19 ENCOUNTER — APPOINTMENT (OUTPATIENT)
Dept: GENERAL RADIOLOGY | Age: 74
End: 2024-09-19
Payer: OTHER GOVERNMENT

## 2024-09-19 ENCOUNTER — HOSPITAL ENCOUNTER (INPATIENT)
Age: 74
LOS: 2 days | Discharge: HOME | End: 2024-09-21
Attending: EMERGENCY MEDICINE | Admitting: STUDENT IN AN ORGANIZED HEALTH CARE EDUCATION/TRAINING PROGRAM
Payer: OTHER GOVERNMENT

## 2024-09-19 ENCOUNTER — APPOINTMENT (OUTPATIENT)
Dept: CT IMAGING | Age: 74
End: 2024-09-19
Payer: OTHER GOVERNMENT

## 2024-09-19 DIAGNOSIS — R17 JAUNDICE: Primary | ICD-10-CM

## 2024-09-19 DIAGNOSIS — E80.6 HYPERBILIRUBINEMIA: ICD-10-CM

## 2024-09-19 DIAGNOSIS — Z86.19 HISTORY OF HEPATITIS B: ICD-10-CM

## 2024-09-19 DIAGNOSIS — Z86.19 HISTORY OF HEPATITIS C: ICD-10-CM

## 2024-09-19 DIAGNOSIS — E87.1 HYPONATREMIA: ICD-10-CM

## 2024-09-19 PROBLEM — R18.8 CIRRHOSIS OF LIVER WITH ASCITES, UNSPECIFIED HEPATIC CIRRHOSIS TYPE (HCC): Status: ACTIVE | Noted: 2024-09-19

## 2024-09-19 PROBLEM — K74.60 CIRRHOSIS OF LIVER WITH ASCITES, UNSPECIFIED HEPATIC CIRRHOSIS TYPE (HCC): Status: ACTIVE | Noted: 2024-09-19

## 2024-09-19 LAB
ALBUMIN SERPL-MCNC: 2.9 G/DL (ref 3.5–5.2)
ALP SERPL-CCNC: 133 U/L (ref 40–129)
ALT SERPL-CCNC: 22 U/L (ref 0–40)
ANION GAP SERPL CALCULATED.3IONS-SCNC: 11 MMOL/L (ref 7–16)
AST SERPL-CCNC: 96 U/L (ref 0–39)
BASOPHILS # BLD: 0.33 K/UL (ref 0–0.2)
BASOPHILS NFR BLD: 4 % (ref 0–2)
BILIRUB DIRECT SERPL-MCNC: 5.1 MG/DL (ref 0–0.3)
BILIRUB INDIRECT SERPL-MCNC: 3.2 MG/DL (ref 0–1)
BILIRUB SERPL-MCNC: 8.3 MG/DL (ref 0–1.2)
BILIRUB UR QL STRIP: ABNORMAL
BNP SERPL-MCNC: 245 PG/ML (ref 0–125)
BUN SERPL-MCNC: 6 MG/DL (ref 6–23)
CALCIUM SERPL-MCNC: 8.3 MG/DL (ref 8.6–10.2)
CHLORIDE SERPL-SCNC: 97 MMOL/L (ref 98–107)
CLARITY UR: CLEAR
CO2 SERPL-SCNC: 19 MMOL/L (ref 22–29)
COLOR UR: YELLOW
CREAT SERPL-MCNC: 0.8 MG/DL (ref 0.7–1.2)
EOSINOPHIL # BLD: 0 K/UL (ref 0.05–0.5)
EOSINOPHILS RELATIVE PERCENT: 0 % (ref 0–6)
ERYTHROCYTE [DISTWIDTH] IN BLOOD BY AUTOMATED COUNT: 27.4 % (ref 11.5–15)
GFR, ESTIMATED: >90 ML/MIN/1.73M2
GLUCOSE SERPL-MCNC: 109 MG/DL (ref 74–99)
GLUCOSE UR STRIP-MCNC: NEGATIVE MG/DL
HCT VFR BLD AUTO: 25.1 % (ref 37–54)
HGB BLD-MCNC: 8.6 G/DL (ref 12.5–16.5)
HGB UR QL STRIP.AUTO: ABNORMAL
KETONES UR STRIP-MCNC: NEGATIVE MG/DL
LACTATE BLDV-SCNC: 2 MMOL/L (ref 0.5–2.2)
LEUKOCYTE ESTERASE UR QL STRIP: NEGATIVE
LIPASE SERPL-CCNC: 101 U/L (ref 13–60)
LYMPHOCYTES NFR BLD: 1.14 K/UL (ref 1.5–4)
LYMPHOCYTES RELATIVE PERCENT: 13 % (ref 20–42)
MCH RBC QN AUTO: 26.6 PG (ref 26–35)
MCHC RBC AUTO-ENTMCNC: 34.3 G/DL (ref 32–34.5)
MCV RBC AUTO: 77.7 FL (ref 80–99.9)
MONOCYTES NFR BLD: 1.3 K/UL (ref 0.1–0.95)
MONOCYTES NFR BLD: 14 % (ref 2–12)
NEUTROPHILS NFR BLD: 70 % (ref 43–80)
NEUTS SEG NFR BLD: 6.34 K/UL (ref 1.8–7.3)
NITRITE UR QL STRIP: NEGATIVE
PH UR STRIP: 6 [PH] (ref 5–9)
PLATELET # BLD AUTO: 204 K/UL (ref 130–450)
PMV BLD AUTO: 9.8 FL (ref 7–12)
POTASSIUM SERPL-SCNC: 3.7 MMOL/L (ref 3.5–5)
PROT SERPL-MCNC: 8.1 G/DL (ref 6.4–8.3)
PROT UR STRIP-MCNC: NEGATIVE MG/DL
RBC # BLD AUTO: 3.23 M/UL (ref 3.8–5.8)
RBC # BLD: ABNORMAL 10*6/UL
RBC #/AREA URNS HPF: NORMAL /HPF
SODIUM SERPL-SCNC: 127 MMOL/L (ref 132–146)
SP GR UR STRIP: 1.01 (ref 1–1.03)
TROPONIN I SERPL HS-MCNC: <6 NG/L (ref 0–11)
UROBILINOGEN UR STRIP-ACNC: 2 EU/DL (ref 0–1)
WBC #/AREA URNS HPF: NORMAL /HPF
WBC OTHER # BLD: 9.1 K/UL (ref 4.5–11.5)

## 2024-09-19 PROCEDURE — 1200000000 HC SEMI PRIVATE

## 2024-09-19 PROCEDURE — 71046 X-RAY EXAM CHEST 2 VIEWS: CPT

## 2024-09-19 PROCEDURE — 2580000003 HC RX 258: Performed by: PHYSICIAN ASSISTANT

## 2024-09-19 PROCEDURE — 83880 ASSAY OF NATRIURETIC PEPTIDE: CPT

## 2024-09-19 PROCEDURE — 99285 EMERGENCY DEPT VISIT HI MDM: CPT

## 2024-09-19 PROCEDURE — 83690 ASSAY OF LIPASE: CPT

## 2024-09-19 PROCEDURE — 80053 COMPREHEN METABOLIC PANEL: CPT

## 2024-09-19 PROCEDURE — 74176 CT ABD & PELVIS W/O CONTRAST: CPT

## 2024-09-19 PROCEDURE — 81001 URINALYSIS AUTO W/SCOPE: CPT

## 2024-09-19 PROCEDURE — 84484 ASSAY OF TROPONIN QUANT: CPT

## 2024-09-19 PROCEDURE — 93005 ELECTROCARDIOGRAM TRACING: CPT | Performed by: PHYSICIAN ASSISTANT

## 2024-09-19 PROCEDURE — 82248 BILIRUBIN DIRECT: CPT

## 2024-09-19 PROCEDURE — 85025 COMPLETE CBC W/AUTO DIFF WBC: CPT

## 2024-09-19 PROCEDURE — 83605 ASSAY OF LACTIC ACID: CPT

## 2024-09-19 RX ORDER — SODIUM CHLORIDE 9 MG/ML
INJECTION, SOLUTION INTRAVENOUS CONTINUOUS
Status: DISCONTINUED | OUTPATIENT
Start: 2024-09-20 | End: 2024-09-21 | Stop reason: HOSPADM

## 2024-09-19 RX ORDER — ALBUTEROL SULFATE 90 UG/1
1 INHALANT RESPIRATORY (INHALATION) 4 TIMES DAILY PRN
Status: DISCONTINUED | OUTPATIENT
Start: 2024-09-19 | End: 2024-09-19 | Stop reason: CLARIF

## 2024-09-19 RX ORDER — PANTOPRAZOLE SODIUM 40 MG/1
40 TABLET, DELAYED RELEASE ORAL DAILY
Status: DISCONTINUED | OUTPATIENT
Start: 2024-09-20 | End: 2024-09-20

## 2024-09-19 RX ORDER — SODIUM CHLORIDE 9 MG/ML
INJECTION, SOLUTION INTRAVENOUS PRN
Status: DISCONTINUED | OUTPATIENT
Start: 2024-09-19 | End: 2024-09-21 | Stop reason: HOSPADM

## 2024-09-19 RX ORDER — AMLODIPINE BESYLATE 10 MG/1
10 TABLET ORAL DAILY
Status: DISCONTINUED | OUTPATIENT
Start: 2024-09-20 | End: 2024-09-21 | Stop reason: HOSPADM

## 2024-09-19 RX ORDER — ALBUTEROL SULFATE 0.83 MG/ML
2.5 SOLUTION RESPIRATORY (INHALATION) 4 TIMES DAILY PRN
Status: DISCONTINUED | OUTPATIENT
Start: 2024-09-19 | End: 2024-09-21 | Stop reason: HOSPADM

## 2024-09-19 RX ORDER — ACETAMINOPHEN 325 MG/1
650 TABLET ORAL EVERY 6 HOURS PRN
Status: DISCONTINUED | OUTPATIENT
Start: 2024-09-19 | End: 2024-09-21 | Stop reason: HOSPADM

## 2024-09-19 RX ORDER — 0.9 % SODIUM CHLORIDE 0.9 %
1000 INTRAVENOUS SOLUTION INTRAVENOUS ONCE
Status: COMPLETED | OUTPATIENT
Start: 2024-09-19 | End: 2024-09-19

## 2024-09-19 RX ORDER — ONDANSETRON 2 MG/ML
4 INJECTION INTRAMUSCULAR; INTRAVENOUS EVERY 6 HOURS PRN
Status: DISCONTINUED | OUTPATIENT
Start: 2024-09-19 | End: 2024-09-21 | Stop reason: HOSPADM

## 2024-09-19 RX ORDER — POTASSIUM CHLORIDE 1500 MG/1
40 TABLET, EXTENDED RELEASE ORAL PRN
Status: DISCONTINUED | OUTPATIENT
Start: 2024-09-19 | End: 2024-09-21 | Stop reason: HOSPADM

## 2024-09-19 RX ORDER — ENOXAPARIN SODIUM 100 MG/ML
40 INJECTION SUBCUTANEOUS DAILY
Status: DISCONTINUED | OUTPATIENT
Start: 2024-09-20 | End: 2024-09-21 | Stop reason: HOSPADM

## 2024-09-19 RX ORDER — IOPAMIDOL 755 MG/ML
75 INJECTION, SOLUTION INTRAVASCULAR
Status: DISCONTINUED | OUTPATIENT
Start: 2024-09-19 | End: 2024-09-21 | Stop reason: HOSPADM

## 2024-09-19 RX ORDER — SODIUM CHLORIDE 0.9 % (FLUSH) 0.9 %
5-40 SYRINGE (ML) INJECTION EVERY 12 HOURS SCHEDULED
Status: DISCONTINUED | OUTPATIENT
Start: 2024-09-20 | End: 2024-09-21 | Stop reason: HOSPADM

## 2024-09-19 RX ORDER — FAMOTIDINE 20 MG/1
20 TABLET, FILM COATED ORAL 2 TIMES DAILY
Status: DISCONTINUED | OUTPATIENT
Start: 2024-09-20 | End: 2024-09-19

## 2024-09-19 RX ORDER — POLYETHYLENE GLYCOL 3350 17 G/17G
17 POWDER, FOR SOLUTION ORAL DAILY PRN
Status: DISCONTINUED | OUTPATIENT
Start: 2024-09-19 | End: 2024-09-21 | Stop reason: HOSPADM

## 2024-09-19 RX ORDER — ACETAMINOPHEN 650 MG/1
650 SUPPOSITORY RECTAL EVERY 6 HOURS PRN
Status: DISCONTINUED | OUTPATIENT
Start: 2024-09-19 | End: 2024-09-21 | Stop reason: HOSPADM

## 2024-09-19 RX ORDER — POTASSIUM CHLORIDE 7.45 MG/ML
10 INJECTION INTRAVENOUS PRN
Status: DISCONTINUED | OUTPATIENT
Start: 2024-09-19 | End: 2024-09-21 | Stop reason: HOSPADM

## 2024-09-19 RX ORDER — SODIUM CHLORIDE 0.9 % (FLUSH) 0.9 %
5-40 SYRINGE (ML) INJECTION PRN
Status: DISCONTINUED | OUTPATIENT
Start: 2024-09-19 | End: 2024-09-21 | Stop reason: HOSPADM

## 2024-09-19 RX ORDER — MAGNESIUM SULFATE IN WATER 40 MG/ML
2000 INJECTION, SOLUTION INTRAVENOUS PRN
Status: DISCONTINUED | OUTPATIENT
Start: 2024-09-19 | End: 2024-09-21 | Stop reason: HOSPADM

## 2024-09-19 RX ORDER — METOPROLOL TARTRATE 50 MG
50 TABLET ORAL 2 TIMES DAILY
Status: DISCONTINUED | OUTPATIENT
Start: 2024-09-20 | End: 2024-09-21 | Stop reason: HOSPADM

## 2024-09-19 RX ORDER — TAMSULOSIN HYDROCHLORIDE 0.4 MG/1
0.4 CAPSULE ORAL 2 TIMES DAILY
Status: DISCONTINUED | OUTPATIENT
Start: 2024-09-20 | End: 2024-09-21 | Stop reason: HOSPADM

## 2024-09-19 RX ORDER — OXYCODONE HYDROCHLORIDE 5 MG/1
10 TABLET ORAL EVERY 6 HOURS PRN
Status: DISCONTINUED | OUTPATIENT
Start: 2024-09-19 | End: 2024-09-21 | Stop reason: HOSPADM

## 2024-09-19 RX ORDER — ONDANSETRON 4 MG/1
4 TABLET, ORALLY DISINTEGRATING ORAL EVERY 8 HOURS PRN
Status: DISCONTINUED | OUTPATIENT
Start: 2024-09-19 | End: 2024-09-21 | Stop reason: HOSPADM

## 2024-09-19 RX ADMIN — SODIUM CHLORIDE 1000 ML: 9 INJECTION, SOLUTION INTRAVENOUS at 20:42

## 2024-09-19 ASSESSMENT — LIFESTYLE VARIABLES
HOW MANY STANDARD DRINKS CONTAINING ALCOHOL DO YOU HAVE ON A TYPICAL DAY: PATIENT DOES NOT DRINK
HOW OFTEN DO YOU HAVE A DRINK CONTAINING ALCOHOL: NEVER

## 2024-09-19 ASSESSMENT — PAIN - FUNCTIONAL ASSESSMENT: PAIN_FUNCTIONAL_ASSESSMENT: NONE - DENIES PAIN

## 2024-09-20 LAB
ALBUMIN SERPL-MCNC: 2.9 G/DL (ref 3.5–5.2)
ALP SERPL-CCNC: 115 U/L (ref 40–129)
ALT SERPL-CCNC: 26 U/L (ref 0–40)
ANION GAP SERPL CALCULATED.3IONS-SCNC: 11 MMOL/L (ref 7–16)
AST SERPL-CCNC: 123 U/L (ref 0–39)
BASOPHILS # BLD: 0.07 K/UL (ref 0–0.2)
BASOPHILS NFR BLD: 1 % (ref 0–2)
BILIRUB SERPL-MCNC: 8.5 MG/DL (ref 0–1.2)
BUN SERPL-MCNC: 7 MG/DL (ref 6–23)
CALCIUM SERPL-MCNC: 8.5 MG/DL (ref 8.6–10.2)
CHLORIDE SERPL-SCNC: 99 MMOL/L (ref 98–107)
CO2 SERPL-SCNC: 20 MMOL/L (ref 22–29)
CREAT SERPL-MCNC: 0.8 MG/DL (ref 0.7–1.2)
EKG ATRIAL RATE: 77 BPM
EKG P AXIS: 63 DEGREES
EKG P-R INTERVAL: 168 MS
EKG Q-T INTERVAL: 416 MS
EKG QRS DURATION: 78 MS
EKG QTC CALCULATION (BAZETT): 470 MS
EKG R AXIS: 40 DEGREES
EKG T AXIS: 16 DEGREES
EKG VENTRICULAR RATE: 77 BPM
EOSINOPHIL # BLD: 0.24 K/UL (ref 0.05–0.5)
EOSINOPHILS RELATIVE PERCENT: 3 % (ref 0–6)
ERYTHROCYTE [DISTWIDTH] IN BLOOD BY AUTOMATED COUNT: 28.3 % (ref 11.5–15)
GFR, ESTIMATED: >90 ML/MIN/1.73M2
GLUCOSE SERPL-MCNC: 85 MG/DL (ref 74–99)
HCT VFR BLD AUTO: 26.2 % (ref 37–54)
HGB BLD-MCNC: 8.8 G/DL (ref 12.5–16.5)
IMM GRANULOCYTES # BLD AUTO: 0.03 K/UL (ref 0–0.58)
IMM GRANULOCYTES NFR BLD: 0 % (ref 0–5)
LYMPHOCYTES NFR BLD: 2.02 K/UL (ref 1.5–4)
LYMPHOCYTES RELATIVE PERCENT: 22 % (ref 20–42)
MCH RBC QN AUTO: 26.7 PG (ref 26–35)
MCHC RBC AUTO-ENTMCNC: 33.6 G/DL (ref 32–34.5)
MCV RBC AUTO: 79.4 FL (ref 80–99.9)
MONOCYTES NFR BLD: 1.69 K/UL (ref 0.1–0.95)
MONOCYTES NFR BLD: 19 % (ref 2–12)
NEUTROPHILS NFR BLD: 55 % (ref 43–80)
NEUTS SEG NFR BLD: 4.97 K/UL (ref 1.8–7.3)
PLATELET CONFIRMATION: NORMAL
PLATELET, FLUORESCENCE: 198 K/UL (ref 130–450)
PMV BLD AUTO: 10.3 FL (ref 7–12)
POTASSIUM SERPL-SCNC: 5 MMOL/L (ref 3.5–5)
PROT SERPL-MCNC: 8.8 G/DL (ref 6.4–8.3)
RBC # BLD AUTO: 3.3 M/UL (ref 3.8–5.8)
RBC # BLD: ABNORMAL 10*6/UL
SODIUM SERPL-SCNC: 130 MMOL/L (ref 132–146)
WBC OTHER # BLD: 9 K/UL (ref 4.5–11.5)

## 2024-09-20 PROCEDURE — 6360000002 HC RX W HCPCS: Performed by: STUDENT IN AN ORGANIZED HEALTH CARE EDUCATION/TRAINING PROGRAM

## 2024-09-20 PROCEDURE — 1200000000 HC SEMI PRIVATE

## 2024-09-20 PROCEDURE — 80053 COMPREHEN METABOLIC PANEL: CPT

## 2024-09-20 PROCEDURE — 2580000003 HC RX 258: Performed by: STUDENT IN AN ORGANIZED HEALTH CARE EDUCATION/TRAINING PROGRAM

## 2024-09-20 PROCEDURE — 6370000000 HC RX 637 (ALT 250 FOR IP): Performed by: HOSPITALIST

## 2024-09-20 PROCEDURE — 6370000000 HC RX 637 (ALT 250 FOR IP): Performed by: STUDENT IN AN ORGANIZED HEALTH CARE EDUCATION/TRAINING PROGRAM

## 2024-09-20 PROCEDURE — 85025 COMPLETE CBC W/AUTO DIFF WBC: CPT

## 2024-09-20 RX ORDER — MAGNESIUM GLUCONATE 27 MG(500)
500 TABLET ORAL DAILY
Status: DISCONTINUED | OUTPATIENT
Start: 2024-09-20 | End: 2024-09-21 | Stop reason: HOSPADM

## 2024-09-20 RX ORDER — FERROUS SULFATE 325(65) MG
325 TABLET ORAL 2 TIMES DAILY
Status: DISCONTINUED | OUTPATIENT
Start: 2024-09-20 | End: 2024-09-21 | Stop reason: HOSPADM

## 2024-09-20 RX ORDER — FOLIC ACID 1 MG/1
1 TABLET ORAL DAILY
COMMUNITY

## 2024-09-20 RX ORDER — FOLIC ACID 1 MG/1
1 TABLET ORAL DAILY
Status: DISCONTINUED | OUTPATIENT
Start: 2024-09-20 | End: 2024-09-21 | Stop reason: HOSPADM

## 2024-09-20 RX ORDER — ENTECAVIR 0.5 MG/1
1 TABLET, FILM COATED ORAL DAILY
COMMUNITY

## 2024-09-20 RX ORDER — FERROUS SULFATE 325(65) MG
325 TABLET ORAL 2 TIMES DAILY
COMMUNITY

## 2024-09-20 RX ORDER — LACTULOSE 10 G/15ML
20 SOLUTION ORAL DAILY
Status: DISCONTINUED | OUTPATIENT
Start: 2024-09-20 | End: 2024-09-21 | Stop reason: HOSPADM

## 2024-09-20 RX ORDER — PANTOPRAZOLE SODIUM 40 MG/1
40 TABLET, DELAYED RELEASE ORAL
Status: DISCONTINUED | OUTPATIENT
Start: 2024-09-20 | End: 2024-09-21 | Stop reason: HOSPADM

## 2024-09-20 RX ORDER — ENTECAVIR 0.5 MG/1
1 TABLET, FILM COATED ORAL DAILY
Status: DISCONTINUED | OUTPATIENT
Start: 2024-09-20 | End: 2024-09-21 | Stop reason: HOSPADM

## 2024-09-20 RX ORDER — MAGNESIUM GLUCONATE 27 MG(500)
500 TABLET ORAL DAILY
COMMUNITY

## 2024-09-20 RX ADMIN — METOPROLOL TARTRATE 50 MG: 50 TABLET, FILM COATED ORAL at 05:09

## 2024-09-20 RX ADMIN — TAMSULOSIN HYDROCHLORIDE 0.4 MG: 0.4 CAPSULE ORAL at 05:08

## 2024-09-20 RX ADMIN — PANTOPRAZOLE SODIUM 40 MG: 40 TABLET, DELAYED RELEASE ORAL at 10:27

## 2024-09-20 RX ADMIN — Medication 500 MG: at 10:25

## 2024-09-20 RX ADMIN — ENTECAVIR 1 MG: 0.5 TABLET, FILM COATED ORAL at 10:23

## 2024-09-20 RX ADMIN — PANTOPRAZOLE SODIUM 40 MG: 40 TABLET, DELAYED RELEASE ORAL at 16:10

## 2024-09-20 RX ADMIN — METOPROLOL TARTRATE 50 MG: 50 TABLET, FILM COATED ORAL at 10:26

## 2024-09-20 RX ADMIN — TAMSULOSIN HYDROCHLORIDE 0.4 MG: 0.4 CAPSULE ORAL at 21:06

## 2024-09-20 RX ADMIN — SODIUM CHLORIDE, PRESERVATIVE FREE 10 ML: 5 INJECTION INTRAVENOUS at 21:08

## 2024-09-20 RX ADMIN — LACTULOSE 20 G: 20 SOLUTION ORAL at 16:10

## 2024-09-20 RX ADMIN — TAMSULOSIN HYDROCHLORIDE 0.4 MG: 0.4 CAPSULE ORAL at 10:27

## 2024-09-20 RX ADMIN — SODIUM CHLORIDE: 9 INJECTION, SOLUTION INTRAVENOUS at 05:10

## 2024-09-20 RX ADMIN — METOPROLOL TARTRATE 50 MG: 50 TABLET, FILM COATED ORAL at 21:06

## 2024-09-20 RX ADMIN — ENOXAPARIN SODIUM 40 MG: 100 INJECTION SUBCUTANEOUS at 10:24

## 2024-09-20 RX ADMIN — OXYCODONE 10 MG: 5 TABLET ORAL at 21:05

## 2024-09-20 RX ADMIN — FERROUS SULFATE TAB 325 MG (65 MG ELEMENTAL FE) 325 MG: 325 (65 FE) TAB at 10:26

## 2024-09-20 RX ADMIN — FERROUS SULFATE TAB 325 MG (65 MG ELEMENTAL FE) 325 MG: 325 (65 FE) TAB at 16:10

## 2024-09-20 RX ADMIN — AMLODIPINE BESYLATE 10 MG: 10 TABLET ORAL at 10:25

## 2024-09-20 RX ADMIN — FOLIC ACID 1 MG: 1 TABLET ORAL at 10:25

## 2024-09-20 ASSESSMENT — PAIN SCALES - GENERAL
PAINLEVEL_OUTOF10: 2
PAINLEVEL_OUTOF10: 6

## 2024-09-20 ASSESSMENT — PAIN - FUNCTIONAL ASSESSMENT
PAIN_FUNCTIONAL_ASSESSMENT: NONE - DENIES PAIN
PAIN_FUNCTIONAL_ASSESSMENT: PREVENTS OR INTERFERES SOME ACTIVE ACTIVITIES AND ADLS

## 2024-09-20 ASSESSMENT — PAIN DESCRIPTION - ORIENTATION: ORIENTATION: RIGHT;LEFT

## 2024-09-20 ASSESSMENT — PAIN DESCRIPTION - LOCATION: LOCATION: FOOT

## 2024-09-20 ASSESSMENT — PAIN DESCRIPTION - DESCRIPTORS: DESCRIPTORS: ACHING;DISCOMFORT

## 2024-09-21 VITALS
TEMPERATURE: 98.6 F | OXYGEN SATURATION: 99 % | DIASTOLIC BLOOD PRESSURE: 69 MMHG | SYSTOLIC BLOOD PRESSURE: 110 MMHG | WEIGHT: 158 LBS | HEIGHT: 69 IN | RESPIRATION RATE: 16 BRPM | HEART RATE: 64 BPM | BODY MASS INDEX: 23.4 KG/M2

## 2024-09-21 PROBLEM — B18.1 CHRONIC HEPATITIS B WITH CIRRHOSIS (HCC): Status: ACTIVE | Noted: 2024-09-21

## 2024-09-21 PROBLEM — K74.60 CHRONIC HEPATITIS B WITH CIRRHOSIS (HCC): Status: ACTIVE | Noted: 2024-09-21

## 2024-09-21 LAB
ALBUMIN SERPL-MCNC: 2.4 G/DL (ref 3.5–5.2)
ALP SERPL-CCNC: 97 U/L (ref 40–129)
ALT SERPL-CCNC: 19 U/L (ref 0–40)
AMMONIA PLAS-SCNC: 69 UMOL/L (ref 16–60)
ANION GAP SERPL CALCULATED.3IONS-SCNC: 9 MMOL/L (ref 7–16)
AST SERPL-CCNC: 89 U/L (ref 0–39)
BASOPHILS # BLD: 0 K/UL (ref 0–0.2)
BASOPHILS NFR BLD: 0 % (ref 0–2)
BILIRUB SERPL-MCNC: 6.7 MG/DL (ref 0–1.2)
BUN SERPL-MCNC: 6 MG/DL (ref 6–23)
CALCIUM SERPL-MCNC: 7.9 MG/DL (ref 8.6–10.2)
CHLORIDE SERPL-SCNC: 103 MMOL/L (ref 98–107)
CO2 SERPL-SCNC: 19 MMOL/L (ref 22–29)
CREAT SERPL-MCNC: 0.8 MG/DL (ref 0.7–1.2)
EOSINOPHIL # BLD: 0.08 K/UL (ref 0.05–0.5)
EOSINOPHILS RELATIVE PERCENT: 1 % (ref 0–6)
ERYTHROCYTE [DISTWIDTH] IN BLOOD BY AUTOMATED COUNT: 28 % (ref 11.5–15)
FERRITIN SERPL-MCNC: 132 NG/ML
GFR, ESTIMATED: >90 ML/MIN/1.73M2
GLUCOSE SERPL-MCNC: 80 MG/DL (ref 74–99)
HCT VFR BLD AUTO: 23.8 % (ref 37–54)
HEMOCCULT SP1 STL QL: POSITIVE
HGB BLD-MCNC: 8 G/DL (ref 12.5–16.5)
INR PPP: 2.8
IRON SATN MFR SERPL: 67 % (ref 20–55)
IRON SERPL-MCNC: 159 UG/DL (ref 59–158)
LYMPHOCYTES NFR BLD: 1.31 K/UL (ref 1.5–4)
LYMPHOCYTES RELATIVE PERCENT: 16 % (ref 20–42)
MCH RBC QN AUTO: 26.1 PG (ref 26–35)
MCHC RBC AUTO-ENTMCNC: 33.6 G/DL (ref 32–34.5)
MCV RBC AUTO: 77.5 FL (ref 80–99.9)
MONOCYTES NFR BLD: 0.98 K/UL (ref 0.1–0.95)
MONOCYTES NFR BLD: 12 % (ref 2–12)
NEUTROPHILS NFR BLD: 71 % (ref 43–80)
NEUTS SEG NFR BLD: 5.82 K/UL (ref 1.8–7.3)
PLATELET # BLD AUTO: 179 K/UL (ref 130–450)
PMV BLD AUTO: 10 FL (ref 7–12)
POTASSIUM SERPL-SCNC: 4.3 MMOL/L (ref 3.5–5)
PROT SERPL-MCNC: 7.3 G/DL (ref 6.4–8.3)
PROTHROMBIN TIME: 30.5 SEC (ref 9.3–12.4)
RBC # BLD AUTO: 3.07 M/UL (ref 3.8–5.8)
RBC # BLD: ABNORMAL 10*6/UL
SODIUM SERPL-SCNC: 131 MMOL/L (ref 132–146)
TIBC SERPL-MCNC: 236 UG/DL (ref 250–450)
WBC OTHER # BLD: 8.2 K/UL (ref 4.5–11.5)

## 2024-09-21 PROCEDURE — 85025 COMPLETE CBC W/AUTO DIFF WBC: CPT

## 2024-09-21 PROCEDURE — 6370000000 HC RX 637 (ALT 250 FOR IP): Performed by: STUDENT IN AN ORGANIZED HEALTH CARE EDUCATION/TRAINING PROGRAM

## 2024-09-21 PROCEDURE — 83550 IRON BINDING TEST: CPT

## 2024-09-21 PROCEDURE — 80074 ACUTE HEPATITIS PANEL: CPT

## 2024-09-21 PROCEDURE — 87522 HEPATITIS C REVRS TRNSCRPJ: CPT

## 2024-09-21 PROCEDURE — 82270 OCCULT BLOOD FECES: CPT

## 2024-09-21 PROCEDURE — 6370000000 HC RX 637 (ALT 250 FOR IP): Performed by: HOSPITALIST

## 2024-09-21 PROCEDURE — 83540 ASSAY OF IRON: CPT

## 2024-09-21 PROCEDURE — 85610 PROTHROMBIN TIME: CPT

## 2024-09-21 PROCEDURE — 82105 ALPHA-FETOPROTEIN SERUM: CPT

## 2024-09-21 PROCEDURE — 82140 ASSAY OF AMMONIA: CPT

## 2024-09-21 PROCEDURE — 6360000002 HC RX W HCPCS: Performed by: STUDENT IN AN ORGANIZED HEALTH CARE EDUCATION/TRAINING PROGRAM

## 2024-09-21 PROCEDURE — 80053 COMPREHEN METABOLIC PANEL: CPT

## 2024-09-21 PROCEDURE — 82728 ASSAY OF FERRITIN: CPT

## 2024-09-21 RX ORDER — PHYTONADIONE 5 MG/1
10 TABLET ORAL ONCE
Status: COMPLETED | OUTPATIENT
Start: 2024-09-21 | End: 2024-09-21

## 2024-09-21 RX ADMIN — FERROUS SULFATE TAB 325 MG (65 MG ELEMENTAL FE) 325 MG: 325 (65 FE) TAB at 16:33

## 2024-09-21 RX ADMIN — PHYTONADIONE 10 MG: 5 TABLET ORAL at 12:49

## 2024-09-21 RX ADMIN — FERROUS SULFATE TAB 325 MG (65 MG ELEMENTAL FE) 325 MG: 325 (65 FE) TAB at 08:59

## 2024-09-21 RX ADMIN — AMLODIPINE BESYLATE 10 MG: 10 TABLET ORAL at 08:59

## 2024-09-21 RX ADMIN — METOPROLOL TARTRATE 50 MG: 50 TABLET, FILM COATED ORAL at 08:59

## 2024-09-21 RX ADMIN — FOLIC ACID 1 MG: 1 TABLET ORAL at 08:59

## 2024-09-21 RX ADMIN — PANTOPRAZOLE SODIUM 40 MG: 40 TABLET, DELAYED RELEASE ORAL at 06:18

## 2024-09-21 RX ADMIN — PANTOPRAZOLE SODIUM 40 MG: 40 TABLET, DELAYED RELEASE ORAL at 16:33

## 2024-09-21 RX ADMIN — Medication 500 MG: at 08:59

## 2024-09-21 RX ADMIN — OXYCODONE 10 MG: 5 TABLET ORAL at 06:18

## 2024-09-21 RX ADMIN — TAMSULOSIN HYDROCHLORIDE 0.4 MG: 0.4 CAPSULE ORAL at 08:59

## 2024-09-21 RX ADMIN — LACTULOSE 20 G: 20 SOLUTION ORAL at 08:59

## 2024-09-21 RX ADMIN — ENOXAPARIN SODIUM 40 MG: 100 INJECTION SUBCUTANEOUS at 08:59

## 2024-09-21 ASSESSMENT — PAIN - FUNCTIONAL ASSESSMENT: PAIN_FUNCTIONAL_ASSESSMENT: PREVENTS OR INTERFERES SOME ACTIVE ACTIVITIES AND ADLS

## 2024-09-21 ASSESSMENT — PAIN DESCRIPTION - ORIENTATION: ORIENTATION: RIGHT;LEFT

## 2024-09-21 ASSESSMENT — PAIN DESCRIPTION - DESCRIPTORS: DESCRIPTORS: ACHING;SORE

## 2024-09-21 ASSESSMENT — PAIN SCALES - GENERAL
PAINLEVEL_OUTOF10: 6
PAINLEVEL_OUTOF10: 2

## 2024-09-21 ASSESSMENT — PAIN DESCRIPTION - LOCATION: LOCATION: FOOT

## 2024-09-22 LAB — AFP SERPL-MCNC: 3.4 UG/L

## 2024-09-23 ENCOUNTER — ANESTHESIA EVENT (OUTPATIENT)
Dept: ENDOSCOPY | Age: 74
End: 2024-09-23
Payer: OTHER GOVERNMENT

## 2024-09-23 LAB
HAV IGM SERPL QL IA: NONREACTIVE
HBV CORE IGM SERPL QL IA: NONREACTIVE
HBV SURFACE AG SERPL QL IA: REACTIVE
HCV AB SERPL QL IA: REACTIVE

## 2024-09-23 RX ORDER — ALBUTEROL SULFATE 90 UG/1
2 INHALANT RESPIRATORY (INHALATION) EVERY 6 HOURS PRN
COMMUNITY

## 2024-09-24 ENCOUNTER — HOSPITAL ENCOUNTER (OUTPATIENT)
Age: 74
Setting detail: OUTPATIENT SURGERY
Discharge: HOME | End: 2024-09-24
Attending: INTERNAL MEDICINE | Admitting: INTERNAL MEDICINE
Payer: OTHER GOVERNMENT

## 2024-09-24 ENCOUNTER — ANESTHESIA (OUTPATIENT)
Dept: ENDOSCOPY | Age: 74
End: 2024-09-24
Payer: OTHER GOVERNMENT

## 2024-09-24 VITALS
SYSTOLIC BLOOD PRESSURE: 108 MMHG | RESPIRATION RATE: 18 BRPM | HEART RATE: 74 BPM | HEIGHT: 69 IN | WEIGHT: 158 LBS | OXYGEN SATURATION: 98 % | BODY MASS INDEX: 23.4 KG/M2 | DIASTOLIC BLOOD PRESSURE: 59 MMHG | TEMPERATURE: 97.8 F

## 2024-09-24 LAB
ANION GAP SERPL CALCULATED.3IONS-SCNC: 12 MMOL/L (ref 7–16)
BUN SERPL-MCNC: 6 MG/DL (ref 6–23)
CALCIUM SERPL-MCNC: 8.4 MG/DL (ref 8.6–10.2)
CHLORIDE SERPL-SCNC: 102 MMOL/L (ref 98–107)
CO2 SERPL-SCNC: 20 MMOL/L (ref 22–29)
CREAT SERPL-MCNC: 0.8 MG/DL (ref 0.7–1.2)
GFR, ESTIMATED: >90 ML/MIN/1.73M2
GLUCOSE SERPL-MCNC: 91 MG/DL (ref 74–99)
POTASSIUM SERPL-SCNC: 3.7 MMOL/L (ref 3.5–5)
SODIUM SERPL-SCNC: 134 MMOL/L (ref 132–146)

## 2024-09-24 PROCEDURE — 3609008600 HC SIGMOIDOSCOPY CONTROL HEMORRHAGE: Performed by: INTERNAL MEDICINE

## 2024-09-24 PROCEDURE — 2580000003 HC RX 258: Performed by: INTERNAL MEDICINE

## 2024-09-24 PROCEDURE — 7100000011 HC PHASE II RECOVERY - ADDTL 15 MIN: Performed by: INTERNAL MEDICINE

## 2024-09-24 PROCEDURE — 7100000010 HC PHASE II RECOVERY - FIRST 15 MIN: Performed by: INTERNAL MEDICINE

## 2024-09-24 PROCEDURE — 3700000000 HC ANESTHESIA ATTENDED CARE: Performed by: INTERNAL MEDICINE

## 2024-09-24 PROCEDURE — 80048 BASIC METABOLIC PNL TOTAL CA: CPT

## 2024-09-24 PROCEDURE — 2709999900 HC NON-CHARGEABLE SUPPLY: Performed by: INTERNAL MEDICINE

## 2024-09-24 PROCEDURE — 2720000010 HC SURG SUPPLY STERILE: Performed by: INTERNAL MEDICINE

## 2024-09-24 PROCEDURE — 6360000002 HC RX W HCPCS

## 2024-09-24 PROCEDURE — 3700000001 HC ADD 15 MINUTES (ANESTHESIA): Performed by: INTERNAL MEDICINE

## 2024-09-24 RX ORDER — PROPOFOL 10 MG/ML
INJECTION, EMULSION INTRAVENOUS
Status: DISCONTINUED | OUTPATIENT
Start: 2024-09-24 | End: 2024-09-24 | Stop reason: SDUPTHER

## 2024-09-24 RX ORDER — SODIUM CHLORIDE 9 MG/ML
INJECTION, SOLUTION INTRAVENOUS CONTINUOUS
Status: DISCONTINUED | OUTPATIENT
Start: 2024-09-24 | End: 2024-09-24 | Stop reason: HOSPADM

## 2024-09-24 RX ADMIN — PROPOFOL 300 MG: 10 INJECTION, EMULSION INTRAVENOUS at 14:58

## 2024-09-24 RX ADMIN — SODIUM CHLORIDE: 9 INJECTION, SOLUTION INTRAVENOUS at 14:42

## 2024-09-24 RX ADMIN — PROPOFOL 250 MG: 10 INJECTION, EMULSION INTRAVENOUS at 14:42

## 2024-09-24 ASSESSMENT — LIFESTYLE VARIABLES: SMOKING_STATUS: 0

## 2024-09-24 ASSESSMENT — PAIN - FUNCTIONAL ASSESSMENT
PAIN_FUNCTIONAL_ASSESSMENT: 0-10
PAIN_FUNCTIONAL_ASSESSMENT: 0-10
PAIN_FUNCTIONAL_ASSESSMENT: ACTIVITIES ARE NOT PREVENTED

## 2024-09-25 LAB
HCV RNA SERPL NAA+PROBE-ACNC: NOT DETECTED IU/ML
HCV RNA SERPL NAA+PROBE-LOG IU: NOT DETECTED LOG IU/ML
HCV RNA SERPL QL NAA+PROBE: NOT DETECTED

## 2024-10-11 ENCOUNTER — HOSPITAL ENCOUNTER (OUTPATIENT)
Dept: CT IMAGING | Age: 74
Discharge: HOME | End: 2024-10-13
Payer: OTHER GOVERNMENT

## 2024-10-11 DIAGNOSIS — B19.10 HEPATITIS B INFECTION WITHOUT DELTA AGENT WITHOUT HEPATIC COMA, UNSPECIFIED CHRONICITY: ICD-10-CM

## 2024-10-11 PROCEDURE — 74170 CT ABD WO CNTRST FLWD CNTRST: CPT

## 2024-10-11 PROCEDURE — 6360000004 HC RX CONTRAST MEDICATION: Performed by: RADIOLOGY

## 2024-10-11 RX ORDER — IOPAMIDOL 755 MG/ML
75 INJECTION, SOLUTION INTRAVASCULAR
Status: COMPLETED | OUTPATIENT
Start: 2024-10-11 | End: 2024-10-11

## 2024-10-11 RX ADMIN — IOPAMIDOL 75 ML: 755 INJECTION, SOLUTION INTRAVENOUS at 10:11

## 2024-11-28 ENCOUNTER — APPOINTMENT (OUTPATIENT)
Dept: GENERAL RADIOLOGY | Age: 74
DRG: 433 | End: 2024-11-28
Payer: COMMERCIAL

## 2024-11-28 ENCOUNTER — HOSPITAL ENCOUNTER (INPATIENT)
Age: 74
LOS: 3 days | Discharge: NURSING, REHAB OR LONG-TERM CARE | DRG: 433 | End: 2024-12-04
Attending: EMERGENCY MEDICINE | Admitting: STUDENT IN AN ORGANIZED HEALTH CARE EDUCATION/TRAINING PROGRAM
Payer: COMMERCIAL

## 2024-11-28 ENCOUNTER — APPOINTMENT (OUTPATIENT)
Dept: ULTRASOUND IMAGING | Age: 74
DRG: 433 | End: 2024-11-28
Payer: COMMERCIAL

## 2024-11-28 DIAGNOSIS — M79.89 LEG SWELLING: Primary | ICD-10-CM

## 2024-11-28 PROBLEM — R18.8 ASCITES OF LIVER: Status: ACTIVE | Noted: 2024-11-28

## 2024-11-28 LAB
ALBUMIN SERPL-MCNC: 2.3 G/DL (ref 3.5–5.2)
ALBUMIN SERPL-MCNC: 2.3 G/DL (ref 3.5–5.2)
ALP SERPL-CCNC: 133 U/L (ref 40–129)
ALP SERPL-CCNC: 149 U/L (ref 40–129)
ALT SERPL-CCNC: 17 U/L (ref 0–40)
ALT SERPL-CCNC: 18 U/L (ref 0–40)
ANION GAP SERPL CALCULATED.3IONS-SCNC: 14 MMOL/L (ref 7–16)
AST SERPL-CCNC: 57 U/L (ref 0–39)
AST SERPL-CCNC: 64 U/L (ref 0–39)
BASOPHILS # BLD: 0.03 K/UL (ref 0–0.2)
BASOPHILS NFR BLD: 1 % (ref 0–2)
BILIRUB DIRECT SERPL-MCNC: 3.8 MG/DL (ref 0–0.3)
BILIRUB INDIRECT SERPL-MCNC: 3.5 MG/DL (ref 0–1)
BILIRUB SERPL-MCNC: 7.3 MG/DL (ref 0–1.2)
BILIRUB SERPL-MCNC: 7.6 MG/DL (ref 0–1.2)
BNP SERPL-MCNC: 496 PG/ML (ref 0–125)
BUN SERPL-MCNC: 19 MG/DL (ref 6–23)
CALCIUM SERPL-MCNC: 8.4 MG/DL (ref 8.6–10.2)
CHLORIDE SERPL-SCNC: 95 MMOL/L (ref 98–107)
CO2 SERPL-SCNC: 22 MMOL/L (ref 22–29)
CREAT SERPL-MCNC: 1.1 MG/DL (ref 0.7–1.2)
EOSINOPHIL # BLD: 0.27 K/UL (ref 0.05–0.5)
EOSINOPHILS RELATIVE PERCENT: 5 % (ref 0–6)
ERYTHROCYTE [DISTWIDTH] IN BLOOD BY AUTOMATED COUNT: 17.2 % (ref 11.5–15)
GFR, ESTIMATED: 69 ML/MIN/1.73M2
GLUCOSE SERPL-MCNC: 98 MG/DL (ref 74–99)
HCT VFR BLD AUTO: 27.2 % (ref 37–54)
HGB BLD-MCNC: 9 G/DL (ref 12.5–16.5)
IMM GRANULOCYTES # BLD AUTO: 0.04 K/UL (ref 0–0.58)
IMM GRANULOCYTES NFR BLD: 1 % (ref 0–5)
INR PPP: 2.3
LYMPHOCYTES NFR BLD: 1.91 K/UL (ref 1.5–4)
LYMPHOCYTES RELATIVE PERCENT: 33 % (ref 20–42)
MCH RBC QN AUTO: 30.9 PG (ref 26–35)
MCHC RBC AUTO-ENTMCNC: 33.1 G/DL (ref 32–34.5)
MCV RBC AUTO: 93.5 FL (ref 80–99.9)
MONOCYTES NFR BLD: 1.26 K/UL (ref 0.1–0.95)
MONOCYTES NFR BLD: 22 % (ref 2–12)
NEUTROPHILS NFR BLD: 39 % (ref 43–80)
NEUTS SEG NFR BLD: 2.23 K/UL (ref 1.8–7.3)
PLATELET # BLD AUTO: 160 K/UL (ref 130–450)
PMV BLD AUTO: 10.2 FL (ref 7–12)
POTASSIUM SERPL-SCNC: 3.6 MMOL/L (ref 3.5–5)
PROT SERPL-MCNC: 7 G/DL (ref 6.4–8.3)
PROT SERPL-MCNC: 7.5 G/DL (ref 6.4–8.3)
PROTHROMBIN TIME: 24.6 SEC (ref 9.3–12.4)
RBC # BLD AUTO: 2.91 M/UL (ref 3.8–5.8)
SODIUM SERPL-SCNC: 131 MMOL/L (ref 132–146)
TROPONIN I SERPL HS-MCNC: 13 NG/L (ref 0–11)
TROPONIN I SERPL HS-MCNC: 17 NG/L (ref 0–11)
WBC OTHER # BLD: 5.7 K/UL (ref 4.5–11.5)

## 2024-11-28 PROCEDURE — 93970 EXTREMITY STUDY: CPT

## 2024-11-28 PROCEDURE — 96372 THER/PROPH/DIAG INJ SC/IM: CPT

## 2024-11-28 PROCEDURE — 85025 COMPLETE CBC W/AUTO DIFF WBC: CPT

## 2024-11-28 PROCEDURE — 99223 1ST HOSP IP/OBS HIGH 75: CPT | Performed by: INTERNAL MEDICINE

## 2024-11-28 PROCEDURE — 96374 THER/PROPH/DIAG INJ IV PUSH: CPT

## 2024-11-28 PROCEDURE — 84484 ASSAY OF TROPONIN QUANT: CPT

## 2024-11-28 PROCEDURE — 85610 PROTHROMBIN TIME: CPT

## 2024-11-28 PROCEDURE — 6360000002 HC RX W HCPCS: Performed by: EMERGENCY MEDICINE

## 2024-11-28 PROCEDURE — 83880 ASSAY OF NATRIURETIC PEPTIDE: CPT

## 2024-11-28 PROCEDURE — 6360000002 HC RX W HCPCS: Performed by: INTERNAL MEDICINE

## 2024-11-28 PROCEDURE — G0378 HOSPITAL OBSERVATION PER HR: HCPCS

## 2024-11-28 PROCEDURE — 71045 X-RAY EXAM CHEST 1 VIEW: CPT

## 2024-11-28 PROCEDURE — 99285 EMERGENCY DEPT VISIT HI MDM: CPT

## 2024-11-28 PROCEDURE — 80076 HEPATIC FUNCTION PANEL: CPT

## 2024-11-28 PROCEDURE — 6370000000 HC RX 637 (ALT 250 FOR IP): Performed by: INTERNAL MEDICINE

## 2024-11-28 PROCEDURE — 80053 COMPREHEN METABOLIC PANEL: CPT

## 2024-11-28 RX ORDER — ONDANSETRON 4 MG/1
4 TABLET, ORALLY DISINTEGRATING ORAL EVERY 8 HOURS PRN
Status: DISCONTINUED | OUTPATIENT
Start: 2024-11-28 | End: 2024-12-05 | Stop reason: HOSPADM

## 2024-11-28 RX ORDER — POTASSIUM CHLORIDE 1500 MG/1
40 TABLET, EXTENDED RELEASE ORAL PRN
Status: DISCONTINUED | OUTPATIENT
Start: 2024-11-28 | End: 2024-12-05 | Stop reason: HOSPADM

## 2024-11-28 RX ORDER — TAMSULOSIN HYDROCHLORIDE 0.4 MG/1
0.4 CAPSULE ORAL DAILY
Status: DISCONTINUED | OUTPATIENT
Start: 2024-11-28 | End: 2024-12-05 | Stop reason: HOSPADM

## 2024-11-28 RX ORDER — OXYCODONE HYDROCHLORIDE 5 MG/1
10 TABLET ORAL EVERY 6 HOURS PRN
Status: DISCONTINUED | OUTPATIENT
Start: 2024-11-28 | End: 2024-12-05 | Stop reason: HOSPADM

## 2024-11-28 RX ORDER — ACETAMINOPHEN 650 MG/1
650 SUPPOSITORY RECTAL EVERY 6 HOURS PRN
Status: DISCONTINUED | OUTPATIENT
Start: 2024-11-28 | End: 2024-12-05 | Stop reason: HOSPADM

## 2024-11-28 RX ORDER — ACETAMINOPHEN 325 MG/1
650 TABLET ORAL EVERY 6 HOURS PRN
Status: DISCONTINUED | OUTPATIENT
Start: 2024-11-28 | End: 2024-12-05 | Stop reason: HOSPADM

## 2024-11-28 RX ORDER — POLYETHYLENE GLYCOL 3350 17 G/17G
17 POWDER, FOR SOLUTION ORAL DAILY PRN
Status: DISCONTINUED | OUTPATIENT
Start: 2024-11-28 | End: 2024-12-05 | Stop reason: HOSPADM

## 2024-11-28 RX ORDER — SPIRONOLACTONE 25 MG/1
25 TABLET ORAL DAILY
Status: DISCONTINUED | OUTPATIENT
Start: 2024-11-28 | End: 2024-12-05 | Stop reason: HOSPADM

## 2024-11-28 RX ORDER — FUROSEMIDE 40 MG/1
40 TABLET ORAL DAILY
Status: DISCONTINUED | OUTPATIENT
Start: 2024-11-28 | End: 2024-12-05 | Stop reason: HOSPADM

## 2024-11-28 RX ORDER — POTASSIUM CHLORIDE 7.45 MG/ML
10 INJECTION INTRAVENOUS PRN
Status: DISCONTINUED | OUTPATIENT
Start: 2024-11-28 | End: 2024-11-28

## 2024-11-28 RX ORDER — ENOXAPARIN SODIUM 100 MG/ML
40 INJECTION SUBCUTANEOUS DAILY
Status: DISCONTINUED | OUTPATIENT
Start: 2024-11-28 | End: 2024-12-02

## 2024-11-28 RX ORDER — ONDANSETRON 2 MG/ML
4 INJECTION INTRAMUSCULAR; INTRAVENOUS EVERY 6 HOURS PRN
Status: DISCONTINUED | OUTPATIENT
Start: 2024-11-28 | End: 2024-12-05 | Stop reason: HOSPADM

## 2024-11-28 RX ORDER — SODIUM CHLORIDE 9 MG/ML
INJECTION, SOLUTION INTRAVENOUS PRN
Status: DISCONTINUED | OUTPATIENT
Start: 2024-11-28 | End: 2024-12-05 | Stop reason: HOSPADM

## 2024-11-28 RX ORDER — SODIUM CHLORIDE 0.9 % (FLUSH) 0.9 %
5-40 SYRINGE (ML) INJECTION PRN
Status: DISCONTINUED | OUTPATIENT
Start: 2024-11-28 | End: 2024-12-05 | Stop reason: HOSPADM

## 2024-11-28 RX ORDER — ENTECAVIR 0.5 MG/1
1 TABLET, FILM COATED ORAL DAILY
Status: DISCONTINUED | OUTPATIENT
Start: 2024-11-28 | End: 2024-11-30 | Stop reason: SDUPTHER

## 2024-11-28 RX ORDER — ALBUTEROL SULFATE 0.83 MG/ML
2.5 SOLUTION RESPIRATORY (INHALATION) EVERY 6 HOURS PRN
Status: DISCONTINUED | OUTPATIENT
Start: 2024-11-28 | End: 2024-12-05 | Stop reason: HOSPADM

## 2024-11-28 RX ORDER — MAGNESIUM SULFATE IN WATER 40 MG/ML
2000 INJECTION, SOLUTION INTRAVENOUS PRN
Status: DISCONTINUED | OUTPATIENT
Start: 2024-11-28 | End: 2024-12-05 | Stop reason: HOSPADM

## 2024-11-28 RX ORDER — ALBUTEROL SULFATE 90 UG/1
2 INHALANT RESPIRATORY (INHALATION) EVERY 6 HOURS PRN
Status: DISCONTINUED | OUTPATIENT
Start: 2024-11-28 | End: 2024-11-28

## 2024-11-28 RX ORDER — FOLIC ACID 1 MG/1
1 TABLET ORAL DAILY
Status: DISCONTINUED | OUTPATIENT
Start: 2024-11-28 | End: 2024-12-05 | Stop reason: HOSPADM

## 2024-11-28 RX ORDER — SODIUM CHLORIDE 0.9 % (FLUSH) 0.9 %
5-40 SYRINGE (ML) INJECTION EVERY 12 HOURS SCHEDULED
Status: DISCONTINUED | OUTPATIENT
Start: 2024-11-28 | End: 2024-12-05 | Stop reason: HOSPADM

## 2024-11-28 RX ORDER — METOPROLOL TARTRATE 50 MG
50 TABLET ORAL 2 TIMES DAILY
Status: DISCONTINUED | OUTPATIENT
Start: 2024-11-28 | End: 2024-12-05 | Stop reason: HOSPADM

## 2024-11-28 RX ORDER — FERROUS SULFATE 325(65) MG
325 TABLET ORAL 2 TIMES DAILY
Status: DISCONTINUED | OUTPATIENT
Start: 2024-11-28 | End: 2024-12-05 | Stop reason: HOSPADM

## 2024-11-28 RX ORDER — FUROSEMIDE 10 MG/ML
40 INJECTION INTRAMUSCULAR; INTRAVENOUS ONCE
Status: COMPLETED | OUTPATIENT
Start: 2024-11-28 | End: 2024-11-28

## 2024-11-28 RX ORDER — AMLODIPINE BESYLATE 5 MG/1
10 TABLET ORAL DAILY
Status: CANCELLED | OUTPATIENT
Start: 2024-11-28

## 2024-11-28 RX ORDER — PANTOPRAZOLE SODIUM 40 MG/1
40 TABLET, DELAYED RELEASE ORAL DAILY
Status: DISCONTINUED | OUTPATIENT
Start: 2024-11-28 | End: 2024-12-05 | Stop reason: HOSPADM

## 2024-11-28 RX ADMIN — TAMSULOSIN HYDROCHLORIDE 0.4 MG: 0.4 CAPSULE ORAL at 17:37

## 2024-11-28 RX ADMIN — FUROSEMIDE 40 MG: 40 TABLET ORAL at 17:36

## 2024-11-28 RX ADMIN — OXYCODONE 10 MG: 5 TABLET ORAL at 17:39

## 2024-11-28 RX ADMIN — PANTOPRAZOLE SODIUM 40 MG: 40 TABLET, DELAYED RELEASE ORAL at 17:37

## 2024-11-28 RX ADMIN — FERROUS SULFATE TAB 325 MG (65 MG ELEMENTAL FE) 325 MG: 325 (65 FE) TAB at 23:21

## 2024-11-28 RX ADMIN — SPIRONOLACTONE 25 MG: 25 TABLET ORAL at 17:37

## 2024-11-28 RX ADMIN — ENOXAPARIN SODIUM 40 MG: 100 INJECTION SUBCUTANEOUS at 17:34

## 2024-11-28 RX ADMIN — FUROSEMIDE 40 MG: 10 INJECTION, SOLUTION INTRAMUSCULAR; INTRAVENOUS at 15:04

## 2024-11-28 RX ADMIN — FOLIC ACID 1 MG: 1 TABLET ORAL at 17:36

## 2024-11-28 ASSESSMENT — PAIN SCALES - GENERAL
PAINLEVEL_OUTOF10: 9
PAINLEVEL_OUTOF10: 9

## 2024-11-28 NOTE — ED PROVIDER NOTES
appreciated  Integument: skin warm and dry. No rashes   Neurologic: GCS 15, no focal deficits, 5/5 strength in dorsiflexion and plantarflexion, equal sensation intact    DIAGNOSTIC RESULTS   LABS:    Labs Reviewed   BRAIN NATRIURETIC PEPTIDE - Abnormal; Notable for the following components:       Result Value    NT Pro- (*)     All other components within normal limits   CBC WITH AUTO DIFFERENTIAL - Abnormal; Notable for the following components:    RBC 2.91 (*)     Hemoglobin 9.0 (*)     Hematocrit 27.2 (*)     RDW 17.2 (*)     Neutrophils % 39 (*)     Monocytes % 22 (*)     Monocytes Absolute 1.26 (*)     All other components within normal limits   COMPREHENSIVE METABOLIC PANEL - Abnormal; Notable for the following components:    Sodium 131 (*)     Chloride 95 (*)     Calcium 8.4 (*)     Albumin 2.3 (*)     Total Bilirubin 7.6 (*)     Alkaline Phosphatase 149 (*)     AST 64 (*)     All other components within normal limits   TROPONIN - Abnormal; Notable for the following components:    Troponin, High Sensitivity 17 (*)     All other components within normal limits   PROTIME-INR - Abnormal; Notable for the following components:    Protime 24.6 (*)     All other components within normal limits   HEPATIC FUNCTION PANEL - Abnormal; Notable for the following components:    Albumin 2.3 (*)     Alkaline Phosphatase 133 (*)     AST 57 (*)     Total Bilirubin 7.3 (*)     Bilirubin, Direct 3.8 (*)     Bilirubin, Indirect 3.5 (*)     All other components within normal limits   TROPONIN - Abnormal; Notable for the following components:    Troponin, High Sensitivity 13 (*)     All other components within normal limits       As interpreted by me, the above displayed labs are abnormal. All other labs obtained during this visit were within normal range or not returned as of this dictation.    EKG Interpretation  Interpreted by emergency department resident physician, Parisa Boone, DO  *Please see ED Course for EKG

## 2024-11-28 NOTE — H&P
SIGMOIDOSCOPY CONTROL HEMORRHAGE performed by PARVEEN Mancia MD at [facility] ENDOSCOPY       Medications Prior to Admission:    Prior to Admission medications    Medication Sig Start Date End Date Taking? Authorizing Provider   albuterol sulfate HFA (VENTOLIN HFA) 108 (90 Base) MCG/ACT inhaler Inhale 2 puffs into the lungs every 6 hours as needed for Wheezing    Paul Quiroz MD   ferrous sulfate (IRON 325) 325 (65 Fe) MG tablet Take 1 tablet by mouth in the morning and at bedtime    Paul Quiroz MD   magnesium gluconate (MAGONATE) 500 MG tablet Take 1 tablet by mouth daily    Paul Quiroz MD   folic acid (FOLVITE) 1 MG tablet Take 1 tablet by mouth daily    Paul Quiroz MD   entecavir (BARACLUDE) 0.5 MG tablet Take 2 tablets by mouth daily    Paul Quiroz MD   cetirizine (ZYRTEC) 5 MG tablet Take 2 tablets by mouth daily    Paul Quiroz MD   fluticasone (FLONASE) 50 MCG/ACT nasal spray 1 spray by Nasal route daily 10/27/20   Paul Quiroz MD   sildenafil (VIAGRA) 100 MG tablet TAKE ONE TABLET BY MOUTH AN HOUR_BEFORE SEX. (NO MORE THAN 1 DOSE PER 24 HOURS) NO NITRATES 5/15/20   Paul Quiroz MD   tamsulosin (FLOMAX) 0.4 MG capsule Take 1 capsule by mouth daily    Paul Quiroz MD   mineral oil-hydrophilic petrolatum (AQUAPHOR) ointment Apply topically as needed for Dry Skin A    Paul Quiroz MD   amLODIPine (NORVASC) 10 MG tablet Take 1 tablet by mouth daily    Paul Quiroz MD   oxyCODONE HCl (OXY-IR) 10 MG immediate release tablet Take 1 tablet by mouth every 6 hours as needed for Pain. 7/25/18   Paul Quiroz MD   metoprolol tartrate (LOPRESSOR) 50 MG tablet Take 1 tablet by mouth 2 times daily    Paul Quiroz MD   pantoprazole (PROTONIX) 40 MG tablet Take 1 tablet by mouth daily    Paul Quiroz MD       Allergies:    Simvastatin and Crestor [rosuvastatin]    Social History:    reports that he quit

## 2024-11-28 NOTE — ED NOTES
ED to Inpatient Handoff Report    Notified floor that electronic handoff available and patient ready for transport to room 505.    Safety Risks: falls    Patient in Restraints: no    Constant Observer or Patient : no    Telemetry Monitoring Ordered :yes           Order to transfer to unit without monitor:yes    Last MEWS: 1 Time completed: 1540    Deterioration Index Score:   Predictive Model Details          27 (Normal)  Factor Value    Calculated 11/28/2024 15:42 44% Age 74 years old    Deterioration Index Model 34% Neurological exam X     9% Respiratory rate 18     5% Hematocrit abnormal (27.2 %)     5% Sodium abnormal (131 mmol/L)     2% Potassium 3.6 mmol/L     0% Pulse oximetry 97 %     0% Temperature 97.4 °F (36.3 °C)     0% Systolic 110     0% WBC count 5.7 k/uL     0% Pulse 77        Vitals:    11/28/24 0936 11/28/24 1323 11/28/24 1540   BP: 107/60 105/63 110/66   Pulse: 80 78 77   Resp: 14 16 18   Temp: 97.7 °F (36.5 °C)  97.4 °F (36.3 °C)   TempSrc: Oral     SpO2: 96% 97% 97%   Weight: 78.9 kg (174 lb)     Height: 1.753 m (5' 9\")           Opportunity for questions and clarification was provided.      
Pt is unable to ambulate due to the swelling and pain of both legs and groin. Pt lives at a 3 story apartment.   
Quality 226: Preventive Care And Screening: Tobacco Use: Screening And Cessation Intervention: Patient screened for tobacco use and is an ex/non-smoker
Quality 130: Documentation Of Current Medications In The Medical Record: Current Medications Documented
Detail Level: Detailed

## 2024-11-29 ENCOUNTER — APPOINTMENT (OUTPATIENT)
Dept: ULTRASOUND IMAGING | Age: 74
DRG: 433 | End: 2024-11-29
Payer: COMMERCIAL

## 2024-11-29 LAB
ALBUMIN SERPL-MCNC: 2.4 G/DL (ref 3.5–5.2)
ALP SERPL-CCNC: 112 U/L (ref 40–129)
ALT SERPL-CCNC: 18 U/L (ref 0–40)
ANION GAP SERPL CALCULATED.3IONS-SCNC: 10 MMOL/L (ref 7–16)
AST SERPL-CCNC: 63 U/L (ref 0–39)
BASOPHILS # BLD: 0.04 K/UL (ref 0–0.2)
BASOPHILS NFR BLD: 1 % (ref 0–2)
BILIRUB SERPL-MCNC: 7.5 MG/DL (ref 0–1.2)
BUN SERPL-MCNC: 16 MG/DL (ref 6–23)
CALCIUM SERPL-MCNC: 8.2 MG/DL (ref 8.6–10.2)
CHLORIDE SERPL-SCNC: 96 MMOL/L (ref 98–107)
CO2 SERPL-SCNC: 25 MMOL/L (ref 22–29)
CREAT SERPL-MCNC: 1.1 MG/DL (ref 0.7–1.2)
EOSINOPHIL # BLD: 0.24 K/UL (ref 0.05–0.5)
EOSINOPHILS RELATIVE PERCENT: 4 % (ref 0–6)
ERYTHROCYTE [DISTWIDTH] IN BLOOD BY AUTOMATED COUNT: 17.1 % (ref 11.5–15)
GFR, ESTIMATED: 73 ML/MIN/1.73M2
GLUCOSE SERPL-MCNC: 105 MG/DL (ref 74–99)
HCT VFR BLD AUTO: 27.6 % (ref 37–54)
HGB BLD-MCNC: 9.3 G/DL (ref 12.5–16.5)
IMM GRANULOCYTES # BLD AUTO: <0.03 K/UL (ref 0–0.58)
IMM GRANULOCYTES NFR BLD: 0 % (ref 0–5)
INR PPP: 2.3
LYMPHOCYTES NFR BLD: 2.37 K/UL (ref 1.5–4)
LYMPHOCYTES RELATIVE PERCENT: 42 % (ref 20–42)
MCH RBC QN AUTO: 30.6 PG (ref 26–35)
MCHC RBC AUTO-ENTMCNC: 33.7 G/DL (ref 32–34.5)
MCV RBC AUTO: 90.8 FL (ref 80–99.9)
MONOCYTES NFR BLD: 1.25 K/UL (ref 0.1–0.95)
MONOCYTES NFR BLD: 22 % (ref 2–12)
NEUTROPHILS NFR BLD: 31 % (ref 43–80)
NEUTS SEG NFR BLD: 1.73 K/UL (ref 1.8–7.3)
PARTIAL THROMBOPLASTIN TIME: 42.8 SEC (ref 24.5–35.1)
PLATELET # BLD AUTO: 171 K/UL (ref 130–450)
PMV BLD AUTO: 10 FL (ref 7–12)
POTASSIUM SERPL-SCNC: 3.2 MMOL/L (ref 3.5–5)
PROT SERPL-MCNC: 7.4 G/DL (ref 6.4–8.3)
PROTHROMBIN TIME: 23.9 SEC (ref 9.3–12.4)
RBC # BLD AUTO: 3.04 M/UL (ref 3.8–5.8)
SODIUM SERPL-SCNC: 131 MMOL/L (ref 132–146)
WBC OTHER # BLD: 5.6 K/UL (ref 4.5–11.5)

## 2024-11-29 PROCEDURE — 2580000003 HC RX 258: Performed by: INTERNAL MEDICINE

## 2024-11-29 PROCEDURE — G0378 HOSPITAL OBSERVATION PER HR: HCPCS

## 2024-11-29 PROCEDURE — 6370000000 HC RX 637 (ALT 250 FOR IP): Performed by: INTERNAL MEDICINE

## 2024-11-29 PROCEDURE — 6360000002 HC RX W HCPCS: Performed by: INTERNAL MEDICINE

## 2024-11-29 PROCEDURE — 6360000002 HC RX W HCPCS: Performed by: PHYSICIAN ASSISTANT

## 2024-11-29 PROCEDURE — 36415 COLL VENOUS BLD VENIPUNCTURE: CPT

## 2024-11-29 PROCEDURE — 85610 PROTHROMBIN TIME: CPT

## 2024-11-29 PROCEDURE — P9047 ALBUMIN (HUMAN), 25%, 50ML: HCPCS | Performed by: STUDENT IN AN ORGANIZED HEALTH CARE EDUCATION/TRAINING PROGRAM

## 2024-11-29 PROCEDURE — 85730 THROMBOPLASTIN TIME PARTIAL: CPT

## 2024-11-29 PROCEDURE — C1729 CATH, DRAINAGE: HCPCS

## 2024-11-29 PROCEDURE — 6360000002 HC RX W HCPCS: Performed by: STUDENT IN AN ORGANIZED HEALTH CARE EDUCATION/TRAINING PROGRAM

## 2024-11-29 PROCEDURE — 85025 COMPLETE CBC W/AUTO DIFF WBC: CPT

## 2024-11-29 PROCEDURE — 99232 SBSQ HOSP IP/OBS MODERATE 35: CPT | Performed by: STUDENT IN AN ORGANIZED HEALTH CARE EDUCATION/TRAINING PROGRAM

## 2024-11-29 PROCEDURE — 80053 COMPREHEN METABOLIC PANEL: CPT

## 2024-11-29 PROCEDURE — 96372 THER/PROPH/DIAG INJ SC/IM: CPT

## 2024-11-29 PROCEDURE — 0W9G30Z DRAINAGE OF PERITONEAL CAVITY WITH DRAINAGE DEVICE, PERCUTANEOUS APPROACH: ICD-10-PCS | Performed by: RADIOLOGY

## 2024-11-29 RX ORDER — LIDOCAINE HYDROCHLORIDE 10 MG/ML
INJECTION, SOLUTION INFILTRATION; PERINEURAL PRN
Status: COMPLETED | OUTPATIENT
Start: 2024-11-29 | End: 2024-11-29

## 2024-11-29 RX ORDER — METOPROLOL TARTRATE 25 MG/1
12.5 TABLET, FILM COATED ORAL ONCE
Status: COMPLETED | OUTPATIENT
Start: 2024-11-29 | End: 2024-11-29

## 2024-11-29 RX ORDER — ALBUMIN (HUMAN) 12.5 G/50ML
50 SOLUTION INTRAVENOUS ONCE
Status: COMPLETED | OUTPATIENT
Start: 2024-11-29 | End: 2024-11-29

## 2024-11-29 RX ADMIN — ALBUMIN (HUMAN) 50 G: 0.25 INJECTION, SOLUTION INTRAVENOUS at 19:20

## 2024-11-29 RX ADMIN — ACETAMINOPHEN 650 MG: 325 TABLET ORAL at 09:31

## 2024-11-29 RX ADMIN — FERROUS SULFATE TAB 325 MG (65 MG ELEMENTAL FE) 325 MG: 325 (65 FE) TAB at 21:14

## 2024-11-29 RX ADMIN — SODIUM CHLORIDE, PRESERVATIVE FREE 10 ML: 5 INJECTION INTRAVENOUS at 21:14

## 2024-11-29 RX ADMIN — FUROSEMIDE 40 MG: 40 TABLET ORAL at 09:26

## 2024-11-29 RX ADMIN — FERROUS SULFATE TAB 325 MG (65 MG ELEMENTAL FE) 325 MG: 325 (65 FE) TAB at 09:26

## 2024-11-29 RX ADMIN — LIDOCAINE HYDROCHLORIDE 10 ML: 10 INJECTION, SOLUTION INFILTRATION; PERINEURAL at 10:56

## 2024-11-29 RX ADMIN — METOPROLOL TARTRATE 50 MG: 50 TABLET, FILM COATED ORAL at 09:27

## 2024-11-29 RX ADMIN — Medication 500 MG: at 09:26

## 2024-11-29 RX ADMIN — POTASSIUM CHLORIDE 40 MEQ: 1500 TABLET, EXTENDED RELEASE ORAL at 15:52

## 2024-11-29 RX ADMIN — OXYCODONE 10 MG: 5 TABLET ORAL at 05:30

## 2024-11-29 RX ADMIN — SODIUM CHLORIDE, PRESERVATIVE FREE 10 ML: 5 INJECTION INTRAVENOUS at 09:32

## 2024-11-29 RX ADMIN — OXYCODONE 10 MG: 5 TABLET ORAL at 15:52

## 2024-11-29 RX ADMIN — PANTOPRAZOLE SODIUM 40 MG: 40 TABLET, DELAYED RELEASE ORAL at 05:26

## 2024-11-29 RX ADMIN — SPIRONOLACTONE 25 MG: 25 TABLET ORAL at 09:27

## 2024-11-29 RX ADMIN — METOPROLOL TARTRATE 12.5 MG: 25 TABLET, FILM COATED ORAL at 22:27

## 2024-11-29 RX ADMIN — TAMSULOSIN HYDROCHLORIDE 0.4 MG: 0.4 CAPSULE ORAL at 09:27

## 2024-11-29 RX ADMIN — FOLIC ACID 1 MG: 1 TABLET ORAL at 09:27

## 2024-11-29 RX ADMIN — ENOXAPARIN SODIUM 40 MG: 100 INJECTION SUBCUTANEOUS at 15:51

## 2024-11-29 ASSESSMENT — PAIN SCALES - GENERAL
PAINLEVEL_OUTOF10: 8
PAINLEVEL_OUTOF10: 0
PAINLEVEL_OUTOF10: 8
PAINLEVEL_OUTOF10: 7
PAINLEVEL_OUTOF10: 0

## 2024-11-29 ASSESSMENT — PAIN DESCRIPTION - ORIENTATION
ORIENTATION: RIGHT;LEFT

## 2024-11-29 ASSESSMENT — PAIN DESCRIPTION - ONSET: ONSET: ON-GOING

## 2024-11-29 ASSESSMENT — PAIN DESCRIPTION - LOCATION
LOCATION: LEG;KNEE;ANKLE
LOCATION: LEG
LOCATION: ABDOMEN;LEG

## 2024-11-29 ASSESSMENT — PAIN - FUNCTIONAL ASSESSMENT
PAIN_FUNCTIONAL_ASSESSMENT: PREVENTS OR INTERFERES SOME ACTIVE ACTIVITIES AND ADLS

## 2024-11-29 ASSESSMENT — PAIN DESCRIPTION - PAIN TYPE: TYPE: ACUTE PAIN

## 2024-11-29 ASSESSMENT — PAIN DESCRIPTION - FREQUENCY: FREQUENCY: CONTINUOUS

## 2024-11-29 ASSESSMENT — PAIN DESCRIPTION - DESCRIPTORS
DESCRIPTORS: SORE
DESCRIPTORS: ACHING;DISCOMFORT;SORE
DESCRIPTORS: SORE;TIGHTNESS

## 2024-11-29 NOTE — OR NURSING
Patient arrival from room (505 ) to IR for paracentesis. Vitals taken, Stacey Chicas PA-C in to speak with the patient about the procedure, all questions answered and patient is agreeable. Abdomen scanned, prepped and 5 Tajik 10 cm centesis catheter inserted to (RLQ) with ultrasound guidance by Stacey Nielsen PA-C. Patient tolerated well.3150 ml drained of clear yellow colored ascitic fluid. Centesis catheter removed. Puncture site cleansed and dry dressing applied. No bleeding, swelling or complications noted. Discharge instructions placed in chart. Patient transported back to room, nurse handoff report called to floor.

## 2024-11-30 LAB
ALBUMIN SERPL-MCNC: 2.2 G/DL (ref 3.5–5.2)
ALP SERPL-CCNC: 76 U/L (ref 40–129)
ALT SERPL-CCNC: 12 U/L (ref 0–40)
ANION GAP SERPL CALCULATED.3IONS-SCNC: 9 MMOL/L (ref 7–16)
AST SERPL-CCNC: 44 U/L (ref 0–39)
ATYPICAL LYMPHOCYTE ABSOLUTE COUNT: 0.12 K/UL (ref 0–0.46)
ATYPICAL LYMPHOCYTES: 2 % (ref 0–4)
BASOPHILS # BLD: 0.06 K/UL (ref 0–0.2)
BASOPHILS NFR BLD: 1 % (ref 0–2)
BILIRUB SERPL-MCNC: 6.1 MG/DL (ref 0–1.2)
BUN SERPL-MCNC: 14 MG/DL (ref 6–23)
CALCIUM SERPL-MCNC: 8 MG/DL (ref 8.6–10.2)
CHLORIDE SERPL-SCNC: 102 MMOL/L (ref 98–107)
CO2 SERPL-SCNC: 23 MMOL/L (ref 22–29)
CREAT SERPL-MCNC: 1.1 MG/DL (ref 0.7–1.2)
EOSINOPHIL # BLD: 0.12 K/UL (ref 0.05–0.5)
EOSINOPHILS RELATIVE PERCENT: 2 % (ref 0–6)
ERYTHROCYTE [DISTWIDTH] IN BLOOD BY AUTOMATED COUNT: 17.5 % (ref 11.5–15)
GFR, ESTIMATED: 71 ML/MIN/1.73M2
GLUCOSE SERPL-MCNC: 77 MG/DL (ref 74–99)
HCT VFR BLD AUTO: 22.4 % (ref 37–54)
HGB BLD-MCNC: 7.5 G/DL (ref 12.5–16.5)
LYMPHOCYTES NFR BLD: 1.98 K/UL (ref 1.5–4)
LYMPHOCYTES RELATIVE PERCENT: 33 % (ref 20–42)
MCH RBC QN AUTO: 31 PG (ref 26–35)
MCHC RBC AUTO-ENTMCNC: 33.5 G/DL (ref 32–34.5)
MCV RBC AUTO: 92.6 FL (ref 80–99.9)
MONOCYTES NFR BLD: 0.9 K/UL (ref 0.1–0.95)
MONOCYTES NFR BLD: 15 % (ref 2–12)
NEUTROPHILS NFR BLD: 47 % (ref 43–80)
NEUTS SEG NFR BLD: 2.82 K/UL (ref 1.8–7.3)
PLATELET # BLD AUTO: 107 K/UL (ref 130–450)
PMV BLD AUTO: 9 FL (ref 7–12)
POTASSIUM SERPL-SCNC: 3.6 MMOL/L (ref 3.5–5)
PROT SERPL-MCNC: 5.9 G/DL (ref 6.4–8.3)
RBC # BLD AUTO: 2.42 M/UL (ref 3.8–5.8)
RBC # BLD: ABNORMAL 10*6/UL
SODIUM SERPL-SCNC: 134 MMOL/L (ref 132–146)
WBC OTHER # BLD: 6 K/UL (ref 4.5–11.5)

## 2024-11-30 PROCEDURE — 99232 SBSQ HOSP IP/OBS MODERATE 35: CPT | Performed by: STUDENT IN AN ORGANIZED HEALTH CARE EDUCATION/TRAINING PROGRAM

## 2024-11-30 PROCEDURE — 6360000002 HC RX W HCPCS: Performed by: INTERNAL MEDICINE

## 2024-11-30 PROCEDURE — 96372 THER/PROPH/DIAG INJ SC/IM: CPT

## 2024-11-30 PROCEDURE — 2580000003 HC RX 258: Performed by: INTERNAL MEDICINE

## 2024-11-30 PROCEDURE — 6370000000 HC RX 637 (ALT 250 FOR IP): Performed by: INTERNAL MEDICINE

## 2024-11-30 PROCEDURE — 80053 COMPREHEN METABOLIC PANEL: CPT

## 2024-11-30 PROCEDURE — P9047 ALBUMIN (HUMAN), 25%, 50ML: HCPCS | Performed by: STUDENT IN AN ORGANIZED HEALTH CARE EDUCATION/TRAINING PROGRAM

## 2024-11-30 PROCEDURE — 6370000000 HC RX 637 (ALT 250 FOR IP): Performed by: STUDENT IN AN ORGANIZED HEALTH CARE EDUCATION/TRAINING PROGRAM

## 2024-11-30 PROCEDURE — 85025 COMPLETE CBC W/AUTO DIFF WBC: CPT

## 2024-11-30 PROCEDURE — G0378 HOSPITAL OBSERVATION PER HR: HCPCS

## 2024-11-30 PROCEDURE — 6360000002 HC RX W HCPCS: Performed by: STUDENT IN AN ORGANIZED HEALTH CARE EDUCATION/TRAINING PROGRAM

## 2024-11-30 RX ORDER — ENTECAVIR 1 MG/1
1 TABLET, FILM COATED ORAL DAILY
Status: DISCONTINUED | OUTPATIENT
Start: 2024-11-30 | End: 2024-12-05 | Stop reason: HOSPADM

## 2024-11-30 RX ORDER — ALBUMIN (HUMAN) 12.5 G/50ML
25 SOLUTION INTRAVENOUS ONCE
Status: COMPLETED | OUTPATIENT
Start: 2024-11-30 | End: 2024-11-30

## 2024-11-30 RX ORDER — MIDODRINE HYDROCHLORIDE 5 MG/1
2.5 TABLET ORAL 2 TIMES DAILY WITH MEALS
Status: DISCONTINUED | OUTPATIENT
Start: 2024-11-30 | End: 2024-12-01

## 2024-11-30 RX ADMIN — PANTOPRAZOLE SODIUM 40 MG: 40 TABLET, DELAYED RELEASE ORAL at 08:55

## 2024-11-30 RX ADMIN — SPIRONOLACTONE 25 MG: 25 TABLET ORAL at 08:55

## 2024-11-30 RX ADMIN — ENTECAVIR 1 MG: 1 TABLET, FILM COATED ORAL at 12:37

## 2024-11-30 RX ADMIN — MIDODRINE HYDROCHLORIDE 2.5 MG: 5 TABLET ORAL at 16:00

## 2024-11-30 RX ADMIN — ALBUMIN (HUMAN) 25 G: 0.25 INJECTION, SOLUTION INTRAVENOUS at 10:43

## 2024-11-30 RX ADMIN — OXYCODONE 10 MG: 5 TABLET ORAL at 09:29

## 2024-11-30 RX ADMIN — FERROUS SULFATE TAB 325 MG (65 MG ELEMENTAL FE) 325 MG: 325 (65 FE) TAB at 08:55

## 2024-11-30 RX ADMIN — POLYETHYLENE GLYCOL 3350 17 G: 17 POWDER, FOR SOLUTION ORAL at 15:45

## 2024-11-30 RX ADMIN — FERROUS SULFATE TAB 325 MG (65 MG ELEMENTAL FE) 325 MG: 325 (65 FE) TAB at 22:33

## 2024-11-30 RX ADMIN — ENOXAPARIN SODIUM 40 MG: 100 INJECTION SUBCUTANEOUS at 15:45

## 2024-11-30 RX ADMIN — MIDODRINE HYDROCHLORIDE 2.5 MG: 5 TABLET ORAL at 10:43

## 2024-11-30 RX ADMIN — SODIUM CHLORIDE, PRESERVATIVE FREE 10 ML: 5 INJECTION INTRAVENOUS at 22:36

## 2024-11-30 RX ADMIN — FOLIC ACID 1 MG: 1 TABLET ORAL at 08:54

## 2024-11-30 RX ADMIN — SODIUM CHLORIDE, PRESERVATIVE FREE 10 ML: 5 INJECTION INTRAVENOUS at 08:56

## 2024-11-30 RX ADMIN — Medication 500 MG: at 08:55

## 2024-11-30 RX ADMIN — TAMSULOSIN HYDROCHLORIDE 0.4 MG: 0.4 CAPSULE ORAL at 08:55

## 2024-11-30 RX ADMIN — OXYCODONE 10 MG: 5 TABLET ORAL at 22:55

## 2024-11-30 ASSESSMENT — PAIN DESCRIPTION - ORIENTATION: ORIENTATION: RIGHT;LEFT

## 2024-11-30 ASSESSMENT — PAIN - FUNCTIONAL ASSESSMENT: PAIN_FUNCTIONAL_ASSESSMENT: PREVENTS OR INTERFERES SOME ACTIVE ACTIVITIES AND ADLS

## 2024-11-30 ASSESSMENT — PAIN SCALES - GENERAL
PAINLEVEL_OUTOF10: 7
PAINLEVEL_OUTOF10: 9

## 2024-11-30 ASSESSMENT — PAIN DESCRIPTION - LOCATION: LOCATION: ANKLE

## 2024-11-30 ASSESSMENT — PAIN DESCRIPTION - DESCRIPTORS: DESCRIPTORS: STABBING

## 2024-12-01 PROBLEM — K70.31 ASCITES DUE TO ALCOHOLIC CIRRHOSIS (HCC): Status: ACTIVE | Noted: 2024-12-01

## 2024-12-01 LAB
ALBUMIN SERPL-MCNC: 2.7 G/DL (ref 3.5–5.2)
ALP SERPL-CCNC: 95 U/L (ref 40–129)
ALT SERPL-CCNC: 14 U/L (ref 0–40)
ANION GAP SERPL CALCULATED.3IONS-SCNC: 8 MMOL/L (ref 7–16)
AST SERPL-CCNC: 57 U/L (ref 0–39)
BASOPHILS # BLD: 0 K/UL (ref 0–0.2)
BASOPHILS NFR BLD: 0 % (ref 0–2)
BILIRUB SERPL-MCNC: 7 MG/DL (ref 0–1.2)
BUN SERPL-MCNC: 13 MG/DL (ref 6–23)
CALCIUM SERPL-MCNC: 8.6 MG/DL (ref 8.6–10.2)
CHLORIDE SERPL-SCNC: 102 MMOL/L (ref 98–107)
CO2 SERPL-SCNC: 24 MMOL/L (ref 22–29)
CREAT SERPL-MCNC: 1 MG/DL (ref 0.7–1.2)
EOSINOPHIL # BLD: 0.41 K/UL (ref 0.05–0.5)
EOSINOPHILS RELATIVE PERCENT: 6 % (ref 0–6)
ERYTHROCYTE [DISTWIDTH] IN BLOOD BY AUTOMATED COUNT: 17.5 % (ref 11.5–15)
GFR, ESTIMATED: 80 ML/MIN/1.73M2
GLUCOSE SERPL-MCNC: 88 MG/DL (ref 74–99)
HCT VFR BLD AUTO: 24.1 % (ref 37–54)
HGB BLD-MCNC: 8 G/DL (ref 12.5–16.5)
LYMPHOCYTES NFR BLD: 3.86 K/UL (ref 1.5–4)
LYMPHOCYTES RELATIVE PERCENT: 56 % (ref 20–42)
MCH RBC QN AUTO: 30.8 PG (ref 26–35)
MCHC RBC AUTO-ENTMCNC: 33.2 G/DL (ref 32–34.5)
MCV RBC AUTO: 92.7 FL (ref 80–99.9)
MONOCYTES NFR BLD: 0.76 K/UL (ref 0.1–0.95)
MONOCYTES NFR BLD: 11 % (ref 2–12)
NEUTROPHILS NFR BLD: 27 % (ref 43–80)
NEUTS SEG NFR BLD: 1.86 K/UL (ref 1.8–7.3)
NUCLEATED RED BLOOD CELLS: 4 PER 100 WBC
PLATELET, FLUORESCENCE: 135 K/UL (ref 130–450)
PMV BLD AUTO: 10.3 FL (ref 7–12)
POTASSIUM SERPL-SCNC: 3.9 MMOL/L (ref 3.5–5)
PROT SERPL-MCNC: 6.8 G/DL (ref 6.4–8.3)
RBC # BLD AUTO: 2.6 M/UL (ref 3.8–5.8)
RBC # BLD: ABNORMAL 10*6/UL
SODIUM SERPL-SCNC: 134 MMOL/L (ref 132–146)
WBC OTHER # BLD: 6.9 K/UL (ref 4.5–11.5)

## 2024-12-01 PROCEDURE — 6370000000 HC RX 637 (ALT 250 FOR IP): Performed by: INTERNAL MEDICINE

## 2024-12-01 PROCEDURE — 1200000000 HC SEMI PRIVATE

## 2024-12-01 PROCEDURE — 80053 COMPREHEN METABOLIC PANEL: CPT

## 2024-12-01 PROCEDURE — 6370000000 HC RX 637 (ALT 250 FOR IP): Performed by: STUDENT IN AN ORGANIZED HEALTH CARE EDUCATION/TRAINING PROGRAM

## 2024-12-01 PROCEDURE — 2580000003 HC RX 258: Performed by: INTERNAL MEDICINE

## 2024-12-01 PROCEDURE — 85025 COMPLETE CBC W/AUTO DIFF WBC: CPT

## 2024-12-01 PROCEDURE — 6360000002 HC RX W HCPCS: Performed by: INTERNAL MEDICINE

## 2024-12-01 PROCEDURE — 99232 SBSQ HOSP IP/OBS MODERATE 35: CPT | Performed by: STUDENT IN AN ORGANIZED HEALTH CARE EDUCATION/TRAINING PROGRAM

## 2024-12-01 RX ORDER — MIDODRINE HYDROCHLORIDE 5 MG/1
2.5 TABLET ORAL
Status: DISCONTINUED | OUTPATIENT
Start: 2024-12-01 | End: 2024-12-05 | Stop reason: HOSPADM

## 2024-12-01 RX ADMIN — MIDODRINE HYDROCHLORIDE 2.5 MG: 5 TABLET ORAL at 16:11

## 2024-12-01 RX ADMIN — SPIRONOLACTONE 25 MG: 25 TABLET ORAL at 08:52

## 2024-12-01 RX ADMIN — TAMSULOSIN HYDROCHLORIDE 0.4 MG: 0.4 CAPSULE ORAL at 08:52

## 2024-12-01 RX ADMIN — PANTOPRAZOLE SODIUM 40 MG: 40 TABLET, DELAYED RELEASE ORAL at 06:43

## 2024-12-01 RX ADMIN — FUROSEMIDE 40 MG: 40 TABLET ORAL at 08:52

## 2024-12-01 RX ADMIN — FOLIC ACID 1 MG: 1 TABLET ORAL at 08:52

## 2024-12-01 RX ADMIN — ENOXAPARIN SODIUM 40 MG: 100 INJECTION SUBCUTANEOUS at 16:11

## 2024-12-01 RX ADMIN — FERROUS SULFATE TAB 325 MG (65 MG ELEMENTAL FE) 325 MG: 325 (65 FE) TAB at 08:52

## 2024-12-01 RX ADMIN — OXYCODONE 10 MG: 5 TABLET ORAL at 16:11

## 2024-12-01 RX ADMIN — Medication 500 MG: at 08:52

## 2024-12-01 RX ADMIN — METOPROLOL TARTRATE 50 MG: 50 TABLET, FILM COATED ORAL at 20:49

## 2024-12-01 RX ADMIN — SODIUM CHLORIDE, PRESERVATIVE FREE 10 ML: 5 INJECTION INTRAVENOUS at 08:57

## 2024-12-01 RX ADMIN — ENTECAVIR 1 MG: 1 TABLET, FILM COATED ORAL at 08:52

## 2024-12-01 RX ADMIN — POLYETHYLENE GLYCOL 3350 17 G: 17 POWDER, FOR SOLUTION ORAL at 09:57

## 2024-12-01 RX ADMIN — FERROUS SULFATE TAB 325 MG (65 MG ELEMENTAL FE) 325 MG: 325 (65 FE) TAB at 20:49

## 2024-12-01 RX ADMIN — SODIUM CHLORIDE, PRESERVATIVE FREE 10 ML: 5 INJECTION INTRAVENOUS at 20:54

## 2024-12-01 RX ADMIN — OXYCODONE 10 MG: 5 TABLET ORAL at 09:57

## 2024-12-01 RX ADMIN — MIDODRINE HYDROCHLORIDE 2.5 MG: 5 TABLET ORAL at 08:52

## 2024-12-01 RX ADMIN — MIDODRINE HYDROCHLORIDE 2.5 MG: 5 TABLET ORAL at 13:45

## 2024-12-01 ASSESSMENT — PAIN DESCRIPTION - DESCRIPTORS
DESCRIPTORS: ACHING
DESCRIPTORS: ACHING

## 2024-12-01 ASSESSMENT — PAIN DESCRIPTION - LOCATION
LOCATION: LEG
LOCATION: LEG

## 2024-12-01 ASSESSMENT — PAIN DESCRIPTION - ORIENTATION
ORIENTATION: RIGHT;LEFT;LOWER
ORIENTATION: RIGHT;LEFT

## 2024-12-01 ASSESSMENT — PAIN SCALES - GENERAL
PAINLEVEL_OUTOF10: 10
PAINLEVEL_OUTOF10: 8

## 2024-12-02 LAB
ALBUMIN SERPL-MCNC: 2.7 G/DL (ref 3.5–5.2)
ALP SERPL-CCNC: 87 U/L (ref 40–129)
ALT SERPL-CCNC: 13 U/L (ref 0–40)
ANION GAP SERPL CALCULATED.3IONS-SCNC: 10 MMOL/L (ref 7–16)
AST SERPL-CCNC: 46 U/L (ref 0–39)
BASOPHILS # BLD: 0.03 K/UL (ref 0–0.2)
BASOPHILS NFR BLD: 0 % (ref 0–2)
BILIRUB SERPL-MCNC: 7.2 MG/DL (ref 0–1.2)
BUN SERPL-MCNC: 12 MG/DL (ref 6–23)
CALCIUM SERPL-MCNC: 8.6 MG/DL (ref 8.6–10.2)
CHLORIDE SERPL-SCNC: 104 MMOL/L (ref 98–107)
CO2 SERPL-SCNC: 23 MMOL/L (ref 22–29)
CREAT SERPL-MCNC: 1 MG/DL (ref 0.7–1.2)
EOSINOPHIL # BLD: 0.49 K/UL (ref 0.05–0.5)
EOSINOPHILS RELATIVE PERCENT: 7 % (ref 0–6)
ERYTHROCYTE [DISTWIDTH] IN BLOOD BY AUTOMATED COUNT: 17.6 % (ref 11.5–15)
GFR, ESTIMATED: 83 ML/MIN/1.73M2
GLUCOSE SERPL-MCNC: 89 MG/DL (ref 74–99)
HCT VFR BLD AUTO: 25.7 % (ref 37–54)
HGB BLD-MCNC: 8.4 G/DL (ref 12.5–16.5)
IMM GRANULOCYTES # BLD AUTO: <0.03 K/UL (ref 0–0.58)
IMM GRANULOCYTES NFR BLD: 0 % (ref 0–5)
LYMPHOCYTES NFR BLD: 2.48 K/UL (ref 1.5–4)
LYMPHOCYTES RELATIVE PERCENT: 37 % (ref 20–42)
MCH RBC QN AUTO: 30.5 PG (ref 26–35)
MCHC RBC AUTO-ENTMCNC: 32.7 G/DL (ref 32–34.5)
MCV RBC AUTO: 93.5 FL (ref 80–99.9)
MONOCYTES NFR BLD: 1.49 K/UL (ref 0.1–0.95)
MONOCYTES NFR BLD: 22 % (ref 2–12)
NEUTROPHILS NFR BLD: 33 % (ref 43–80)
NEUTS SEG NFR BLD: 2.19 K/UL (ref 1.8–7.3)
PLATELET # BLD AUTO: 126 K/UL (ref 130–450)
PMV BLD AUTO: 9.5 FL (ref 7–12)
POTASSIUM SERPL-SCNC: 3.8 MMOL/L (ref 3.5–5)
PROT SERPL-MCNC: 6.8 G/DL (ref 6.4–8.3)
RBC # BLD AUTO: 2.75 M/UL (ref 3.8–5.8)
SODIUM SERPL-SCNC: 137 MMOL/L (ref 132–146)
WBC OTHER # BLD: 6.7 K/UL (ref 4.5–11.5)

## 2024-12-02 PROCEDURE — 97165 OT EVAL LOW COMPLEX 30 MIN: CPT

## 2024-12-02 PROCEDURE — 80053 COMPREHEN METABOLIC PANEL: CPT

## 2024-12-02 PROCEDURE — 6370000000 HC RX 637 (ALT 250 FOR IP): Performed by: STUDENT IN AN ORGANIZED HEALTH CARE EDUCATION/TRAINING PROGRAM

## 2024-12-02 PROCEDURE — 99232 SBSQ HOSP IP/OBS MODERATE 35: CPT | Performed by: INTERNAL MEDICINE

## 2024-12-02 PROCEDURE — 6370000000 HC RX 637 (ALT 250 FOR IP): Performed by: INTERNAL MEDICINE

## 2024-12-02 PROCEDURE — 97161 PT EVAL LOW COMPLEX 20 MIN: CPT

## 2024-12-02 PROCEDURE — 97530 THERAPEUTIC ACTIVITIES: CPT

## 2024-12-02 PROCEDURE — 1200000000 HC SEMI PRIVATE

## 2024-12-02 PROCEDURE — 85025 COMPLETE CBC W/AUTO DIFF WBC: CPT

## 2024-12-02 PROCEDURE — 2580000003 HC RX 258: Performed by: INTERNAL MEDICINE

## 2024-12-02 RX ORDER — SODIUM PHOSPHATE, DIBASIC AND SODIUM PHOSPHATE, MONOBASIC 7; 19 G/230ML; G/230ML
1 ENEMA RECTAL
Status: ACTIVE | OUTPATIENT
Start: 2024-12-02 | End: 2024-12-03

## 2024-12-02 RX ADMIN — OXYCODONE 10 MG: 5 TABLET ORAL at 12:53

## 2024-12-02 RX ADMIN — ACETAMINOPHEN 650 MG: 325 TABLET ORAL at 14:25

## 2024-12-02 RX ADMIN — METOPROLOL TARTRATE 50 MG: 50 TABLET, FILM COATED ORAL at 20:02

## 2024-12-02 RX ADMIN — FUROSEMIDE 40 MG: 40 TABLET ORAL at 09:34

## 2024-12-02 RX ADMIN — METOPROLOL TARTRATE 50 MG: 50 TABLET, FILM COATED ORAL at 09:37

## 2024-12-02 RX ADMIN — TAMSULOSIN HYDROCHLORIDE 0.4 MG: 0.4 CAPSULE ORAL at 09:34

## 2024-12-02 RX ADMIN — POLYETHYLENE GLYCOL 3350 17 G: 17 POWDER, FOR SOLUTION ORAL at 09:29

## 2024-12-02 RX ADMIN — SODIUM CHLORIDE, PRESERVATIVE FREE 10 ML: 5 INJECTION INTRAVENOUS at 20:02

## 2024-12-02 RX ADMIN — FERROUS SULFATE TAB 325 MG (65 MG ELEMENTAL FE) 325 MG: 325 (65 FE) TAB at 20:02

## 2024-12-02 RX ADMIN — SODIUM CHLORIDE, PRESERVATIVE FREE 10 ML: 5 INJECTION INTRAVENOUS at 09:42

## 2024-12-02 RX ADMIN — FOLIC ACID 1 MG: 1 TABLET ORAL at 09:32

## 2024-12-02 RX ADMIN — Medication 500 MG: at 09:34

## 2024-12-02 RX ADMIN — ENTECAVIR 1 MG: 1 TABLET, FILM COATED ORAL at 09:30

## 2024-12-02 RX ADMIN — OXYCODONE 10 MG: 5 TABLET ORAL at 21:52

## 2024-12-02 RX ADMIN — SPIRONOLACTONE 25 MG: 25 TABLET ORAL at 09:34

## 2024-12-02 RX ADMIN — FERROUS SULFATE TAB 325 MG (65 MG ELEMENTAL FE) 325 MG: 325 (65 FE) TAB at 09:33

## 2024-12-02 RX ADMIN — PANTOPRAZOLE SODIUM 40 MG: 40 TABLET, DELAYED RELEASE ORAL at 06:13

## 2024-12-02 RX ADMIN — MIDODRINE HYDROCHLORIDE 2.5 MG: 5 TABLET ORAL at 09:33

## 2024-12-02 ASSESSMENT — PAIN SCALES - GENERAL
PAINLEVEL_OUTOF10: 8
PAINLEVEL_OUTOF10: 5
PAINLEVEL_OUTOF10: 8
PAINLEVEL_OUTOF10: 6
PAINLEVEL_OUTOF10: 3
PAINLEVEL_OUTOF10: 3

## 2024-12-02 ASSESSMENT — PAIN DESCRIPTION - LOCATION: LOCATION: ABDOMEN

## 2024-12-02 ASSESSMENT — PAIN DESCRIPTION - ORIENTATION: ORIENTATION: RIGHT

## 2024-12-02 NOTE — ACP (ADVANCE CARE PLANNING)
Advance Care Planning   Healthcare Decision Maker:    Primary Decision Maker: Dawson Salamanca - [phone]    Click here to complete Healthcare Decision Makers including selection of the Healthcare Decision Maker Relationship (ie \"Primary\").

## 2024-12-02 NOTE — CARE COORDINATION
Introduced my self and provided explanation of CM role to patient. Patient is awake, alert, and aware of current diagnosis and discharge planning. Patient is from home alone. [facility]al [facility]. Spoke to patient at the bedside who states he would like to go to rehab. [facility] SNF list provided as well as original SNF list. Cousin called to notify of discharge planning and to help patient decide on facility. Await choices. TCC [facility] to [facility] with all appropriate documentation.   Electronically signed by hSayla Cramer RN on 12/2/2024 at 11:40 AM    UPDATE: Spoke to Angelika from the [facility] who states the patient is only approved to go the the [facility] for rehab due to his service connection. Spoke to patient and girlfriend about options. They wish to pursue with [facility]. Referral faxed, await input.   Electronically signed by Shayla Cramer RN on 12/2/2024 at 1:49 PM

## 2024-12-03 LAB
ALBUMIN SERPL-MCNC: 2.3 G/DL (ref 3.5–5.2)
ALP SERPL-CCNC: 85 U/L (ref 40–129)
ALT SERPL-CCNC: 12 U/L (ref 0–40)
ANION GAP SERPL CALCULATED.3IONS-SCNC: 9 MMOL/L (ref 7–16)
AST SERPL-CCNC: 43 U/L (ref 0–39)
BASOPHILS # BLD: 0.04 K/UL (ref 0–0.2)
BASOPHILS NFR BLD: 1 % (ref 0–2)
BILIRUB SERPL-MCNC: 6 MG/DL (ref 0–1.2)
BUN SERPL-MCNC: 14 MG/DL (ref 6–23)
CALCIUM SERPL-MCNC: 8.5 MG/DL (ref 8.6–10.2)
CHLORIDE SERPL-SCNC: 104 MMOL/L (ref 98–107)
CO2 SERPL-SCNC: 20 MMOL/L (ref 22–29)
CREAT SERPL-MCNC: 0.9 MG/DL (ref 0.7–1.2)
EOSINOPHIL # BLD: 0.5 K/UL (ref 0.05–0.5)
EOSINOPHILS RELATIVE PERCENT: 8 % (ref 0–6)
ERYTHROCYTE [DISTWIDTH] IN BLOOD BY AUTOMATED COUNT: 18 % (ref 11.5–15)
GFR, ESTIMATED: >90 ML/MIN/1.73M2
GLUCOSE SERPL-MCNC: 77 MG/DL (ref 74–99)
HCT VFR BLD AUTO: 27.9 % (ref 37–54)
HGB BLD-MCNC: 8.8 G/DL (ref 12.5–16.5)
IMM GRANULOCYTES # BLD AUTO: <0.03 K/UL (ref 0–0.58)
IMM GRANULOCYTES NFR BLD: 0 % (ref 0–5)
LYMPHOCYTES NFR BLD: 2.39 K/UL (ref 1.5–4)
LYMPHOCYTES RELATIVE PERCENT: 40 % (ref 20–42)
MCH RBC QN AUTO: 30.8 PG (ref 26–35)
MCHC RBC AUTO-ENTMCNC: 31.5 G/DL (ref 32–34.5)
MCV RBC AUTO: 97.6 FL (ref 80–99.9)
MONOCYTES NFR BLD: 1.31 K/UL (ref 0.1–0.95)
MONOCYTES NFR BLD: 22 % (ref 2–12)
NEUTROPHILS NFR BLD: 29 % (ref 43–80)
NEUTS SEG NFR BLD: 1.71 K/UL (ref 1.8–7.3)
PLATELET # BLD AUTO: 120 K/UL (ref 130–450)
PMV BLD AUTO: 10 FL (ref 7–12)
POTASSIUM SERPL-SCNC: 3.6 MMOL/L (ref 3.5–5)
PROT SERPL-MCNC: 6.5 G/DL (ref 6.4–8.3)
RBC # BLD AUTO: 2.86 M/UL (ref 3.8–5.8)
SODIUM SERPL-SCNC: 133 MMOL/L (ref 132–146)
WBC OTHER # BLD: 6 K/UL (ref 4.5–11.5)

## 2024-12-03 PROCEDURE — 97530 THERAPEUTIC ACTIVITIES: CPT

## 2024-12-03 PROCEDURE — 1200000000 HC SEMI PRIVATE

## 2024-12-03 PROCEDURE — 85025 COMPLETE CBC W/AUTO DIFF WBC: CPT

## 2024-12-03 PROCEDURE — 6370000000 HC RX 637 (ALT 250 FOR IP): Performed by: INTERNAL MEDICINE

## 2024-12-03 PROCEDURE — 6370000000 HC RX 637 (ALT 250 FOR IP): Performed by: STUDENT IN AN ORGANIZED HEALTH CARE EDUCATION/TRAINING PROGRAM

## 2024-12-03 PROCEDURE — 99232 SBSQ HOSP IP/OBS MODERATE 35: CPT | Performed by: INTERNAL MEDICINE

## 2024-12-03 PROCEDURE — 97110 THERAPEUTIC EXERCISES: CPT

## 2024-12-03 PROCEDURE — 80053 COMPREHEN METABOLIC PANEL: CPT

## 2024-12-03 PROCEDURE — 36415 COLL VENOUS BLD VENIPUNCTURE: CPT

## 2024-12-03 PROCEDURE — 2580000003 HC RX 258: Performed by: INTERNAL MEDICINE

## 2024-12-03 RX ADMIN — TAMSULOSIN HYDROCHLORIDE 0.4 MG: 0.4 CAPSULE ORAL at 08:02

## 2024-12-03 RX ADMIN — MIDODRINE HYDROCHLORIDE 2.5 MG: 5 TABLET ORAL at 17:09

## 2024-12-03 RX ADMIN — SPIRONOLACTONE 25 MG: 25 TABLET ORAL at 10:24

## 2024-12-03 RX ADMIN — FERROUS SULFATE TAB 325 MG (65 MG ELEMENTAL FE) 325 MG: 325 (65 FE) TAB at 08:02

## 2024-12-03 RX ADMIN — FOLIC ACID 1 MG: 1 TABLET ORAL at 08:02

## 2024-12-03 RX ADMIN — SODIUM CHLORIDE, PRESERVATIVE FREE 10 ML: 5 INJECTION INTRAVENOUS at 19:48

## 2024-12-03 RX ADMIN — PANTOPRAZOLE SODIUM 40 MG: 40 TABLET, DELAYED RELEASE ORAL at 06:38

## 2024-12-03 RX ADMIN — MIDODRINE HYDROCHLORIDE 2.5 MG: 5 TABLET ORAL at 12:40

## 2024-12-03 RX ADMIN — OXYCODONE 10 MG: 5 TABLET ORAL at 12:39

## 2024-12-03 RX ADMIN — Medication 500 MG: at 08:02

## 2024-12-03 RX ADMIN — MIDODRINE HYDROCHLORIDE 2.5 MG: 5 TABLET ORAL at 08:02

## 2024-12-03 RX ADMIN — ENTECAVIR 1 MG: 1 TABLET, FILM COATED ORAL at 10:24

## 2024-12-03 RX ADMIN — METOPROLOL TARTRATE 50 MG: 50 TABLET, FILM COATED ORAL at 10:24

## 2024-12-03 RX ADMIN — FERROUS SULFATE TAB 325 MG (65 MG ELEMENTAL FE) 325 MG: 325 (65 FE) TAB at 19:48

## 2024-12-03 RX ADMIN — FUROSEMIDE 40 MG: 40 TABLET ORAL at 10:24

## 2024-12-03 RX ADMIN — SODIUM CHLORIDE, PRESERVATIVE FREE 10 ML: 5 INJECTION INTRAVENOUS at 08:02

## 2024-12-03 RX ADMIN — OXYCODONE 10 MG: 5 TABLET ORAL at 19:47

## 2024-12-03 ASSESSMENT — PAIN SCALES - GENERAL
PAINLEVEL_OUTOF10: 8
PAINLEVEL_OUTOF10: 4
PAINLEVEL_OUTOF10: 8

## 2024-12-03 ASSESSMENT — PAIN DESCRIPTION - ORIENTATION: ORIENTATION: RIGHT;LEFT

## 2024-12-03 ASSESSMENT — PAIN DESCRIPTION - LOCATION: LOCATION: GENERALIZED

## 2024-12-03 ASSESSMENT — PAIN - FUNCTIONAL ASSESSMENT: PAIN_FUNCTIONAL_ASSESSMENT: PREVENTS OR INTERFERES SOME ACTIVE ACTIVITIES AND ADLS

## 2024-12-03 ASSESSMENT — PAIN DESCRIPTION - DESCRIPTORS: DESCRIPTORS: ACHING;DISCOMFORT

## 2024-12-03 NOTE — CARE COORDINATION
CASE MANAGEMENT... Discharge plan is SNF placement. Referral [facility] to [facility]. Await review. IF [facility] declines, patient will have to use Medicare benefits for SNF placement.   Electronically signed by Shayla Cramer RN on 12/3/2024 at 9:42 AM    UPDATE: [facility] Jaxon from [facility]. [facility]fers the [facility] to the [facility] of [facility]. Spoke to patient and family who do not wish to be transferred to [facility]. Spoke to patient and family who DO NOT wish to be transferred. They wish to use his Medicare benefits for placement. Choices are [facility] and [facility]. Neither facility takes his insurance. Asked family for more choices.   Electronically signed by Shayla Cramer RN on 12/3/2024 at 1:29 PM    UPDATE: Next choices are [facility] in [place]. [facility] accepting. Await precert.

## 2024-12-04 VITALS
SYSTOLIC BLOOD PRESSURE: 108 MMHG | BODY MASS INDEX: 25.62 KG/M2 | HEIGHT: 69 IN | HEART RATE: 66 BPM | RESPIRATION RATE: 16 BRPM | OXYGEN SATURATION: 97 % | WEIGHT: 173 LBS | DIASTOLIC BLOOD PRESSURE: 59 MMHG | TEMPERATURE: 98.4 F

## 2024-12-04 PROCEDURE — 6370000000 HC RX 637 (ALT 250 FOR IP): Performed by: INTERNAL MEDICINE

## 2024-12-04 PROCEDURE — 97530 THERAPEUTIC ACTIVITIES: CPT

## 2024-12-04 PROCEDURE — 99239 HOSP IP/OBS DSCHRG MGMT >30: CPT | Performed by: INTERNAL MEDICINE

## 2024-12-04 PROCEDURE — 2580000003 HC RX 258: Performed by: INTERNAL MEDICINE

## 2024-12-04 PROCEDURE — 97535 SELF CARE MNGMENT TRAINING: CPT

## 2024-12-04 PROCEDURE — 6370000000 HC RX 637 (ALT 250 FOR IP): Performed by: STUDENT IN AN ORGANIZED HEALTH CARE EDUCATION/TRAINING PROGRAM

## 2024-12-04 RX ORDER — SPIRONOLACTONE 25 MG/1
25 TABLET ORAL DAILY
Qty: 30 TABLET | Refills: 3
Start: 2024-12-05

## 2024-12-04 RX ORDER — FUROSEMIDE 40 MG/1
40 TABLET ORAL DAILY
Qty: 60 TABLET | Refills: 3
Start: 2024-12-05

## 2024-12-04 RX ORDER — MIDODRINE HYDROCHLORIDE 2.5 MG/1
2.5 TABLET ORAL
Qty: 90 TABLET | Refills: 3
Start: 2024-12-04

## 2024-12-04 RX ORDER — ALBUTEROL SULFATE 0.83 MG/ML
2.5 SOLUTION RESPIRATORY (INHALATION) EVERY 6 HOURS PRN
Qty: 120 EACH | Refills: 1
Start: 2024-12-04

## 2024-12-04 RX ADMIN — TAMSULOSIN HYDROCHLORIDE 0.4 MG: 0.4 CAPSULE ORAL at 08:38

## 2024-12-04 RX ADMIN — MIDODRINE HYDROCHLORIDE 2.5 MG: 5 TABLET ORAL at 17:04

## 2024-12-04 RX ADMIN — SODIUM CHLORIDE, PRESERVATIVE FREE 10 ML: 5 INJECTION INTRAVENOUS at 08:38

## 2024-12-04 RX ADMIN — ENTECAVIR 1 MG: 1 TABLET, FILM COATED ORAL at 08:38

## 2024-12-04 RX ADMIN — OXYCODONE 10 MG: 5 TABLET ORAL at 11:02

## 2024-12-04 RX ADMIN — MIDODRINE HYDROCHLORIDE 2.5 MG: 5 TABLET ORAL at 08:37

## 2024-12-04 RX ADMIN — OXYCODONE 10 MG: 5 TABLET ORAL at 17:14

## 2024-12-04 RX ADMIN — OXYCODONE 10 MG: 5 TABLET ORAL at 04:28

## 2024-12-04 RX ADMIN — FERROUS SULFATE TAB 325 MG (65 MG ELEMENTAL FE) 325 MG: 325 (65 FE) TAB at 08:38

## 2024-12-04 RX ADMIN — SPIRONOLACTONE 25 MG: 25 TABLET ORAL at 08:38

## 2024-12-04 RX ADMIN — FUROSEMIDE 40 MG: 40 TABLET ORAL at 08:37

## 2024-12-04 RX ADMIN — METOPROLOL TARTRATE 50 MG: 50 TABLET, FILM COATED ORAL at 08:38

## 2024-12-04 RX ADMIN — MIDODRINE HYDROCHLORIDE 2.5 MG: 5 TABLET ORAL at 11:02

## 2024-12-04 RX ADMIN — Medication 500 MG: at 08:37

## 2024-12-04 RX ADMIN — PANTOPRAZOLE SODIUM 40 MG: 40 TABLET, DELAYED RELEASE ORAL at 05:39

## 2024-12-04 RX ADMIN — FOLIC ACID 1 MG: 1 TABLET ORAL at 08:37

## 2024-12-04 RX ADMIN — FERROUS SULFATE TAB 325 MG (65 MG ELEMENTAL FE) 325 MG: 325 (65 FE) TAB at 22:26

## 2024-12-04 ASSESSMENT — PAIN DESCRIPTION - ORIENTATION
ORIENTATION: RIGHT;LEFT
ORIENTATION: RIGHT;LEFT

## 2024-12-04 ASSESSMENT — PAIN SCALES - GENERAL
PAINLEVEL_OUTOF10: 7
PAINLEVEL_OUTOF10: 8
PAINLEVEL_OUTOF10: 8
PAINLEVEL_OUTOF10: 5

## 2024-12-04 ASSESSMENT — PAIN DESCRIPTION - LOCATION
LOCATION: ANKLE;LEG
LOCATION: LEG;ANKLE

## 2024-12-04 ASSESSMENT — PAIN - FUNCTIONAL ASSESSMENT: PAIN_FUNCTIONAL_ASSESSMENT: ACTIVITIES ARE NOT PREVENTED

## 2024-12-04 ASSESSMENT — PAIN DESCRIPTION - DESCRIPTORS: DESCRIPTORS: THROBBING

## 2024-12-04 NOTE — DISCHARGE INSTR - COC
Continuity of Care Form    Patient Name: Hardik Gray   :  1950  MRN:  [id number]    Admit date:  2024  Discharge date:  24    Code Status Order: Full Code   Advance Directives:   Advance Care Flowsheet Documentation             Admitting Physician:  Mitch Mares MD  PCP: Xavi Swenson MD    Discharging Nurse: Jean-Paul Cardona RN  Discharging Hospital Unit/Room#: 0505/0505-A  Discharging Unit Phone Number: [phone]    Emergency Contact:   Extended Emergency Contact Information  Primary Emergency Contact: Dawson Salamanca  Home Phone: [phone]  Mobile Phone: [phone]  Relation: Other  Secondary Emergency Contact: Missy Olivier  Home Phone: [phone]  Relation: Other    Past Surgical History:  Past Surgical History:   Procedure Laterality Date    BLADDER SURGERY N/A 3/22/2023    CYSTOSCOPY RETROGRADE PYELOGRAM URETHERAL DILATION SUAREZ INSERTION performed by Rashaad Salvador MD at [facility] OR    CORONARY ANGIOPLASTY WITH STENT PLACEMENT      @ [facility]    CYSTOSCOPY N/A 3/22/2021    CYSTOSCOPY, RETROGRADE PYELOGRAM, URETHRAL DIALATION, TURBT performed by Rashaad Salvador MD at [facility] OR    FOOT SURGERY      lt foot    FOOT SURGERY      rt foot as well    PROSTATE BIOPSY      SIGMOIDOSCOPY N/A 2024    SIGMOIDOSCOPY CONTROL HEMORRHAGE performed by PARVEEN Mancia MD at [facility] ENDOSCOPY       Immunization History:   Immunization History   Administered Date(s) Administered    COVID-19, MODERNA BLUE border, Primary or Immunocompromised, (age 12y+), IM, 100 mcg/0.5mL 2021, 2021    COVID-19, PFIZER, (age 12y+), IM, 30mcg/0.3mL 2024, 2024    DTaP vaccine 2014    Hep A, HAVRIX, VAQTA, (age 19y+), IM, 1mL 2016    Td vaccine (adult) 2002       Active Problems:  Patient Active Problem List   Diagnosis Code    Prostate cancer (HCC) C61    Dyslipidemia E78.5    Essential hypertension I10    Asymptomatic PVCs I49.3    History of MI (myocardial infarction) I25.2

## 2024-12-04 NOTE — CARE COORDINATION
CASE MANAGEMENT... Discharge plan is SNF placement. [facility] accepting. Precert pending. KAITLIN, demos, transport forms, [address] completed and in soft chart.   Electronically signed by Shalya Cramer RN on 12/4/2024 at 8:32 AM

## 2024-12-04 NOTE — DISCHARGE SUMMARY
[facility] Hospitalist       Hospitalist Physician Discharge Summary       Xavi Swenson MD  [address]  [address]  [place] [id number]  [phone]    Schedule an appointment as soon as possible for a visit       [facility] Emergency Department  [address] [id number]  [phone]  Go to   If symptoms worsen    [facility]  [address] [place] [address]  [phone]          Activity level: As tolerated    Diet: ADULT DIET; Regular; 1500 ml  ADULT ORAL NUTRITION SUPPLEMENT; Lunch; Frozen Oral Supplement    Dispo:SNF     Condition on Discharge stable   Patient ID:  Hardik Gray  [id number]  74 y.o.  1950    Admit date: 11/28/2024    Discharge date and time:  12/4/2024  3:05 PM    Admission Diagnoses: Principal Problem:    Ascites of liver  Active Problems:    Leg swelling    Ascites due to alcoholic cirrhosis (HCC)  Resolved Problems:    * No resolved hospital problems. *      Discharge Diagnoses: Principal Problem:    Ascites of liver  Active Problems:    Leg swelling    Ascites due to alcoholic cirrhosis (HCC)  Resolved Problems:    * No resolved hospital problems. *      Consults:  IP CONSULT TO VASCULAR ACCESS TEAM  IP CONSULT TO VASCULAR ACCESS TEAM    Hospital Course:   He is a 74-year-old male with past medical history of CAD, CVA, COPD, HTN, HLD, MI, state cancer s/p radiation, cirrhosis, BPH came to ER with increasing bipedal edema and groin swelling for last month prior to arrival.  He was admitted with anasarca secondary to alcoholic cirrhosis-with  possible h/o  noncompliance.  He was managed as below.  Diuretics were initiated.  He underwent paracentesis during this admission.  He is now discharged to skilled nursing facility for further rehab.    Anasarca 2/2 alcoholic cirrhosis - IR paracentesis 11/29- 3150 ml drained of clear yellow colored ascitic fluid . Started on lasix and aldactone.

## 2024-12-04 NOTE — PLAN OF CARE
Problem: Chronic Conditions and Co-morbidities  Goal: Patient's chronic conditions and co-morbidity symptoms are monitored and maintained or improved  11/29/2024 1108 by Thea Gama RN  Outcome: Progressing     Problem: Safety - Adult  Goal: Free from fall injury  11/29/2024 1108 by Thea Gama RN  Outcome: Progressing     Problem: Skin/Tissue Integrity  Goal: Absence of new skin breakdown  Description: 1.  Monitor for areas of redness and/or skin breakdown  2.  Assess vascular access sites hourly  3.  Every 4-6 hours minimum:  Change oxygen saturation probe site  4.  Every 4-6 hours:  If on nasal continuous positive airway pressure, respiratory therapy assess nares and determine need for appliance change or resting period.  11/29/2024 1108 by Thea Gama RN  Outcome: Progressing     Problem: Pain  Goal: Verbalizes/displays adequate comfort level or baseline comfort level  11/29/2024 1108 by Thea Gama RN  Outcome: Progressing     Problem: Neurosensory - Adult  Goal: Achieves stable or improved neurological status  11/29/2024 1108 by Thea Gama RN  Outcome: Progressing     Problem: Respiratory - Adult  Goal: Achieves optimal ventilation and oxygenation  11/29/2024 1108 by Thea Gama RN  Outcome: Progressing     Problem: Musculoskeletal - Adult  Goal: Return mobility to safest level of function  11/29/2024 1108 by Thea Gama RN  Outcome: Progressing     Problem: Genitourinary - Adult  Goal: Absence of urinary retention  Outcome: Progressing     Problem: Discharge Planning  Goal: Discharge to home or other facility with appropriate resources  Outcome: Progressing     
  Problem: Chronic Conditions and Co-morbidities  Goal: Patient's chronic conditions and co-morbidity symptoms are monitored and maintained or improved  11/30/2024 0957 by Jean-Paul Cardona RN  Outcome: Progressing  11/30/2024 0405 by Ritika Rudolph RN  Outcome: Progressing     Problem: Safety - Adult  Goal: Free from fall injury  11/30/2024 0957 by Jean-Paul Cardona RN  Outcome: Progressing  11/30/2024 0405 by Ritika Rudolph RN  Outcome: Progressing     Problem: Skin/Tissue Integrity  Goal: Absence of new skin breakdown  Description: 1.  Monitor for areas of redness and/or skin breakdown  2.  Assess vascular access sites hourly  3.  Every 4-6 hours minimum:  Change oxygen saturation probe site  4.  Every 4-6 hours:  If on nasal continuous positive airway pressure, respiratory therapy assess nares and determine need for appliance change or resting period.  11/30/2024 0957 by Jean-Paul Cardona RN  Outcome: Progressing  11/30/2024 0405 by Ritika Rudolph RN  Outcome: Progressing     Problem: Pain  Goal: Verbalizes/displays adequate comfort level or baseline comfort level  11/30/2024 0957 by Jean-Paul Cardona RN  Outcome: Progressing  11/30/2024 0405 by Ritika Rudolph RN  Outcome: Progressing     Problem: Respiratory - Adult  Goal: Achieves optimal ventilation and oxygenation  11/30/2024 0957 by Jean-Paul Cardona RN  Outcome: Progressing  11/30/2024 0405 by Ritika Rudolph RN  Outcome: Progressing     Problem: Musculoskeletal - Adult  Goal: Return mobility to safest level of function  11/30/2024 0957 by Jean-Paul Cardona RN  Outcome: Progressing  11/30/2024 0405 by Ritika Rudolph RN  Outcome: Progressing     Problem: Genitourinary - Adult  Goal: Absence of urinary retention  11/30/2024 0957 by Jean-Paul Cardona RN  Outcome: Progressing  11/30/2024 0405 by Ritika Rudolph RN  Outcome: Progressing     Problem: Discharge Planning  Goal: Discharge to home or other facility with appropriate 
  Problem: Chronic Conditions and Co-morbidities  Goal: Patient's chronic conditions and co-morbidity symptoms are monitored and maintained or improved  12/1/2024 2305 by Hiral Schuler RN  Outcome: Progressing     Problem: Safety - Adult  Goal: Free from fall injury  12/1/2024 2305 by Hiral Schuler RN  Outcome: Progressing     Problem: Skin/Tissue Integrity  Goal: Absence of new skin breakdown  Description: 1.  Monitor for areas of redness and/or skin breakdown  2.  Assess vascular access sites hourly  3.  Every 4-6 hours minimum:  Change oxygen saturation probe site  4.  Every 4-6 hours:  If on nasal continuous positive airway pressure, respiratory therapy assess nares and determine need for appliance change or resting period.  12/1/2024 2305 by Hiral Schuler RN  Outcome: Progressing     Problem: Pain  Goal: Verbalizes/displays adequate comfort level or baseline comfort level  12/1/2024 2305 by Hiral Schuler RN  Outcome: Progressing     Problem: Respiratory - Adult  Goal: Achieves optimal ventilation and oxygenation  12/1/2024 2305 by Hiral Schuler RN  Outcome: Progressing     Problem: Musculoskeletal - Adult  Goal: Return mobility to safest level of function  12/1/2024 2305 by Hiral Schuler RN  Outcome: Progressing     Problem: Genitourinary - Adult  Goal: Absence of urinary retention  12/1/2024 2305 by Hiral Schuler RN  Outcome: Progressing     
  Problem: Chronic Conditions and Co-morbidities  Goal: Patient's chronic conditions and co-morbidity symptoms are monitored and maintained or improved  12/2/2024 2233 by Anais Rdz RN  Outcome: Progressing  12/2/2024 1656 by Ninfa Spaulding RN  Outcome: Progressing     Problem: Safety - Adult  Goal: Free from fall injury  12/2/2024 2233 by Anais Rdz RN  Outcome: Progressing  12/2/2024 1656 by Ninfa Spaulding RN  Outcome: Progressing     Problem: Skin/Tissue Integrity  Goal: Absence of new skin breakdown  Description: 1.  Monitor for areas of redness and/or skin breakdown  2.  Assess vascular access sites hourly  3.  Every 4-6 hours minimum:  Change oxygen saturation probe site  4.  Every 4-6 hours:  If on nasal continuous positive airway pressure, respiratory therapy assess nares and determine need for appliance change or resting period.  12/2/2024 2233 by Anais Rdz RN  Outcome: Progressing  12/2/2024 1656 by Ninfa Spaulding RN  Outcome: Progressing     Problem: Pain  Goal: Verbalizes/displays adequate comfort level or baseline comfort level  12/2/2024 2233 by Anais Rdz RN  Outcome: Progressing  12/2/2024 1656 by Ninfa Spaulding RN  Outcome: Progressing  Flowsheets (Taken 12/2/2024 1253)  Verbalizes/displays adequate comfort level or baseline comfort level:   Encourage patient to monitor pain and request assistance   Assess pain using appropriate pain scale     Problem: Neurosensory - Adult  Goal: Achieves stable or improved neurological status  12/2/2024 2233 by Anais Rdz RN  Outcome: Progressing  12/2/2024 1656 by Ninfa Spaulding RN  Outcome: Progressing     Problem: Respiratory - Adult  Goal: Achieves optimal ventilation and oxygenation  12/2/2024 2233 by Anais Rdz RN  Outcome: Progressing  12/2/2024 1656 by Ninfa Spaulding RN  Outcome: Progressing     Problem: Musculoskeletal - Adult  Goal: Return mobility to safest level of function  12/2/2024 2233 by Anais Rdz RN  Outcome: 
  Problem: Chronic Conditions and Co-morbidities  Goal: Patient's chronic conditions and co-morbidity symptoms are monitored and maintained or improved  12/3/2024 2022 by Anais Rdz RN  Outcome: Progressing  12/3/2024 0929 by Esthela Sosa RN  Outcome: Progressing     Problem: Safety - Adult  Goal: Free from fall injury  12/3/2024 2022 by Anais Rdz RN  Outcome: Progressing  12/3/2024 0929 by Esthela Sosa RN  Outcome: Progressing     Problem: Skin/Tissue Integrity  Goal: Absence of new skin breakdown  Description: 1.  Monitor for areas of redness and/or skin breakdown  2.  Assess vascular access sites hourly  3.  Every 4-6 hours minimum:  Change oxygen saturation probe site  4.  Every 4-6 hours:  If on nasal continuous positive airway pressure, respiratory therapy assess nares and determine need for appliance change or resting period.  Outcome: Progressing     Problem: Pain  Goal: Verbalizes/displays adequate comfort level or baseline comfort level  Outcome: Progressing     Problem: Neurosensory - Adult  Goal: Achieves stable or improved neurological status  Outcome: Progressing     Problem: Respiratory - Adult  Goal: Achieves optimal ventilation and oxygenation  Outcome: Progressing     Problem: Musculoskeletal - Adult  Goal: Return mobility to safest level of function  Outcome: Progressing     Problem: Genitourinary - Adult  Goal: Absence of urinary retention  Outcome: Progressing     Problem: Discharge Planning  Goal: Discharge to home or other facility with appropriate resources  Outcome: Progressing     
  Problem: Chronic Conditions and Co-morbidities  Goal: Patient's chronic conditions and co-morbidity symptoms are monitored and maintained or improved  12/4/2024 0941 by Jean-Paul Cardona RN  Outcome: Progressing  12/3/2024 2022 by Anais Rdz RN  Outcome: Progressing     Problem: Safety - Adult  Goal: Free from fall injury  12/4/2024 0941 by Jean-Paul Cardona RN  Outcome: Progressing  12/3/2024 2022 by Anais Rdz RN  Outcome: Progressing     Problem: Skin/Tissue Integrity  Goal: Absence of new skin breakdown  Description: 1.  Monitor for areas of redness and/or skin breakdown  2.  Assess vascular access sites hourly  3.  Every 4-6 hours minimum:  Change oxygen saturation probe site  4.  Every 4-6 hours:  If on nasal continuous positive airway pressure, respiratory therapy assess nares and determine need for appliance change or resting period.  12/4/2024 0941 by Jean-Paul Cardona RN  Outcome: Progressing  12/3/2024 2022 by Anais Rdz RN  Outcome: Progressing     Problem: Pain  Goal: Verbalizes/displays adequate comfort level or baseline comfort level  12/4/2024 0941 by Jean-Paul Cardona RN  Outcome: Progressing  12/3/2024 2022 by Anais Rdz RN  Outcome: Progressing     Problem: Neurosensory - Adult  Goal: Achieves stable or improved neurological status  12/4/2024 0941 by Jean-Paul Cardona RN  Outcome: Progressing  12/3/2024 2022 by Anais Rdz RN  Outcome: Progressing     Problem: Respiratory - Adult  Goal: Achieves optimal ventilation and oxygenation  12/4/2024 0941 by Jean-Paul Cardona RN  Outcome: Progressing  12/3/2024 2022 by Anais Rdz RN  Outcome: Progressing     Problem: Musculoskeletal - Adult  Goal: Return mobility to safest level of function  12/4/2024 0941 by Jean-Paul Cardona RN  Outcome: Progressing  12/3/2024 2022 by Anais Rdz RN  Outcome: Progressing     Problem: Genitourinary - Adult  Goal: Absence of urinary retention  12/4/2024 0941 by Brendan 
  Problem: Chronic Conditions and Co-morbidities  Goal: Patient's chronic conditions and co-morbidity symptoms are monitored and maintained or improved  Outcome: Progressing     Problem: Safety - Adult  Goal: Free from fall injury  Outcome: Progressing     Problem: Skin/Tissue Integrity  Goal: Absence of new skin breakdown  Description: 1.  Monitor for areas of redness and/or skin breakdown  2.  Assess vascular access sites hourly  3.  Every 4-6 hours minimum:  Change oxygen saturation probe site  4.  Every 4-6 hours:  If on nasal continuous positive airway pressure, respiratory therapy assess nares and determine need for appliance change or resting period.  Outcome: Progressing     Problem: Pain  Goal: Verbalizes/displays adequate comfort level or baseline comfort level  Outcome: Progressing     Problem: Musculoskeletal - Adult  Goal: Return mobility to safest level of function  Outcome: Progressing     Problem: Genitourinary - Adult  Goal: Absence of urinary retention  Outcome: Progressing     
  Problem: Chronic Conditions and Co-morbidities  Goal: Patient's chronic conditions and co-morbidity symptoms are monitored and maintained or improved  Outcome: Progressing     Problem: Safety - Adult  Goal: Free from fall injury  Outcome: Progressing     Problem: Skin/Tissue Integrity  Goal: Absence of new skin breakdown  Description: 1.  Monitor for areas of redness and/or skin breakdown  2.  Assess vascular access sites hourly  3.  Every 4-6 hours minimum:  Change oxygen saturation probe site  4.  Every 4-6 hours:  If on nasal continuous positive airway pressure, respiratory therapy assess nares and determine need for appliance change or resting period.  Outcome: Progressing     Problem: Pain  Goal: Verbalizes/displays adequate comfort level or baseline comfort level  Outcome: Progressing     Problem: Neurosensory - Adult  Goal: Achieves stable or improved neurological status  Outcome: Progressing     Problem: Respiratory - Adult  Goal: Achieves optimal ventilation and oxygenation  Outcome: Progressing     Problem: Musculoskeletal - Adult  Goal: Return mobility to safest level of function  Outcome: Progressing     Problem: Genitourinary - Adult  Goal: Absence of urinary retention  Outcome: Progressing     Problem: Discharge Planning  Goal: Discharge to home or other facility with appropriate resources  Outcome: Progressing     
Addendum: I have personally participated in the history, exam, medical decision making with Anne-Marie Yun on the date of service and I agree with all of the pertinent clinical information unless otherwise noted. I have also reviewed and agree with the past medical, family, and social history unless otherwise noted.     Patient was admitted with B/L LE swelling     PHYSICAL EXAM:  Vitals:  /63   Pulse 78   Temp 97.7 °F (36.5 °C) (Oral)   Resp 16   Ht 1.753 m (5' 9\")   Wt 78.9 kg (174 lb)   SpO2 97%   BMI 25.70 kg/m²   Gen: awake, alert, NAD  Lungs: clear to auscultation bilaterally no crackles no wheezing.  Heart: RRR, no murmur   Abdomen: soft nontender nondistended positive bowel sounds.  Extremities: full range of motion no peripheral edema.    Impression:  Principal Problem:    Ascites of liver  Resolved Problems:    * No resolved hospital problems. *      My findings/plan include:    Cirrhosis with ascites - I have ordered US guided therapeutic paracentesis  Liver cirrhosis 2/2 Hepatitis B/C and alcohol abuse   B/L LE swelling 2/2 liver disease - Will start him on lasix and aldactone. He has been placed on 1.5 L fluid restriction  Essential HTN - Continue lopressor. Hold norvasc as it can cause B/L LE edema  Hepatitis B - Continue entacavir    NOTE: This report was transcribed using voice recognition software. Every effort was made to ensure accuracy; however, inadvertent computerized transcription errors may be present.    Electronically signed by Olya Walter DO on 11/28/2024 at 3:21 PM       
Progressing     
safest level of function  11/29/2024 0114 by Ritika Rudolph, RN  Outcome: Progressing  11/28/2024 1844 by Ninfa Spaulding, RN  Outcome: Progressing     Problem: Genitourinary - Adult  Goal: Absence of urinary retention  11/28/2024 1844 by Ninfa Spaulding, RN  Outcome: Progressing

## 2024-12-05 NOTE — PROGRESS NOTES
[facility] Hospitalist Progress Note    Admitting Date and Time: 11/28/2024  9:28 AM  Admit Dx: Leg swelling [M79.89]  Ascites of liver [R18.8]    Subjective:  Patient is being followed for Leg swelling [M79.89]  Ascites of liver [R18.8]   Pt being reports swelling legs.  Per RN: No major concerns.    ROS: denies fever, chills, cp, sob, n/v, HA unless stated above.      albumin human 25%  25 g IntraVENous Once    midodrine  2.5 mg Oral BID WC    entecavir  1 mg Oral Daily    ferrous sulfate  325 mg Oral BID    folic acid  1 mg Oral Daily    Magnesium Gluconate  500 mg Oral Daily    metoprolol tartrate  50 mg Oral BID    pantoprazole  40 mg Oral Daily    tamsulosin  0.4 mg Oral Daily    sodium chloride flush  5-40 mL IntraVENous 2 times per day    enoxaparin  40 mg SubCUTAneous Daily    furosemide  40 mg Oral Daily    spironolactone  25 mg Oral Daily     oxyCODONE HCl, 10 mg, Q6H PRN  sodium chloride flush, 5-40 mL, PRN  sodium chloride, , PRN  potassium chloride, 40 mEq, PRN   Or  potassium alternative oral replacement, 40 mEq, PRN  magnesium sulfate, 2,000 mg, PRN  ondansetron, 4 mg, Q8H PRN   Or  ondansetron, 4 mg, Q6H PRN  polyethylene glycol, 17 g, Daily PRN  acetaminophen, 650 mg, Q6H PRN   Or  acetaminophen, 650 mg, Q6H PRN  perflutren lipid microspheres, 1.5 mL, ONCE PRN  albuterol, 2.5 mg, Q6H PRN         Objective:    BP (!) 92/54   Pulse 78   Temp 98.6 °F (37 °C) (Oral)   Resp 16   Ht 1.753 m (5' 9\")   Wt 79.4 kg (175 lb)   SpO2 94%   BMI 25.84 kg/m²     General Appearance: alert and oriented to person, place and time and in no acute distress  Skin: warm and dry  Head: normocephalic and atraumatic  Eyes: pupils equal, round, and reactive to light, extraocular eye movements intact, conjunctivae normal  Neck: neck supple and non tender without mass   Pulmonary/Chest: clear to auscultation bilaterally- no wheezes, rales or rhonchi, normal air movement, no respiratory distress  Cardiovascular: 
     [facility] Hospitalist   Progress Note    Admitting Date and Time: 11/28/2024  9:28 AM  Admit Dx: Leg swelling [M79.89]  Ascites of liver [R18.8]  Ascites due to alcoholic cirrhosis (HCC) [K70.31]    Seen for follow on problems as listed below     Subjective:  In bed comfortable , in no distress , on inquiry denies CP ,sob or abd pain.Mentions generalized pain but current meds helping. D/w nursing. Uneventful night.     midodrine  2.5 mg Oral TID WC    entecavir  1 mg Oral Daily    ferrous sulfate  325 mg Oral BID    folic acid  1 mg Oral Daily    Magnesium Gluconate  500 mg Oral Daily    metoprolol tartrate  50 mg Oral BID    pantoprazole  40 mg Oral Daily    tamsulosin  0.4 mg Oral Daily    sodium chloride flush  5-40 mL IntraVENous 2 times per day    enoxaparin  40 mg SubCUTAneous Daily    furosemide  40 mg Oral Daily    spironolactone  25 mg Oral Daily     sodium phosphate, 1 enema, Once PRN  oxyCODONE HCl, 10 mg, Q6H PRN  sodium chloride flush, 5-40 mL, PRN  sodium chloride, , PRN  potassium chloride, 40 mEq, PRN   Or  potassium alternative oral replacement, 40 mEq, PRN  magnesium sulfate, 2,000 mg, PRN  ondansetron, 4 mg, Q8H PRN   Or  ondansetron, 4 mg, Q6H PRN  polyethylene glycol, 17 g, Daily PRN  acetaminophen, 650 mg, Q6H PRN   Or  acetaminophen, 650 mg, Q6H PRN  perflutren lipid microspheres, 1.5 mL, ONCE PRN  albuterol, 2.5 mg, Q6H PRN         Objective:    /76   Pulse 71   Temp 97.6 °F (36.4 °C) (Oral)   Resp 16   Ht 1.753 m (5' 9\")   Wt 79.6 kg (175 lb 6.4 oz)   SpO2 98%   BMI 25.90 kg/m²   General Appearance: alert and oriented to person, place and time,frail appearing,  in no acute distress  Skin: warm and dry  Head: normocephalic and atraumatic  Eyes:  extraocular eye movements intact, conjunctivae normal  Neck: supple and non-tender without mass  Pulmonary/Chest: clear to auscultation bilaterally  Cardiovascular: normal rate,  normal S1 and S2  Abdomen: soft, 
     [facility] Hospitalist   Progress Note    Admitting Date and Time: 11/28/2024  9:28 AM  Admit Dx: Leg swelling [M79.89]  Ascites of liver [R18.8]  Ascites due to alcoholic cirrhosis (HCC) [K70.31]    Seen for follow on problems as listed below     Subjective:  Frail appearing , weak , denies CP ,sob or abd pain. D/w nursing.     midodrine  2.5 mg Oral TID WC    entecavir  1 mg Oral Daily    ferrous sulfate  325 mg Oral BID    folic acid  1 mg Oral Daily    Magnesium Gluconate  500 mg Oral Daily    metoprolol tartrate  50 mg Oral BID    pantoprazole  40 mg Oral Daily    tamsulosin  0.4 mg Oral Daily    sodium chloride flush  5-40 mL IntraVENous 2 times per day    furosemide  40 mg Oral Daily    spironolactone  25 mg Oral Daily     sodium phosphate, 1 enema, Once PRN  oxyCODONE HCl, 10 mg, Q6H PRN  sodium chloride flush, 5-40 mL, PRN  sodium chloride, , PRN  potassium chloride, 40 mEq, PRN   Or  potassium alternative oral replacement, 40 mEq, PRN  magnesium sulfate, 2,000 mg, PRN  ondansetron, 4 mg, Q8H PRN   Or  ondansetron, 4 mg, Q6H PRN  polyethylene glycol, 17 g, Daily PRN  acetaminophen, 650 mg, Q6H PRN   Or  acetaminophen, 650 mg, Q6H PRN  perflutren lipid microspheres, 1.5 mL, ONCE PRN  albuterol, 2.5 mg, Q6H PRN         Objective:    /61   Pulse 58   Temp 97.8 °F (36.6 °C) (Oral)   Resp 16   Ht 1.753 m (5' 9\")   Wt 76.2 kg (168 lb)   SpO2 97%   BMI 24.81 kg/m²   General Appearance: alert and oriented to person, place and time,frail appearing,  in no acute distress  Skin: warm and dry  Head: normocephalic and atraumatic  Eyes:  extraocular eye movements intact, conjunctivae normal  Neck: supple and non-tender without mass  Pulmonary/Chest: clear to auscultation bilaterally  Cardiovascular: normal rate,  normal S1 and S2  Abdomen: soft, non-tender, non-distended, normal bowel sounds, no masses or organomegaly  Extremities: no cyanosis, clubbing   Neurologic: no cranial nerve 
     [facility] Hospitalist   Progress Note    Admitting Date and Time: 11/28/2024  9:28 AM  Admit Dx: Leg swelling [M79.89]  Ascites of liver [R18.8]  Ascites due to alcoholic cirrhosis (HCC) [K70.31]    Seen for follow on problems as listed below     Subjective:  Denies CP ,sob or abd pain. Very weak & fragile. D/w nursing. Uneventful night.     midodrine  2.5 mg Oral TID WC    entecavir  1 mg Oral Daily    ferrous sulfate  325 mg Oral BID    folic acid  1 mg Oral Daily    Magnesium Gluconate  500 mg Oral Daily    metoprolol tartrate  50 mg Oral BID    pantoprazole  40 mg Oral Daily    tamsulosin  0.4 mg Oral Daily    sodium chloride flush  5-40 mL IntraVENous 2 times per day    furosemide  40 mg Oral Daily    spironolactone  25 mg Oral Daily     oxyCODONE HCl, 10 mg, Q6H PRN  sodium chloride flush, 5-40 mL, PRN  sodium chloride, , PRN  potassium chloride, 40 mEq, PRN   Or  potassium alternative oral replacement, 40 mEq, PRN  magnesium sulfate, 2,000 mg, PRN  ondansetron, 4 mg, Q8H PRN   Or  ondansetron, 4 mg, Q6H PRN  polyethylene glycol, 17 g, Daily PRN  acetaminophen, 650 mg, Q6H PRN   Or  acetaminophen, 650 mg, Q6H PRN  perflutren lipid microspheres, 1.5 mL, ONCE PRN  albuterol, 2.5 mg, Q6H PRN         Objective:    /64   Pulse 71   Temp 97.8 °F (36.6 °C) (Oral)   Resp 16   Ht 1.753 m (5' 9\")   Wt 78.5 kg (173 lb)   SpO2 97%   BMI 25.55 kg/m²   General Appearance: alert and oriented to person, place and time,frail appearing,  in no acute distress  Skin: warm and dry  Head: normocephalic and atraumatic  Eyes:  extraocular eye movements intact, conjunctivae normal  Neck: supple and non-tender without mass  Pulmonary/Chest: clear to auscultation bilaterally  Cardiovascular: normal rate,  normal S1 and S2  Abdomen: soft, non-tender, non-distended, normal bowel sounds, no masses or organomegaly  Extremities: no cyanosis, clubbing   Neurologic: no cranial nerve deficit,speech 
  P Quality Flow/Interdisciplinary Rounds Progress Note        Quality Flow Rounds held on December 3, 2024    Disciplines Attending:  Bedside Nurse and     Hardik Gray was admitted on 11/28/2024  9:28 AM    Anticipated Discharge Date:       Disposition:    Sandeep Score:  Sandeep Scale Score: 13    Readmission Risk              Risk of Unplanned Readmission:  19           Discussed patient goal for the day, patient clinical progression, and barriers to discharge.  The following Goal(s) of the Day/Commitment(s) have been identified:  Discharge - Obtain Order - await mayra Bautista RN  December 3, 2024        
  P Quality Flow/Interdisciplinary Rounds Progress Note        Quality Flow Rounds held on December 4, 2024    Disciplines Attending:  Bedside Nurse and     Hardik Gray was admitted on 11/28/2024  9:28 AM    Anticipated Discharge Date:       Disposition:    Sandeep Score:  Sandeep Scale Score: 14    Readmission Risk              Risk of Unplanned Readmission:  19           Discussed patient goal for the day, patient clinical progression, and barriers to discharge.  The following Goal(s) of the Day/Commitment(s) have been identified:  Discharge - Obtain Order - await mayra Bautista RN  December 4, 2024        
4 Eyes Skin Assessment     NAME:  Hardik Gray  YOB: 1950  MEDICAL RECORD NUMBER:  [id number]    The patient is being assessed for  Admission    I agree that at least one RN has performed a thorough Head to Toe Skin Assessment on the patient. ALL assessment sites listed below have been assessed.      Areas assessed by both nurses:    Head, Face, Ears, Shoulders, Back, Chest, Arms, Elbows, Hands, Sacrum. Buttock, Coccyx, Ischium, Legs. Feet and Heels, and Under Medical Devices         Does the Patient have a Wound? No noted wound(s)       Sandeep Prevention initiated by RN: Yes  Wound Care Orders initiated by RN: No    Pressure Injury (Stage 3,4, Unstageable, DTI, NWPT, and Complex wounds) if present, place Wound referral order by RN under : No    New Ostomies, if present place, Ostomy referral order under : No     Nurse 1 eSignature: Electronically signed by Ninfa Spaulding RN on 11/28/24 at 5:32 PM EST    **SHARE this note so that the co-signing nurse can place an eSignature**    Nurse 2 eSignature: {Esignature:[id number]}   
Attempted to call nurse to nurse to [facility], no response at this time. Will re attempt at later time. Electronically signed by Jean-Paul Cardona RN on 12/4/2024 at 3:15 PM    Nurse to nurse called to Nithya at [facility]. [employer] faxed. Electronically signed by Jean-Paul Cardona RN on 12/4/2024 at 3:42 PM      
Notified DAREN Vazquez of increasingly bloody urine. It was present during Dr. Mares's rounds but has since gotten darker. Electronically signed by Jean-Paul Cardona RN on 12/1/2024 at 7:31 PM    
Notified contact Tigist [facility]avidez [facility] Electronically signed by Jean-Paul Cardona RN on 12/4/2024 at 3:45 PM    
Nutrition Assessment    Type and Reason for Visit:  Initial, Positive nutrition screen    Nutrition Recommendations/Plan:   Continue current diet  Will add Magic Cup daily  Monitor      Nutrition Assessment:    Pt admits w/ anasarca 2/2 cirrhosis, s/p paracentesis 11/29. Hx COPD, CAD, CVA, prostate CA s/p radiation, hep B/C. Meal intakes appear >50%, fluid restriction noted, will add appropriate ONS daily.    Nutrition Related Findings:    A&O X4, I&Os WDL, BLE +3 edema, abd taut, gross ascites, +BS, bili 7.5, Na 131   Wound Type: None       Current Nutrition Intake & Therapies:    Average Meal Intake: 51-75%  ADULT DIET; Regular; 1500 ml    Anthropometric Measures:  Height: 175.3 cm (5' 9\")  Ideal Body Weight (IBW): 160 lbs (73 kg)    Admission Body Weight: 83.6 kg (184 lb 3.2 oz) (11/29 bed)  Current Body Weight: 83.6 kg (184 lb 3.2 oz) (11/29), 115.1 % IBW. Weight Source: Bed scale  Current BMI (kg/m2): 27.2  Usual Body Weight: 71.7 kg (158 lb) (9/20/24 per EMR)     % Weight Change (Calculated): 16.6                    BMI Categories: Overweight (BMI 25.0-29.9)    Nutrition Interventions:   Food and/or Nutrient Delivery: Continue Current Diet, Start Oral Nutrition Supplement  Nutrition Education/Counseling: No recommendation at this time  Coordination of Nutrition Care: Continue to monitor while inpatient       Goals:  Goals: PO intake 75% or greater, by next RD assessment          Nutrition Monitoring and Evaluation:      Food/Nutrient Intake Outcomes: Food and Nutrient Intake, Supplement Intake  Physical Signs/Symptoms Outcomes: Biochemical Data, GI Status, Fluid Status or Edema, Nutrition Focused Physical Findings, Skin, Weight    Discharge Planning:    Too soon to determine     Miranda D'Amico, RD, LD  Contact: [phone]    
Occupational Therapy  OCCUPATIONAL THERAPY INITIAL EVALUATION  [facility]  [address] [place], [place]    Date: 2024     Patient Name: Hardik Gray  MRN: [id number]  : 1950  Room: [address] [place] [address]    Evaluating OT: Chary Ramirez, OTR/L - [id number]    Referring Provider: Mitch Mares MD   Specific Provider Orders/Date: \"OT eval and treat\" - 2024    Diagnosis: Leg swelling [M79.89]  Ascites of liver [R18.8]  Ascites due to alcoholic cirrhosis (HCC) [K70.31]      Pertinent Medical History: arthritis, back pain, CAD, CVA, COPD, HLD, HTN, MI, prostate CA     Precautions: fall risk, cognition, alarms    Assessment of Current Deficits:    [x] Functional mobility   [x]ADLs  [x] Strength               [x]Cognition   [x] Functional transfers   [x] IADLs         [x] Safety Awareness   [x]Endurance   [] Fine Coordination              [x] Balance      [] Vision/perception   []Sensation    []Gross Motor Coordination  [] ROM  [] Delirium                   [x] Motor Control     OT PLAN OF CARE   OT POC is based on physician orders, patient diagnosis, and results of clinical assessment.  Frequency/Duration 2-5 days/week for 2 weeks PRN   Specific OT Treatment Interventions to Include:   * Instruction/training on adapted ADL techniques and AE recommendations to increase functional independence within precautions       * Training on energy conservation strategies, correct breathing pattern and techniques to improve independence/tolerance for self-care routine  * Functional transfer/mobility training/DME recommendations for increased independence, safety, and fall prevention  * Patient/Family education to increase follow through with safety techniques and functional independence  * Recommendation of environmental modifications for increased safety with functional transfers/mobility and ADLs  * Therapeutic exercise to improve motor endurance, ROM, and 
Occupational Therapy  OT BEDSIDE TREATMENT NOTE      Date:2024  Patient Name: Hardik Gray  MRN: [id number]  : 1950  Room: [address] [place] [address]       Evaluating OT: Chary Ramirez, DAYANAR/L - [id number]     Referring Provider: Mitch Mares MD   Specific Provider Orders/Date: \"OT eval and treat\" - 2024     Diagnosis: Leg swelling [M79.89]  Ascites of liver [R18.8]  Ascites due to alcoholic cirrhosis (HCC) [K70.31]       Pertinent Medical History: arthritis, back pain, CAD, CVA, COPD, HLD, HTN, MI, prostate CA      Precautions: fall risk, cognition, alarms     Assessment of Current Deficits:    [x] Functional mobility             [x]ADLs           [x] Strength                  [x]Cognition   [x] Functional transfers           [x] IADLs         [x] Safety Awareness   [x]Endurance   [] Fine Coordination              [x] Balance      [] Vision/perception   []Sensation     []Gross Motor Coordination  [] ROM           [] Delirium                   [x] Motor Control      OT PLAN OF CARE   OT POC is based on physician orders, patient diagnosis, and results of clinical assessment.  Frequency/Duration 2-5 days/week for 2 weeks PRN   Specific OT Treatment Interventions to Include:   * Instruction/training on adapted ADL techniques and AE recommendations to increase functional independence within precautions       * Training on energy conservation strategies, correct breathing pattern and techniques to improve independence/tolerance for self-care routine  * Functional transfer/mobility training/DME recommendations for increased independence, safety, and fall prevention  * Patient/Family education to increase follow through with safety techniques and functional independence  * Recommendation of environmental modifications for increased safety with functional transfers/mobility and ADLs  * Therapeutic exercise to improve motor endurance, ROM, and functional strength for ADLs/functional transfers  * Therapeutic activities to 
[facility] updated pickup time hour and a half.     Electronically signed by Alexa Hercules RN on 12/4/2024 at 9:42 PM    
Patient just got picked up by [facility] and left his glasses, his cousin Dawson stated she would come and pick them up.    Electronically signed by Alexa Hercules RN on 12/4/2024 at 11:32 PM    
Physical Therapy  Facility/Department: [address] MED SURG  Physical Therapy Initial Assessment    Name: Hardik Gray  : 1950  MRN: [id number]  Date of Service: 2024    Attending Provider:  Bridgette Olmos MD    Evaluating PT:  Luis Brito Jr., P.T.    Room #:  0505/0505-A  Diagnosis:  Leg swelling [M79.89]  Ascites of liver [R18.8]  Ascites due to alcoholic cirrhosis (HCC) [K70.31]  Procedure/Surgery:  24 IR paracentesis 3.1L removed  Precautions:  Falls, bed/chair alarm, confusion, impaired cognition, slow processing of instructions.    SUBJECTIVE:  Per chart:   Pt lives alone in a 3rd floor apt with 6+6 stairs and 2 rail to enter.  Pt ambulated with no AD PTA.    OBJECTIVE:   Initial Evaluation  Date: 24 Treatment Short Term/ Long Term   Goals   Was pt agreeable to Eval/treatment? yes     Does pt have pain? No c/o pain or apparent pain at this time.       Bed Mobility  Rolling: MAX A  Supine to sit: NA  Sit to supine: MAX A x2  Scooting: MAX A  supervision   Transfers Sit to stand: MAX A x2  Stand to sit: MAX A x2  Stand pivot: NA  SBA   Ambulation   NA, pt unable to move his legs  150 feet with AAD if needed SBA   Stair negotiation: ascended and descended NA   12 steps with 1-2 rails SBA   AM-PAC 6 Clicks        BLE ROM is WFL, but B knee flexion limited due to BLE edema.   BLE strength is grossly 2-/5 to 3-/5.   Edema:  BLE  Balance: sitting is SBA and standing with ww is MIN A  Endurance: fair-    Patient education  Pt educated on transfers    Patient response to education:   Pt verbalized understanding Pt demonstrated skill Pt requires further education in this area   yes Yes with VCs and assist yes     ASSESSMENT:    Conditions Requiring Skilled Therapeutic Intervention:    [x]Decreased strength     []Decreased ROM  [x]Decreased functional mobility  [x]Decreased balance   [x]Decreased endurance   []Decreased posture  []Decreased sensation  [x]Decreased coordination 
Physical Therapy  Facility/Department: [address] MED SURG  Physical Therapy Treatment Note    Name: Hardik Gray  : 1950  MRN: [id number]  Date of Service: 12/3/2024    Attending Provider:  Bridgette Olmos MD    Evaluating PT:  Luis Brito Jr., P.T.    Room #:  0505/0505-A  Diagnosis:  Leg swelling [M79.89]  Ascites of liver [R18.8]  Ascites due to alcoholic cirrhosis (HCC) [K70.31]  Procedure/Surgery:  24 IR paracentesis 3.1L removed  Precautions:  Falls, bed/chair alarm, confusion, impaired cognition, slow processing of instructions.    SUBJECTIVE:  Per chart:   Pt lives alone in a 3rd floor apt with 6+6 stairs and 2 rail to enter.  Pt ambulated with no AD PTA.    OBJECTIVE:   Initial Evaluation  Date: 24 Treatment  12/3/2024 Short Term/ Long Term   Goals   Was pt agreeable to Eval/treatment? yes yes    Does pt have pain? No c/o pain or apparent pain at this time.   L groin pain during gait. Pt states medicated for pain prior    Bed Mobility  Rolling: MAX A  Supine to sit: NA  Sit to supine: MAX A x2  Scooting: MAX A Rolling: Mod A  Supine to sit: Mod A  Scooting: Max A seated to EOB supervision   Transfers Sit to stand: MAX A x2  Stand to sit: MAX A x2  Stand pivot: NA Sit <> stand: Mod A SBA   Ambulation   NA, pt unable to move his legs 6 feet with WW Mod A 150 feet with AAD if needed SBA   Stair negotiation: ascended and descended NA  NT 12 steps with 1-2 rails SBA   AM-PAC 6 Clicks       Pt is alert, following instruction, states wants to get up and walk, wants to go home  Balance: poor during brief mobility with WW    Pt performed therapeutic exercise of the following: supine B ankle pumps AROM x 30; R heel slides, hip ABd/ADd AAROM x 20    Patient education/treatment  Pt was educated on therapeutic exercise for circulation/ROM/strengthening needed for functional activity, UE usage for transfer safety, gait mechanics for posture    Patient response to education:   Pt 
Radiology imaging disc in pt. Lite chart.  Electronically signed by Sherron Bautista RN on 12/3/2024 at 1:50 PM    
Spiritual Health History and Assessment/Progress Note  [facility]    Initial Encounter,  ,  ,      Name: Hardik Gray MRN: [id number]    Age: 74 y.o.     Sex: male   Language: English   Confucianist: Unknown   Ascites of liver     Date: 12/2/2024                           Spiritual Assessment began in [facility] SUR[facility]        Referral/Consult From: Rounding   Encounter Overview/Reason: Initial Encounter  Service Provided For: Patient    Rakel, Belief, Meaning:   Patient has beliefs or practices that help with coping during difficult times  Family/Friends No family/friends present      Importance and Influence:  Patient has no beliefs influential to healthcare decision-making identified during this visit  Family/Friends No family/friends present    Community:  Patient feels well-supported. Support system includes: Friends  Family/Friends No family/friends present    Assessment and Plan of Care:     Patient Interventions include: Facilitated expression of thoughts and feelings  Family/Friends Interventions include: No family/friends present    Patient Plan of Care: Spiritual Care available upon further referral  Family/Friends Plan of Care: No family/friends present    Electronically signed by Chaplain Maxwell on 12/2/2024 at 7:50 PM   
Spoke with  regarding patient's blood pressure. Okay to hold 50mg dose of Lopressor and give one time dose of 12.5mg Lopressor.    Electronically signed by Ritika Rudolph RN on 11/29/2024 at 9:26 PM    
Spoke with [facility].  Pt. Does NOT need precert to go there and can be discharged there today.    Electronically signed by Sherron Bautista RN on 12/4/2024 at 3:07 PM      Transport set up with [facility] stretcher for 1930.  Bedside RN aware.      Electronically signed by Sherron Bautista RN on 12/4/2024 at 3:23 PM    
TECHNOLOGIST PROVIDED HISTORY: Reason for exam:->R/o edema FINDINGS: The lungs are without acute focal process.  There is no effusion or pneumothorax. The cardiomediastinal silhouette is without acute process. The osseous structures are without acute process.     No acute process.       Assessment:    Principal Problem:    Ascites of liver  Active Problems:    Leg swelling    Ascites due to alcoholic cirrhosis (HCC)  Resolved Problems:    * No resolved hospital problems. *      Plan:  Anasarca 2/2 alcoholic cirrhosis - IR paracentesis 11/29- 3150 ml drained of clear yellow colored ascitic fluid drained. Started on lasix and aldactone. Monitor intake and output.   Hyperbilirubinemina 2/2 above - Total bili 7.3.   Hyponatremia - Na 131-- 134, fluid restriction. Monitor labs.   Hepatitis B/C - continue entecavir. Last Hep C viral load undetectable   Chronic anemia - Hgb 9.0, stable. Monitor and transfuse for less than 7. Continue ferrous sulfate.   HTN currently hypotensive - hold norvasc. Continue lopressor. Monitor BP. Added midodrine and albumin.  BPH - continue flomax.      Code Status: full  DVT prophylaxis: Lovenox   [facility], PT/OT ordered.    NOTE: This report was transcribed using voice recognition software. Every effort was made to ensure accuracy; however, inadvertent computerized transcription errors may be present.  Electronically signed by Mitch Mares MD on 12/1/2024 at 12:44 PM      
liver  Active Problems:    Leg swelling  Resolved Problems:    * No resolved hospital problems. *      Plan:  Anasarca 2/2 alcoholic cirrhosis - IR paracentesis today. Started on lasix and aldactone. Monitor intake and output.   Hyperbilirubinemina 2/2 above - Total bili 7.3.   Hyponatremia - Na 131, fluid restriction. Monitor labs.   Hepatitis B/C - continue entecavir. Last Hep C viral load undetectable   Chronic anemia - Hgb 9.0, stable. Monitor and transfuse for less than 7. Continue ferrous sulfate.   HTN - hold norvasc. Continue lopressor. Monitor BP.   BPH - continue flomax.      Code Status: full  DVT prophylaxis: Lovenox       NOTE: This report was transcribed using voice recognition software. Every effort was made to ensure accuracy; however, inadvertent computerized transcription errors may be present.  Electronically signed by Mitch Mares MD on 11/29/2024 at 8:10 AM

## 2024-12-13 ENCOUNTER — APPOINTMENT (OUTPATIENT)
Dept: CT IMAGING | Age: 74
End: 2024-12-13
Payer: OTHER GOVERNMENT

## 2024-12-13 ENCOUNTER — HOSPITAL ENCOUNTER (EMERGENCY)
Age: 74
Discharge: HOME | End: 2024-12-14
Attending: EMERGENCY MEDICINE
Payer: OTHER GOVERNMENT

## 2024-12-13 DIAGNOSIS — Z98.890 STATUS POST ABDOMINAL PARACENTESIS: Primary | ICD-10-CM

## 2024-12-13 DIAGNOSIS — K70.31 ASCITES DUE TO ALCOHOLIC CIRRHOSIS (HCC): ICD-10-CM

## 2024-12-13 LAB
ALBUMIN SERPL-MCNC: 2.5 G/DL (ref 3.5–5.2)
ALP SERPL-CCNC: 152 U/L (ref 40–129)
ALT SERPL-CCNC: 18 U/L (ref 0–40)
ANION GAP SERPL CALCULATED.3IONS-SCNC: 5 MMOL/L (ref 7–16)
AST SERPL-CCNC: 49 U/L (ref 0–39)
BASOPHILS # BLD: 0.04 K/UL (ref 0–0.2)
BASOPHILS NFR BLD: 1 % (ref 0–2)
BILIRUB DIRECT SERPL-MCNC: 2.9 MG/DL (ref 0–0.3)
BILIRUB INDIRECT SERPL-MCNC: 3.1 MG/DL (ref 0–1)
BILIRUB SERPL-MCNC: 6 MG/DL (ref 0–1.2)
BUN SERPL-MCNC: 7 MG/DL (ref 6–23)
CALCIUM SERPL-MCNC: 8.5 MG/DL (ref 8.6–10.2)
CHLORIDE SERPL-SCNC: 101 MMOL/L (ref 98–107)
CO2 SERPL-SCNC: 26 MMOL/L (ref 22–29)
CREAT SERPL-MCNC: 0.8 MG/DL (ref 0.7–1.2)
EOSINOPHIL # BLD: 0.36 K/UL (ref 0.05–0.5)
EOSINOPHILS RELATIVE PERCENT: 5 % (ref 0–6)
ERYTHROCYTE [DISTWIDTH] IN BLOOD BY AUTOMATED COUNT: 17.3 % (ref 11.5–15)
GFR, ESTIMATED: >90 ML/MIN/1.73M2
GLUCOSE SERPL-MCNC: 93 MG/DL (ref 74–99)
HCT VFR BLD AUTO: 25.4 % (ref 37–54)
HGB BLD-MCNC: 8.4 G/DL (ref 12.5–16.5)
IMM GRANULOCYTES # BLD AUTO: <0.03 K/UL (ref 0–0.58)
IMM GRANULOCYTES NFR BLD: 0 % (ref 0–5)
INR PPP: 2.6
LACTATE BLDV-SCNC: 2.2 MMOL/L (ref 0.5–2.2)
LIPASE SERPL-CCNC: 30 U/L (ref 13–60)
LYMPHOCYTES NFR BLD: 2.18 K/UL (ref 1.5–4)
LYMPHOCYTES RELATIVE PERCENT: 29 % (ref 20–42)
MAGNESIUM SERPL-MCNC: 1.7 MG/DL (ref 1.6–2.6)
MCH RBC QN AUTO: 31.2 PG (ref 26–35)
MCHC RBC AUTO-ENTMCNC: 33.1 G/DL (ref 32–34.5)
MCV RBC AUTO: 94.4 FL (ref 80–99.9)
MONOCYTES NFR BLD: 1.62 K/UL (ref 0.1–0.95)
MONOCYTES NFR BLD: 22 % (ref 2–12)
NEUTROPHILS NFR BLD: 43 % (ref 43–80)
NEUTS SEG NFR BLD: 3.21 K/UL (ref 1.8–7.3)
PLATELET # BLD AUTO: 139 K/UL (ref 130–450)
PMV BLD AUTO: 9.2 FL (ref 7–12)
POTASSIUM SERPL-SCNC: 4 MMOL/L (ref 3.5–5)
PROT SERPL-MCNC: 7.2 G/DL (ref 6.4–8.3)
PROTHROMBIN TIME: 28.8 SEC (ref 9.3–12.4)
RBC # BLD AUTO: 2.69 M/UL (ref 3.8–5.8)
RBC # BLD: ABNORMAL 10*6/UL
SODIUM SERPL-SCNC: 132 MMOL/L (ref 132–146)
WBC OTHER # BLD: 7.4 K/UL (ref 4.5–11.5)

## 2024-12-13 PROCEDURE — 82248 BILIRUBIN DIRECT: CPT

## 2024-12-13 PROCEDURE — 99285 EMERGENCY DEPT VISIT HI MDM: CPT

## 2024-12-13 PROCEDURE — 83735 ASSAY OF MAGNESIUM: CPT

## 2024-12-13 PROCEDURE — 83690 ASSAY OF LIPASE: CPT

## 2024-12-13 PROCEDURE — 6370000000 HC RX 637 (ALT 250 FOR IP): Performed by: EMERGENCY MEDICINE

## 2024-12-13 PROCEDURE — 80053 COMPREHEN METABOLIC PANEL: CPT

## 2024-12-13 PROCEDURE — 74177 CT ABD & PELVIS W/CONTRAST: CPT

## 2024-12-13 PROCEDURE — 85025 COMPLETE CBC W/AUTO DIFF WBC: CPT

## 2024-12-13 PROCEDURE — 85610 PROTHROMBIN TIME: CPT

## 2024-12-13 PROCEDURE — 6360000004 HC RX CONTRAST MEDICATION: Performed by: RADIOLOGY

## 2024-12-13 PROCEDURE — 83605 ASSAY OF LACTIC ACID: CPT

## 2024-12-13 RX ORDER — HYDROCODONE BITARTRATE AND ACETAMINOPHEN 5; 325 MG/1; MG/1
1 TABLET ORAL ONCE
Status: COMPLETED | OUTPATIENT
Start: 2024-12-13 | End: 2024-12-13

## 2024-12-13 RX ORDER — IOPAMIDOL 755 MG/ML
75 INJECTION, SOLUTION INTRAVASCULAR
Status: COMPLETED | OUTPATIENT
Start: 2024-12-13 | End: 2024-12-13

## 2024-12-13 RX ADMIN — HYDROCODONE BITARTRATE AND ACETAMINOPHEN 1 TABLET: 5; 325 TABLET ORAL at 20:52

## 2024-12-13 RX ADMIN — IOPAMIDOL 75 ML: 755 INJECTION, SOLUTION INTRAVENOUS at 17:14

## 2024-12-13 ASSESSMENT — PAIN DESCRIPTION - LOCATION: LOCATION: ANKLE;GROIN

## 2024-12-13 ASSESSMENT — PAIN DESCRIPTION - PAIN TYPE: TYPE: ACUTE PAIN

## 2024-12-13 ASSESSMENT — PAIN - FUNCTIONAL ASSESSMENT
PAIN_FUNCTIONAL_ASSESSMENT: ACTIVITIES ARE NOT PREVENTED
PAIN_FUNCTIONAL_ASSESSMENT: 0-10

## 2024-12-13 ASSESSMENT — PAIN DESCRIPTION - ONSET: ONSET: ON-GOING

## 2024-12-13 ASSESSMENT — PAIN SCALES - GENERAL: PAINLEVEL_OUTOF10: 4

## 2024-12-13 ASSESSMENT — PAIN DESCRIPTION - FREQUENCY: FREQUENCY: CONTINUOUS

## 2024-12-13 ASSESSMENT — PAIN DESCRIPTION - DESCRIPTORS: DESCRIPTORS: ACHING

## 2024-12-13 NOTE — ED PROVIDER NOTES
Vaping Use    Vaping status: Never Used   Substance Use Topics    Alcohol use: Yes     Comment: occasional beer. None since 8/15/2024    Drug use: Not Currently     Types: Cocaine     Comment: LAST USED 2002       SCREENINGS        [place] Coma Scale  Eye Opening: Spontaneous  Best Verbal Response: Oriented  Best Motor Response: Obeys commands  [place] Coma Scale Score: 15                CIWA Assessment  BP: 119/68  Pulse: 71           PHYSICAL EXAM  1 or more Elements     ED Triage Vitals   BP Systolic BP Percentile Diastolic BP Percentile Temp Temp src Pulse Resp SpO2   -- -- -- -- -- -- -- --      Height Weight         -- --                Constitutional/General: Alert and oriented x3  Head: Normocephalic and atraumatic  Eyes: PERRL, EOMI, conjunctiva normal, sclera non icteric  ENT:  Oropharynx clear, handling secretions, no trismus, no asymmetry of the posterior oropharynx or uvular edema  Neck: Supple, full ROM, no stridor, no meningeal signs  Respiratory: Lungs clear to auscultation bilaterally, no wheezes, rales, or rhonchi. Not in respiratory distress  Cardiovascular:  Regular rate. Regular rhythm. Equal extremity pulses.   GI: Abdomen Soft. Non distended. Non tender, No rebound, guarding, or rigidity.  Mild serous drainage from site of paracentesis.  Musculoskeletal: Pitting edema to his bilateral lower extremities.  Moves all extremities x 4. Warm and well perfused, no clubbing, no cyanosis, Capillary refill <3 seconds  Integument: skin warm and dry. No rashes.   Neurologic: no focal deficits, CN II-XII grossly intact. Symmetric strength 5/5 in the upper and lower extremities bilaterally  Psychiatric: Normal Affect        DIAGNOSTIC RESULTS   LABS:    Labs Reviewed   CBC WITH AUTO DIFFERENTIAL - Abnormal; Notable for the following components:       Result Value    RBC 2.69 (*)     Hemoglobin 8.4 (*)     Hematocrit 25.4 (*)     RDW 17.3 (*)     Monocytes % 22 (*)     Monocytes Absolute 1.62 (*)     All  Late effect of radiation (5/16/2023), MI (myocardial infarction) (HCC) (2002), Personal history of radiation therapy (5/16/2023), Prostate cancer (HCC) (2018), Urinary frequency, and Weak urinary stream.     EMERGENCY DEPARTMENT COURSE    Vitals:    Vitals:    12/13/24 1556 12/13/24 1900 12/13/24 1930 12/13/24 2000   BP: 117/79 119/68     Pulse:       Resp:       Temp:       SpO2: 100% 100% 99% 99%   Weight:       Height:           Patient was given the following medications:  Medications   iopamidol (ISOVUE-370) 76 % injection 75 mL (75 mLs IntraVENous Given 12/13/24 1714)   HYDROcodone-acetaminophen (NORCO) 5-325 MG per tablet 1 tablet (1 tablet Oral Given 12/13/24 2052)           Is this patient to be included in the SEP-1 Core Measure due to severe sepsis or septic shock?   No   Exclusion criteria - the patient is NOT to be included for SEP-1 Core Measure due to:  Infection is not suspected        Medical Decision Making/Differential Diagnosis:    CC/HPI Summary, Social Determinants of health, Records Reviewed, DDx, testing done/not done, ED Course, Reassessment, disposition considerations/shared decision making with patient, consults, disposition:      ED Course as of 12/13/24 2223   Fri Dec 13, 2024   1526 Patient is a pleasant 74-year-old gentleman who presents from rehab facility.  Patient states that he was sent to [facility] for his routine paracentesis yesterday as history of alcoholic cirrhosis end-stage liver disease disorder and ascites.  He states he has a paracentesis about every 2 to 3 weeks.  Paperwork, it is noted that they took off 3 L yesterday.  They put a dressing on was not taped down on all 4 sides.  Dressing has not been changed.  Straining some clear ascites fluid out of the puncture site from paracentesis yesterday patient denies any abdominal pain no fevers or chills no purulent drainage no redness around the site.  No nausea vomiting or diarrhea. he was sent in for continued

## 2024-12-13 NOTE — DISCHARGE INSTRUCTIONS
Please call and schedule appointment with gastroenterology, Dr. Mancia and your primary care doctor.  Return to the ER if your symptoms should significantly worsen.

## 2024-12-14 VITALS
BODY MASS INDEX: 24.88 KG/M2 | RESPIRATION RATE: 20 BRPM | SYSTOLIC BLOOD PRESSURE: 119 MMHG | DIASTOLIC BLOOD PRESSURE: 58 MMHG | TEMPERATURE: 98.8 F | WEIGHT: 168 LBS | OXYGEN SATURATION: 98 % | HEART RATE: 82 BPM | HEIGHT: 69 IN

## 2024-12-14 NOTE — ED NOTES
Discharge instructions reviewed , including diagnosis, medications, follow up appointments, home care, and also when to call 911.  All discharge instructions questions answered.     IV removed    Pt left ED ambulance to facility

## 2025-01-03 ENCOUNTER — HOSPITAL ENCOUNTER (OUTPATIENT)
Dept: ULTRASOUND IMAGING | Age: 75
Discharge: HOME | End: 2025-01-05

## 2025-01-03 DIAGNOSIS — R18.8 OTHER ASCITES: ICD-10-CM

## 2025-01-17 ENCOUNTER — HOSPITAL ENCOUNTER (OUTPATIENT)
Dept: ULTRASOUND IMAGING | Age: 75
Discharge: HOME | End: 2025-01-19
Payer: COMMERCIAL

## 2025-01-17 ENCOUNTER — HOSPITAL ENCOUNTER (OUTPATIENT)
Age: 75
Discharge: HOME | End: 2025-01-17
Payer: COMMERCIAL

## 2025-01-17 VITALS
RESPIRATION RATE: 18 BRPM | SYSTOLIC BLOOD PRESSURE: 103 MMHG | HEART RATE: 86 BPM | DIASTOLIC BLOOD PRESSURE: 57 MMHG | OXYGEN SATURATION: 99 %

## 2025-01-17 DIAGNOSIS — R18.8 OTHER ASCITES: ICD-10-CM

## 2025-01-17 LAB
INR PPP: 2.7
PROTHROMBIN TIME: 29.6 SEC (ref 9.3–12.4)

## 2025-01-17 PROCEDURE — 36415 COLL VENOUS BLD VENIPUNCTURE: CPT

## 2025-01-17 PROCEDURE — 85610 PROTHROMBIN TIME: CPT

## 2025-01-17 PROCEDURE — C1729 CATH, DRAINAGE: HCPCS

## 2025-01-17 PROCEDURE — 6360000002 HC RX W HCPCS: Performed by: PHYSICIAN ASSISTANT

## 2025-01-17 RX ORDER — LIDOCAINE HYDROCHLORIDE 10 MG/ML
INJECTION, SOLUTION INFILTRATION; PERINEURAL PRN
Status: COMPLETED | OUTPATIENT
Start: 2025-01-17 | End: 2025-01-17

## 2025-01-17 RX ADMIN — LIDOCAINE HYDROCHLORIDE 8 ML: 10 INJECTION, SOLUTION INFILTRATION; PERINEURAL at 15:10

## 2025-01-17 NOTE — OR NURSING
Pt wheeled in wheelchair to IR room for paracentesis. Pt is alert and oriented, states  pain an 8/10 prior to procedure. Ultrasound images taken prior to procedure. Procedure is explained to patient, including possible risks. Pt verbalizes understanding and consent form signed.    Procedure done under sterile technique and guidance of ultrasound imaging. 1% Lidocaine is used during procedure for comfort measures. One step 5 Mauritanian x 10 cm to LLQ.  A total of 2570  ml's of hazy yellow colored ascitic fluid drained during procedure. Catheter removed and  dressing applied to site with no bleeding noted. Albumin infusion given during procedure. Pt tolerated procedure well and denies any pain after. Pt given discharge instructions and pt verbalizes understanding of post procedure care/follow up. Pt  out of department with no difficulties.

## 2025-01-19 ENCOUNTER — APPOINTMENT (OUTPATIENT)
Dept: ULTRASOUND IMAGING | Age: 75
End: 2025-01-19
Payer: COMMERCIAL

## 2025-01-19 ENCOUNTER — HOSPITAL ENCOUNTER (INPATIENT)
Age: 75
LOS: 4 days | Discharge: NURSING, REHAB OR LONG-TERM CARE | End: 2025-01-23
Attending: EMERGENCY MEDICINE | Admitting: STUDENT IN AN ORGANIZED HEALTH CARE EDUCATION/TRAINING PROGRAM
Payer: COMMERCIAL

## 2025-01-19 ENCOUNTER — APPOINTMENT (OUTPATIENT)
Dept: CT IMAGING | Age: 75
End: 2025-01-19
Payer: COMMERCIAL

## 2025-01-19 ENCOUNTER — APPOINTMENT (OUTPATIENT)
Dept: GENERAL RADIOLOGY | Age: 75
End: 2025-01-19
Payer: COMMERCIAL

## 2025-01-19 DIAGNOSIS — N39.0 URINARY TRACT INFECTION WITH HEMATURIA, SITE UNSPECIFIED: Primary | ICD-10-CM

## 2025-01-19 DIAGNOSIS — R60.1 ANASARCA: ICD-10-CM

## 2025-01-19 DIAGNOSIS — E87.20 LACTIC ACIDOSIS: ICD-10-CM

## 2025-01-19 DIAGNOSIS — R31.9 URINARY TRACT INFECTION WITH HEMATURIA, SITE UNSPECIFIED: Primary | ICD-10-CM

## 2025-01-19 DIAGNOSIS — R33.9 URINARY RETENTION: ICD-10-CM

## 2025-01-19 PROBLEM — N48.9 PENILE LESION: Status: ACTIVE | Noted: 2025-01-19

## 2025-01-19 PROBLEM — R79.89 ELEVATED LACTIC ACID LEVEL: Status: ACTIVE | Noted: 2025-01-19

## 2025-01-19 PROBLEM — S30.852A: Status: ACTIVE | Noted: 2025-01-19

## 2025-01-19 PROBLEM — E87.70 VOLUME OVERLOAD: Status: ACTIVE | Noted: 2025-01-19

## 2025-01-19 PROBLEM — R31.0 GROSS HEMATURIA: Status: ACTIVE | Noted: 2025-01-19

## 2025-01-19 PROBLEM — S31.20XA OPEN WOUND OF PENIS: Status: ACTIVE | Noted: 2025-01-19

## 2025-01-19 LAB
ALBUMIN SERPL-MCNC: 2.4 G/DL (ref 3.5–5.2)
ALP SERPL-CCNC: 198 U/L (ref 40–129)
ALT SERPL-CCNC: 13 U/L (ref 0–40)
ANION GAP SERPL CALCULATED.3IONS-SCNC: 16 MMOL/L (ref 7–16)
AST SERPL-CCNC: 47 U/L (ref 0–39)
BACTERIA URNS QL MICRO: ABNORMAL
BASOPHILS # BLD: 0.01 K/UL (ref 0–0.2)
BASOPHILS NFR BLD: 0 % (ref 0–2)
BILIRUB DIRECT SERPL-MCNC: 5.9 MG/DL (ref 0–0.3)
BILIRUB INDIRECT SERPL-MCNC: 5.1 MG/DL (ref 0–1)
BILIRUB SERPL-MCNC: 11 MG/DL (ref 0–1.2)
BILIRUB UR QL STRIP: ABNORMAL
BNP SERPL-MCNC: 817 PG/ML (ref 0–125)
BUN SERPL-MCNC: 12 MG/DL (ref 6–23)
CALCIUM SERPL-MCNC: 8.5 MG/DL (ref 8.6–10.2)
CHLORIDE SERPL-SCNC: 94 MMOL/L (ref 98–107)
CLARITY UR: ABNORMAL
CO2 SERPL-SCNC: 22 MMOL/L (ref 22–29)
COLOR UR: ABNORMAL
CREAT SERPL-MCNC: 0.9 MG/DL (ref 0.7–1.2)
EOSINOPHIL # BLD: 0.01 K/UL (ref 0.05–0.5)
EOSINOPHILS RELATIVE PERCENT: 0 % (ref 0–6)
ERYTHROCYTE [DISTWIDTH] IN BLOOD BY AUTOMATED COUNT: 16 % (ref 11.5–15)
GFR, ESTIMATED: 87 ML/MIN/1.73M2
GLUCOSE SERPL-MCNC: 110 MG/DL (ref 74–99)
GLUCOSE UR STRIP-MCNC: 100 MG/DL
HCT VFR BLD AUTO: 23 % (ref 37–54)
HGB BLD-MCNC: 7.5 G/DL (ref 12.5–16.5)
HGB UR QL STRIP.AUTO: ABNORMAL
IMM GRANULOCYTES # BLD AUTO: 0.08 K/UL (ref 0–0.58)
IMM GRANULOCYTES NFR BLD: 1 % (ref 0–5)
INR PPP: 2.6
KETONES UR STRIP-MCNC: 15 MG/DL
LACTATE BLDV-SCNC: 3.7 MMOL/L (ref 0.5–2.2)
LACTATE BLDV-SCNC: 5.6 MMOL/L (ref 0.5–2.2)
LACTATE BLDV-SCNC: 7.3 MMOL/L (ref 0.5–2.2)
LEUKOCYTE ESTERASE UR QL STRIP: ABNORMAL
LIPASE SERPL-CCNC: 25 U/L (ref 13–60)
LYMPHOCYTES NFR BLD: 0.74 K/UL (ref 1.5–4)
LYMPHOCYTES RELATIVE PERCENT: 9 % (ref 20–42)
MCH RBC QN AUTO: 31.9 PG (ref 26–35)
MCHC RBC AUTO-ENTMCNC: 32.6 G/DL (ref 32–34.5)
MCV RBC AUTO: 97.9 FL (ref 80–99.9)
MONOCYTES NFR BLD: 1.31 K/UL (ref 0.1–0.95)
MONOCYTES NFR BLD: 17 % (ref 2–12)
NEUTROPHILS NFR BLD: 73 % (ref 43–80)
NEUTS SEG NFR BLD: 5.7 K/UL (ref 1.8–7.3)
NITRITE UR QL STRIP: POSITIVE
PH UR STRIP: 6.5 [PH] (ref 5–9)
PLATELET # BLD AUTO: 102 K/UL (ref 130–450)
PMV BLD AUTO: 8.6 FL (ref 7–12)
POTASSIUM SERPL-SCNC: 3.6 MMOL/L (ref 3.5–5)
PROT SERPL-MCNC: 7.8 G/DL (ref 6.4–8.3)
PROT UR STRIP-MCNC: >=300 MG/DL
PROTHROMBIN TIME: 27.8 SEC (ref 9.3–12.4)
RBC # BLD AUTO: 2.35 M/UL (ref 3.8–5.8)
RBC #/AREA URNS HPF: ABNORMAL /HPF
SODIUM SERPL-SCNC: 132 MMOL/L (ref 132–146)
SP GR UR STRIP: 1.02 (ref 1–1.03)
TSH SERPL DL<=0.05 MIU/L-ACNC: 1.11 UIU/ML (ref 0.27–4.2)
UROBILINOGEN UR STRIP-ACNC: 4 EU/DL (ref 0–1)
WBC #/AREA URNS HPF: ABNORMAL /HPF
WBC OTHER # BLD: 7.9 K/UL (ref 4.5–11.5)

## 2025-01-19 PROCEDURE — 80053 COMPREHEN METABOLIC PANEL: CPT

## 2025-01-19 PROCEDURE — 99285 EMERGENCY DEPT VISIT HI MDM: CPT

## 2025-01-19 PROCEDURE — 6360000002 HC RX W HCPCS: Performed by: STUDENT IN AN ORGANIZED HEALTH CARE EDUCATION/TRAINING PROGRAM

## 2025-01-19 PROCEDURE — 96375 TX/PRO/DX INJ NEW DRUG ADDON: CPT

## 2025-01-19 PROCEDURE — 84443 ASSAY THYROID STIM HORMONE: CPT

## 2025-01-19 PROCEDURE — 6370000000 HC RX 637 (ALT 250 FOR IP): Performed by: STUDENT IN AN ORGANIZED HEALTH CARE EDUCATION/TRAINING PROGRAM

## 2025-01-19 PROCEDURE — 96376 TX/PRO/DX INJ SAME DRUG ADON: CPT

## 2025-01-19 PROCEDURE — 2500000003 HC RX 250 WO HCPCS

## 2025-01-19 PROCEDURE — 6360000004 HC RX CONTRAST MEDICATION: Performed by: RADIOLOGY

## 2025-01-19 PROCEDURE — 76770 US EXAM ABDO BACK WALL COMP: CPT

## 2025-01-19 PROCEDURE — 2580000003 HC RX 258: Performed by: EMERGENCY MEDICINE

## 2025-01-19 PROCEDURE — 76775 US EXAM ABDO BACK WALL LIM: CPT | Performed by: EMERGENCY MEDICINE

## 2025-01-19 PROCEDURE — 74177 CT ABD & PELVIS W/CONTRAST: CPT

## 2025-01-19 PROCEDURE — 83605 ASSAY OF LACTIC ACID: CPT

## 2025-01-19 PROCEDURE — 83880 ASSAY OF NATRIURETIC PEPTIDE: CPT

## 2025-01-19 PROCEDURE — 99223 1ST HOSP IP/OBS HIGH 75: CPT | Performed by: STUDENT IN AN ORGANIZED HEALTH CARE EDUCATION/TRAINING PROGRAM

## 2025-01-19 PROCEDURE — 2500000003 HC RX 250 WO HCPCS: Performed by: STUDENT IN AN ORGANIZED HEALTH CARE EDUCATION/TRAINING PROGRAM

## 2025-01-19 PROCEDURE — 2060000000 HC ICU INTERMEDIATE R&B

## 2025-01-19 PROCEDURE — 85025 COMPLETE CBC W/AUTO DIFF WBC: CPT

## 2025-01-19 PROCEDURE — 6360000002 HC RX W HCPCS

## 2025-01-19 PROCEDURE — 82248 BILIRUBIN DIRECT: CPT

## 2025-01-19 PROCEDURE — P9047 ALBUMIN (HUMAN), 25%, 50ML: HCPCS | Performed by: STUDENT IN AN ORGANIZED HEALTH CARE EDUCATION/TRAINING PROGRAM

## 2025-01-19 PROCEDURE — 71045 X-RAY EXAM CHEST 1 VIEW: CPT

## 2025-01-19 PROCEDURE — 81001 URINALYSIS AUTO W/SCOPE: CPT

## 2025-01-19 PROCEDURE — 96374 THER/PROPH/DIAG INJ IV PUSH: CPT

## 2025-01-19 PROCEDURE — 83690 ASSAY OF LIPASE: CPT

## 2025-01-19 PROCEDURE — 93005 ELECTROCARDIOGRAM TRACING: CPT | Performed by: EMERGENCY MEDICINE

## 2025-01-19 PROCEDURE — 87086 URINE CULTURE/COLONY COUNT: CPT

## 2025-01-19 PROCEDURE — 85610 PROTHROMBIN TIME: CPT

## 2025-01-19 RX ORDER — ALBUMIN (HUMAN) 12.5 G/50ML
25 SOLUTION INTRAVENOUS EVERY 8 HOURS
Status: DISCONTINUED | OUTPATIENT
Start: 2025-01-19 | End: 2025-01-23 | Stop reason: HOSPADM

## 2025-01-19 RX ORDER — HYDROCODONE BITARTRATE AND ACETAMINOPHEN 5; 325 MG/1; MG/1
1 TABLET ORAL EVERY 6 HOURS PRN
Status: DISCONTINUED | OUTPATIENT
Start: 2025-01-19 | End: 2025-01-19

## 2025-01-19 RX ORDER — METOPROLOL TARTRATE 50 MG
50 TABLET ORAL 2 TIMES DAILY
Status: DISCONTINUED | OUTPATIENT
Start: 2025-01-19 | End: 2025-01-23 | Stop reason: HOSPADM

## 2025-01-19 RX ORDER — FERROUS SULFATE 325(65) MG
325 TABLET ORAL 2 TIMES DAILY
Status: DISCONTINUED | OUTPATIENT
Start: 2025-01-19 | End: 2025-01-23 | Stop reason: HOSPADM

## 2025-01-19 RX ORDER — SPIRONOLACTONE 25 MG/1
50 TABLET ORAL DAILY
Status: DISCONTINUED | OUTPATIENT
Start: 2025-01-19 | End: 2025-01-22

## 2025-01-19 RX ORDER — POTASSIUM CHLORIDE 7.45 MG/ML
10 INJECTION INTRAVENOUS PRN
Status: DISCONTINUED | OUTPATIENT
Start: 2025-01-19 | End: 2025-01-23 | Stop reason: HOSPADM

## 2025-01-19 RX ORDER — SODIUM CHLORIDE 0.9 % (FLUSH) 0.9 %
5-40 SYRINGE (ML) INJECTION EVERY 12 HOURS SCHEDULED
Status: DISCONTINUED | OUTPATIENT
Start: 2025-01-19 | End: 2025-01-23 | Stop reason: HOSPADM

## 2025-01-19 RX ORDER — FOLIC ACID 1 MG/1
1 TABLET ORAL DAILY
Status: DISCONTINUED | OUTPATIENT
Start: 2025-01-20 | End: 2025-01-23 | Stop reason: HOSPADM

## 2025-01-19 RX ORDER — FUROSEMIDE 10 MG/ML
40 INJECTION INTRAMUSCULAR; INTRAVENOUS 2 TIMES DAILY
Status: DISCONTINUED | OUTPATIENT
Start: 2025-01-19 | End: 2025-01-23 | Stop reason: HOSPADM

## 2025-01-19 RX ORDER — IOPAMIDOL 755 MG/ML
75 INJECTION, SOLUTION INTRAVASCULAR
Status: COMPLETED | OUTPATIENT
Start: 2025-01-19 | End: 2025-01-19

## 2025-01-19 RX ORDER — PROCHLORPERAZINE EDISYLATE 5 MG/ML
10 INJECTION INTRAMUSCULAR; INTRAVENOUS EVERY 6 HOURS PRN
Status: DISCONTINUED | OUTPATIENT
Start: 2025-01-19 | End: 2025-01-23 | Stop reason: HOSPADM

## 2025-01-19 RX ORDER — SODIUM CHLORIDE 9 MG/ML
INJECTION, SOLUTION INTRAVENOUS PRN
Status: DISCONTINUED | OUTPATIENT
Start: 2025-01-19 | End: 2025-01-23 | Stop reason: HOSPADM

## 2025-01-19 RX ORDER — 0.9 % SODIUM CHLORIDE 0.9 %
1000 INTRAVENOUS SOLUTION INTRAVENOUS ONCE
Status: COMPLETED | OUTPATIENT
Start: 2025-01-19 | End: 2025-01-19

## 2025-01-19 RX ORDER — SODIUM CHLORIDE 0.9 % (FLUSH) 0.9 %
5-40 SYRINGE (ML) INJECTION PRN
Status: DISCONTINUED | OUTPATIENT
Start: 2025-01-19 | End: 2025-01-23 | Stop reason: HOSPADM

## 2025-01-19 RX ORDER — FENTANYL CITRATE 50 UG/ML
50 INJECTION, SOLUTION INTRAMUSCULAR; INTRAVENOUS ONCE
Status: COMPLETED | OUTPATIENT
Start: 2025-01-19 | End: 2025-01-19

## 2025-01-19 RX ORDER — TAMSULOSIN HYDROCHLORIDE 0.4 MG/1
0.4 CAPSULE ORAL DAILY
Status: DISCONTINUED | OUTPATIENT
Start: 2025-01-20 | End: 2025-01-23 | Stop reason: HOSPADM

## 2025-01-19 RX ORDER — PANTOPRAZOLE SODIUM 40 MG/1
40 TABLET, DELAYED RELEASE ORAL DAILY
Status: DISCONTINUED | OUTPATIENT
Start: 2025-01-20 | End: 2025-01-23 | Stop reason: HOSPADM

## 2025-01-19 RX ORDER — POLYETHYLENE GLYCOL 3350 17 G/17G
17 POWDER, FOR SOLUTION ORAL DAILY PRN
Status: DISCONTINUED | OUTPATIENT
Start: 2025-01-19 | End: 2025-01-23 | Stop reason: HOSPADM

## 2025-01-19 RX ORDER — ENTECAVIR 0.5 MG/1
1 TABLET, FILM COATED ORAL DAILY
Status: DISCONTINUED | OUTPATIENT
Start: 2025-01-20 | End: 2025-01-23 | Stop reason: HOSPADM

## 2025-01-19 RX ORDER — OXYCODONE HYDROCHLORIDE 5 MG/1
10 TABLET ORAL EVERY 6 HOURS PRN
Status: DISCONTINUED | OUTPATIENT
Start: 2025-01-19 | End: 2025-01-23 | Stop reason: HOSPADM

## 2025-01-19 RX ORDER — MIDODRINE HYDROCHLORIDE 5 MG/1
5 TABLET ORAL
Status: DISCONTINUED | OUTPATIENT
Start: 2025-01-20 | End: 2025-01-23 | Stop reason: HOSPADM

## 2025-01-19 RX ORDER — FENTANYL CITRATE 50 UG/ML
INJECTION, SOLUTION INTRAMUSCULAR; INTRAVENOUS
Status: COMPLETED
Start: 2025-01-19 | End: 2025-01-19

## 2025-01-19 RX ORDER — MAGNESIUM SULFATE IN WATER 40 MG/ML
2000 INJECTION, SOLUTION INTRAVENOUS PRN
Status: DISCONTINUED | OUTPATIENT
Start: 2025-01-19 | End: 2025-01-23 | Stop reason: HOSPADM

## 2025-01-19 RX ORDER — POTASSIUM CHLORIDE 1500 MG/1
40 TABLET, EXTENDED RELEASE ORAL PRN
Status: DISCONTINUED | OUTPATIENT
Start: 2025-01-19 | End: 2025-01-23 | Stop reason: HOSPADM

## 2025-01-19 RX ORDER — ALBUMIN (HUMAN) 12.5 G/50ML
25 SOLUTION INTRAVENOUS ONCE
Status: DISCONTINUED | OUTPATIENT
Start: 2025-01-19 | End: 2025-01-19

## 2025-01-19 RX ORDER — FLUTICASONE PROPIONATE 50 MCG
1 SPRAY, SUSPENSION (ML) NASAL DAILY PRN
Status: DISCONTINUED | OUTPATIENT
Start: 2025-01-19 | End: 2025-01-23 | Stop reason: HOSPADM

## 2025-01-19 RX ORDER — ALBUMIN (HUMAN) 12.5 G/50ML
25 SOLUTION INTRAVENOUS 2 TIMES DAILY
Status: DISCONTINUED | OUTPATIENT
Start: 2025-01-19 | End: 2025-01-19

## 2025-01-19 RX ORDER — ALBUTEROL SULFATE 0.83 MG/ML
2.5 SOLUTION RESPIRATORY (INHALATION) EVERY 6 HOURS PRN
Status: DISCONTINUED | OUTPATIENT
Start: 2025-01-19 | End: 2025-01-23 | Stop reason: HOSPADM

## 2025-01-19 RX ADMIN — CEFTRIAXONE 1000 MG: 1 INJECTION, POWDER, FOR SOLUTION INTRAMUSCULAR; INTRAVENOUS at 08:23

## 2025-01-19 RX ADMIN — FENTANYL CITRATE 50 MCG: 50 INJECTION INTRAMUSCULAR; INTRAVENOUS at 07:07

## 2025-01-19 RX ADMIN — ALBUMIN (HUMAN) 25 G: 0.25 INJECTION, SOLUTION INTRAVENOUS at 12:20

## 2025-01-19 RX ADMIN — FUROSEMIDE 40 MG: 10 INJECTION, SOLUTION INTRAMUSCULAR; INTRAVENOUS at 14:15

## 2025-01-19 RX ADMIN — OXYCODONE 10 MG: 5 TABLET ORAL at 20:31

## 2025-01-19 RX ADMIN — Medication 3 MG: at 20:22

## 2025-01-19 RX ADMIN — FENTANYL CITRATE 50 MCG: 50 INJECTION INTRAMUSCULAR; INTRAVENOUS at 09:52

## 2025-01-19 RX ADMIN — ALBUMIN (HUMAN) 25 G: 0.25 INJECTION, SOLUTION INTRAVENOUS at 20:21

## 2025-01-19 RX ADMIN — SODIUM CHLORIDE, PRESERVATIVE FREE 10 ML: 5 INJECTION INTRAVENOUS at 20:23

## 2025-01-19 RX ADMIN — FERROUS SULFATE TAB 325 MG (65 MG ELEMENTAL FE) 325 MG: 325 (65 FE) TAB at 20:31

## 2025-01-19 RX ADMIN — FUROSEMIDE 40 MG: 10 INJECTION, SOLUTION INTRAMUSCULAR; INTRAVENOUS at 17:03

## 2025-01-19 RX ADMIN — SODIUM CHLORIDE 1000 ML: 9 INJECTION, SOLUTION INTRAVENOUS at 07:57

## 2025-01-19 RX ADMIN — IOPAMIDOL 75 ML: 755 INJECTION, SOLUTION INTRAVENOUS at 08:45

## 2025-01-19 RX ADMIN — FENTANYL CITRATE 50 MCG: 50 INJECTION, SOLUTION INTRAMUSCULAR; INTRAVENOUS at 09:52

## 2025-01-19 ASSESSMENT — PAIN - FUNCTIONAL ASSESSMENT
PAIN_FUNCTIONAL_ASSESSMENT: 0-10
PAIN_FUNCTIONAL_ASSESSMENT: ACTIVITIES ARE NOT PREVENTED

## 2025-01-19 ASSESSMENT — PAIN SCALES - GENERAL
PAINLEVEL_OUTOF10: 0
PAINLEVEL_OUTOF10: 9
PAINLEVEL_OUTOF10: 9

## 2025-01-19 ASSESSMENT — PAIN DESCRIPTION - DESCRIPTORS
DESCRIPTORS: DISCOMFORT;PRESSURE
DESCRIPTORS: ACHING

## 2025-01-19 ASSESSMENT — PAIN DESCRIPTION - LOCATION
LOCATION: ABDOMEN;GROIN
LOCATION: ABDOMEN

## 2025-01-19 ASSESSMENT — PAIN DESCRIPTION - PAIN TYPE
TYPE: ACUTE PAIN
TYPE: ACUTE PAIN

## 2025-01-19 ASSESSMENT — PAIN DESCRIPTION - FREQUENCY
FREQUENCY: CONTINUOUS
FREQUENCY: INTERMITTENT

## 2025-01-19 ASSESSMENT — PAIN DESCRIPTION - ORIENTATION: ORIENTATION: MID

## 2025-01-19 ASSESSMENT — PAIN DESCRIPTION - ONSET: ONSET: GRADUAL

## 2025-01-19 NOTE — ED PROVIDER NOTES
[facility] EMERGENCY DEPARTMENT  EMERGENCY DEPARTMENT ENCOUNTER        Pt Name: Hardik Gray  MRN: [id number]  Birthdate 1950  Date of evaluation: 1/19/2025  Provider: Tina Cano MD  PCP: Xavi Swenson MD  Note Started: 6:17 AM EST 1/19/25    CHIEF COMPLAINT       Chief Complaint   Patient presents with    Hematuria     const    Constipation       HISTORY OF PRESENT ILLNESS: 1 or more Elements   History From: Patient    Limitations to history : None    Hardik Gray is a 74 y.o. male who presents for generalized abdominal pain, constipation, bleeding from penis over the past day.  Patient states he had a paracentesis due to ascites from liver cirrhosis on Friday.  He states he has been able to have bowel movements but feels he is not getting it all out.  He states he has not been able to urinate since yesterday.  He does also endorse some testicular pain to the right that has been going on for a while.  Denies chest pain, shortness of breath, fevers.     Nursing Notes were all reviewed and agreed with or any disagreements were addressed in the HPI.        REVIEW OF EXTERNAL NOTE :       Patient was last seen in the ED on 12/13/2024 status post abdominal paracentesis, ascites due to alcoholic cirrhosis    REVIEW OF SYSTEMS :           Positives and Pertinent negatives as per HPI.     SURGICAL HISTORY     Past Surgical History:   Procedure Laterality Date    BLADDER SURGERY N/A 3/22/2023    CYSTOSCOPY RETROGRADE PYELOGRAM URETHERAL DILATION SUAREZ INSERTION performed by Rashaad Salvador MD at [facility] OR    CORONARY ANGIOPLASTY WITH STENT PLACEMENT  2002    @ [facility]    CYSTOSCOPY N/A 3/22/2021    CYSTOSCOPY, RETROGRADE PYELOGRAM, URETHRAL DIALATION, TURBT performed by Rashaad Salvador MD at [facility] OR    FOOT SURGERY      lt foot    FOOT SURGERY      rt foot as well    PROSTATE BIOPSY      SIGMOIDOSCOPY N/A 9/24/2024    SIGMOIDOSCOPY CONTROL HEMORRHAGE performed by PARVEEN Mancia MD at [facility]  IntraVENous Given 1/19/25 0952)           Medical Decision Making/Differential Diagnosis:    CC/HPI Summary, Social Determinants of health, Records Reviewed, DDx, testing done/not done, ED Course, Reassessment, disposition considerations/shared decision making with patient, consults, disposition:      ED Course as of 01/19/25 1319   Sun Jan 19, 2025   0612 March 2023  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 Prostate cancer (HCC) - ICD9: 185, ICD10: C61  3. 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  Shows sinus tachycardia at a rate of 107, normal MN interval, normal QRS, QTc 493, T wave inversions in lead III and aVF, no ST elevations or depressions, does not meet STEMI criteria [KM]   0738 I spoke with urology who will come and attempt to place Millan catheter [KM]   0834 EKG:  This EKG is signed and interpreted by ED Physician.  Time:  0708   Rate: 107  Rhythm: Sinus.  Interpretation: non-specific EKG. PERRL, conjunctiva normal.  No STEMI.  QTc 493.  T wave inversion lead III seen on prior.  Nonspecific T wave changes laterally  Comparison: stable as compared to patient's most recent EKG.     [KP]   1000 Urology in the room attempting to place Millan catheter [KP]   1151 Spoke with medicine, discussed case, will admit []      ED Course User Index  [KM] Tina Cano MD  [] Sridevi Padron,       He is a 74-year-old male with a history of cirrhosis presenting for generalized abdominal pain, constipation, bleeding from penis, urinary retention/hematuria over the past day.  Differentials include but not limited to ascites due to cirrhosis, urethral stricture, tumor, appendicitis, diverticulitis, constipation, UTI, SCOTT.  On exam patient is in no acute distress.  His initial vitals are unremarkable.  He is afebrile.  Exam shows abnormal

## 2025-01-19 NOTE — CONSULTS
1/19/2025 10:52 AM  Hardik Gray  [id number]     Chief Complaint:    Urinary retention, difficult Suarez placement    History of Present Illness:      The patient is a 74 y.o. male patient who presented to the hospital with complaints of generalized abdominal pain and bleeding from the phallus for the past day.  He notably does have alcoholic cirrhosis and had a recent paracentesis.  He continues with diffuse anasarca and large volume ascites, and is jaundiced.  He does have significant urologic history and has undergone cystoscopy with urethral dilation in the past for Suarez catheter placement for retention.  Currently he has what appears to be a bleeding wound on the foreskin, with significant penoscrotal edema related to his volume overload.  He is able to void small amounts of blood-tinged urine.  Bladder scan was greater than 500 cc.  CT scan demonstrates distended bladder.    He does also have a history of prostate cancer and has undergone radiation in the past.  He has followed at [facility] for this.      Past Medical History:   Diagnosis Date    Acid reflux     Arthritis     Back pain     CAD (coronary artery disease)     follows with PCP    Cerebral artery occlusion with cerebral infarction (HCC) 10/2009    affected vision initially; no deficits at this time    COPD (chronic obstructive pulmonary disease) (HCC)     Hematuria     Hematuria due to irradiation cystitis 5/16/2023    History of tobacco use 5/16/2023    Hyperlipidemia     Hypertension     Late effect of radiation 5/16/2023    MI (myocardial infarction) (HCC) 2002    follows w PCP    Personal history of radiation therapy 5/16/2023    Prostate cancer (HCC) 2018    treated with radiation    Urinary frequency     Weak urinary stream          Past Surgical History:   Procedure Laterality Date    BLADDER SURGERY N/A 3/22/2023    CYSTOSCOPY RETROGRADE PYELOGRAM URETHERAL DILATION SUAREZ INSERTION performed by Rashaad Salvador MD at [facility] OR     by Louis Tan MD on 1/19/2025 at 10:52 AM  [facility] Urology

## 2025-01-19 NOTE — ED NOTES
ED to Inpatient Handoff Report    Notified evens carmen that electronic handoff available and patient ready for transport to room 444.    Safety Risks: Home safety issues, Difficulty with daily activities, and Risk of falls    Patient in Restraints: no    Constant Observer or Patient : no    Telemetry Monitoring Ordered :Yes           Order to transfer to unit without monitor:NO    Last MEWS: 2 Time completed: 1304    Deterioration Index Score:   Predictive Model Details          22 (Normal)  Factor Value    Calculated 1/19/2025 13:06 61% Age 74 years old    Deterioration Index Model 14% Pulse 103     13% Hematocrit abnormal (23.0 %)     6% Sodium 132 mmol/L     3% Potassium 3.6 mmol/L     3% Platelet count abnormal (102 k/uL)     1% Pulse oximetry 98 %     1% Temperature 97.5 °F (36.4 °C)     0% WBC count 7.9 k/uL     0% Systolic 113     0% Respiratory rate 16        Vitals:    01/19/25 0537 01/19/25 0720 01/19/25 0845 01/19/25 1304   BP: 126/77  121/79 113/72   Pulse: 75 76 (!) 102 (!) 103   Resp: 18  16 16   Temp: 98.1 °F (36.7 °C)   97.5 °F (36.4 °C)   TempSrc: Oral   Oral   SpO2: 98%  98% 98%   Weight: 73.9 kg (163 lb)      Height: 1.753 m (5' 9\")            Opportunity for questions and clarification was provided.

## 2025-01-19 NOTE — PROGRESS NOTES
4 Eyes Skin Assessment     NAME:  Hardik Gray  YOB: 1950  MEDICAL RECORD NUMBER:  [id number]    The patient is being assessed for  Admission    I agree that at least one RN has performed a thorough Head to Toe Skin Assessment on the patient. ALL assessment sites listed below have been assessed.      Areas assessed by both nurses:    Head, Face, Ears, Shoulders, Back, Chest, Arms, Elbows, Hands, Sacrum. Buttock, Coccyx, Ischium, and Legs. Feet and Heels        Does the Patient have a Wound? No noted wound(s)       Sandeep Prevention initiated by RN: No  Wound Care Orders initiated by RN: No    Pressure Injury (Stage 3,4, Unstageable, DTI, NWPT, and Complex wounds) if present, place Wound referral order by RN under : No    New Ostomies, if present place, Ostomy referral order under : No     Nurse 1 eSignature: Electronically signed by Valarie Lange RN on 1/19/25 at 4:00 PM EST    **SHARE this note so that the co-signing nurse can place an eSignature**    Nurse 2 eSignature: Electronically signed by Vani Rodríguez RN on 1/19/25 at 4:01 PM EST

## 2025-01-19 NOTE — H&P
[facility] Hospitalist Group History and Physical      CHIEF COMPLAINT:  hematuria, worsening edema    History of Present Illness:  75 yo male w/ hx of alcoholic cirrhosis, CAD s/p PCI, HTN who p/w several day hx of worsening gross hematuria, scrotal edema, and diffuse LE/abdominal edema. Pt's family reports that pt always has some mild hematuria and that he had edema that started several weeks ago. However, after he had a paracentesis 2 days ago, pt had significant worsening of both sxs. Scrotal edema is to the point where pt is not able to walk. Additionally, pt has \"loose skin\" over the head of his penis that the family member believes was \"ripped off\" several days ago. Pt reports abd pain, and some dyspnea. Denies CP, significant fevers.         REVIEW OF SYSTEMS:  As per HPI.    PMH:  Past Medical History:   Diagnosis Date    Acid reflux     Arthritis     Back pain     CAD (coronary artery disease)     follows with PCP    Cerebral artery occlusion with cerebral infarction (HCC) 10/2009    affected vision initially; no deficits at this time    COPD (chronic obstructive pulmonary disease) ([facility])     Hematuria     Hematuria due to irradiation cystitis 5/16/2023    History of tobacco use 5/16/2023    Hyperlipidemia     Hypertension     Late effect of radiation 5/16/2023    MI (myocardial infarction) (HCC) 2002    follows w PCP    Personal history of radiation therapy 5/16/2023    Prostate cancer (HCC) 2018    treated with radiation    Urinary frequency     Weak urinary stream        Surgical History:  Past Surgical History:   Procedure Laterality Date    BLADDER SURGERY N/A 3/22/2023    CYSTOSCOPY RETROGRADE PYELOGRAM URETHERAL DILATION SUAREZ INSERTION performed by Rashaad Salvdaor MD at [facility] OR    CORONARY ANGIOPLASTY WITH STENT PLACEMENT  2002    @ [facility]    CYSTOSCOPY N/A 3/22/2021    CYSTOSCOPY, RETROGRADE PYELOGRAM, URETHRAL DIALATION, TURBT performed by Rashaad Salvador MD at [facility] OR    FOOT SURGERY       foot

## 2025-01-19 NOTE — CONSULTS
Gastroenterology, Hepatology, &  Advanced Endoscopy    Consult Note      Reason for Consult: Cirrhosis, Fluid Overload     HPI:   Hardik Gray is a 74 y.o. male w/ PMH of  has a past medical history of Acid reflux, Arthritis, Back pain, CAD (coronary artery disease), Cerebral artery occlusion with cerebral infarction (HCC), COPD (chronic obstructive pulmonary disease) (HCC), Hematuria, Hematuria due to irradiation cystitis, History of tobacco use, Hyperlipidemia, Hypertension, Late effect of radiation, MI (myocardial infarction) (HCC), Personal history of radiation therapy, Prostate cancer (HCC), Urinary frequency, and Weak urinary stream. who presents to the ED with fluid overload. [facility] cannot offer much information regarding outpatient care for cirrhosis. He does report that he believes that he is on diuretics. He has a history of prostate cancer s/p radiation which required Flex Sig with APC management of radiation proctitis in Sept 2024.  He presents with abdominal distention and LE edema. He had a paracentesis 1/17 with removal of 2.57L. Fluid studies were not performed.     Chart review shows that he has tested + for HCV Ab and HBV Ag in the past though had overall negative or very low viral loads. The patient is noted to be on entecavir.       Recent Labs     01/17/25  1400 01/19/25  0636   INR 2.7 2.6   ALT  --  13   AST  --  47*   ALKPHOS  --  198*   BILITOT  --  11.0*   BILIDIR  --  5.9*     Lab Results   Component Value Date    WBC 7.9 01/19/2025    HGB 7.5 (L) 01/19/2025    HCT 23.0 (L) 01/19/2025     (L) 01/19/2025     01/19/2025    K 3.6 01/19/2025    CL 94 (L) 01/19/2025    CREATININE 0.9 01/19/2025    BUN 12 01/19/2025    CO2 22 01/19/2025    FOLATE 7.6 07/09/2024    [id number] 726 07/09/2024    AMMONIA 69 (H) 01/15/2025    GLUCOSE 110 (H) 01/19/2025    INR 2.6 01/19/2025    PROTIME 27.8 (H) 01/19/2025    TSH 1.11 01/19/2025    LABA1C 4.7  07/09/2024     Lab Results   Component Value Date    INR 2.6 01/19/2025    INR 2.7 01/17/2025    INR 2.6 12/13/2024    PROTIME 27.8 (H) 01/19/2025    PROTIME 29.6 (H) 01/17/2025    PROTIME 28.8 (H) 12/13/2024      MELD 3.0: 29 at 1/19/2025  6:36 AM  MELD-Na: 28 at 1/19/2025  6:36 AM  Calculated from:  Serum Creatinine: 0.9 mg/dL (Using min of 1 mg/dL) at 1/19/2025  6:36 AM  Serum Sodium: 132 mmol/L at 1/19/2025  6:36 AM  Total Bilirubin: 11.0 mg/dL at 1/19/2025  6:36 AM  Serum Albumin: 2.4 g/dL at 1/19/2025  6:36 AM  INR(ratio): 2.6 at 1/19/2025  6:36 AM  Age at listing (hypothetical): 74 years  Sex: Male at 1/19/2025  6:36 AM     Lipase   Date Value Ref Range Status   01/19/2025 25 13 - 60 U/L Final   12/13/2024 30 13 - 60 U/L Final   09/19/2024 101 (H) 13 - 60 U/L Final          US Result (most recent):  US URINARY BLADDER LIMITED 01/19/2025    Narrative  EXAMINATION:  RETROPERITONEAL ULTRASOUND OF THE KIDNEYS AND URINARY BLADDER    1/19/2025    COMPARISON:  None    HISTORY:  ORDERING SYSTEM PROVIDED HISTORY: rule out urinary retention  TECHNOLOGIST PROVIDED HISTORY:    Reason for exam:->rule out urinary retention  What reading provider will be dictating this exam?->CRC    FINDINGS:  Kidneys:    The right kidney measures  10.0 cm  in length and the left kidney measures  0.9 cm    in length.    Kidneys demonstrate normal cortical echogenicity.  No evidence of  hydronephrosis or intrarenal stones.    Bladder:    Unremarkable Millan catheter in a decompressed urinary bladder.  Diffuse  urinary bladder wall thickening up to 2.2 mm.    MISCELLANEOUS:    Pelvic ascites.    Impression  1. Unremarkable ultrasound of the kidneys.  2. Millan catheter with mild diffuse urinary bladder wall thickening.  3. Pelvic ascites.     CT Result (most recent):  CT ABDOMEN PELVIS W IV CONTRAST 01/19/2025    Narrative  EXAMINATION:  CT OF THE ABDOMEN AND PELVIS WITH CONTRAST 1/19/2025 8:44 am    TECHNIQUE:  CT of the abdomen and pelvis was  last flexible sigmoidoscopy: 2024    PMH:       Diagnosis Date    Acid reflux     Arthritis     Back pain     CAD (coronary artery disease)     follows with PCP    Cerebral artery occlusion with cerebral infarction (HCC) 10/2009    affected vision initially; no deficits at this time    COPD (chronic obstructive pulmonary disease) (HCC)     Hematuria     Hematuria due to irradiation cystitis 2023    History of tobacco use 2023    Hyperlipidemia     Hypertension     Late effect of radiation 2023    MI (myocardial infarction) ([facility])     follows w PCP    Personal history of radiation therapy 2023    Prostate cancer ([facility]) 2018    treated with radiation    Urinary frequency     Weak urinary stream         PSH:       Procedure Laterality Date    BLADDER SURGERY N/A 3/22/2023    CYSTOSCOPY RETROGRADE PYELOGRAM URETHERAL DILATION SUAREZ INSERTION performed by Rashaad Salvador MD at [facility] OR    CORONARY ANGIOPLASTY WITH STENT PLACEMENT      @ [facility]    CYSTOSCOPY N/A 3/22/2021    CYSTOSCOPY, RETROGRADE PYELOGRAM, URETHRAL DIALATION, TURBT performed by Rashaad Salvador MD at [facility] OR    FOOT SURGERY      lt foot    FOOT SURGERY      rt foot as well    PROSTATE BIOPSY      SIGMOIDOSCOPY N/A 2024    SIGMOIDOSCOPY CONTROL HEMORRHAGE performed by PARVEEN Mancia MD at [facility] ENDOSCOPY        Family History:       Problem Relation Age of Onset    Stroke Mother     Diabetes Mother     Heart Attack Father     Heart Attack Brother         Social History:   Social History     Tobacco Use    Smoking status: Former     Current packs/day: 0.00     Average packs/day: 1 pack/day for 42.3 years (42.3 ttl pk-yrs)     Types: Cigarettes     Start date: 1971     Quit date: 2013     Years since quittin.4    Smokeless tobacco: Never   Vaping Use    Vaping status: Never Used   Substance Use Topics    Alcohol use: Yes     Comment: occasional beer. None since 8/15/2024    Drug use: Not Currently     Types:

## 2025-01-19 NOTE — ED NOTES
Urology at bedside placing rodrigues catheter. For urinary retention. Pt was bladder scanned this morning per night shift nurse 500ml noted on bladder scan performed at 6:30 this morning

## 2025-01-20 ENCOUNTER — APPOINTMENT (OUTPATIENT)
Dept: ULTRASOUND IMAGING | Age: 75
End: 2025-01-20
Payer: COMMERCIAL

## 2025-01-20 LAB
ALBUMIN FLD-MCNC: 0.5 G/DL
ALBUMIN SERPL-MCNC: 2.1 G/DL (ref 3.5–5.2)
ALP SERPL-CCNC: 91 U/L (ref 40–129)
ALT SERPL-CCNC: 13 U/L (ref 0–40)
ANION GAP SERPL CALCULATED.3IONS-SCNC: 9 MMOL/L (ref 7–16)
APPEARANCE FLD: CLEAR
AST SERPL-CCNC: 38 U/L (ref 0–39)
BASOPHILS # BLD: 0.01 K/UL (ref 0–0.2)
BASOPHILS NFR BLD: 0 % (ref 0–2)
BILIRUB SERPL-MCNC: 10.1 MG/DL (ref 0–1.2)
BODY FLD TYPE: NORMAL
BUN SERPL-MCNC: 13 MG/DL (ref 6–23)
CALCIUM SERPL-MCNC: 7.9 MG/DL (ref 8.6–10.2)
CHLORIDE SERPL-SCNC: 99 MMOL/L (ref 98–107)
CLOT CHECK: NORMAL
CO2 SERPL-SCNC: 27 MMOL/L (ref 22–29)
COLOR FLD: YELLOW
CREAT SERPL-MCNC: 1 MG/DL (ref 0.7–1.2)
EKG ATRIAL RATE: 107 BPM
EKG P AXIS: 35 DEGREES
EKG P-R INTERVAL: 170 MS
EKG Q-T INTERVAL: 370 MS
EKG QRS DURATION: 74 MS
EKG QTC CALCULATION (BAZETT): 493 MS
EKG R AXIS: 27 DEGREES
EKG T AXIS: -15 DEGREES
EKG VENTRICULAR RATE: 107 BPM
EOSINOPHIL # BLD: 0.23 K/UL (ref 0.05–0.5)
EOSINOPHILS RELATIVE PERCENT: 4 % (ref 0–6)
ERYTHROCYTE [DISTWIDTH] IN BLOOD BY AUTOMATED COUNT: 17.8 % (ref 11.5–15)
GFR, ESTIMATED: 85 ML/MIN/1.73M2
GLUCOSE FLD-MCNC: 102 MG/DL
GLUCOSE SERPL-MCNC: 104 MG/DL (ref 74–99)
HCT VFR BLD AUTO: 21.5 % (ref 37–54)
HGB BLD-MCNC: 7.1 G/DL (ref 12.5–16.5)
IMM GRANULOCYTES # BLD AUTO: 0.03 K/UL (ref 0–0.58)
IMM GRANULOCYTES NFR BLD: 1 % (ref 0–5)
INR PPP: 2.4
LACTATE BLDV-SCNC: 2.6 MMOL/L (ref 0.5–2.2)
LYMPHOCYTES NFR BLD: 1.54 K/UL (ref 1.5–4)
LYMPHOCYTES RELATIVE PERCENT: 25 % (ref 20–42)
MCH RBC QN AUTO: 33.3 PG (ref 26–35)
MCHC RBC AUTO-ENTMCNC: 33 G/DL (ref 32–34.5)
MCV RBC AUTO: 100.9 FL (ref 80–99.9)
MICROORGANISM SPEC CULT: NO GROWTH
MONOCYTES NFR BLD: 1.33 K/UL (ref 0.1–0.95)
MONOCYTES NFR BLD: 22 % (ref 2–12)
MONOCYTES NFR FLD: 88 %
NEUTROPHILS NFR BLD: 49 % (ref 43–80)
NEUTROPHILS NFR FLD: 12 %
NEUTS SEG NFR BLD: 3 K/UL (ref 1.8–7.3)
PARTIAL THROMBOPLASTIN TIME: 42.8 SEC (ref 24.5–35.1)
PLATELET # BLD AUTO: 210 K/UL (ref 130–450)
PMV BLD AUTO: 12.5 FL (ref 7–12)
POTASSIUM SERPL-SCNC: 3.1 MMOL/L (ref 3.5–5)
PROT FLD-MCNC: 0.9 G/DL
PROT SERPL-MCNC: 6.2 G/DL (ref 6.4–8.3)
PROTHROMBIN TIME: 26.6 SEC (ref 9.3–12.4)
RBC # BLD AUTO: 2.13 M/UL (ref 3.8–5.8)
RBC # FLD: <2000 CELLS/UL
SERVICE CMNT-IMP: NORMAL
SODIUM SERPL-SCNC: 135 MMOL/L (ref 132–146)
SPECIMEN DESCRIPTION: NORMAL
SPECIMEN TYPE: NORMAL
WBC # FLD: 76 CELLS/UL
WBC OTHER # BLD: 6.1 K/UL (ref 4.5–11.5)

## 2025-01-20 PROCEDURE — 2500000003 HC RX 250 WO HCPCS: Performed by: STUDENT IN AN ORGANIZED HEALTH CARE EDUCATION/TRAINING PROGRAM

## 2025-01-20 PROCEDURE — 2060000000 HC ICU INTERMEDIATE R&B

## 2025-01-20 PROCEDURE — 87522 HEPATITIS C REVRS TRNSCRPJ: CPT

## 2025-01-20 PROCEDURE — 85610 PROTHROMBIN TIME: CPT

## 2025-01-20 PROCEDURE — 82945 GLUCOSE OTHER FLUID: CPT

## 2025-01-20 PROCEDURE — 87070 CULTURE OTHR SPECIMN AEROBIC: CPT

## 2025-01-20 PROCEDURE — 80074 ACUTE HEPATITIS PANEL: CPT

## 2025-01-20 PROCEDURE — 89051 BODY FLUID CELL COUNT: CPT

## 2025-01-20 PROCEDURE — 88112 CYTOPATH CELL ENHANCE TECH: CPT

## 2025-01-20 PROCEDURE — 93010 ELECTROCARDIOGRAM REPORT: CPT | Performed by: INTERNAL MEDICINE

## 2025-01-20 PROCEDURE — 6370000000 HC RX 637 (ALT 250 FOR IP): Performed by: STUDENT IN AN ORGANIZED HEALTH CARE EDUCATION/TRAINING PROGRAM

## 2025-01-20 PROCEDURE — 88305 TISSUE EXAM BY PATHOLOGIST: CPT

## 2025-01-20 PROCEDURE — 6360000002 HC RX W HCPCS: Performed by: STUDENT IN AN ORGANIZED HEALTH CARE EDUCATION/TRAINING PROGRAM

## 2025-01-20 PROCEDURE — 87205 SMEAR GRAM STAIN: CPT

## 2025-01-20 PROCEDURE — G0480 DRUG TEST DEF 1-7 CLASSES: HCPCS

## 2025-01-20 PROCEDURE — 85025 COMPLETE CBC W/AUTO DIFF WBC: CPT

## 2025-01-20 PROCEDURE — 6360000002 HC RX W HCPCS: Performed by: PHYSICIAN ASSISTANT

## 2025-01-20 PROCEDURE — 87517 HEPATITIS B DNA QUANT: CPT

## 2025-01-20 PROCEDURE — 85730 THROMBOPLASTIN TIME PARTIAL: CPT

## 2025-01-20 PROCEDURE — 84157 ASSAY OF PROTEIN OTHER: CPT

## 2025-01-20 PROCEDURE — 80053 COMPREHEN METABOLIC PANEL: CPT

## 2025-01-20 PROCEDURE — 36415 COLL VENOUS BLD VENIPUNCTURE: CPT

## 2025-01-20 PROCEDURE — 51700 IRRIGATION OF BLADDER: CPT

## 2025-01-20 PROCEDURE — P9047 ALBUMIN (HUMAN), 25%, 50ML: HCPCS | Performed by: STUDENT IN AN ORGANIZED HEALTH CARE EDUCATION/TRAINING PROGRAM

## 2025-01-20 PROCEDURE — 0W9G3ZZ DRAINAGE OF PERITONEAL CAVITY, PERCUTANEOUS APPROACH: ICD-10-PCS | Performed by: STUDENT IN AN ORGANIZED HEALTH CARE EDUCATION/TRAINING PROGRAM

## 2025-01-20 PROCEDURE — C1729 CATH, DRAINAGE: HCPCS

## 2025-01-20 PROCEDURE — 99232 SBSQ HOSP IP/OBS MODERATE 35: CPT | Performed by: STUDENT IN AN ORGANIZED HEALTH CARE EDUCATION/TRAINING PROGRAM

## 2025-01-20 PROCEDURE — 83605 ASSAY OF LACTIC ACID: CPT

## 2025-01-20 PROCEDURE — 82042 OTHER SOURCE ALBUMIN QUAN EA: CPT

## 2025-01-20 PROCEDURE — 86803 HEPATITIS C AB TEST: CPT

## 2025-01-20 RX ORDER — LIDOCAINE HYDROCHLORIDE 10 MG/ML
INJECTION, SOLUTION INFILTRATION; PERINEURAL PRN
Status: COMPLETED | OUTPATIENT
Start: 2025-01-20 | End: 2025-01-20

## 2025-01-20 RX ORDER — LACTULOSE 10 G/15ML
20 SOLUTION ORAL 2 TIMES DAILY
Status: DISCONTINUED | OUTPATIENT
Start: 2025-01-20 | End: 2025-01-23 | Stop reason: HOSPADM

## 2025-01-20 RX ADMIN — Medication 3 MG: at 20:29

## 2025-01-20 RX ADMIN — SODIUM CHLORIDE, PRESERVATIVE FREE 10 ML: 5 INJECTION INTRAVENOUS at 09:04

## 2025-01-20 RX ADMIN — OXYCODONE 10 MG: 5 TABLET ORAL at 20:17

## 2025-01-20 RX ADMIN — MIDODRINE HYDROCHLORIDE 5 MG: 5 TABLET ORAL at 11:08

## 2025-01-20 RX ADMIN — ALBUMIN (HUMAN) 25 G: 0.25 INJECTION, SOLUTION INTRAVENOUS at 11:11

## 2025-01-20 RX ADMIN — Medication 500 MG: at 07:58

## 2025-01-20 RX ADMIN — SODIUM CHLORIDE, PRESERVATIVE FREE 10 ML: 5 INJECTION INTRAVENOUS at 08:00

## 2025-01-20 RX ADMIN — ALBUMIN (HUMAN) 25 G: 0.25 INJECTION, SOLUTION INTRAVENOUS at 20:16

## 2025-01-20 RX ADMIN — FUROSEMIDE 40 MG: 10 INJECTION, SOLUTION INTRAMUSCULAR; INTRAVENOUS at 07:59

## 2025-01-20 RX ADMIN — OXYCODONE 10 MG: 5 TABLET ORAL at 07:59

## 2025-01-20 RX ADMIN — PROCHLORPERAZINE EDISYLATE 10 MG: 5 INJECTION INTRAMUSCULAR; INTRAVENOUS at 02:09

## 2025-01-20 RX ADMIN — PANTOPRAZOLE SODIUM 40 MG: 40 TABLET, DELAYED RELEASE ORAL at 07:58

## 2025-01-20 RX ADMIN — SPIRONOLACTONE 50 MG: 25 TABLET ORAL at 07:58

## 2025-01-20 RX ADMIN — FOLIC ACID 1 MG: 1 TABLET ORAL at 07:58

## 2025-01-20 RX ADMIN — MIDODRINE HYDROCHLORIDE 5 MG: 5 TABLET ORAL at 07:58

## 2025-01-20 RX ADMIN — ALBUMIN (HUMAN) 25 G: 0.25 INJECTION, SOLUTION INTRAVENOUS at 04:16

## 2025-01-20 RX ADMIN — METOPROLOL TARTRATE 50 MG: 50 TABLET, FILM COATED ORAL at 07:58

## 2025-01-20 RX ADMIN — MIDODRINE HYDROCHLORIDE 5 MG: 5 TABLET ORAL at 16:35

## 2025-01-20 RX ADMIN — FERROUS SULFATE TAB 325 MG (65 MG ELEMENTAL FE) 325 MG: 325 (65 FE) TAB at 20:17

## 2025-01-20 RX ADMIN — METOPROLOL TARTRATE 50 MG: 50 TABLET, FILM COATED ORAL at 20:18

## 2025-01-20 RX ADMIN — LIDOCAINE HYDROCHLORIDE 7 ML: 10 INJECTION, SOLUTION INFILTRATION; PERINEURAL at 10:14

## 2025-01-20 RX ADMIN — POTASSIUM CHLORIDE 40 MEQ: 1500 TABLET, EXTENDED RELEASE ORAL at 12:48

## 2025-01-20 RX ADMIN — OXYCODONE 10 MG: 5 TABLET ORAL at 02:02

## 2025-01-20 RX ADMIN — TAMSULOSIN HYDROCHLORIDE 0.4 MG: 0.4 CAPSULE ORAL at 07:58

## 2025-01-20 RX ADMIN — LACTULOSE 20 G: 20 SOLUTION ORAL at 07:58

## 2025-01-20 RX ADMIN — FUROSEMIDE 40 MG: 10 INJECTION, SOLUTION INTRAMUSCULAR; INTRAVENOUS at 16:35

## 2025-01-20 RX ADMIN — CEFTRIAXONE 1000 MG: 1 INJECTION, POWDER, FOR SOLUTION INTRAMUSCULAR; INTRAVENOUS at 07:59

## 2025-01-20 RX ADMIN — FERROUS SULFATE TAB 325 MG (65 MG ELEMENTAL FE) 325 MG: 325 (65 FE) TAB at 07:58

## 2025-01-20 RX ADMIN — SODIUM CHLORIDE, PRESERVATIVE FREE 10 ML: 5 INJECTION INTRAVENOUS at 20:19

## 2025-01-20 RX ADMIN — LACTULOSE 20 G: 20 SOLUTION ORAL at 20:17

## 2025-01-20 ASSESSMENT — PAIN SCALES - GENERAL
PAINLEVEL_OUTOF10: 7
PAINLEVEL_OUTOF10: 0
PAINLEVEL_OUTOF10: 0
PAINLEVEL_OUTOF10: 7
PAINLEVEL_OUTOF10: 10
PAINLEVEL_OUTOF10: 0
PAINLEVEL_OUTOF10: 0

## 2025-01-20 ASSESSMENT — PAIN DESCRIPTION - LOCATION
LOCATION: ABDOMEN
LOCATION: KNEE
LOCATION: ANKLE;ABDOMEN

## 2025-01-20 ASSESSMENT — PAIN - FUNCTIONAL ASSESSMENT
PAIN_FUNCTIONAL_ASSESSMENT: 0-10
PAIN_FUNCTIONAL_ASSESSMENT: ACTIVITIES ARE NOT PREVENTED
PAIN_FUNCTIONAL_ASSESSMENT: ACTIVITIES ARE NOT PREVENTED

## 2025-01-20 ASSESSMENT — PAIN DESCRIPTION - DESCRIPTORS
DESCRIPTORS: ACHING;SORE
DESCRIPTORS: ACHING
DESCRIPTORS: ACHING;SORE;DISCOMFORT
DESCRIPTORS: ACHING;DISCOMFORT;SORE

## 2025-01-20 ASSESSMENT — PAIN DESCRIPTION - ORIENTATION
ORIENTATION: LEFT;RIGHT
ORIENTATION: RIGHT;LEFT

## 2025-01-20 ASSESSMENT — PAIN DESCRIPTION - FREQUENCY: FREQUENCY: INTERMITTENT

## 2025-01-20 ASSESSMENT — PAIN DESCRIPTION - PAIN TYPE: TYPE: ACUTE PAIN

## 2025-01-20 ASSESSMENT — PAIN DESCRIPTION - ONSET: ONSET: GRADUAL

## 2025-01-20 NOTE — PROGRESS NOTES
[facility] Hospitalist Progress Note    Admitting Date and Time: 1/19/2025  5:45 AM  Admit Dx: Lactic acidosis [E87.20]  Urinary retention [R33.9]  Anasarca [R60.1]  UTI (urinary tract infection) [N39.0]  Urinary tract infection with hematuria, site unspecified [N39.0, R31.9]    Subjective:  Patient is being followed for Lactic acidosis [E87.20]  Urinary retention [R33.9]  Anasarca [R60.1]  UTI (urinary tract infection) [N39.0]  Urinary tract infection with hematuria, site unspecified [N39.0, R31.9]     Patient seen and evaluated after US guided paracentesis. He denies hematuria. Edema has improved. He has generalized weakness.     ROS: Pertinent findings stated above. Denies dizziness, diaphoresis, fever, chills, chest pain, palpitations, cough, shortness of breath, nausea, vomiting, dysuria, melena, hematochezia, diarrhea, or constipation.     lactulose  20 g Oral BID    furosemide  40 mg IntraVENous BID    spironolactone  50 mg Oral Daily    cefTRIAXone (ROCEPHIN) IV  1,000 mg IntraVENous Q24H    sodium chloride flush  5-40 mL IntraVENous 2 times per day    albumin human 25%  25 g IntraVENous Q8H    entecavir  1 mg Oral Daily    ferrous sulfate  325 mg Oral BID    folic acid  1 mg Oral Daily    Magnesium Gluconate  500 mg Oral Daily    metoprolol tartrate  50 mg Oral BID    midodrine  5 mg Oral TID WC    pantoprazole  40 mg Oral Daily    tamsulosin  0.4 mg Oral Daily     prochlorperazine, 10 mg, Q6H PRN  sodium chloride flush, 5-40 mL, PRN  sodium chloride, , PRN  potassium chloride, 40 mEq, PRN   Or  potassium alternative oral replacement, 40 mEq, PRN   Or  potassium chloride, 10 mEq, PRN  magnesium sulfate, 2,000 mg, PRN  polyethylene glycol, 17 g, Daily PRN  melatonin, 3 mg, Nightly PRN  albuterol, 2.5 mg, Q6H PRN  fluticasone, 1 spray, Daily PRN  white petrolatum, , PRN  oxyCODONE HCl, 10 mg, Q6H PRN         Objective:    BP 98/65   Pulse 87   Temp 97.8 °F (36.6 °C) (Oral)   Resp 16   Ht 1.753 m  (5' 9.02\")   Wt 86.8 kg (191 lb 6.4 oz)   SpO2 100%   BMI 28.25 kg/m²     General Appearance: No acute distress. Alert and oriented to person, place and time  Malnourished  Skin: warm and dry  HEENT: normocephalic and atraumatic, extraocular movement intact, conjunctiva clear, no sclera icteris, oral mucosa moist  Pulmonary/Chest: Normal air movement, bibasilar crackles present. No wheezing or rhonchi  Cardiovascular: normal rate, normal S1 and S2 and no carotid bruits  Abdomen: firm, non-tender, slightly distended, normal bowel sounds, no masses or organomegaly. No rebound tenderness or guarding   Extremities: +2 pitting edema in B/L LE, no clubbing, or cyanosis   Neurologic: CN III-XII intact, alert and oriented x 3        Recent Labs     01/19/25  0636 01/20/25  1145    135   K 3.6 3.1*   CL 94* 99   CO2 22 27   BUN 12 13   CREATININE 0.9 1.0   GLUCOSE 110* 104*   CALCIUM 8.5* 7.9*       Recent Labs     01/19/25  0636 01/20/25  0855   WBC 7.9 6.1   RBC 2.35* 2.13*   HGB 7.5* 7.1*   HCT 23.0* 21.5*   MCV 97.9 100.9*   MCH 31.9 33.3   MCHC 32.6 33.0   RDW 16.0* 17.8*   * 210   MPV 8.6 12.5*         Assessment:    Principal Problem:    UTI (urinary tract infection)  Active Problems:    Hepatitis B, chronic (HCC)    Alcoholic cirrhosis of liver with ascites (HCC)    Gross hematuria    Penile lesion    Volume overload    Elevated lactic acid level  Resolved Problems:    * No resolved hospital problems. *      Plan:  1.  Lactic Acidosis  Lactic acid now  2.6 from 7.3 on admission, continue to monitor  2.  UTI  3. Gross Hematuria  4. Penile Lesion  Patient on rocephin and Flomax  5. Liver Cirrhosis secondary  to Alcohol abuse  6. Hepatic Encephalopathy  Alert and oriented x 3   Underwent US guided paracentesis 01/20 with IR. 4450cc yellow, cloudy fluid removed and sent of analysis/culture. Albumin ordered   Continue Lactulose, 50mg Aldactone p.o. qD, 40mg IV lasix BID, and Entecavir  GI following,

## 2025-01-20 NOTE — OR NURSING
Pt transported to IR room for paracentesis. Pt is alert and oriented, states  pain prior to procedure. Ultrasound images taken prior to procedure. Procedure is explained to patient, including possible risks. Pt verbalizes understanding and consent form signed.    Procedure done under sterile technique and guidance of ultrasound imaging. 1% Lidocaine is used during procedure for comfort measures. 6 Cymraes safet-centesis.  A  total of  4450 ml's of cloudy yellow colored ascitic fluid drained during procedure. Catheter removed and  dressing applied to site with no bleeding noted. Specimen collected and sent to lab for ordered testing.  Pt tolerated procedure well and denies any pain after.  Pt transported out of department with no difficulties with transport, report called to  Brandie JAY.

## 2025-01-20 NOTE — PROCEDURES
Procedure: Ultrasound Guided Paracentesis    Diagnosis: Ascites    Findings: Large volume ascites, successful catheter placement with cloudy yellow ascitic fluid return    Specimen:  Approximately 50cc obtained and sent for analysis (orders from referring physician).  Total amount of fluid removed to be reported in PACS.    Anesthesia: Local infiltration of 10cc 1% Lidocaine without epi    EBL: Minimal.    Complication: None immediately post procedure.    Plan: Return to floor/room following paracentesis.    Comments:    See radiology dictation in PACs for image review and additional procedural information.

## 2025-01-20 NOTE — PROGRESS NOTES
1/20/2025 4:50 PM  Hardik Gray  [id number]    Subjective:    He is laying in bed   Nursing is present at the bedside   Rodrigues draining dark red urine     Review of Systems  Constitutional: No fever or chills   Respiratory: negative for cough and hemoptysis  Cardiovascular: negative for chest pain and dyspnea  Gastrointestinal: negative for abdominal pain, diarrhea, nausea and vomiting   : See above  Derm: negative for rash and skin lesion(s)  Neurological: negative for seizures and tremors  Musculoskeletal: Negative    Psychiatric: Negative   All other reviews are negative      Scheduled Meds:   lactulose  20 g Oral BID    furosemide  40 mg IntraVENous BID    spironolactone  50 mg Oral Daily    cefTRIAXone (ROCEPHIN) IV  1,000 mg IntraVENous Q24H    sodium chloride flush  5-40 mL IntraVENous 2 times per day    albumin human 25%  25 g IntraVENous Q8H    entecavir  1 mg Oral Daily    ferrous sulfate  325 mg Oral BID    folic acid  1 mg Oral Daily    Magnesium Gluconate  500 mg Oral Daily    metoprolol tartrate  50 mg Oral BID    midodrine  5 mg Oral TID WC    pantoprazole  40 mg Oral Daily    tamsulosin  0.4 mg Oral Daily       Objective:  Vitals:    01/20/25 1445   BP: 98/65   Pulse: 87   Resp: 16   Temp: 97.8 °F (36.6 °C)   SpO2: 100%         Allergies: Simvastatin and Crestor [rosuvastatin]    General Appearance: alert and oriented to person, place and time and in no acute distress  Skin: no rash or erythema  Head: normocephalic and atraumatic  Pulmonary/Chest: normal air movement, no respiratory distress  Abdomen: soft, non-tender, non-distended  Genitourinary: rodrigues draining dark red urine   Extremities: no cyanosis, clubbing or edema         Labs:     Recent Labs     01/20/25  1145      K 3.1*   CL 99   CO2 27   BUN 13   CREATININE 1.0   GLUCOSE 104*   CALCIUM 7.9*       Lab Results   Component Value Date/Time    HGB 7.1 01/20/2025 08:55 AM    HCT 21.5 01/20/2025 08:55 AM       Lab Results   Component

## 2025-01-20 NOTE — PROGRESS NOTES
Hardik Gray was ordered entecavir (Baraclude) which is a nonformulary medication. Nurse is going to check with patient to see if home supply of this medication will be brought in to the hospital for inpatient use.  A pharmacist will follow-up with the nurse of the patient to assess the capability of the patient to bring in the medication.  If it is determined that the patient cannot supply the medication and it is not available to be dispensed from the pharmacy, a call will be placed to the ordering provider to discuss alternative options.     Kris Douglas, PharmD  01/19/25 8:26 PM

## 2025-01-21 LAB
ALBUMIN SERPL-MCNC: 2.7 G/DL (ref 3.5–5.2)
ALP SERPL-CCNC: 69 U/L (ref 40–129)
ALT SERPL-CCNC: 9 U/L (ref 0–40)
ANION GAP SERPL CALCULATED.3IONS-SCNC: 10 MMOL/L (ref 7–16)
AST SERPL-CCNC: 34 U/L (ref 0–39)
BASOPHILS # BLD: 0 K/UL (ref 0–0.2)
BASOPHILS NFR BLD: 0 % (ref 0–2)
BILIRUB SERPL-MCNC: 9 MG/DL (ref 0–1.2)
BUN SERPL-MCNC: 13 MG/DL (ref 6–23)
CALCIUM SERPL-MCNC: 8 MG/DL (ref 8.6–10.2)
CHLORIDE SERPL-SCNC: 100 MMOL/L (ref 98–107)
CO2 SERPL-SCNC: 26 MMOL/L (ref 22–29)
CREAT SERPL-MCNC: 1 MG/DL (ref 0.7–1.2)
EOSINOPHIL # BLD: 0.32 K/UL (ref 0.05–0.5)
EOSINOPHILS RELATIVE PERCENT: 5 % (ref 0–6)
ERYTHROCYTE [DISTWIDTH] IN BLOOD BY AUTOMATED COUNT: 16.4 % (ref 11.5–15)
GFR, ESTIMATED: 81 ML/MIN/1.73M2
GLUCOSE SERPL-MCNC: 118 MG/DL (ref 74–99)
HCT VFR BLD AUTO: 19.1 % (ref 37–54)
HCT VFR BLD AUTO: 19.8 % (ref 37–54)
HGB BLD-MCNC: 6.2 G/DL (ref 12.5–16.5)
HGB BLD-MCNC: 6.4 G/DL (ref 12.5–16.5)
INR PPP: 2.8
LYMPHOCYTES NFR BLD: 1.79 K/UL (ref 1.5–4)
LYMPHOCYTES RELATIVE PERCENT: 30 % (ref 20–42)
MCH RBC QN AUTO: 32.2 PG (ref 26–35)
MCHC RBC AUTO-ENTMCNC: 32.3 G/DL (ref 32–34.5)
MCV RBC AUTO: 99.5 FL (ref 80–99.9)
MONOCYTES NFR BLD: 1.21 K/UL (ref 0.1–0.95)
MONOCYTES NFR BLD: 20 % (ref 2–12)
NEUTROPHILS NFR BLD: 45 % (ref 43–80)
NEUTS SEG NFR BLD: 2.68 K/UL (ref 1.8–7.3)
NON-GYN CYTOLOGY REPORT: NORMAL
PLATELET # BLD AUTO: 87 K/UL (ref 130–450)
PLATELET CONFIRMATION: NORMAL
PMV BLD AUTO: 9.3 FL (ref 7–12)
POTASSIUM SERPL-SCNC: 3.3 MMOL/L (ref 3.5–5)
PROT SERPL-MCNC: 6 G/DL (ref 6.4–8.3)
PROTHROMBIN TIME: 29.4 SEC (ref 9.3–12.4)
RBC # BLD AUTO: 1.99 M/UL (ref 3.8–5.8)
RBC # BLD: ABNORMAL 10*6/UL
SODIUM SERPL-SCNC: 136 MMOL/L (ref 132–146)
WBC OTHER # BLD: 6 K/UL (ref 4.5–11.5)

## 2025-01-21 PROCEDURE — 6360000002 HC RX W HCPCS: Performed by: STUDENT IN AN ORGANIZED HEALTH CARE EDUCATION/TRAINING PROGRAM

## 2025-01-21 PROCEDURE — 86850 RBC ANTIBODY SCREEN: CPT

## 2025-01-21 PROCEDURE — 80053 COMPREHEN METABOLIC PANEL: CPT

## 2025-01-21 PROCEDURE — 6370000000 HC RX 637 (ALT 250 FOR IP): Performed by: STUDENT IN AN ORGANIZED HEALTH CARE EDUCATION/TRAINING PROGRAM

## 2025-01-21 PROCEDURE — P9016 RBC LEUKOCYTES REDUCED: HCPCS

## 2025-01-21 PROCEDURE — 99232 SBSQ HOSP IP/OBS MODERATE 35: CPT | Performed by: STUDENT IN AN ORGANIZED HEALTH CARE EDUCATION/TRAINING PROGRAM

## 2025-01-21 PROCEDURE — 36430 TRANSFUSION BLD/BLD COMPNT: CPT

## 2025-01-21 PROCEDURE — 85014 HEMATOCRIT: CPT

## 2025-01-21 PROCEDURE — 85610 PROTHROMBIN TIME: CPT

## 2025-01-21 PROCEDURE — 86923 COMPATIBILITY TEST ELECTRIC: CPT

## 2025-01-21 PROCEDURE — 85025 COMPLETE CBC W/AUTO DIFF WBC: CPT

## 2025-01-21 PROCEDURE — 36415 COLL VENOUS BLD VENIPUNCTURE: CPT

## 2025-01-21 PROCEDURE — 51700 IRRIGATION OF BLADDER: CPT

## 2025-01-21 PROCEDURE — 30233N1 TRANSFUSION OF NONAUTOLOGOUS RED BLOOD CELLS INTO PERIPHERAL VEIN, PERCUTANEOUS APPROACH: ICD-10-PCS | Performed by: STUDENT IN AN ORGANIZED HEALTH CARE EDUCATION/TRAINING PROGRAM

## 2025-01-21 PROCEDURE — 86901 BLOOD TYPING SEROLOGIC RH(D): CPT

## 2025-01-21 PROCEDURE — 86900 BLOOD TYPING SEROLOGIC ABO: CPT

## 2025-01-21 PROCEDURE — 2060000000 HC ICU INTERMEDIATE R&B

## 2025-01-21 PROCEDURE — 85018 HEMOGLOBIN: CPT

## 2025-01-21 PROCEDURE — P9047 ALBUMIN (HUMAN), 25%, 50ML: HCPCS | Performed by: STUDENT IN AN ORGANIZED HEALTH CARE EDUCATION/TRAINING PROGRAM

## 2025-01-21 PROCEDURE — 2500000003 HC RX 250 WO HCPCS: Performed by: STUDENT IN AN ORGANIZED HEALTH CARE EDUCATION/TRAINING PROGRAM

## 2025-01-21 RX ORDER — CALCIUM CARBONATE 500 MG/1
500 TABLET, CHEWABLE ORAL 3 TIMES DAILY PRN
Status: DISCONTINUED | OUTPATIENT
Start: 2025-01-21 | End: 2025-01-23 | Stop reason: HOSPADM

## 2025-01-21 RX ORDER — SODIUM CHLORIDE 9 MG/ML
INJECTION, SOLUTION INTRAVENOUS PRN
Status: DISCONTINUED | OUTPATIENT
Start: 2025-01-21 | End: 2025-01-23 | Stop reason: HOSPADM

## 2025-01-21 RX ADMIN — FUROSEMIDE 40 MG: 10 INJECTION, SOLUTION INTRAMUSCULAR; INTRAVENOUS at 18:53

## 2025-01-21 RX ADMIN — LACTULOSE 20 G: 20 SOLUTION ORAL at 20:09

## 2025-01-21 RX ADMIN — Medication 500 MG: at 18:53

## 2025-01-21 RX ADMIN — FOLIC ACID 1 MG: 1 TABLET ORAL at 08:40

## 2025-01-21 RX ADMIN — OXYCODONE 10 MG: 5 TABLET ORAL at 08:39

## 2025-01-21 RX ADMIN — MIDODRINE HYDROCHLORIDE 5 MG: 5 TABLET ORAL at 18:53

## 2025-01-21 RX ADMIN — OXYCODONE 10 MG: 5 TABLET ORAL at 20:09

## 2025-01-21 RX ADMIN — POTASSIUM BICARBONATE 40 MEQ: 782 TABLET, EFFERVESCENT ORAL at 14:29

## 2025-01-21 RX ADMIN — FERROUS SULFATE TAB 325 MG (65 MG ELEMENTAL FE) 325 MG: 325 (65 FE) TAB at 20:09

## 2025-01-21 RX ADMIN — MIDODRINE HYDROCHLORIDE 5 MG: 5 TABLET ORAL at 14:29

## 2025-01-21 RX ADMIN — SPIRONOLACTONE 50 MG: 25 TABLET ORAL at 08:39

## 2025-01-21 RX ADMIN — PANTOPRAZOLE SODIUM 40 MG: 40 TABLET, DELAYED RELEASE ORAL at 08:40

## 2025-01-21 RX ADMIN — SODIUM CHLORIDE, PRESERVATIVE FREE 10 ML: 5 INJECTION INTRAVENOUS at 08:40

## 2025-01-21 RX ADMIN — SODIUM CHLORIDE, PRESERVATIVE FREE 10 ML: 5 INJECTION INTRAVENOUS at 20:09

## 2025-01-21 RX ADMIN — FERROUS SULFATE TAB 325 MG (65 MG ELEMENTAL FE) 325 MG: 325 (65 FE) TAB at 08:40

## 2025-01-21 RX ADMIN — CEFTRIAXONE 1000 MG: 1 INJECTION, POWDER, FOR SOLUTION INTRAMUSCULAR; INTRAVENOUS at 08:39

## 2025-01-21 RX ADMIN — METOPROLOL TARTRATE 50 MG: 50 TABLET, FILM COATED ORAL at 08:40

## 2025-01-21 RX ADMIN — FUROSEMIDE 40 MG: 10 INJECTION, SOLUTION INTRAMUSCULAR; INTRAVENOUS at 08:39

## 2025-01-21 RX ADMIN — MIDODRINE HYDROCHLORIDE 5 MG: 5 TABLET ORAL at 08:40

## 2025-01-21 RX ADMIN — TAMSULOSIN HYDROCHLORIDE 0.4 MG: 0.4 CAPSULE ORAL at 08:40

## 2025-01-21 RX ADMIN — LACTULOSE 20 G: 20 SOLUTION ORAL at 08:39

## 2025-01-21 RX ADMIN — METOPROLOL TARTRATE 50 MG: 50 TABLET, FILM COATED ORAL at 20:10

## 2025-01-21 RX ADMIN — ALBUMIN (HUMAN) 25 G: 0.25 INJECTION, SOLUTION INTRAVENOUS at 02:39

## 2025-01-21 RX ADMIN — ALBUMIN (HUMAN) 25 G: 0.25 INJECTION, SOLUTION INTRAVENOUS at 20:14

## 2025-01-21 RX ADMIN — ALBUMIN (HUMAN) 25 G: 0.25 INJECTION, SOLUTION INTRAVENOUS at 14:35

## 2025-01-21 ASSESSMENT — PAIN SCALES - GENERAL
PAINLEVEL_OUTOF10: 7
PAINLEVEL_OUTOF10: 7
PAINLEVEL_OUTOF10: 0

## 2025-01-21 ASSESSMENT — PAIN DESCRIPTION - LOCATION: LOCATION: ARM;LEG

## 2025-01-21 ASSESSMENT — PAIN - FUNCTIONAL ASSESSMENT: PAIN_FUNCTIONAL_ASSESSMENT: PREVENTS OR INTERFERES SOME ACTIVE ACTIVITIES AND ADLS

## 2025-01-21 ASSESSMENT — PAIN DESCRIPTION - ORIENTATION: ORIENTATION: RIGHT;LEFT

## 2025-01-21 ASSESSMENT — PAIN DESCRIPTION - DESCRIPTORS: DESCRIPTORS: ACHING;SORE

## 2025-01-21 NOTE — PROGRESS NOTES
Dr. Slater notified of critical hemoglobin 6.4, also that platelets yesterday 210 and today 87. Orders received

## 2025-01-21 NOTE — ACP (ADVANCE CARE PLANNING)
Advance Care Planning   Healthcare Decision Maker:    Primary Decision Maker: India Gray - Child - [phone]    Patients daughter, but he cannot recall her last name. She lives in [place]

## 2025-01-21 NOTE — PROGRESS NOTES
Occupational Therapy  Date:1/21/2025  Patient Name: Hardik Gray  Room: 0444/0444-A     Occupational Therapy (OT) order received, patient's medical record reviewed, and OT evaluation attempted this morning; OT evaluation held secondary to low hemoglobin level and need for blood. OT evaluation to be re-attempted at later date, as able/appropriate.    Shayla Rincon, OTR/L  License Number: [id number]

## 2025-01-21 NOTE — CARE COORDINATION
CASE MANAGEMENT.... Met with patient at the bedside. He confirmed that he lives on the 3rd floor of an apartment complex. No elevator. Has a friend, Tigist, that stays with him most of the time. Still drives. No dme. Has recent history at [facility] and states he is current with [facility]. Called and left vm with Charito from [facility] to verify services - await input. PT/OT pending. Mr Dye is interested in [facility] at [place] if appropriate. Spoke with Bina from the facility and patient is accepted pending bed availability. For now, he is being followed by urology. Has rodrigues cath for urinary retention and will dc with it. Orders to irrigate q 6hrs. GI on board for cirrhosis/ascites and hepatic encephalopathy. Had paracentesis under IR yesterday. Monitoring labs/vitals closely. Hgb 6.4. 1 unit PRBC ordered. Continue iv spa q8hrs, iv lasix bid, midodrine and iv rocephin. If going to [facility], will precert, n 17, transport forms and [id number]. Will follow.

## 2025-01-21 NOTE — PROGRESS NOTES
Physical Therapy  Facility/Department: [facility]    Name: Hardik Gray  : 1950  MRN: [id number]  Date of Service: 2025        Attempted to see pt for PT evaluation, hold secondary to low hemoglobin and pt getting blood.   Lila Stevens PT [id number]

## 2025-01-21 NOTE — PROGRESS NOTES
[facility] Hospitalist Progress Note    Admitting Date and Time: 1/19/2025  5:45 AM  Admit Dx: Lactic acidosis [E87.20]  Urinary retention [R33.9]  Anasarca [R60.1]  UTI (urinary tract infection) [N39.0]  Urinary tract infection with hematuria, site unspecified [N39.0, R31.9]    Subjective:  Patient is being followed for Lactic acidosis [E87.20]  Urinary retention [R33.9]  Anasarca [R60.1]  UTI (urinary tract infection) [N39.0]  Urinary tract infection with hematuria, site unspecified [N39.0, R31.9]     Patient reports fatigue. Patient was hypotensive overnight, MAP now at 76. He had no lightheadness, dizzness, chest pain, palpitations. No other complaints or concerns at this time.     ROS: Pertinent findings stated above. Denies dizziness, diaphoresis, fever, chills, chest pain, palpitations, cough, shortness of breath, abdominal pain, nausea, vomiting, dysuria, hematuria, melena, hematochezia, diarrhea, or constipation     lactulose  20 g Oral BID    furosemide  40 mg IntraVENous BID    spironolactone  50 mg Oral Daily    cefTRIAXone (ROCEPHIN) IV  1,000 mg IntraVENous Q24H    sodium chloride flush  5-40 mL IntraVENous 2 times per day    albumin human 25%  25 g IntraVENous Q8H    entecavir  1 mg Oral Daily    ferrous sulfate  325 mg Oral BID    folic acid  1 mg Oral Daily    Magnesium Gluconate  500 mg Oral Daily    metoprolol tartrate  50 mg Oral BID    midodrine  5 mg Oral TID WC    pantoprazole  40 mg Oral Daily    tamsulosin  0.4 mg Oral Daily     sodium chloride, , PRN  calcium carbonate, 500 mg, TID PRN  prochlorperazine, 10 mg, Q6H PRN  sodium chloride flush, 5-40 mL, PRN  sodium chloride, , PRN  potassium chloride, 40 mEq, PRN   Or  potassium alternative oral replacement, 40 mEq, PRN   Or  potassium chloride, 10 mEq, PRN  magnesium sulfate, 2,000 mg, PRN  polyethylene glycol, 17 g, Daily PRN  melatonin, 3 mg, Nightly PRN  albuterol, 2.5 mg, Q6H PRN  fluticasone, 1 spray, Daily PRN  white

## 2025-01-21 NOTE — CONSENT
Informed Consent for Blood Component Transfusion Note    I have discussed with the patient the rationale for blood component transfusion; its benefits in treating or preventing fatigue, organ damage, or death; and its risk which includes mild transfusion reactions, rare risk of blood borne infection, or more serious but rare reactions. I have discussed the alternatives to transfusion, including the risk and consequences of not receiving transfusion. The patient had an opportunity to ask questions and had agreed to proceed with transfusion of blood components.    Electronically signed by Harjit Slater MD on 1/21/25 at 2:48 PM EST

## 2025-01-21 NOTE — PROGRESS NOTES
1/21/2025 9:49 AM  Hardik Gray  [id number]    Subjective:      Awake and alert  Lying in bed  Denies new complaints today  Millan catheter in place draining brown-tinged urine in tubing    Review of Systems  Constitutional: No fever or chills   Respiratory: negative for cough and hemoptysis  Cardiovascular: negative for chest pain and dyspnea  Gastrointestinal: negative for abdominal pain, diarrhea, nausea and vomiting   : See above  Derm: negative for rash and skin lesion(s)  Neurological: negative for seizures and tremors  Musculoskeletal: Negative    Psychiatric: Negative   All other reviews are negative      Scheduled Meds:   lactulose  20 g Oral BID    furosemide  40 mg IntraVENous BID    spironolactone  50 mg Oral Daily    cefTRIAXone (ROCEPHIN) IV  1,000 mg IntraVENous Q24H    sodium chloride flush  5-40 mL IntraVENous 2 times per day    albumin human 25%  25 g IntraVENous Q8H    entecavir  1 mg Oral Daily    ferrous sulfate  325 mg Oral BID    folic acid  1 mg Oral Daily    Magnesium Gluconate  500 mg Oral Daily    metoprolol tartrate  50 mg Oral BID    midodrine  5 mg Oral TID WC    pantoprazole  40 mg Oral Daily    tamsulosin  0.4 mg Oral Daily       Objective:  Vitals:    01/21/25 0702   BP: (!) 96/50   Pulse:    Resp: 18   Temp: 98.4 °F (36.9 °C)   SpO2: 94%         Allergies: Simvastatin and Crestor [rosuvastatin]    General Appearance: alert and oriented to person, place and time and in no acute distress  Skin: no rash or erythema  Head: normocephalic and atraumatic  Pulmonary/Chest: normal air movement, no respiratory distress  Abdomen: soft, non-tender, non-distended  Genitourinary: Millan catheter draining brown-tinged urine, penoscrotal edema, erythematous wound to the ventral aspect of the foreskin  Extremities: no cyanosis, clubbing or edema         Labs:     Recent Labs     01/20/25  1145      K 3.1*   CL 99   CO2 27   BUN 13   CREATININE 1.0   GLUCOSE 104*   CALCIUM 7.9*       Lab

## 2025-01-22 VITALS
DIASTOLIC BLOOD PRESSURE: 65 MMHG | OXYGEN SATURATION: 98 % | TEMPERATURE: 98.2 F | HEART RATE: 74 BPM | RESPIRATION RATE: 18 BRPM | BODY MASS INDEX: 27.68 KG/M2 | HEIGHT: 69 IN | WEIGHT: 186.9 LBS | SYSTOLIC BLOOD PRESSURE: 110 MMHG

## 2025-01-22 LAB
ABO/RH: NORMAL
ALBUMIN SERPL-MCNC: 2.8 G/DL (ref 3.5–5.2)
ALBUMIN SERPL-MCNC: 3 G/DL (ref 3.5–5.2)
ALP SERPL-CCNC: 69 U/L (ref 40–129)
ALP SERPL-CCNC: 73 U/L (ref 40–129)
ALT SERPL-CCNC: 8 U/L (ref 0–40)
ALT SERPL-CCNC: 8 U/L (ref 0–40)
ANION GAP SERPL CALCULATED.3IONS-SCNC: 11 MMOL/L (ref 7–16)
ANION GAP SERPL CALCULATED.3IONS-SCNC: 11 MMOL/L (ref 7–16)
ANTIBODY SCREEN: NEGATIVE
ARM BAND NUMBER: NORMAL
AST SERPL-CCNC: 35 U/L (ref 0–39)
AST SERPL-CCNC: 36 U/L (ref 0–39)
ATYPICAL LYMPHOCYTE ABSOLUTE COUNT: 0.3 K/UL (ref 0–0.46)
ATYPICAL LYMPHOCYTES: 5 % (ref 0–4)
BASOPHILS # BLD: 0 K/UL (ref 0–0.2)
BASOPHILS # BLD: 0.01 K/UL (ref 0–0.2)
BASOPHILS NFR BLD: 0 % (ref 0–2)
BASOPHILS NFR BLD: 0 % (ref 0–2)
BILIRUB SERPL-MCNC: 8.8 MG/DL (ref 0–1.2)
BILIRUB SERPL-MCNC: 8.8 MG/DL (ref 0–1.2)
BLOOD BANK BLOOD PRODUCT EXPIRATION DATE: NORMAL
BLOOD BANK BLOOD PRODUCT EXPIRATION DATE: NORMAL
BLOOD BANK DISPENSE STATUS: NORMAL
BLOOD BANK DISPENSE STATUS: NORMAL
BLOOD BANK ISBT PRODUCT BLOOD TYPE: 6200
BLOOD BANK ISBT PRODUCT BLOOD TYPE: 6200
BLOOD BANK PRODUCT CODE: NORMAL
BLOOD BANK PRODUCT CODE: NORMAL
BLOOD BANK SAMPLE EXPIRATION: NORMAL
BLOOD BANK UNIT TYPE AND RH: NORMAL
BLOOD BANK UNIT TYPE AND RH: NORMAL
BPU ID: NORMAL
BPU ID: NORMAL
BUN SERPL-MCNC: 12 MG/DL (ref 6–23)
BUN SERPL-MCNC: 12 MG/DL (ref 6–23)
CALCIUM SERPL-MCNC: 8.1 MG/DL (ref 8.6–10.2)
CALCIUM SERPL-MCNC: 8.3 MG/DL (ref 8.6–10.2)
CHLORIDE SERPL-SCNC: 98 MMOL/L (ref 98–107)
CHLORIDE SERPL-SCNC: 99 MMOL/L (ref 98–107)
CO2 SERPL-SCNC: 26 MMOL/L (ref 22–29)
CO2 SERPL-SCNC: 26 MMOL/L (ref 22–29)
COMPONENT: NORMAL
COMPONENT: NORMAL
CREAT SERPL-MCNC: 0.9 MG/DL (ref 0.7–1.2)
CREAT SERPL-MCNC: 1 MG/DL (ref 0.7–1.2)
CROSSMATCH RESULT: NORMAL
CROSSMATCH RESULT: NORMAL
EOSINOPHIL # BLD: 0 K/UL (ref 0.05–0.5)
EOSINOPHIL # BLD: 0.26 K/UL (ref 0.05–0.5)
EOSINOPHILS RELATIVE PERCENT: 0 % (ref 0–6)
EOSINOPHILS RELATIVE PERCENT: 4 % (ref 0–6)
ERYTHROCYTE [DISTWIDTH] IN BLOOD BY AUTOMATED COUNT: 17.8 % (ref 11.5–15)
ERYTHROCYTE [DISTWIDTH] IN BLOOD BY AUTOMATED COUNT: 17.8 % (ref 11.5–15)
GFR, ESTIMATED: 83 ML/MIN/1.73M2
GFR, ESTIMATED: 90 ML/MIN/1.73M2
GLUCOSE SERPL-MCNC: 70 MG/DL (ref 74–99)
GLUCOSE SERPL-MCNC: 83 MG/DL (ref 74–99)
HCT VFR BLD AUTO: 21.1 % (ref 37–54)
HCT VFR BLD AUTO: 21.4 % (ref 37–54)
HGB BLD-MCNC: 7 G/DL (ref 12.5–16.5)
HGB BLD-MCNC: 7.1 G/DL (ref 12.5–16.5)
IMM GRANULOCYTES # BLD AUTO: 0.03 K/UL (ref 0–0.58)
IMM GRANULOCYTES NFR BLD: 1 % (ref 0–5)
INR PPP: 3
INR PPP: 3.2
LYMPHOCYTES NFR BLD: 1.86 K/UL (ref 1.5–4)
LYMPHOCYTES NFR BLD: 2.13 K/UL (ref 1.5–4)
LYMPHOCYTES RELATIVE PERCENT: 31 % (ref 20–42)
LYMPHOCYTES RELATIVE PERCENT: 35 % (ref 20–42)
MCH RBC QN AUTO: 31.4 PG (ref 26–35)
MCH RBC QN AUTO: 32.1 PG (ref 26–35)
MCHC RBC AUTO-ENTMCNC: 32.7 G/DL (ref 32–34.5)
MCHC RBC AUTO-ENTMCNC: 33.6 G/DL (ref 32–34.5)
MCV RBC AUTO: 95.5 FL (ref 80–99.9)
MCV RBC AUTO: 96 FL (ref 80–99.9)
MONOCYTES NFR BLD: 1.31 K/UL (ref 0.1–0.95)
MONOCYTES NFR BLD: 1.62 K/UL (ref 0.1–0.95)
MONOCYTES NFR BLD: 22 % (ref 2–12)
MONOCYTES NFR BLD: 27 % (ref 2–12)
NEUTROPHILS NFR BLD: 37 % (ref 43–80)
NEUTROPHILS NFR BLD: 39 % (ref 43–80)
NEUTS SEG NFR BLD: 2.22 K/UL (ref 1.8–7.3)
NEUTS SEG NFR BLD: 2.36 K/UL (ref 1.8–7.3)
NUCLEATED RED BLOOD CELLS: 1 PER 100 WBC
PLATELET # BLD AUTO: 70 K/UL (ref 130–450)
PLATELET # BLD AUTO: 74 K/UL (ref 130–450)
PLATELET CONFIRMATION: NORMAL
PLATELET CONFIRMATION: NORMAL
PMV BLD AUTO: 9.2 FL (ref 7–12)
PMV BLD AUTO: 9.7 FL (ref 7–12)
POTASSIUM SERPL-SCNC: 3.4 MMOL/L (ref 3.5–5)
POTASSIUM SERPL-SCNC: 3.7 MMOL/L (ref 3.5–5)
PROT SERPL-MCNC: 5.8 G/DL (ref 6.4–8.3)
PROT SERPL-MCNC: 6.1 G/DL (ref 6.4–8.3)
PROTHROMBIN TIME: 33 SEC (ref 9.3–12.4)
PROTHROMBIN TIME: 34.2 SEC (ref 9.3–12.4)
RBC # BLD AUTO: 2.21 M/UL (ref 3.8–5.8)
RBC # BLD AUTO: 2.23 M/UL (ref 3.8–5.8)
RBC # BLD: ABNORMAL 10*6/UL
SODIUM SERPL-SCNC: 135 MMOL/L (ref 132–146)
SODIUM SERPL-SCNC: 136 MMOL/L (ref 132–146)
TRANSFUSION STATUS: NORMAL
TRANSFUSION STATUS: NORMAL
UNIT DIVISION: 0
UNIT DIVISION: 0
UNIT ISSUE DATE/TIME: NORMAL
UNIT ISSUE DATE/TIME: NORMAL
WBC # BLD: ABNORMAL 10*3/UL
WBC OTHER # BLD: 6 K/UL (ref 4.5–11.5)
WBC OTHER # BLD: 6.1 K/UL (ref 4.5–11.5)

## 2025-01-22 PROCEDURE — 97161 PT EVAL LOW COMPLEX 20 MIN: CPT

## 2025-01-22 PROCEDURE — 51700 IRRIGATION OF BLADDER: CPT

## 2025-01-22 PROCEDURE — 6360000002 HC RX W HCPCS: Performed by: STUDENT IN AN ORGANIZED HEALTH CARE EDUCATION/TRAINING PROGRAM

## 2025-01-22 PROCEDURE — 2500000003 HC RX 250 WO HCPCS: Performed by: STUDENT IN AN ORGANIZED HEALTH CARE EDUCATION/TRAINING PROGRAM

## 2025-01-22 PROCEDURE — 85610 PROTHROMBIN TIME: CPT

## 2025-01-22 PROCEDURE — 2060000000 HC ICU INTERMEDIATE R&B

## 2025-01-22 PROCEDURE — 80053 COMPREHEN METABOLIC PANEL: CPT

## 2025-01-22 PROCEDURE — 6370000000 HC RX 637 (ALT 250 FOR IP): Performed by: STUDENT IN AN ORGANIZED HEALTH CARE EDUCATION/TRAINING PROGRAM

## 2025-01-22 PROCEDURE — 2580000003 HC RX 258: Performed by: STUDENT IN AN ORGANIZED HEALTH CARE EDUCATION/TRAINING PROGRAM

## 2025-01-22 PROCEDURE — P9047 ALBUMIN (HUMAN), 25%, 50ML: HCPCS | Performed by: STUDENT IN AN ORGANIZED HEALTH CARE EDUCATION/TRAINING PROGRAM

## 2025-01-22 PROCEDURE — 97165 OT EVAL LOW COMPLEX 30 MIN: CPT

## 2025-01-22 PROCEDURE — 85025 COMPLETE CBC W/AUTO DIFF WBC: CPT

## 2025-01-22 RX ORDER — CEFDINIR 300 MG/1
300 CAPSULE ORAL 2 TIMES DAILY
Qty: 6 CAPSULE | Refills: 0 | Status: SHIPPED | OUTPATIENT
Start: 2025-01-22 | End: 2025-01-25

## 2025-01-22 RX ORDER — SPIRONOLACTONE 25 MG/1
100 TABLET ORAL DAILY
Status: DISCONTINUED | OUTPATIENT
Start: 2025-01-22 | End: 2025-01-23 | Stop reason: HOSPADM

## 2025-01-22 RX ORDER — LACTULOSE 10 G/15ML
20 SOLUTION ORAL 2 TIMES DAILY
Qty: 450 ML | Refills: 0 | Status: ON HOLD | OUTPATIENT
Start: 2025-01-22 | End: 2025-02-21

## 2025-01-22 RX ADMIN — FUROSEMIDE 40 MG: 10 INJECTION, SOLUTION INTRAMUSCULAR; INTRAVENOUS at 17:08

## 2025-01-22 RX ADMIN — METOPROLOL TARTRATE 50 MG: 50 TABLET, FILM COATED ORAL at 08:50

## 2025-01-22 RX ADMIN — SPIRONOLACTONE 100 MG: 25 TABLET ORAL at 11:26

## 2025-01-22 RX ADMIN — CEFTRIAXONE 1000 MG: 1 INJECTION, POWDER, FOR SOLUTION INTRAMUSCULAR; INTRAVENOUS at 08:51

## 2025-01-22 RX ADMIN — POTASSIUM BICARBONATE 40 MEQ: 782 TABLET, EFFERVESCENT ORAL at 06:05

## 2025-01-22 RX ADMIN — TAMSULOSIN HYDROCHLORIDE 0.4 MG: 0.4 CAPSULE ORAL at 08:50

## 2025-01-22 RX ADMIN — Medication 500 MG: at 08:50

## 2025-01-22 RX ADMIN — OXYCODONE 10 MG: 5 TABLET ORAL at 12:15

## 2025-01-22 RX ADMIN — ALBUMIN (HUMAN) 25 G: 0.25 INJECTION, SOLUTION INTRAVENOUS at 11:29

## 2025-01-22 RX ADMIN — FUROSEMIDE 40 MG: 10 INJECTION, SOLUTION INTRAMUSCULAR; INTRAVENOUS at 08:50

## 2025-01-22 RX ADMIN — MIDODRINE HYDROCHLORIDE 5 MG: 5 TABLET ORAL at 17:08

## 2025-01-22 RX ADMIN — FOLIC ACID 1 MG: 1 TABLET ORAL at 08:50

## 2025-01-22 RX ADMIN — MIDODRINE HYDROCHLORIDE 5 MG: 5 TABLET ORAL at 11:26

## 2025-01-22 RX ADMIN — OXYCODONE 10 MG: 5 TABLET ORAL at 23:34

## 2025-01-22 RX ADMIN — PANTOPRAZOLE SODIUM 40 MG: 40 TABLET, DELAYED RELEASE ORAL at 08:49

## 2025-01-22 RX ADMIN — LACTULOSE 20 G: 20 SOLUTION ORAL at 08:49

## 2025-01-22 RX ADMIN — PHYTONADIONE 10 MG: 10 INJECTION, EMULSION INTRAMUSCULAR; INTRAVENOUS; SUBCUTANEOUS at 08:47

## 2025-01-22 RX ADMIN — ALBUMIN (HUMAN) 25 G: 0.25 INJECTION, SOLUTION INTRAVENOUS at 03:55

## 2025-01-22 RX ADMIN — FERROUS SULFATE TAB 325 MG (65 MG ELEMENTAL FE) 325 MG: 325 (65 FE) TAB at 08:49

## 2025-01-22 RX ADMIN — Medication 3 MG: at 02:21

## 2025-01-22 RX ADMIN — MIDODRINE HYDROCHLORIDE 5 MG: 5 TABLET ORAL at 08:50

## 2025-01-22 RX ADMIN — SODIUM CHLORIDE, PRESERVATIVE FREE 10 ML: 5 INJECTION INTRAVENOUS at 08:56

## 2025-01-22 ASSESSMENT — PAIN SCALES - GENERAL
PAINLEVEL_OUTOF10: 1
PAINLEVEL_OUTOF10: 0
PAINLEVEL_OUTOF10: 7

## 2025-01-22 NOTE — PLAN OF CARE
Problem: Chronic Conditions and Co-morbidities  Goal: Patient's chronic conditions and co-morbidity symptoms are monitored and maintained or improved  Outcome: Adequate for Discharge     Problem: Discharge Planning  Goal: Discharge to home or other facility with appropriate resources  Outcome: Adequate for Discharge     Problem: Skin/Tissue Integrity  Goal: Absence of new skin breakdown  Description: 1.  Monitor for areas of redness and/or skin breakdown  2.  Assess vascular access sites hourly  3.  Every 4-6 hours minimum:  Change oxygen saturation probe site  4.  Every 4-6 hours:  If on nasal continuous positive airway pressure, respiratory therapy assess nares and determine need for appliance change or resting period.  Outcome: Adequate for Discharge     Problem: ABCDS Injury Assessment  Goal: Absence of physical injury  Outcome: Adequate for Discharge     Problem: Safety - Adult  Goal: Free from fall injury  Outcome: Adequate for Discharge     Problem: Pain  Goal: Verbalizes/displays adequate comfort level or baseline comfort level  Outcome: Adequate for Discharge

## 2025-01-22 NOTE — PROGRESS NOTES
Occupational Therapy  OCCUPATIONAL THERAPY INITIAL EVALUATION  [facility]  [address], [place]    Date: 2025     Patient Name: Hardik Gray  MRN: [id number]  : 1950  Room: [address] [place] [address]    Evaluating OT: Shayla Rincon, OTR/L - [id number]    Referring Provider: Harjit Slater MD; Michelle Reynoso MD  Specific Provider Orders/Date: \"OT eval and treat\" - 2025 and 2025    Diagnosis: Lactic acidosis [E87.20]  Urinary retention [R33.9]  Anasarca [R60.1]  UTI (urinary tract infection) [N39.0]  Urinary tract infection with hematuria, site unspecified [N39.0, R31.9]     Patient underwent paracentesis on 2025 and 2025.    Pertinent Medical History: arthritis, CAD, COPD, CVA, back pain, HTN, MI, prostate cancer, urinary frequency     Precautions: fall risk, bed/chair alarms, rodrigues catheter    Assessment of Current Deficits:    [x] Functional mobility   [x] ADLs  [x] Strength               [x] Cognition   [x] Functional transfers   [x] IADLs         [x] Safety Awareness   [x] Endurance   [] Fine Motor Coordination  [x] Balance      [] Vision/Perception   [x] Sensation    [] Gross Motor Coordination [] ROM          [] Delirium                  [] Motor Control     OT PLAN OF CARE   OT POC is based on physician orders, patient diagnosis, and results of clinical assessment.  Frequency/Duration 2-5 days/week for 2-4 weeks PRN   Specific OT Treatment Interventions to Include:   * Instruction/training on adapted ADL techniques and AE recommendations to increase functional independence within precautions       * Training on energy conservation strategies, correct breathing pattern and techniques to improve independence/tolerance for self-care routine  * Functional transfer/mobility training/DME recommendations for increased independence, safety, and fall prevention  * Patient/Family education to increase follow through with safety  Mod A  Patient with rodrigues catheter currently.  Mod I / Independent   Bed Mobility  Supine-to-Sit: Min A     Independent in order to maximize patient's independence/participation with ADLs, re-positioning, and other functional tasks.   Functional Transfers Sit-to-Stand: Min A   from EOB. Cues given to maximize safety.  Independent   Functional Mobility Min A (with walker) for short distance within patient's room. Unsteadiness demonstrated.  Mod I with functional mobility (with device, as needed/appropriate) in order to maximize independence with ADLs/IADLs and other functional tasks.   Balance Sitting: Fair+ (at EOB)  Standing: Fair (with walker)  Fair+ dynamic standing balance during completion of ADLs/IADLs and other functional tasks.   Activity Tolerance Fair  Patient will demonstrate Good understanding and consistent implementation of energy conservation techniques and work simplification techniques into ADL/IADL routines.   Visual/  Perceptual WFL     N/A   B UE Strength, ROM B UE AROM: WFL  B UE Strength: 4-/5 grossly  Patient will demonstrate 5/5 B UE strength in order to maximize independence with ADLs/IADLs and functional transfers.     Additional Long-Term Goal: Patient will increase functional independence to PLOF in order to allow patient to live in least restrictive environment.     Hearing: Fair  Sensation: No c/o numbness/tingling in B UEs.  Tone: WFL  Edema: No (B UEs)    Comments: RN approved patient's participation in OOB activities. Upon arrival, patient supine in bed. At end of session, patient seated in bedside chair with call light and phone within reach, chair alarm activated, and all lines and tubes intact. Patient would benefit from continued skilled OT to increase safety and independence with completion of ADL/IADL tasks for functional independence and quality of life.     Patient education provided regardin) importance of having staff assist with OOB activities, 2) safe transfer

## 2025-01-22 NOTE — DISCHARGE INSTR - COC
25Continuity of Care Form    Patient Name: Hardik Gray   :  1950  MRN:  [id number]    Admit date:  2025  Discharge date:  25    Code Status Order: Full Code   Advance Directives:   Advance Care Flowsheet Documentation             Admitting Physician:  Michelle Reynoso MD  PCP: Xavi Swenson MD    Discharging Nurse: AL  Discharging Hospital Unit/Room#: 0444/0444-A  Discharging Unit Phone Number: [phone]    Emergency Contact:   Extended Emergency Contact Information  Primary Emergency Contact: Dawson Salamanca  Home Phone: [phone]  Mobile Phone: [phone]  Relation: Other  Secondary Emergency Contact: India Gray  Mobile Phone: [phone]  Relation: Child    Past Surgical History:  Past Surgical History:   Procedure Laterality Date    BLADDER SURGERY N/A 3/22/2023    CYSTOSCOPY RETROGRADE PYELOGRAM URETHERAL DILATION SUAREZ INSERTION performed by Rashaad Salvador MD at [facility] OR    CORONARY ANGIOPLASTY WITH STENT PLACEMENT      @ [facility]    CYSTOSCOPY N/A 3/22/2021    CYSTOSCOPY, RETROGRADE PYELOGRAM, URETHRAL DIALATION, TURBT performed by Rashaad Salvador MD at [facility] OR    FOOT SURGERY      lt foot    FOOT SURGERY      rt foot as well    PROSTATE BIOPSY      SIGMOIDOSCOPY N/A 2024    SIGMOIDOSCOPY CONTROL HEMORRHAGE performed by PARVEEN Mancia MD at [facility] ENDOSCOPY       Immunization History:   Immunization History   Administered Date(s) Administered    COVID-19, MODERNA BLUE border, Primary or Immunocompromised, (age 12y+), IM, 100 mcg/0.5mL 2021, 2021    COVID-19, PFIZER, (age 12y+), IM, 30mcg/0.3mL 2024, 2024    DTaP vaccine 2014    Hep A, HAVRIX, VAQTA, (age 19y+), IM, 1mL 2016    Td vaccine (adult) 2002       Active Problems:  Patient Active Problem List   Diagnosis Code    Prostate cancer (HCC) C61    Dyslipidemia E78.5    Essential hypertension I10    Asymptomatic PVCs I49.3    History of MI (myocardial infarction) I25.2    Coronary artery  disease involving native coronary artery of native heart without angina pectoris I25.10    Pain in both lower extremities M79.604, M79.605    Hematuria R31.9    Late effect of radiation T66.XXXS    Personal history of radiation therapy Z92.3    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    History of tobacco use Z87.891    Hyperbilirubinemia E80.6    Cirrhosis of liver with ascites, unspecified hepatic cirrhosis type (HCC) K74.60, R18.8    Hepatitis B, chronic (HCC) B18.1    Ascites of liver R18.8    Leg swelling M79.89    Alcoholic cirrhosis of liver with ascites (HCC) K70.31    UTI (urinary tract infection) N39.0    Gross hematuria R31.0    Splinter of penis without major open wound S30.852A    Penile lesion N48.9    Volume overload E87.70    Elevated lactic acid level R79.89       Isolation/Infection:   Isolation            No Isolation          Patient Infection Status       None to display                     Nurse Assessment:  Last Vital Signs: /79   Pulse 76   Temp 98.3 °F (36.8 °C) (Oral)   Resp 18   Ht 1.753 m (5' 9.02\")   Wt 84.8 kg (186 lb 14.4 oz)   SpO2 96%   BMI 27.59 kg/m²     Last documented pain score (0-10 scale): Pain Level: 1  Last Weight:   Wt Readings from Last 1 Encounters:   01/22/25 84.8 kg (186 lb 14.4 oz)     Mental Status:  oriented and alert    IV Access:  - None    Nursing Mobility/ADLs:  Walking   Assisted  Transfer  Assisted  Bathing  Assisted  Dressing  Assisted  Toileting  Assisted  Feeding  Independent  Med Admin  Independent  Med Delivery   whole    Wound Care Documentation and Therapy:  Wound 12/02/24 Scrotum Medial;Right (Active)   Number of days: 50       Wound 01/21/25 Brachial Distal;Right;Anterior inner elbow (Active)   Wound Etiology Skin Tear 01/22/25 0755   Dressing Status Clean;Dry;Intact 01/22/25 0755   Wound Cleansed Cleansed with saline 01/22/25 0755   Dressing/Treatment Other (comment) 01/22/25 0755   Number of days: 1        Elimination:  Continence:

## 2025-01-22 NOTE — CARE COORDINATION
CASE MANAGEMENT....PT 16/OT 15. Priya Jones can accept patient with precert pending and as early as today. [employer] message sent to Dr Slater. Await input.       UPDATE.... Patient stable for discharge. [facility] transport arranged for 5:30 pm with [facility]. Nursing, facility and patient updated. N 17 in epic. Transport forms/[id number] in chart.

## 2025-01-22 NOTE — PROGRESS NOTES
Nurse to nurse called to Priya Dwyer) at this time.  All questions answered. Carolyn requests that pt be sent with IV due to being a difficult stick.

## 2025-01-22 NOTE — DISCHARGE INSTRUCTIONS
Call upon discharge to schedule a follow-up visit with Adrienne Perez/Dominick/Omid/Madeleine ([facility] Urology) at [phone]

## 2025-01-22 NOTE — PROGRESS NOTES
1/22/2025 8:04 AM  Hardik Gray  [id number]    Subjective: Laying in bed currently.  He is concerned about his hepatology outpatient follow-up.  No issues with catheter currently.      Scheduled Meds:   spironolactone  100 mg Oral Daily    phytonadione (ADULT) (VITAMIN K) 10 mg in sodium chloride 0.9 % 100 mL IVPB  10 mg IntraVENous Once    lactulose  20 g Oral BID    furosemide  40 mg IntraVENous BID    cefTRIAXone (ROCEPHIN) IV  1,000 mg IntraVENous Q24H    sodium chloride flush  5-40 mL IntraVENous 2 times per day    albumin human 25%  25 g IntraVENous Q8H    entecavir  1 mg Oral Daily    ferrous sulfate  325 mg Oral BID    folic acid  1 mg Oral Daily    Magnesium Gluconate  500 mg Oral Daily    metoprolol tartrate  50 mg Oral BID    midodrine  5 mg Oral TID WC    pantoprazole  40 mg Oral Daily    tamsulosin  0.4 mg Oral Daily       Objective:  Vitals:    01/22/25 0708   BP: (!) 109/56   Pulse:    Resp: 18   Temp: 97.9 °F (36.6 °C)   SpO2: 99%         Allergies: Simvastatin and Crestor [rosuvastatin]    General Appearance: alert and oriented to person, place and time and in no acute distress  Skin: no rash or erythema  Head: normocephalic and atraumatic  Pulmonary/Chest: normal air movement, no respiratory distress  Abdomen: soft, non-tender, non-distended  Genitourinary: Millan catheter in place draining dark mary urine  Extremities: no cyanosis, clubbing or edema         Labs:     Recent Labs     01/22/25  0350      K 3.4*   CL 98   CO2 26   BUN 12   CREATININE 0.9   GLUCOSE 83   CALCIUM 8.1*       Lab Results   Component Value Date/Time    HGB 7.1 01/22/2025 03:50 AM    HCT 21.1 01/22/2025 03:50 AM       Lab Results   Component Value Date    PSA 0.05 11/10/2023    PSA 0.10 11/04/2022    PSA 0.13 09/26/2022         Assessment/Plan:  Urinary retention  History of prostate cancer status post radiation  Urethral stricture disease  Penoscrotal edema related to volume overload  Ascites  Alcoholic  cirrhosis  Difficult rodrigues    Given very difficult Rodrigues placement and presence of urethral stricture disease, patient would do well to keep the catheter for now.  He should follow-up at our office within the next month to discuss next steps including possible trial of void.    Urology will continue to follow while inpatient.    Louis Tan MD   [facility]  Urology

## 2025-01-22 NOTE — PLAN OF CARE
Problem: Chronic Conditions and Co-morbidities  Goal: Patient's chronic conditions and co-morbidity symptoms are monitored and maintained or improved  1/21/2025 2333 by Margaret Ruiz, RN  Outcome: Progressing     Problem: Discharge Planning  Goal: Discharge to home or other facility with appropriate resources  1/21/2025 2333 by Margaret Ruiz, RN  Outcome: Progressing     Problem: Skin/Tissue Integrity  Goal: Absence of new skin breakdown  Description: 1.  Monitor for areas of redness and/or skin breakdown  2.  Assess vascular access sites hourly  3.  Every 4-6 hours minimum:  Change oxygen saturation probe site  4.  Every 4-6 hours:  If on nasal continuous positive airway pressure, respiratory therapy assess nares and determine need for appliance change or resting period.  1/21/2025 2333 by Margaret Ruiz, RN  Outcome: Progressing     Problem: ABCDS Injury Assessment  Goal: Absence of physical injury  1/21/2025 2333 by Margaret Ruiz, RN  Outcome: Progressing     Problem: Safety - Adult  Goal: Free from fall injury  1/21/2025 2333 by Margaret Ruiz, RN  Outcome: Progressing     Problem: Pain  Goal: Verbalizes/displays adequate comfort level or baseline comfort level  1/21/2025 2333 by Margaret Ruiz, RN  Outcome: Progressing

## 2025-01-22 NOTE — PROGRESS NOTES
Gastroenterology, Hepatology, &  Advanced Endoscopy    Progress Note        HPI:   Patient had paracentesis with removal of 4.4L with fluid studies negative for SBP. He feels improved. He had a traumatic Millan catheter placement and has been having hematuria. Urology following. He has had no suggestion of GI blood loss.        Lab Results   Component Value Date    INR 2.8 01/21/2025    INR 2.4 01/20/2025    INR 2.6 01/19/2025    PROTIME 29.4 (H) 01/21/2025    PROTIME 26.6 (H) 01/20/2025    PROTIME 27.8 (H) 01/19/2025         ALT   Date Value Ref Range Status   01/21/2025 9 0 - 40 U/L Final   01/20/2025 13 0 - 40 U/L Final   01/19/2025 13 0 - 40 U/L Final     AST   Date Value Ref Range Status   01/21/2025 34 0 - 39 U/L Final   01/20/2025 38 0 - 39 U/L Final   01/19/2025 47 (H) 0 - 39 U/L Final     Alkaline Phosphatase   Date Value Ref Range Status   01/21/2025 69 40 - 129 U/L Final   01/20/2025 91 40 - 129 U/L Final   01/19/2025 198 (H) 40 - 129 U/L Final     Total Bilirubin   Date Value Ref Range Status   01/21/2025 9.0 (H) 0.0 - 1.2 mg/dL Final   01/20/2025 10.1 (H) 0.0 - 1.2 mg/dL Final   01/19/2025 11.0 (H) 0.0 - 1.2 mg/dL Final     Bilirubin, Direct   Date Value Ref Range Status   01/19/2025 5.9 (H) 0.0 - 0.3 mg/dL Final   12/13/2024 2.9 (H) 0.0 - 0.3 mg/dL Final   11/28/2024 3.8 (H) 0.0 - 0.3 mg/dL Final      Lab Results   Component Value Date    WBC 6.0 01/21/2025    HGB 6.4 (LL) 01/21/2025    HCT 19.8 (L) 01/21/2025    PLT 87 (L) 01/21/2025     01/21/2025    K 3.3 (L) 01/21/2025     01/21/2025    CREATININE 1.0 01/21/2025    BUN 13 01/21/2025    CO2 26 01/21/2025    FOLATE 7.6 07/09/2024    [id number] 726 07/09/2024    AMMONIA 69 (H) 01/15/2025    GLUCOSE 118 (H) 01/21/2025    INR 2.8 01/21/2025    PROTIME 29.4 (H) 01/21/2025    TSH 1.11 01/19/2025    LABA1C 4.7 07/09/2024     MELD 3.0: 27 at 1/21/2025  9:07 AM  MELD-Na: 26 at 1/21/2025  9:07 AM  Calculated from:  Serum Creatinine: 1.0 mg/dL at         melatonin tablet 3 mg  3 mg Oral Nightly PRN Michelle Reynoso MD   3 mg at 01/20/25 2029    albumin human 25% IV solution 25 g  25 g IntraVENous Q8H Carlos Sinclair MD   Stopped at 01/21/25 1605    albuterol (PROVENTIL) (2.5 MG/3ML) 0.083% nebulizer solution 2.5 mg  2.5 mg Nebulization Q6H PRN Michelle Reynoso MD        entecavir (BARACLUDE) tablet 1 mg (Patient Supplied)  1 mg Oral Daily Michelle Reynoso MD        ferrous sulfate (IRON 325) tablet 325 mg  325 mg Oral BID Michelle Reynoso MD   325 mg at 01/21/25 2009    fluticasone (FLONASE) 50 MCG/ACT nasal spray 1 spray  1 spray Nasal Daily PRN Michelle Reynoso MD        folic acid (FOLVITE) tablet 1 mg  1 mg Oral Daily Michelle Reynoso MD   1 mg at 01/21/25 0840    Magnesium Gluconate tablet 500 mg  500 mg Oral Daily Michelle Reynoso MD   500 mg at 01/21/25 1853    metoprolol tartrate (LOPRESSOR) tablet 50 mg  50 mg Oral BID Michelle Reynoso MD   50 mg at 01/21/25 2010    midodrine (PROAMATINE) tablet 5 mg  5 mg Oral TID WC Michelle Reynoso MD   5 mg at 01/21/25 1853    white petrolatum ointment   Topical PRN Michelle Reynoso MD        oxyCODONE (ROXICODONE) immediate release tablet 10 mg  10 mg Oral Q6H PRN Michelle Reynoso MD   10 mg at 01/21/25 2009    pantoprazole (PROTONIX) tablet 40 mg  40 mg Oral Daily Michelle Reynoso MD   40 mg at 01/21/25 0840    tamsulosin (FLOMAX) capsule 0.4 mg  0.4 mg Oral Daily Michelle Reynoso MD   0.4 mg at 01/21/25 0840       OBJECTIVE      Physical    VITALS:  /61   Pulse 76   Temp 98.1 °F (36.7 °C) (Oral)   Resp 18   Ht 1.753 m (5' 9.02\")   Wt 84.8 kg (186 lb 14.4 oz)   SpO2 98%   BMI 27.59 kg/m²   Physical Exam:  General: Overall well-appearing, NAD  HEENT: PERRLA, EOMI, Scleral icterus, MMM, no rhinorrhea  Cards: RRR, no LE edema  Resp: Breathing comfortably on room air, good air movement, no use of accessory muscles, no audible wheezing  Abdomen: soft, NT, ND.   Extremities: Moves all extremities, no effusions or bruising.  Skin:

## 2025-01-22 NOTE — PROGRESS NOTES
Physical Therapy  Facility/Department: [address] INTERMEDIATE 1  Physical Therapy Initial Assessment    Name: Hardik Gray  : 1950  MRN: [id number]  Date of Service: 2025        Patient Diagnosis(es): The primary encounter diagnosis was Urinary tract infection with hematuria, site unspecified. Diagnoses of Urinary retention, Anasarca, and Lactic acidosis were also pertinent to this visit.  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  Past Surgical History:  has a past surgical history that includes Prostate biopsy; Foot surgery; Foot surgery; Coronary angioplasty with stent (); Cystoscopy (N/A, 3/22/2021); Bladder surgery (N/A, 3/22/2023); and sigmoidoscopy (N/A, 2024).    Evaluating Therapist: Lila Stevens PT    Room #:  0444/0444-A  Diagnosis:  Lactic acidosis [E87.20]  Urinary retention [R33.9]  Anasarca [R60.1]  UTI (urinary tract infection) [N39.0]  Urinary tract infection with hematuria, site unspecified [N39.0, R31.9]  PMHx/PSHx:  COPD, CAD, CVA, HTN, MI, prostate CA, liver cirrhosis  Prodedure: Paracentesis  and   Precautions:  falls, alarm      Social:  Pt lives with alone but has a friend that helps him. Third floor apartment with no elevator. Pt independent without device.      Initial Evaluation  Date: 25 Treatment      Short Term/ Long Term   Goals   Was pt agreeable to Eval/treatment? yes     Does pt have pain? B calf pain     Bed Mobility  Rolling: min assist  Supine to sit: min assist  Sit to supine: NT  Scooting: min assist   independent   Transfers Sit to stand: min assist  Stand to sit: min assist  Stand pivot: min assist  SBA   Ambulation    5 feet with ww with min assist  50 feet with ww with SBA   Stair Negotiation  Ascended and descended  NT      LE strength     3+5/    4/5   balance      fair     AM-PAC Raw score               16/24         Pt is alert and Oriented   LE ROM: WFL  Sensation: intact  Edema: B LEs  Endurance: fair  Chair alarm: yes     ASSESSMENT:    Pt displays functional ability as noted in the objective portion of this evaluation.      Patient education  Pt educated on transfer technique    Patient response to education:   Pt verbalized understanding Pt demonstrated skill Pt requires further education in this area   yes  With cues  yes       Comments:  Pt fatigues quickly with mobility Difficulty moving LE's to ambulate. Decreased step length and foot clearance.  Pt up in chair at end of session.     Pt's/ family goals   1. To get stronger    Conditions Requiring Skilled Therapeutic Intervention:    [x]Decreased strength     []Decreased ROM  [x]Decreased functional mobility  [x]Decreased balance   [x]Decreased endurance   []Decreased posture  []Decreased sensation  []Decreased coordination   []Decreased vision  []Decreased safety awareness   [x]Increased pain       Patient and or family understand(s) diagnosis, prognosis, and plan of care.    Prognosis is good for reaching above PT goals    PHYSICAL THERAPY PLAN OF CARE:    PT POC is established based on physician order and patient diagnosis     Referring provider/PT Order: Harjit Slater MD / PT eval and treat      Current Treatment Recommendations:     [x] Strengthening to improve independence with functional mobility   [] ROM to improve independence with functional mobility   [x] Balance Training to improve static/dynamic balance and to reduce fall risk  [x] Endurance Training to improve activity tolerance during functional mobility   [x] Transfer Training

## 2025-01-23 LAB
HCV RNA SERPL NAA+PROBE-ACNC: NOT DETECTED IU/ML
HCV RNA SERPL NAA+PROBE-LOG IU: NOT DETECTED LOG IU/ML
HCV RNA SERPL QL NAA+PROBE: NOT DETECTED
LABORATORY REPORT: NORMAL
MICROORGANISM SPEC CULT: NO GROWTH
MICROORGANISM/AGENT SPEC: NORMAL
PETH INTERPRETATION: NORMAL
PLPETH BLD-MCNC: <10 NG/ML
POPETH BLD-MCNC: <10 NG/ML
SERVICE CMNT-IMP: NORMAL
SPECIMEN DESCRIPTION: NORMAL

## 2025-01-23 PROCEDURE — 99239 HOSP IP/OBS DSCHRG MGMT >30: CPT | Performed by: STUDENT IN AN ORGANIZED HEALTH CARE EDUCATION/TRAINING PROGRAM

## 2025-01-23 NOTE — DISCHARGE SUMMARY
[facility] Hospitalist Physician Discharge Summary       [facility]  [address] [address]  [phone]        Brayan Perez DO  [address] [address]  [phone]    Call  needs to be seen within 2 weeks.      Activity level: As tolerated     Dispo: [facility]ANA    Condition on discharge: Stable     Patient ID:  Hardik Gray  [id number]  74 y.o.  1950    Admit date: 1/19/2025    Discharge date and time:  1/23/2025  6:08 PM    Admission Diagnoses: Principal Problem:    UTI (urinary tract infection)  Active Problems:    Hepatitis B, chronic (HCC)    Alcoholic cirrhosis of liver with ascites (HCC)    Gross hematuria    Penile lesion    Volume overload    Elevated lactic acid level  Resolved Problems:    * No resolved hospital problems. *      Discharge Diagnoses: Principal Problem:    UTI (urinary tract infection)  Active Problems:    Hepatitis B, chronic (HCC)    Alcoholic cirrhosis of liver with ascites (HCC)    Gross hematuria    Penile lesion    Volume overload    Elevated lactic acid level  Resolved Problems:    * No resolved hospital problems. *      Consults:  IP CONSULT TO UROLOGY  IP CONSULT TO INTERNAL MEDICINE  IP CONSULT TO GI  IP CONSULT TO VASCULAR ACCESS TEAM    Hospital Course:   Patient Hardik Gray is a 74 y.o. presented with Lactic acidosis [E87.20]  Urinary retention [R33.9]  Anasarca [R60.1]  UTI (urinary tract infection) [N39.0]  Urinary tract infection with hematuria, site unspecified [N39.0, R31.9]    Patient admitted with hematuria and worsening edema. Noted to have lactic acidosis with level of 7.3 on admission. Patient found to have UTI and gross hematuria. Urology consulted and rodrigues placed. Patient will have voiding trial in outpatient setting. He was treated with rocephin. Patient has liver cirrhosis. He underwent US guided paracentesis 01/20 with IR. 4450cc yellow, cloudy fluid removed and sent of analysis/culture.

## 2025-01-23 NOTE — PROGRESS NOTES
PAS arrived on unit to take patient to [facility]. Report given and all questions answered. PAS took patient off unit for discharge.

## 2025-01-24 LAB
HBV IU/ML: NOT DETECTED IU/ML
HBV LOG 10 IU/ML: NOT DETECTED LOG IU/ML
HCV AB SERPL QL IA: ABNORMAL
HCV IGG SER IA-ACNC: >11 IV
INTERPRETATION: NOT DETECTED

## 2025-01-25 LAB
HAV IGM SERPL QL IA: NORMAL
HBV CORE IGM SERPL QL IA: NORMAL
HBV SURFACE AG SERPL QL IA: NORMAL
HCV AB SERPL QL IA: NORMAL

## 2025-01-31 ENCOUNTER — HOSPITAL ENCOUNTER (EMERGENCY)
Age: 75
Discharge: LEFT BEFORE DISCHARGE | DRG: 433 | End: 2025-01-31
Attending: STUDENT IN AN ORGANIZED HEALTH CARE EDUCATION/TRAINING PROGRAM
Payer: OTHER GOVERNMENT

## 2025-01-31 ENCOUNTER — HOSPITAL ENCOUNTER (INPATIENT)
Age: 75
LOS: 3 days | Discharge: HOME HEALTH | DRG: 433 | End: 2025-02-06
Attending: STUDENT IN AN ORGANIZED HEALTH CARE EDUCATION/TRAINING PROGRAM | Admitting: FAMILY MEDICINE
Payer: OTHER GOVERNMENT

## 2025-01-31 ENCOUNTER — HOSPITAL ENCOUNTER (OUTPATIENT)
Dept: ULTRASOUND IMAGING | Age: 75
Discharge: HOME | End: 2025-01-31
Payer: COMMERCIAL

## 2025-01-31 ENCOUNTER — HOSPITAL ENCOUNTER (OUTPATIENT)
Age: 75
Discharge: HOME | End: 2025-01-31
Payer: COMMERCIAL

## 2025-01-31 VITALS
BODY MASS INDEX: 24.88 KG/M2 | RESPIRATION RATE: 16 BRPM | OXYGEN SATURATION: 99 % | WEIGHT: 168 LBS | TEMPERATURE: 97.7 F | SYSTOLIC BLOOD PRESSURE: 113 MMHG | DIASTOLIC BLOOD PRESSURE: 76 MMHG | HEART RATE: 82 BPM | HEIGHT: 69 IN

## 2025-01-31 VITALS
RESPIRATION RATE: 20 BRPM | HEART RATE: 81 BPM | SYSTOLIC BLOOD PRESSURE: 118 MMHG | DIASTOLIC BLOOD PRESSURE: 73 MMHG | OXYGEN SATURATION: 98 %

## 2025-01-31 DIAGNOSIS — Z53.21 ELOPED FROM EMERGENCY DEPARTMENT: Primary | ICD-10-CM

## 2025-01-31 DIAGNOSIS — E80.6 HYPERBILIRUBINEMIA: ICD-10-CM

## 2025-01-31 DIAGNOSIS — T83.011A MALFUNCTION OF FOLEY CATHETER, INITIAL ENCOUNTER: ICD-10-CM

## 2025-01-31 DIAGNOSIS — R18.8 OTHER ASCITES: ICD-10-CM

## 2025-01-31 DIAGNOSIS — K70.31 ASCITES DUE TO ALCOHOLIC CIRRHOSIS (HCC): Primary | ICD-10-CM

## 2025-01-31 DIAGNOSIS — E87.20 LACTIC ACIDOSIS: ICD-10-CM

## 2025-01-31 DIAGNOSIS — D64.9 ANEMIA, UNSPECIFIED TYPE: ICD-10-CM

## 2025-01-31 LAB
ABO + RH BLD: NORMAL
ANION GAP SERPL CALCULATED.3IONS-SCNC: 12 MMOL/L (ref 7–16)
ARM BAND NUMBER: NORMAL
BLOOD BANK SAMPLE EXPIRATION: NORMAL
BLOOD GROUP ANTIBODIES SERPL: NEGATIVE
BNP SERPL-MCNC: 1898 PG/ML (ref 0–125)
BUN SERPL-MCNC: 9 MG/DL (ref 6–23)
CALCIUM SERPL-MCNC: 8.8 MG/DL (ref 8.6–10.2)
CHLORIDE SERPL-SCNC: 98 MMOL/L (ref 98–107)
CO2 SERPL-SCNC: 24 MMOL/L (ref 22–29)
CREAT SERPL-MCNC: 1 MG/DL (ref 0.7–1.2)
GFR, ESTIMATED: 83 ML/MIN/1.73M2
GLUCOSE SERPL-MCNC: 122 MG/DL (ref 74–99)
INR PPP: 2.8
INR PPP: 2.8
LACTATE BLDV-SCNC: 3.7 MMOL/L (ref 0.5–2.2)
LIPASE SERPL-CCNC: 32 U/L (ref 13–60)
PARTIAL THROMBOPLASTIN TIME: 49.2 SEC (ref 24.5–35.1)
POTASSIUM SERPL-SCNC: 3.8 MMOL/L (ref 3.5–5)
PROTHROMBIN TIME: 30.2 SEC (ref 9.3–12.4)
PROTHROMBIN TIME: 30.8 SEC (ref 9.3–12.4)
SODIUM SERPL-SCNC: 134 MMOL/L (ref 132–146)
TROPONIN I SERPL HS-MCNC: 17 NG/L (ref 0–11)

## 2025-01-31 PROCEDURE — 80053 COMPREHEN METABOLIC PANEL: CPT

## 2025-01-31 PROCEDURE — 99285 EMERGENCY DEPT VISIT HI MDM: CPT

## 2025-01-31 PROCEDURE — 36415 COLL VENOUS BLD VENIPUNCTURE: CPT

## 2025-01-31 PROCEDURE — 85610 PROTHROMBIN TIME: CPT

## 2025-01-31 PROCEDURE — 83690 ASSAY OF LIPASE: CPT

## 2025-01-31 PROCEDURE — 82248 BILIRUBIN DIRECT: CPT

## 2025-01-31 PROCEDURE — 85730 THROMBOPLASTIN TIME PARTIAL: CPT

## 2025-01-31 PROCEDURE — 83880 ASSAY OF NATRIURETIC PEPTIDE: CPT

## 2025-01-31 PROCEDURE — 51702 INSERT TEMP BLADDER CATH: CPT

## 2025-01-31 PROCEDURE — 86901 BLOOD TYPING SEROLOGIC RH(D): CPT

## 2025-01-31 PROCEDURE — 84484 ASSAY OF TROPONIN QUANT: CPT

## 2025-01-31 PROCEDURE — 86850 RBC ANTIBODY SCREEN: CPT

## 2025-01-31 PROCEDURE — 83605 ASSAY OF LACTIC ACID: CPT

## 2025-01-31 PROCEDURE — 81001 URINALYSIS AUTO W/SCOPE: CPT

## 2025-01-31 PROCEDURE — 99283 EMERGENCY DEPT VISIT LOW MDM: CPT

## 2025-01-31 PROCEDURE — 85025 COMPLETE CBC W/AUTO DIFF WBC: CPT

## 2025-01-31 PROCEDURE — 76705 ECHO EXAM OF ABDOMEN: CPT

## 2025-01-31 PROCEDURE — 86900 BLOOD TYPING SEROLOGIC ABO: CPT

## 2025-01-31 RX ORDER — 0.9 % SODIUM CHLORIDE 0.9 %
1000 INTRAVENOUS SOLUTION INTRAVENOUS ONCE
Status: COMPLETED | OUTPATIENT
Start: 2025-01-31 | End: 2025-02-01

## 2025-01-31 ASSESSMENT — PAIN - FUNCTIONAL ASSESSMENT: PAIN_FUNCTIONAL_ASSESSMENT: NONE - DENIES PAIN

## 2025-01-31 NOTE — PROGRESS NOTES
Mr Gray arrived to IR for paracentesis, ultrasound done with small amount of fluid noted. Patient states catheter not draining since yesterday and wearing a depends, noted blood on, pulled back depends and catheter appears coming out side of penis, cracked area noted.  Call placed to emergency and spoke with Ilan JAY in charge. Placed in wheelchair and taken to emergency to registration.

## 2025-01-31 NOTE — ED PROVIDER NOTES
I had signed up to see and evaluate this patient, shortly after signing for the patient it stated the patient was off the floor, it appears he may have eloped from the department.     Eulalio Taylor,   01/31/25 2920

## 2025-01-31 NOTE — ED TRIAGE NOTES
Department of Emergency Medicine  FIRST PROVIDER TRIAGE NOTE             Independent MLP           1/31/25  4:19 PM EST    Date of Encounter: 1/31/25   MRN: [id number]      HPI: Hardik Gray is a 74 y.o. male who presents to the ED for OTHER (Millan problems, leaking) and Abdominal Pain (Lower abd pain)      ROS: Negative for cp, Suicidal ideation, or Homicidal Ideation.    PE: Gen Appearance/Constitutional: alert  CV: regular rate  GI: tender to palpation - lower abd     Initial Plan of Care: All treatment areas with department are currently occupied. Plan to order/Initiate the following while awaiting opening in ED: Urinalysis with microscopic.  Initiate Treatment-Testing, Proceed toTreatment Area When Bed Available for ED Attending/MLP to Continue Care    Electronically signed by DAREN Echavarria - JUSTIN   DD: 1/31/25

## 2025-02-01 ENCOUNTER — APPOINTMENT (OUTPATIENT)
Dept: CT IMAGING | Age: 75
DRG: 433 | End: 2025-02-01
Payer: OTHER GOVERNMENT

## 2025-02-01 LAB
ALBUMIN SERPL-MCNC: 2.5 G/DL (ref 3.5–5.2)
ALBUMIN SERPL-MCNC: 3.1 G/DL (ref 3.5–5.2)
ALP SERPL-CCNC: 153 U/L (ref 40–129)
ALP SERPL-CCNC: 172 U/L (ref 40–129)
ALT SERPL-CCNC: 10 U/L (ref 0–40)
ALT SERPL-CCNC: 10 U/L (ref 0–40)
AMMONIA PLAS-SCNC: 85 UMOL/L (ref 16–60)
ANION GAP SERPL CALCULATED.3IONS-SCNC: 11 MMOL/L (ref 7–16)
AST SERPL-CCNC: 37 U/L (ref 0–39)
AST SERPL-CCNC: 39 U/L (ref 0–39)
BACTERIA URNS QL MICRO: ABNORMAL
BACTERIA URNS QL MICRO: ABNORMAL
BASOPHILS # BLD: 0 K/UL (ref 0–0.2)
BASOPHILS NFR BLD: 0 % (ref 0–2)
BILIRUB DIRECT SERPL-MCNC: 5.3 MG/DL (ref 0–0.3)
BILIRUB INDIRECT SERPL-MCNC: 7 MG/DL (ref 0–1)
BILIRUB SERPL-MCNC: 12.3 MG/DL (ref 0–1.2)
BILIRUB SERPL-MCNC: 9.7 MG/DL (ref 0–1.2)
BILIRUB UR QL STRIP: ABNORMAL
BILIRUB UR QL STRIP: ABNORMAL
BUN SERPL-MCNC: 9 MG/DL (ref 6–23)
CALCIUM SERPL-MCNC: 8.1 MG/DL (ref 8.6–10.2)
CHLORIDE SERPL-SCNC: 102 MMOL/L (ref 98–107)
CLARITY UR: ABNORMAL
CLARITY UR: ABNORMAL
CO2 SERPL-SCNC: 22 MMOL/L (ref 22–29)
COLOR UR: ABNORMAL
COLOR UR: ABNORMAL
CREAT SERPL-MCNC: 0.9 MG/DL (ref 0.7–1.2)
EOSINOPHIL # BLD: 0.39 K/UL (ref 0.05–0.5)
EOSINOPHILS RELATIVE PERCENT: 5 % (ref 0–6)
EPI CELLS #/AREA URNS HPF: ABNORMAL /HPF
ERYTHROCYTE [DISTWIDTH] IN BLOOD BY AUTOMATED COUNT: 18.2 % (ref 11.5–15)
GFR, ESTIMATED: >90 ML/MIN/1.73M2
GLUCOSE SERPL-MCNC: 75 MG/DL (ref 74–99)
GLUCOSE UR STRIP-MCNC: 100 MG/DL
GLUCOSE UR STRIP-MCNC: NEGATIVE MG/DL
HCT VFR BLD AUTO: 22.7 % (ref 37–54)
HGB BLD-MCNC: 7.6 G/DL (ref 12.5–16.5)
HGB UR QL STRIP.AUTO: ABNORMAL
HGB UR QL STRIP.AUTO: ABNORMAL
KETONES UR STRIP-MCNC: ABNORMAL MG/DL
KETONES UR STRIP-MCNC: ABNORMAL MG/DL
LACTATE BLDV-SCNC: 2.4 MMOL/L (ref 0.5–2.2)
LEUKOCYTE ESTERASE UR QL STRIP: ABNORMAL
LEUKOCYTE ESTERASE UR QL STRIP: ABNORMAL
LYMPHOCYTES NFR BLD: 1.74 K/UL (ref 1.5–4)
LYMPHOCYTES RELATIVE PERCENT: 24 % (ref 20–42)
MCH RBC QN AUTO: 33 PG (ref 26–35)
MCHC RBC AUTO-ENTMCNC: 33.5 G/DL (ref 32–34.5)
MCV RBC AUTO: 98.7 FL (ref 80–99.9)
MONOCYTES NFR BLD: 1.1 K/UL (ref 0.1–0.95)
MONOCYTES NFR BLD: 15 % (ref 2–12)
MUCOUS THREADS URNS QL MICRO: PRESENT
NEUTROPHILS NFR BLD: 56 % (ref 43–80)
NEUTS SEG NFR BLD: 4.07 K/UL (ref 1.8–7.3)
NITRITE UR QL STRIP: NEGATIVE
NITRITE UR QL STRIP: NEGATIVE
PH UR STRIP: 5.5 [PH] (ref 5–8)
PH UR STRIP: 5.5 [PH] (ref 5–8)
PLATELET # BLD AUTO: 118 K/UL (ref 130–450)
PMV BLD AUTO: 9.5 FL (ref 7–12)
POTASSIUM SERPL-SCNC: 3.9 MMOL/L (ref 3.5–5)
PROT SERPL-MCNC: 5.8 G/DL (ref 6.4–8.3)
PROT SERPL-MCNC: 7 G/DL (ref 6.4–8.3)
PROT UR STRIP-MCNC: 30 MG/DL
PROT UR STRIP-MCNC: 30 MG/DL
RBC # BLD AUTO: 2.3 M/UL (ref 3.8–5.8)
RBC # BLD: ABNORMAL 10*6/UL
RBC #/AREA URNS HPF: ABNORMAL /HPF
RBC #/AREA URNS HPF: ABNORMAL /HPF
SODIUM SERPL-SCNC: 135 MMOL/L (ref 132–146)
SP GR UR STRIP: 1.02 (ref 1–1.03)
SP GR UR STRIP: 1.02 (ref 1–1.03)
TROPONIN I SERPL HS-MCNC: 16 NG/L (ref 0–11)
UROBILINOGEN UR STRIP-ACNC: 2 EU/DL (ref 0–1)
UROBILINOGEN UR STRIP-ACNC: 4 EU/DL (ref 0–1)
WBC #/AREA URNS HPF: ABNORMAL /HPF
WBC #/AREA URNS HPF: ABNORMAL /HPF
WBC OTHER # BLD: 7.3 K/UL (ref 4.5–11.5)
YEAST URNS QL MICRO: PRESENT
YEAST URNS QL MICRO: PRESENT

## 2025-02-01 PROCEDURE — 84484 ASSAY OF TROPONIN QUANT: CPT

## 2025-02-01 PROCEDURE — 83605 ASSAY OF LACTIC ACID: CPT

## 2025-02-01 PROCEDURE — 2500000003 HC RX 250 WO HCPCS: Performed by: NURSE PRACTITIONER

## 2025-02-01 PROCEDURE — 80053 COMPREHEN METABOLIC PANEL: CPT

## 2025-02-01 PROCEDURE — 74177 CT ABD & PELVIS W/CONTRAST: CPT

## 2025-02-01 PROCEDURE — 6360000002 HC RX W HCPCS

## 2025-02-01 PROCEDURE — G0378 HOSPITAL OBSERVATION PER HR: HCPCS

## 2025-02-01 PROCEDURE — 2580000003 HC RX 258

## 2025-02-01 PROCEDURE — 6370000000 HC RX 637 (ALT 250 FOR IP): Performed by: INTERNAL MEDICINE

## 2025-02-01 PROCEDURE — 81001 URINALYSIS AUTO W/SCOPE: CPT

## 2025-02-01 PROCEDURE — 87106 FUNGI IDENTIFICATION YEAST: CPT

## 2025-02-01 PROCEDURE — 82140 ASSAY OF AMMONIA: CPT

## 2025-02-01 PROCEDURE — 2500000003 HC RX 250 WO HCPCS

## 2025-02-01 PROCEDURE — APPSS45 APP SPLIT SHARED TIME 31-45 MINUTES: Performed by: NURSE PRACTITIONER

## 2025-02-01 PROCEDURE — 96375 TX/PRO/DX INJ NEW DRUG ADDON: CPT

## 2025-02-01 PROCEDURE — 6370000000 HC RX 637 (ALT 250 FOR IP): Performed by: NURSE PRACTITIONER

## 2025-02-01 PROCEDURE — 6360000004 HC RX CONTRAST MEDICATION: Performed by: RADIOLOGY

## 2025-02-01 PROCEDURE — 93005 ELECTROCARDIOGRAM TRACING: CPT

## 2025-02-01 PROCEDURE — 87086 URINE CULTURE/COLONY COUNT: CPT

## 2025-02-01 PROCEDURE — 96374 THER/PROPH/DIAG INJ IV PUSH: CPT

## 2025-02-01 RX ORDER — OXYCODONE HYDROCHLORIDE 5 MG/1
10 TABLET ORAL EVERY 6 HOURS PRN
Status: DISCONTINUED | OUTPATIENT
Start: 2025-02-01 | End: 2025-02-06 | Stop reason: HOSPADM

## 2025-02-01 RX ORDER — HYDROCODONE BITARTRATE AND ACETAMINOPHEN 5; 325 MG/1; MG/1
1 TABLET ORAL EVERY 6 HOURS PRN
Status: DISCONTINUED | OUTPATIENT
Start: 2025-02-01 | End: 2025-02-01

## 2025-02-01 RX ORDER — POTASSIUM CHLORIDE 1500 MG/1
40 TABLET, EXTENDED RELEASE ORAL PRN
Status: DISCONTINUED | OUTPATIENT
Start: 2025-02-01 | End: 2025-02-06 | Stop reason: HOSPADM

## 2025-02-01 RX ORDER — SPIRONOLACTONE 25 MG/1
25 TABLET ORAL DAILY
Status: DISCONTINUED | OUTPATIENT
Start: 2025-02-01 | End: 2025-02-04

## 2025-02-01 RX ORDER — SODIUM CHLORIDE 0.9 % (FLUSH) 0.9 %
5-40 SYRINGE (ML) INJECTION PRN
Status: DISCONTINUED | OUTPATIENT
Start: 2025-02-01 | End: 2025-02-06 | Stop reason: HOSPADM

## 2025-02-01 RX ORDER — FUROSEMIDE 40 MG/1
40 TABLET ORAL DAILY
Status: DISCONTINUED | OUTPATIENT
Start: 2025-02-01 | End: 2025-02-06 | Stop reason: HOSPADM

## 2025-02-01 RX ORDER — FLUTICASONE PROPIONATE 50 MCG
1 SPRAY, SUSPENSION (ML) NASAL DAILY
Status: DISCONTINUED | OUTPATIENT
Start: 2025-02-01 | End: 2025-02-06 | Stop reason: HOSPADM

## 2025-02-01 RX ORDER — METOPROLOL TARTRATE 50 MG
50 TABLET ORAL 2 TIMES DAILY
Status: DISCONTINUED | OUTPATIENT
Start: 2025-02-01 | End: 2025-02-01

## 2025-02-01 RX ORDER — MINERAL OIL/HYDROPHIL PETROLAT
OINTMENT (GRAM) TOPICAL PRN
Status: DISCONTINUED | OUTPATIENT
Start: 2025-02-01 | End: 2025-02-06 | Stop reason: HOSPADM

## 2025-02-01 RX ORDER — MIDODRINE HYDROCHLORIDE 5 MG/1
2.5 TABLET ORAL
Status: DISCONTINUED | OUTPATIENT
Start: 2025-02-01 | End: 2025-02-06 | Stop reason: HOSPADM

## 2025-02-01 RX ORDER — FOLIC ACID 1 MG/1
1 TABLET ORAL DAILY
Status: DISCONTINUED | OUTPATIENT
Start: 2025-02-01 | End: 2025-02-06 | Stop reason: HOSPADM

## 2025-02-01 RX ORDER — MAGNESIUM SULFATE IN WATER 40 MG/ML
2000 INJECTION, SOLUTION INTRAVENOUS PRN
Status: DISCONTINUED | OUTPATIENT
Start: 2025-02-01 | End: 2025-02-06 | Stop reason: HOSPADM

## 2025-02-01 RX ORDER — METOPROLOL TARTRATE 25 MG/1
25 TABLET, FILM COATED ORAL 2 TIMES DAILY
Status: DISCONTINUED | OUTPATIENT
Start: 2025-02-01 | End: 2025-02-06 | Stop reason: HOSPADM

## 2025-02-01 RX ORDER — POLYETHYLENE GLYCOL 3350 17 G/17G
17 POWDER, FOR SOLUTION ORAL DAILY PRN
Status: DISCONTINUED | OUTPATIENT
Start: 2025-02-01 | End: 2025-02-06 | Stop reason: HOSPADM

## 2025-02-01 RX ORDER — SODIUM CHLORIDE 9 MG/ML
INJECTION, SOLUTION INTRAVENOUS PRN
Status: DISCONTINUED | OUTPATIENT
Start: 2025-02-01 | End: 2025-02-06 | Stop reason: HOSPADM

## 2025-02-01 RX ORDER — FERROUS SULFATE 325(65) MG
325 TABLET ORAL 2 TIMES DAILY WITH MEALS
Status: DISCONTINUED | OUTPATIENT
Start: 2025-02-01 | End: 2025-02-06 | Stop reason: HOSPADM

## 2025-02-01 RX ORDER — TAMSULOSIN HYDROCHLORIDE 0.4 MG/1
0.4 CAPSULE ORAL DAILY
Status: DISCONTINUED | OUTPATIENT
Start: 2025-02-01 | End: 2025-02-06 | Stop reason: HOSPADM

## 2025-02-01 RX ORDER — PANTOPRAZOLE SODIUM 40 MG/1
40 TABLET, DELAYED RELEASE ORAL
Status: DISCONTINUED | OUTPATIENT
Start: 2025-02-01 | End: 2025-02-02

## 2025-02-01 RX ORDER — IOPAMIDOL 755 MG/ML
75 INJECTION, SOLUTION INTRAVASCULAR
Status: COMPLETED | OUTPATIENT
Start: 2025-02-01 | End: 2025-02-01

## 2025-02-01 RX ORDER — POTASSIUM CHLORIDE 7.45 MG/ML
10 INJECTION INTRAVENOUS PRN
Status: DISCONTINUED | OUTPATIENT
Start: 2025-02-01 | End: 2025-02-06 | Stop reason: HOSPADM

## 2025-02-01 RX ORDER — ONDANSETRON 4 MG/1
4 TABLET, ORALLY DISINTEGRATING ORAL EVERY 8 HOURS PRN
Status: DISCONTINUED | OUTPATIENT
Start: 2025-02-01 | End: 2025-02-06 | Stop reason: HOSPADM

## 2025-02-01 RX ORDER — LACTULOSE 10 G/15ML
20 SOLUTION ORAL 2 TIMES DAILY
Status: DISCONTINUED | OUTPATIENT
Start: 2025-02-01 | End: 2025-02-06 | Stop reason: HOSPADM

## 2025-02-01 RX ORDER — ALBUTEROL SULFATE 0.83 MG/ML
2.5 SOLUTION RESPIRATORY (INHALATION) EVERY 6 HOURS PRN
Status: DISCONTINUED | OUTPATIENT
Start: 2025-02-01 | End: 2025-02-06 | Stop reason: HOSPADM

## 2025-02-01 RX ORDER — ENOXAPARIN SODIUM 100 MG/ML
40 INJECTION SUBCUTANEOUS DAILY
Status: DISCONTINUED | OUTPATIENT
Start: 2025-02-01 | End: 2025-02-06 | Stop reason: HOSPADM

## 2025-02-01 RX ORDER — SODIUM CHLORIDE 0.9 % (FLUSH) 0.9 %
5-40 SYRINGE (ML) INJECTION EVERY 12 HOURS SCHEDULED
Status: DISCONTINUED | OUTPATIENT
Start: 2025-02-01 | End: 2025-02-06 | Stop reason: HOSPADM

## 2025-02-01 RX ORDER — ENTECAVIR 0.5 MG/1
1 TABLET, FILM COATED ORAL DAILY
Status: DISCONTINUED | OUTPATIENT
Start: 2025-02-01 | End: 2025-02-06 | Stop reason: HOSPADM

## 2025-02-01 RX ORDER — ONDANSETRON 2 MG/ML
4 INJECTION INTRAMUSCULAR; INTRAVENOUS EVERY 6 HOURS PRN
Status: DISCONTINUED | OUTPATIENT
Start: 2025-02-01 | End: 2025-02-06 | Stop reason: HOSPADM

## 2025-02-01 RX ADMIN — FUROSEMIDE 40 MG: 40 TABLET ORAL at 14:00

## 2025-02-01 RX ADMIN — WATER 1000 MG: 1 INJECTION INTRAMUSCULAR; INTRAVENOUS; SUBCUTANEOUS at 03:01

## 2025-02-01 RX ADMIN — MIDODRINE HYDROCHLORIDE 2.5 MG: 5 TABLET ORAL at 13:59

## 2025-02-01 RX ADMIN — PANTOPRAZOLE SODIUM 40 MG: 40 TABLET, DELAYED RELEASE ORAL at 06:58

## 2025-02-01 RX ADMIN — SODIUM CHLORIDE, PRESERVATIVE FREE 10 ML: 5 INJECTION INTRAVENOUS at 21:56

## 2025-02-01 RX ADMIN — IOPAMIDOL 75 ML: 755 INJECTION, SOLUTION INTRAVENOUS at 01:37

## 2025-02-01 RX ADMIN — SODIUM CHLORIDE 1000 ML: 9 INJECTION, SOLUTION INTRAVENOUS at 00:58

## 2025-02-01 RX ADMIN — HYDROCODONE BITARTRATE AND ACETAMINOPHEN 1 TABLET: 5; 325 TABLET ORAL at 13:58

## 2025-02-01 RX ADMIN — OXYCODONE 10 MG: 5 TABLET ORAL at 21:48

## 2025-02-01 RX ADMIN — FERROUS SULFATE TAB 325 MG (65 MG ELEMENTAL FE) 325 MG: 325 (65 FE) TAB at 14:00

## 2025-02-01 RX ADMIN — METOPROLOL TARTRATE 25 MG: 50 TABLET, FILM COATED ORAL at 21:57

## 2025-02-01 RX ADMIN — METOPROLOL TARTRATE 50 MG: 50 TABLET, FILM COATED ORAL at 13:54

## 2025-02-01 RX ADMIN — FOLIC ACID 1 MG: 1 TABLET ORAL at 13:54

## 2025-02-01 RX ADMIN — SPIRONOLACTONE 25 MG: 25 TABLET, FILM COATED ORAL at 14:01

## 2025-02-01 ASSESSMENT — PAIN DESCRIPTION - LOCATION
LOCATION: PENIS
LOCATION: ABDOMEN

## 2025-02-01 ASSESSMENT — PAIN SCALES - GENERAL
PAINLEVEL_OUTOF10: 8
PAINLEVEL_OUTOF10: 4
PAINLEVEL_OUTOF10: 8
PAINLEVEL_OUTOF10: 10

## 2025-02-01 ASSESSMENT — PAIN DESCRIPTION - ORIENTATION: ORIENTATION: MID

## 2025-02-01 ASSESSMENT — PAIN DESCRIPTION - DESCRIPTORS: DESCRIPTORS: ACHING

## 2025-02-01 NOTE — PLAN OF CARE
Patient admitted after midnight  Came in with Millan catheter malfunction    Started on Rocephin for positive UA and concern for UTI    CT scan showed moderate ascites.  IR consulted for possible paracentesis    Noted to have lactic acidosis.  Improving.  Monitor

## 2025-02-01 NOTE — ED PROVIDER NOTES
[facility] EMERGENCY DEPARTMENT  EMERGENCY DEPARTMENT ENCOUNTER        Pt Name: Hardik Gray  MRN: [id number]  Birthdate 1950  Date of evaluation: 1/31/2025  Provider: Tina Cano MD  PCP: Xavi Swenson MD  Note Started: 6:34 AM EST 2/1/25    CHIEF COMPLAINT       Chief Complaint   Patient presents with    urinary catheter problems    Abdominal Pain       HISTORY OF PRESENT ILLNESS: 1 or more Elements   History From: Patient    Limitations to history : None    Hardik Gray is a 74 y.o. male who presents for urinary catheter obstruction as well as generalized abdominal pain.  Patient noticed that his Suarez catheter had not been working this afternoon.  Patient does have a history of liver cirrhosis and does get regular paracentesis.  He had gone for paracentesis the other day however they stated he only had about 1 L when he usually has about 3 to 4 L and paracentesis was not performed at that time.  He denies fevers, nausea, vomiting, chest pain, shortness of breath.  He is followed by Dr. Salvador (urology)    Nursing Notes were all reviewed and agreed with or any disagreements were addressed in the HPI.        REVIEW OF EXTERNAL NOTE :       Patient was last admitted on 1/19/2025 for UTI, urinary retention, anasarca, lactic acidosis    REVIEW OF SYSTEMS :           Positives and Pertinent negatives as per HPI.     SURGICAL HISTORY     Past Surgical History:   Procedure Laterality Date    BLADDER SURGERY N/A 3/22/2023    CYSTOSCOPY RETROGRADE PYELOGRAM URETHERAL DILATION SUAREZ INSERTION performed by Rashaad Salvador MD at [facility] OR    CORONARY ANGIOPLASTY WITH STENT PLACEMENT  2002    @ [facility]    CYSTOSCOPY N/A 3/22/2021    CYSTOSCOPY, RETROGRADE PYELOGRAM, URETHRAL DIALATION, TURBT performed by Rashaad Salvador MD at [facility] OR    FOOT SURGERY      lt foot    FOOT SURGERY      rt foot as well    PROSTATE BIOPSY      SIGMOIDOSCOPY N/A 9/24/2024    SIGMOIDOSCOPY CONTROL HEMORRHAGE performed  by PARVEEN Mancia MD at [facility] ENDOSCOPY       CURRENTMEDICATIONS       Previous Medications    ALBUTEROL (PROVENTIL) (2.5 MG/3ML) 0.083% NEBULIZER SOLUTION    Take 3 mLs by nebulization every 6 hours as needed for Wheezing    ALBUTEROL SULFATE HFA (VENTOLIN HFA) 108 (90 BASE) MCG/ACT INHALER    Inhale 2 puffs into the lungs every 6 hours as needed for Wheezing    ENTECAVIR (BARACLUDE) 0.5 MG TABLET    Take 2 tablets by mouth daily    FERROUS SULFATE (IRON 325) 325 (65 FE) MG TABLET    Take 1 tablet by mouth in the morning and at bedtime    FLUTICASONE (FLONASE) 50 MCG/ACT NASAL SPRAY    1 spray by Nasal route daily    FOLIC ACID (FOLVITE) 1 MG TABLET    Take 1 tablet by mouth daily    FUROSEMIDE (LASIX) 40 MG TABLET    Take 1 tablet by mouth daily    LACTULOSE (CHRONULAC) 10 GM/15ML SOLUTION    Take 30 mLs by mouth 2 times daily    MAGNESIUM GLUCONATE (MAGONATE) 500 MG TABLET    Take 1 tablet by mouth daily    METOPROLOL TARTRATE (LOPRESSOR) 50 MG TABLET    Take 1 tablet by mouth 2 times daily    MIDODRINE (PROAMATINE) 2.5 MG TABLET    Take 1 tablet by mouth 3 times daily (with meals)    MINERAL OIL-HYDROPHILIC PETROLATUM (AQUAPHOR) OINTMENT    Apply topically as needed for Dry Skin A    OXYCODONE HCL (OXY-IR) 10 MG IMMEDIATE RELEASE TABLET    Take 1 tablet by mouth every 6 hours as needed for Pain.    PANTOPRAZOLE (PROTONIX) 40 MG TABLET    Take 1 tablet by mouth daily    SPIRONOLACTONE (ALDACTONE) 25 MG TABLET    Take 1 tablet by mouth daily    TAMSULOSIN (FLOMAX) 0.4 MG CAPSULE    Take 1 capsule by mouth daily       ALLERGIES     Simvastatin and Crestor [rosuvastatin]    FAMILYHISTORY       Family History   Problem Relation Age of Onset    Stroke Mother     Diabetes Mother     Heart Attack Father     Heart Attack Brother         SOCIAL HISTORY       Social History     Tobacco Use    Smoking status: Former     Current packs/day: 0.00     Average packs/day: 1 pack/day for 42.3 years (42.3 ttl pk-yrs)     Types:  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     EMERGENCY DEPARTMENT COURSE    Vitals:    Vitals:    01/31/25 2143   BP: 117/73   Pulse: 88   Resp: 16   Temp: 98.1 °F (36.7 °C)   TempSrc: Oral   SpO2: 100%   Weight: 76.2 kg (168 lb)   Height: 1.753 m (5' 9\")       Patient was given the following medications:  Medications   sodium chloride flush 0.9 % injection 5-40 mL (has no administration in time range)   sodium chloride flush 0.9 % injection 5-40 mL (has no administration in time range)   0.9 % sodium chloride infusion (has no administration in time range)   potassium chloride (KLOR-CON M) extended release tablet 40 mEq (has no administration in time range)     Or   potassium bicarb-citric acid (EFFER-K) effervescent tablet 40 mEq (has no administration in time range)     Or   potassium chloride 10 mEq/100 mL IVPB (Peripheral Line) (has no administration in time range)   magnesium sulfate 2000 mg in 50 mL IVPB premix (has no administration in time range)   enoxaparin (LOVENOX) injection 40 mg (has no administration in time range)   ondansetron (ZOFRAN-ODT) disintegrating tablet 4 mg (has no administration in time range)     Or   ondansetron (ZOFRAN) injection 4 mg (has no administration in time range)   polyethylene glycol (GLYCOLAX) packet 17 g (has no administration in time range)   albuterol (PROVENTIL) (2.5 MG/3ML) 0.083% nebulizer solution 2.5 mg (has no administration in time range)   entecavir (BARACLUDE) tablet 1 mg (has no administration in time range)   ferrous sulfate (IRON 325) tablet 325 mg (has no administration in time range)   fluticasone (FLONASE) 50 MCG/ACT nasal spray 1 spray (has no administration in time range)   folic

## 2025-02-01 NOTE — H&P
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  Recently admitted 01/19/2025 - 01/23/2025 for UTI, chronic hepatitis B, cirrhosis with ascites, gross hematuria, penile lesion and elevated lactic acid level.  Patient treated with Rocephin at that time and also had paracentesis 01/20 with 4450 fluid removal.  Patient to ED due to Millan catheter malfunction.  Millan was clogged and not putting out urine.  Millan flushed in ED and now working.  Patient denies any and all complaints including fever, chills, shortness of breath, chest pain, nausea/vomiting, abdominal pain, changes in bowel/bladder habits.    Labs significant for lactic acid 3.7 and then 2.4, BNP 1898, troponin 17 and then 16, albumin 3.1, alkaline phos 172, total bili 12.3, direct bili 5.3, indirect bili 7.0, and H&H 7.6/22.7.  Urine dark yellow, positive for ketones, protein, urobilinogen, moderate LE, WBCs , RBCs greater than 100, bacteria 2+, yeast, large hemoglobin.  CT of the abdomen showing hepatic cirrhosis with moderate abdominal pelvic ascites, wall thickening of the bladder with distention, cholelithiasis, anasarca and diverticulosis.  Given Rocephin and 1 L bolus in ED.  Will admit for further evaluation and treatment.    Informant(s) for H&P: Patient and chart review    REVIEW OF SYSTEMS:  A comprehensive review of systems was negative except for: what is in the HPI      PMH:  Past Medical History:   Diagnosis Date    Acid reflux     Arthritis     Back pain     CAD (coronary artery disease)     follows with PCP    Cerebral artery occlusion with cerebral infarction (HCC) 10/2009    affected vision initially; no deficits at this time    COPD (chronic obstructive

## 2025-02-02 LAB
ALBUMIN SERPL-MCNC: 2.7 G/DL (ref 3.5–5.2)
ALP SERPL-CCNC: 188 U/L (ref 40–129)
ALT SERPL-CCNC: 10 U/L (ref 0–40)
ANION GAP SERPL CALCULATED.3IONS-SCNC: 11 MMOL/L (ref 7–16)
AST SERPL-CCNC: 40 U/L (ref 0–39)
BASOPHILS # BLD: 0 K/UL (ref 0–0.2)
BASOPHILS # BLD: 0.06 K/UL (ref 0–0.2)
BASOPHILS NFR BLD: 0 % (ref 0–2)
BASOPHILS NFR BLD: 1 % (ref 0–2)
BILIRUB SERPL-MCNC: 9.9 MG/DL (ref 0–1.2)
BUN SERPL-MCNC: 9 MG/DL (ref 6–23)
CALCIUM SERPL-MCNC: 8.4 MG/DL (ref 8.6–10.2)
CHLORIDE SERPL-SCNC: 105 MMOL/L (ref 98–107)
CO2 SERPL-SCNC: 23 MMOL/L (ref 22–29)
CREAT SERPL-MCNC: 1 MG/DL (ref 0.7–1.2)
EOSINOPHIL # BLD: 0.35 K/UL (ref 0.05–0.5)
EOSINOPHIL # BLD: 0.38 K/UL (ref 0.05–0.5)
EOSINOPHILS RELATIVE PERCENT: 4 % (ref 0–6)
EOSINOPHILS RELATIVE PERCENT: 5 % (ref 0–6)
ERYTHROCYTE [DISTWIDTH] IN BLOOD BY AUTOMATED COUNT: 18.5 % (ref 11.5–15)
ERYTHROCYTE [DISTWIDTH] IN BLOOD BY AUTOMATED COUNT: 18.6 % (ref 11.5–15)
GFR, ESTIMATED: 83 ML/MIN/1.73M2
GLUCOSE SERPL-MCNC: 115 MG/DL (ref 74–99)
HCT VFR BLD AUTO: 20.6 % (ref 37–54)
HCT VFR BLD AUTO: 22.2 % (ref 37–54)
HGB BLD-MCNC: 6.8 G/DL (ref 12.5–16.5)
HGB BLD-MCNC: 6.9 G/DL (ref 12.5–16.5)
HGB BLD-MCNC: 7.3 G/DL (ref 12.5–16.5)
LACTATE BLDV-SCNC: 1.6 MMOL/L (ref 0.5–2.2)
LYMPHOCYTES NFR BLD: 1.45 K/UL (ref 1.5–4)
LYMPHOCYTES NFR BLD: 1.73 K/UL (ref 1.5–4)
LYMPHOCYTES RELATIVE PERCENT: 20 % (ref 20–42)
LYMPHOCYTES RELATIVE PERCENT: 22 % (ref 20–42)
MCH RBC QN AUTO: 32.4 PG (ref 26–35)
MCH RBC QN AUTO: 32.9 PG (ref 26–35)
MCHC RBC AUTO-ENTMCNC: 32.9 G/DL (ref 32–34.5)
MCHC RBC AUTO-ENTMCNC: 33.5 G/DL (ref 32–34.5)
MCV RBC AUTO: 98.1 FL (ref 80–99.9)
MCV RBC AUTO: 98.7 FL (ref 80–99.9)
MONOCYTES NFR BLD: 1.2 K/UL (ref 0.1–0.95)
MONOCYTES NFR BLD: 1.31 K/UL (ref 0.1–0.95)
MONOCYTES NFR BLD: 16 % (ref 2–12)
MONOCYTES NFR BLD: 17 % (ref 2–12)
NEUTROPHILS NFR BLD: 57 % (ref 43–80)
NEUTROPHILS NFR BLD: 58 % (ref 43–80)
NEUTS SEG NFR BLD: 4.22 K/UL (ref 1.8–7.3)
NEUTS SEG NFR BLD: 4.42 K/UL (ref 1.8–7.3)
PLATELET # BLD AUTO: 103 K/UL (ref 130–450)
PLATELET # BLD AUTO: 112 K/UL (ref 130–450)
PMV BLD AUTO: 10.2 FL (ref 7–12)
PMV BLD AUTO: 9.3 FL (ref 7–12)
POTASSIUM SERPL-SCNC: 3.8 MMOL/L (ref 3.5–5)
PROT SERPL-MCNC: 6.2 G/DL (ref 6.4–8.3)
RBC # BLD AUTO: 2.1 M/UL (ref 3.8–5.8)
RBC # BLD AUTO: 2.25 M/UL (ref 3.8–5.8)
RBC # BLD: ABNORMAL 10*6/UL
SODIUM SERPL-SCNC: 139 MMOL/L (ref 132–146)
WBC # BLD: ABNORMAL 10*3/UL
WBC # BLD: ABNORMAL 10*3/UL
WBC OTHER # BLD: 7.3 K/UL (ref 4.5–11.5)
WBC OTHER # BLD: 7.8 K/UL (ref 4.5–11.5)

## 2025-02-02 PROCEDURE — 96365 THER/PROPH/DIAG IV INF INIT: CPT

## 2025-02-02 PROCEDURE — 96366 THER/PROPH/DIAG IV INF ADDON: CPT

## 2025-02-02 PROCEDURE — 85018 HEMOGLOBIN: CPT

## 2025-02-02 PROCEDURE — 80053 COMPREHEN METABOLIC PANEL: CPT

## 2025-02-02 PROCEDURE — 86900 BLOOD TYPING SEROLOGIC ABO: CPT

## 2025-02-02 PROCEDURE — G0378 HOSPITAL OBSERVATION PER HR: HCPCS

## 2025-02-02 PROCEDURE — 6360000002 HC RX W HCPCS: Performed by: INTERNAL MEDICINE

## 2025-02-02 PROCEDURE — 82105 ALPHA-FETOPROTEIN SERUM: CPT

## 2025-02-02 PROCEDURE — 2500000003 HC RX 250 WO HCPCS: Performed by: NURSE PRACTITIONER

## 2025-02-02 PROCEDURE — 96376 TX/PRO/DX INJ SAME DRUG ADON: CPT

## 2025-02-02 PROCEDURE — 99232 SBSQ HOSP IP/OBS MODERATE 35: CPT | Performed by: FAMILY MEDICINE

## 2025-02-02 PROCEDURE — 86850 RBC ANTIBODY SCREEN: CPT

## 2025-02-02 PROCEDURE — 6370000000 HC RX 637 (ALT 250 FOR IP): Performed by: NURSE PRACTITIONER

## 2025-02-02 PROCEDURE — 2580000003 HC RX 258: Performed by: INTERNAL MEDICINE

## 2025-02-02 PROCEDURE — 86901 BLOOD TYPING SEROLOGIC RH(D): CPT

## 2025-02-02 PROCEDURE — 6360000002 HC RX W HCPCS: Performed by: NURSE PRACTITIONER

## 2025-02-02 PROCEDURE — 96375 TX/PRO/DX INJ NEW DRUG ADDON: CPT

## 2025-02-02 PROCEDURE — 85025 COMPLETE CBC W/AUTO DIFF WBC: CPT

## 2025-02-02 PROCEDURE — 86923 COMPATIBILITY TEST ELECTRIC: CPT

## 2025-02-02 PROCEDURE — 83605 ASSAY OF LACTIC ACID: CPT

## 2025-02-02 PROCEDURE — 36415 COLL VENOUS BLD VENIPUNCTURE: CPT

## 2025-02-02 RX ORDER — SODIUM CHLORIDE 9 MG/ML
INJECTION, SOLUTION INTRAVENOUS PRN
Status: DISCONTINUED | OUTPATIENT
Start: 2025-02-02 | End: 2025-02-06 | Stop reason: HOSPADM

## 2025-02-02 RX ORDER — PANTOPRAZOLE SODIUM 40 MG/10ML
40 INJECTION, POWDER, LYOPHILIZED, FOR SOLUTION INTRAVENOUS 2 TIMES DAILY
Status: DISCONTINUED | OUTPATIENT
Start: 2025-02-02 | End: 2025-02-02 | Stop reason: SDUPTHER

## 2025-02-02 RX ORDER — OCTREOTIDE ACETATE 50 UG/ML
50 INJECTION, SOLUTION INTRAVENOUS; SUBCUTANEOUS ONCE
Status: COMPLETED | OUTPATIENT
Start: 2025-02-02 | End: 2025-02-02

## 2025-02-02 RX ADMIN — SPIRONOLACTONE 25 MG: 25 TABLET, FILM COATED ORAL at 08:17

## 2025-02-02 RX ADMIN — PANTOPRAZOLE SODIUM 40 MG: 40 TABLET, DELAYED RELEASE ORAL at 05:16

## 2025-02-02 RX ADMIN — MIDODRINE HYDROCHLORIDE 2.5 MG: 5 TABLET ORAL at 11:36

## 2025-02-02 RX ADMIN — FUROSEMIDE 40 MG: 40 TABLET ORAL at 08:16

## 2025-02-02 RX ADMIN — FERROUS SULFATE TAB 325 MG (65 MG ELEMENTAL FE) 325 MG: 325 (65 FE) TAB at 08:16

## 2025-02-02 RX ADMIN — OCTREOTIDE ACETATE 50 MCG/HR: 500 INJECTION, SOLUTION INTRAVENOUS; SUBCUTANEOUS at 21:26

## 2025-02-02 RX ADMIN — FOLIC ACID 1 MG: 1 TABLET ORAL at 08:16

## 2025-02-02 RX ADMIN — MIDODRINE HYDROCHLORIDE 2.5 MG: 5 TABLET ORAL at 08:17

## 2025-02-02 RX ADMIN — OXYCODONE 10 MG: 5 TABLET ORAL at 14:50

## 2025-02-02 RX ADMIN — SODIUM CHLORIDE, PRESERVATIVE FREE 10 ML: 5 INJECTION INTRAVENOUS at 21:26

## 2025-02-02 RX ADMIN — Medication 500 MG: at 08:16

## 2025-02-02 RX ADMIN — SODIUM CHLORIDE, PRESERVATIVE FREE 10 ML: 5 INJECTION INTRAVENOUS at 08:17

## 2025-02-02 RX ADMIN — LACTULOSE 20 G: 20 SOLUTION ORAL at 08:16

## 2025-02-02 RX ADMIN — FLUTICASONE PROPIONATE 1 SPRAY: 50 SPRAY, METERED NASAL at 08:16

## 2025-02-02 RX ADMIN — OXYCODONE 10 MG: 5 TABLET ORAL at 21:05

## 2025-02-02 RX ADMIN — WATER 1000 MG: 1 INJECTION INTRAMUSCULAR; INTRAVENOUS; SUBCUTANEOUS at 02:58

## 2025-02-02 RX ADMIN — SODIUM CHLORIDE 40 MG: 9 INJECTION INTRAMUSCULAR; INTRAVENOUS; SUBCUTANEOUS at 21:22

## 2025-02-02 RX ADMIN — MIDODRINE HYDROCHLORIDE 2.5 MG: 5 TABLET ORAL at 16:24

## 2025-02-02 RX ADMIN — METOPROLOL TARTRATE 25 MG: 50 TABLET, FILM COATED ORAL at 21:06

## 2025-02-02 RX ADMIN — METOPROLOL TARTRATE 25 MG: 50 TABLET, FILM COATED ORAL at 08:17

## 2025-02-02 RX ADMIN — TAMSULOSIN HYDROCHLORIDE 0.4 MG: 0.4 CAPSULE ORAL at 08:18

## 2025-02-02 RX ADMIN — OXYCODONE 10 MG: 5 TABLET ORAL at 03:41

## 2025-02-02 RX ADMIN — FERROUS SULFATE TAB 325 MG (65 MG ELEMENTAL FE) 325 MG: 325 (65 FE) TAB at 16:24

## 2025-02-02 RX ADMIN — OCTREOTIDE ACETATE 50 MCG: 50 INJECTION, SOLUTION INTRAVENOUS; SUBCUTANEOUS at 21:24

## 2025-02-02 ASSESSMENT — PAIN SCALES - GENERAL
PAINLEVEL_OUTOF10: 10
PAINLEVEL_OUTOF10: 10
PAINLEVEL_OUTOF10: 9
PAINLEVEL_OUTOF10: 6
PAINLEVEL_OUTOF10: 8
PAINLEVEL_OUTOF10: 4
PAINLEVEL_OUTOF10: 8

## 2025-02-02 ASSESSMENT — PAIN DESCRIPTION - ONSET: ONSET: ON-GOING

## 2025-02-02 ASSESSMENT — PAIN DESCRIPTION - PAIN TYPE: TYPE: CHRONIC PAIN

## 2025-02-02 ASSESSMENT — PAIN DESCRIPTION - LOCATION
LOCATION: GENERALIZED
LOCATION: ABDOMEN

## 2025-02-02 ASSESSMENT — PAIN - FUNCTIONAL ASSESSMENT: PAIN_FUNCTIONAL_ASSESSMENT: PREVENTS OR INTERFERES WITH MANY ACTIVE NOT PASSIVE ACTIVITIES

## 2025-02-02 ASSESSMENT — PAIN DESCRIPTION - FREQUENCY: FREQUENCY: CONTINUOUS

## 2025-02-02 ASSESSMENT — PAIN SCALES - WONG BAKER
WONGBAKER_NUMERICALRESPONSE: NO HURT
WONGBAKER_NUMERICALRESPONSE: HURTS LITTLE MORE

## 2025-02-02 ASSESSMENT — PAIN DESCRIPTION - ORIENTATION: ORIENTATION: MID

## 2025-02-02 ASSESSMENT — PAIN DESCRIPTION - DESCRIPTORS
DESCRIPTORS: ACHING;DISCOMFORT;NUMBNESS
DESCRIPTORS: ACHING

## 2025-02-02 NOTE — PLAN OF CARE
Problem: Chronic Conditions and Co-morbidities  Goal: Patient's chronic conditions and co-morbidity symptoms are monitored and maintained or improved  Outcome: Progressing  Flowsheets (Taken 2/2/2025 0425)  Care Plan - Patient's Chronic Conditions and Co-Morbidity Symptoms are Monitored and Maintained or Improved: Monitor and assess patient's chronic conditions and comorbid symptoms for stability, deterioration, or improvement     Problem: Discharge Planning  Goal: Discharge to home or other facility with appropriate resources  Outcome: Progressing  Flowsheets (Taken 2/2/2025 0425)  Discharge to home or other facility with appropriate resources:   Identify barriers to discharge with patient and caregiver   Arrange for needed discharge resources and transportation as appropriate   Identify discharge learning needs (meds, wound care, etc)   Refer to discharge planning if patient needs post-hospital services based on physician order or complex needs related to functional status, cognitive ability or social support system     Problem: Safety - Adult  Goal: Free from fall injury  Outcome: Progressing     Problem: Pain  Goal: Verbalizes/displays adequate comfort level or baseline comfort level  Outcome: Progressing

## 2025-02-02 NOTE — PROGRESS NOTES
4 Eyes Skin Assessment     NAME:  Hardik Gray  YOB: 1950  MEDICAL RECORD NUMBER:  [id number]    The patient is being assessed for  Admission    I agree that at least one RN has performed a thorough Head to Toe Skin Assessment on the patient. ALL assessment sites listed below have been assessed.      Areas assessed by both nurses:    Head, Face, Ears, Shoulders, Back, Chest, Arms, Elbows, Hands, Sacrum. Buttock, Coccyx, Ischium, Legs. Feet and Heels, and Under Medical Devices         Does the Patient have a Wound? No noted wound(s)  Penile discharge (Millan trauma)       Sandeep Prevention initiated by RN: No  Wound Care Orders initiated by RN: No    Pressure Injury (Stage 3,4, Unstageable, DTI, NWPT, and Complex wounds) if present, place Wound referral order by RN under : No    New Ostomies, if present place, Ostomy referral order under : No     Nurse 1 eSignature: Electronically signed by Anne-Marie Rosales RN on 2/2/25 at 4:43 AM EST    **SHARE this note so that the co-signing nurse can place an eSignature**    Nurse 2 eSignature: Electronically signed by Bina Benitez RN on 2/2/25 at 4:47 AM EST

## 2025-02-02 NOTE — PROGRESS NOTES
Pt states his metoprolol dose was lower to 25 mg BID. Lisa served NP Liliana Laurent. Ordered to modify order.

## 2025-02-02 NOTE — PROGRESS NOTES
[facility] Hospitalist Progress Note    Admitting Date and Time: 1/31/2025  9:17 PM  Admit Dx: Lactic acidosis [E87.20]  Hyperbilirubinemia [E80.6]  Malfunction of Millan catheter, initial encounter (HCC) [T83.011A]  Cirrhosis of liver with ascites, unspecified hepatic cirrhosis type (HCC) [K74.60, R18.8]  Ascites due to alcoholic cirrhosis (HCC) [K70.31]  Anemia, unspecified type [D64.9]      Assessment:    Principal Problem:    Cirrhosis of liver with ascites, unspecified hepatic cirrhosis type (HCC)  Active Problems:    Dyslipidemia    Essential hypertension    UTI (urinary tract infection)    Elevated lactic acid level  Resolved Problems:    * No resolved hospital problems. *      Plan:  1.  Liver cirrhosis with ascites : reports quitting alcohol two months ago  CT on admission revealed moderate ascites  anasarca . IR consulted C/w Lasix 40 mg daily Aldactone 25 mg daily midodrine 2.5 mg TID Lactulose 20 mg twice daily  MELD score 26 based on admission values  GI and IR consulted     Acute on chronic anemia : sec to liver disease HGB 6.9 this am and repeat 7.3 will monitor transfuse < 7.0     Millan malfunction : unclogged  in Ed and functioning      Thrombocytopenia :   monitor     Coagulopathy : INR 28     Urinary tract infection : as seen on CT bladder  wall thickening C/w IV Rocephin await Ucx      Lactic acidosis : resolved sec to liver cirrhosis    History of Hep c : Entecavir     GERD : Protonix     BPH : Flomax    Hypertension : Lopressor 25 mg twice daily       DVT px : SCDs   Code : Full         Greater than 30 minutes spent on discussing plan of care chart review lab data and physical examination      Synopsis       This is a 74 year old male with PMH significant for CAD, CVA, COPD, hematuria due to radiation cystitis, tobacco use, HLD, HTN, MI, prostate CA, and alcoholic liver cirrhosis with ascites.  Recently admitted 01/19/2025 - 01/23/2025 for UTI, chronic hepatitis B, cirrhosis with

## 2025-02-02 NOTE — PLAN OF CARE
Problem: Chronic Conditions and Co-morbidities  Goal: Patient's chronic conditions and co-morbidity symptoms are monitored and maintained or improved  2/2/2025 1215 by Madina Awad RN  Outcome: Progressing  2/2/2025 0441 by Anne-Marie Rosales RN  Outcome: Progressing  Flowsheets (Taken 2/2/2025 0425)  Care Plan - Patient's Chronic Conditions and Co-Morbidity Symptoms are Monitored and Maintained or Improved: Monitor and assess patient's chronic conditions and comorbid symptoms for stability, deterioration, or improvement     Problem: Discharge Planning  Goal: Discharge to home or other facility with appropriate resources  2/2/2025 1215 by Madina Awad RN  Outcome: Progressing  2/2/2025 0441 by Anne-Marie Rosales RN  Outcome: Progressing  Flowsheets (Taken 2/2/2025 0425)  Discharge to home or other facility with appropriate resources:   Identify barriers to discharge with patient and caregiver   Arrange for needed discharge resources and transportation as appropriate   Identify discharge learning needs (meds, wound care, etc)   Refer to discharge planning if patient needs post-hospital services based on physician order or complex needs related to functional status, cognitive ability or social support system     Problem: Safety - Adult  Goal: Free from fall injury  2/2/2025 1215 by Madina Awad RN  Outcome: Progressing  2/2/2025 0441 by Anne-Marie Rosales RN  Outcome: Progressing     Problem: Pain  Goal: Verbalizes/displays adequate comfort level or baseline comfort level  2/2/2025 1215 by Madina Awad RN  Outcome: Progressing  2/2/2025 0441 by Anne-Marie Rosales RN  Outcome: Progressing

## 2025-02-02 NOTE — PROGRESS NOTES
Hardik Gray was ordered Entecavir tablet which is a nonformulary medication.  This medication is not stocked by the pharmacy so it will need to be brought in from home for inpatient use.    If the medication has not been administered by 1400 on the following day from the time the order was placed, a pharmacist will follow-up with the nurse of the patient to assess the capability of the patient to bring in the medication.    Thank you

## 2025-02-03 ENCOUNTER — ANESTHESIA EVENT (OUTPATIENT)
Dept: ENDOSCOPY | Age: 75
End: 2025-02-03
Payer: COMMERCIAL

## 2025-02-03 ENCOUNTER — ANESTHESIA (OUTPATIENT)
Dept: ENDOSCOPY | Age: 75
End: 2025-02-03
Payer: COMMERCIAL

## 2025-02-03 ENCOUNTER — APPOINTMENT (OUTPATIENT)
Dept: ULTRASOUND IMAGING | Age: 75
DRG: 433 | End: 2025-02-03
Payer: OTHER GOVERNMENT

## 2025-02-03 PROBLEM — K70.31 ASCITES DUE TO ALCOHOLIC CIRRHOSIS (HCC): Status: ACTIVE | Noted: 2025-02-03

## 2025-02-03 LAB
ALBUMIN SERPL-MCNC: 2.7 G/DL (ref 3.5–5.2)
ALP SERPL-CCNC: 162 U/L (ref 40–129)
ALT SERPL-CCNC: 12 U/L (ref 0–40)
ANION GAP SERPL CALCULATED.3IONS-SCNC: 13 MMOL/L (ref 7–16)
AST SERPL-CCNC: 39 U/L (ref 0–39)
BILIRUB SERPL-MCNC: 10.1 MG/DL (ref 0–1.2)
BUN SERPL-MCNC: 9 MG/DL (ref 6–23)
CALCIUM SERPL-MCNC: 8.2 MG/DL (ref 8.6–10.2)
CHLORIDE SERPL-SCNC: 102 MMOL/L (ref 98–107)
CO2 SERPL-SCNC: 21 MMOL/L (ref 22–29)
CREAT SERPL-MCNC: 0.9 MG/DL (ref 0.7–1.2)
GFR, ESTIMATED: 90 ML/MIN/1.73M2
GLUCOSE SERPL-MCNC: 89 MG/DL (ref 74–99)
HCT VFR BLD AUTO: 24.2 % (ref 37–54)
HGB BLD-MCNC: 7.6 G/DL (ref 12.5–16.5)
HGB BLD-MCNC: 7.8 G/DL (ref 12.5–16.5)
HGB BLD-MCNC: 7.9 G/DL (ref 12.5–16.5)
HGB BLD-MCNC: 8 G/DL (ref 12.5–16.5)
INR PPP: 2.8
MICROORGANISM SPEC CULT: ABNORMAL
POTASSIUM SERPL-SCNC: 4 MMOL/L (ref 3.5–5)
PROT SERPL-MCNC: 6.4 G/DL (ref 6.4–8.3)
PROTHROMBIN TIME: 30 SEC (ref 9.3–12.4)
SERVICE CMNT-IMP: ABNORMAL
SODIUM SERPL-SCNC: 136 MMOL/L (ref 132–146)
SPECIMEN DESCRIPTION: ABNORMAL

## 2025-02-03 PROCEDURE — 96366 THER/PROPH/DIAG IV INF ADDON: CPT

## 2025-02-03 PROCEDURE — 2580000003 HC RX 258: Performed by: HOSPITALIST

## 2025-02-03 PROCEDURE — P9017 PLASMA 1 DONOR FRZ W/IN 8 HR: HCPCS

## 2025-02-03 PROCEDURE — 6370000000 HC RX 637 (ALT 250 FOR IP): Performed by: INTERNAL MEDICINE

## 2025-02-03 PROCEDURE — P9047 ALBUMIN (HUMAN), 25%, 50ML: HCPCS | Performed by: HOSPITALIST

## 2025-02-03 PROCEDURE — G0378 HOSPITAL OBSERVATION PER HR: HCPCS

## 2025-02-03 PROCEDURE — 80053 COMPREHEN METABOLIC PANEL: CPT

## 2025-02-03 PROCEDURE — 6360000002 HC RX W HCPCS: Performed by: HOSPITALIST

## 2025-02-03 PROCEDURE — 1200000000 HC SEMI PRIVATE

## 2025-02-03 PROCEDURE — 85018 HEMOGLOBIN: CPT

## 2025-02-03 PROCEDURE — 2720000010 HC SURG SUPPLY STERILE: Performed by: INTERNAL MEDICINE

## 2025-02-03 PROCEDURE — 7100000011 HC PHASE II RECOVERY - ADDTL 15 MIN: Performed by: INTERNAL MEDICINE

## 2025-02-03 PROCEDURE — P9016 RBC LEUKOCYTES REDUCED: HCPCS

## 2025-02-03 PROCEDURE — 6360000002 HC RX W HCPCS: Performed by: STUDENT IN AN ORGANIZED HEALTH CARE EDUCATION/TRAINING PROGRAM

## 2025-02-03 PROCEDURE — 3700000001 HC ADD 15 MINUTES (ANESTHESIA): Performed by: INTERNAL MEDICINE

## 2025-02-03 PROCEDURE — 96376 TX/PRO/DX INJ SAME DRUG ADON: CPT

## 2025-02-03 PROCEDURE — 6360000002 HC RX W HCPCS: Performed by: INTERNAL MEDICINE

## 2025-02-03 PROCEDURE — 6360000002 HC RX W HCPCS: Performed by: NURSE ANESTHETIST, CERTIFIED REGISTERED

## 2025-02-03 PROCEDURE — 7100000010 HC PHASE II RECOVERY - FIRST 15 MIN: Performed by: INTERNAL MEDICINE

## 2025-02-03 PROCEDURE — 6370000000 HC RX 637 (ALT 250 FOR IP): Performed by: NURSE PRACTITIONER

## 2025-02-03 PROCEDURE — 85014 HEMATOCRIT: CPT

## 2025-02-03 PROCEDURE — 36415 COLL VENOUS BLD VENIPUNCTURE: CPT

## 2025-02-03 PROCEDURE — 36430 TRANSFUSION BLD/BLD COMPNT: CPT

## 2025-02-03 PROCEDURE — 2580000003 HC RX 258: Performed by: INTERNAL MEDICINE

## 2025-02-03 PROCEDURE — 99232 SBSQ HOSP IP/OBS MODERATE 35: CPT | Performed by: FAMILY MEDICINE

## 2025-02-03 PROCEDURE — 3700000000 HC ANESTHESIA ATTENDED CARE: Performed by: INTERNAL MEDICINE

## 2025-02-03 PROCEDURE — 86927 PLASMA FRESH FROZEN: CPT

## 2025-02-03 PROCEDURE — 3609012300 HC EGD BAND LIGATION ESOPHGEAL/GASTRIC VARICES: Performed by: INTERNAL MEDICINE

## 2025-02-03 PROCEDURE — 2500000003 HC RX 250 WO HCPCS: Performed by: NURSE PRACTITIONER

## 2025-02-03 PROCEDURE — 6360000002 HC RX W HCPCS: Performed by: NURSE PRACTITIONER

## 2025-02-03 PROCEDURE — 85610 PROTHROMBIN TIME: CPT

## 2025-02-03 PROCEDURE — 06L38CZ OCCLUSION OF ESOPHAGEAL VEIN WITH EXTRALUMINAL DEVICE, VIA NATURAL OR ARTIFICIAL OPENING ENDOSCOPIC: ICD-10-PCS | Performed by: INTERNAL MEDICINE

## 2025-02-03 PROCEDURE — 0W9G3ZZ DRAINAGE OF PERITONEAL CAVITY, PERCUTANEOUS APPROACH: ICD-10-PCS | Performed by: INTERNAL MEDICINE

## 2025-02-03 PROCEDURE — 2709999900 HC NON-CHARGEABLE SUPPLY: Performed by: INTERNAL MEDICINE

## 2025-02-03 RX ORDER — SODIUM CHLORIDE 9 MG/ML
INJECTION, SOLUTION INTRAVENOUS PRN
Status: COMPLETED | OUTPATIENT
Start: 2025-02-03 | End: 2025-02-03

## 2025-02-03 RX ORDER — PROPOFOL 10 MG/ML
INJECTION, EMULSION INTRAVENOUS
Status: DISCONTINUED | OUTPATIENT
Start: 2025-02-03 | End: 2025-02-03 | Stop reason: SDUPTHER

## 2025-02-03 RX ORDER — ALBUMIN (HUMAN) 12.5 G/50ML
50 SOLUTION INTRAVENOUS
Status: COMPLETED | OUTPATIENT
Start: 2025-02-03 | End: 2025-02-03

## 2025-02-03 RX ORDER — PROCHLORPERAZINE EDISYLATE 5 MG/ML
5 INJECTION INTRAMUSCULAR; INTRAVENOUS ONCE
Status: COMPLETED | OUTPATIENT
Start: 2025-02-03 | End: 2025-02-03

## 2025-02-03 RX ADMIN — METOPROLOL TARTRATE 25 MG: 50 TABLET, FILM COATED ORAL at 09:22

## 2025-02-03 RX ADMIN — FUROSEMIDE 40 MG: 40 TABLET ORAL at 09:22

## 2025-02-03 RX ADMIN — SPIRONOLACTONE 25 MG: 25 TABLET, FILM COATED ORAL at 09:23

## 2025-02-03 RX ADMIN — METOPROLOL TARTRATE 25 MG: 50 TABLET, FILM COATED ORAL at 20:47

## 2025-02-03 RX ADMIN — SODIUM CHLORIDE 40 MG: 9 INJECTION INTRAMUSCULAR; INTRAVENOUS; SUBCUTANEOUS at 20:49

## 2025-02-03 RX ADMIN — PROCHLORPERAZINE EDISYLATE 5 MG: 5 INJECTION INTRAMUSCULAR; INTRAVENOUS at 14:17

## 2025-02-03 RX ADMIN — FOLIC ACID 1 MG: 1 TABLET ORAL at 09:22

## 2025-02-03 RX ADMIN — TAMSULOSIN HYDROCHLORIDE 0.4 MG: 0.4 CAPSULE ORAL at 09:23

## 2025-02-03 RX ADMIN — OXYCODONE 10 MG: 5 TABLET ORAL at 02:45

## 2025-02-03 RX ADMIN — Medication 500 MG: at 09:22

## 2025-02-03 RX ADMIN — MIDODRINE HYDROCHLORIDE 2.5 MG: 5 TABLET ORAL at 09:22

## 2025-02-03 RX ADMIN — OCTREOTIDE ACETATE 50 MCG/HR: 500 INJECTION, SOLUTION INTRAVENOUS; SUBCUTANEOUS at 10:16

## 2025-02-03 RX ADMIN — FERROUS SULFATE TAB 325 MG (65 MG ELEMENTAL FE) 325 MG: 325 (65 FE) TAB at 09:22

## 2025-02-03 RX ADMIN — MIDODRINE HYDROCHLORIDE 2.5 MG: 5 TABLET ORAL at 16:39

## 2025-02-03 RX ADMIN — WATER 1000 MG: 1 INJECTION INTRAMUSCULAR; INTRAVENOUS; SUBCUTANEOUS at 03:57

## 2025-02-03 RX ADMIN — ALBUMIN (HUMAN) 50 G: 0.25 INJECTION, SOLUTION INTRAVENOUS at 12:25

## 2025-02-03 RX ADMIN — SODIUM CHLORIDE: 9 INJECTION, SOLUTION INTRAVENOUS at 13:15

## 2025-02-03 RX ADMIN — FERROUS SULFATE TAB 325 MG (65 MG ELEMENTAL FE) 325 MG: 325 (65 FE) TAB at 16:39

## 2025-02-03 RX ADMIN — OXYCODONE 10 MG: 5 TABLET ORAL at 16:39

## 2025-02-03 RX ADMIN — SODIUM CHLORIDE 40 MG: 9 INJECTION INTRAMUSCULAR; INTRAVENOUS; SUBCUTANEOUS at 09:22

## 2025-02-03 RX ADMIN — PROPOFOL 210 MG: 10 INJECTION, EMULSION INTRAVENOUS at 13:37

## 2025-02-03 RX ADMIN — SODIUM CHLORIDE, PRESERVATIVE FREE 10 ML: 5 INJECTION INTRAVENOUS at 09:22

## 2025-02-03 ASSESSMENT — PAIN - FUNCTIONAL ASSESSMENT: PAIN_FUNCTIONAL_ASSESSMENT: 0-10

## 2025-02-03 ASSESSMENT — PAIN SCALES - GENERAL
PAINLEVEL_OUTOF10: 8
PAINLEVEL_OUTOF10: 4
PAINLEVEL_OUTOF10: 10
PAINLEVEL_OUTOF10: 9

## 2025-02-03 ASSESSMENT — PAIN DESCRIPTION - ORIENTATION: ORIENTATION: RIGHT;LEFT

## 2025-02-03 ASSESSMENT — PAIN SCALES - WONG BAKER: WONGBAKER_NUMERICALRESPONSE: HURTS LITTLE MORE

## 2025-02-03 ASSESSMENT — PAIN DESCRIPTION - DESCRIPTORS: DESCRIPTORS: ACHING;DISCOMFORT

## 2025-02-03 ASSESSMENT — PAIN DESCRIPTION - LOCATION: LOCATION: ABDOMEN

## 2025-02-03 NOTE — OR NURSING
Patient arrival from room 535 to IR for paracentesis. Vitals taken, due to transport time and waiting for radiologist, patient sent to endoscopy for timed exam.

## 2025-02-03 NOTE — CONSENT
Informed Consent for Blood Component Transfusion Note    I have discussed with the patient the rationale for blood component transfusion; its benefits in treating or preventing fatigue, organ damage, or death; and its risk which includes mild transfusion reactions, rare risk of blood borne infection, or more serious but rare reactions. I have discussed the alternatives to transfusion, including the risk and consequences of not receiving transfusion. The patient had an opportunity to ask questions and had agreed to proceed with transfusion of blood components.    Electronically signed by Clinton Cobb MD on 2/3/25 at 7:08 AM EST

## 2025-02-03 NOTE — PROGRESS NOTES
Occupational Therapy  OT eval and treat order received and chart was reviewed. Attempted OT eval and pt was off unit at a medical procedure.Will attempt OT eval at a later time.Sybil Ledesma OTR/L [id number]

## 2025-02-03 NOTE — PLAN OF CARE
Problem: Chronic Conditions and Co-morbidities  Goal: Patient's chronic conditions and co-morbidity symptoms are monitored and maintained or improved  2/3/2025 1049 by Madina Awad RN  Outcome: Progressing  2/3/2025 0134 by Anne-Marie Rosales RN  Outcome: Progressing  Flowsheets (Taken 2/3/2025 0124)  Care Plan - Patient's Chronic Conditions and Co-Morbidity Symptoms are Monitored and Maintained or Improved: Monitor and assess patient's chronic conditions and comorbid symptoms for stability, deterioration, or improvement     Problem: Discharge Planning  Goal: Discharge to home or other facility with appropriate resources  2/3/2025 1049 by Madina Awad RN  Outcome: Progressing  2/3/2025 0134 by Anne-Marie Rosales RN  Outcome: Progressing  Flowsheets (Taken 2/3/2025 0124)  Discharge to home or other facility with appropriate resources: Identify barriers to discharge with patient and caregiver     Problem: Safety - Adult  Goal: Free from fall injury  2/3/2025 1049 by Madina Awad RN  Outcome: Progressing  2/3/2025 0134 by Anne-Marie Rosales RN  Outcome: Progressing     Problem: Pain  Goal: Verbalizes/displays adequate comfort level or baseline comfort level  2/3/2025 1049 by Madina Awad RN  Outcome: Progressing  2/3/2025 0134 by Anne-Marie Rosales RN  Outcome: Progressing

## 2025-02-03 NOTE — PLAN OF CARE
Problem: Chronic Conditions and Co-morbidities  Goal: Patient's chronic conditions and co-morbidity symptoms are monitored and maintained or improved  2/3/2025 0134 by Anne-Marie Rosales RN  Outcome: Progressing  Flowsheets (Taken 2/3/2025 0124)  Care Plan - Patient's Chronic Conditions and Co-Morbidity Symptoms are Monitored and Maintained or Improved: Monitor and assess patient's chronic conditions and comorbid symptoms for stability, deterioration, or improvement  2/2/2025 1215 by Madina Awad RN  Outcome: Progressing     Problem: Discharge Planning  Goal: Discharge to home or other facility with appropriate resources  2/3/2025 0134 by Anne-Marie Rosalse RN  Outcome: Progressing  Flowsheets (Taken 2/3/2025 0124)  Discharge to home or other facility with appropriate resources: Identify barriers to discharge with patient and caregiver  2/2/2025 1215 by Madina Awad RN  Outcome: Progressing     Problem: Safety - Adult  Goal: Free from fall injury  2/3/2025 0134 by Anne-Marie Rosales RN  Outcome: Progressing  2/2/2025 1215 by Madina Awad RN  Outcome: Progressing     Problem: Pain  Goal: Verbalizes/displays adequate comfort level or baseline comfort level  2/3/2025 0134 by Anne-Marie Rosales RN  Outcome: Progressing  2/2/2025 1215 by Madina Awad RN  Outcome: Progressing

## 2025-02-03 NOTE — CONSULTS
CONSULT  Clinton Cobb M.D.  [facility]  Dr. Angelo Xiao M.D.,  Dr. Kai Tesfaye M.D.,  JULIAN JenningsO.,  Dr. Pranav Lomas D.O. ,  Dr. Chadd Mancia M.D.,          Hardik Gray  74 y.o.  male      Re: \"ascites\"   Requesting physician:Dr. Sanz  Date:6:51 AM 2/3/2025          HPI: 74-year-old male patient seen in the hospital for above described issue.  Patient has been admitted on 1 February because of Millan catheter malfunction.  Our service was not consulted until yesterday afternoon.  Patient has established diagnosis of alcoholic cirrhosis with also diagnosis of coronary disease, CVA, COPD, hematuria, radiation cystitis, radiation proctitis, hyperlipidemia, hypertension, prostate cancer and hepatitis C as well as hepatitis B [facility] which [facility]  According to the notes [facility].  Patient denies any significant abdominal pain at rest but apparently with movement with some increasing distention of the abdomen.  Patient reports small amount of blood with the stool but he is unsure of any black or tarry stool.  Patient cannot provide information in regards to previous upper endoscopy and colonoscopy.  Review of medical records indicates that patient had colonoscopy in September of last year revealing radiation proctitis.  Laboratory work on presentation has revealed significant coagulopathy with INR of 2.8 on the 31st however since then has not been rechecked.  Imaging has been obtained with CT scan of the abdomen and pelvis on presentation showing hepatic cirrhosis with moderate abdominal pelvic ascites, circumferential wall thickening of the distended urinary bladder, cholelithiasis, descending and sigmoid colon diverticulosis, left inguinal hernia and anasarca.    Information sources:   -Patient  -medical record  -health care team    PMHx:  Past Medical History:   Diagnosis Date    Acid reflux

## 2025-02-03 NOTE — ANESTHESIA PRE PROCEDURE
Department of Anesthesiology  Preprocedure Note       Name:  Hardik Gray   Age:  74 y.o.  :  1950                                          MRN:  [id number]         Date:  2/3/2025      Surgeon: Surgeon(s):  Pranav Lomas DO    Procedure: Procedure(s):  ESOPHAGOGASTRODUODENOSCOPY    Medications prior to admission:   Prior to Admission medications    Medication Sig Start Date End Date Taking? Authorizing Provider   albuterol (PROVENTIL) (2.5 MG/3ML) 0.083% nebulizer solution Take 3 mLs by nebulization every 6 hours as needed for Wheezing 24  Yes Bridgette Olmos MD   furosemide (LASIX) 40 MG tablet Take 1 tablet by mouth daily 24  Yes Bridgette Olmos MD   midodrine (PROAMATINE) 2.5 MG tablet Take 1 tablet by mouth 3 times daily (with meals) 24  Yes Bridgette Olmos MD   spironolactone (ALDACTONE) 25 MG tablet Take 1 tablet by mouth daily 24  Yes Bridgette Olmos MD   albuterol sulfate HFA (VENTOLIN HFA) 108 (90 Base) MCG/ACT inhaler Inhale 2 puffs into the lungs every 6 hours as needed for Wheezing   Yes Paul Quiroz MD   ferrous sulfate (IRON 325) 325 (65 Fe) MG tablet Take 1 tablet by mouth in the morning and at bedtime   Yes Paul Quiroz MD   magnesium gluconate (MAGONATE) 500 MG tablet Take 1 tablet by mouth daily   Yes Paul Quiroz MD   folic acid (FOLVITE) 1 MG tablet Take 1 tablet by mouth daily   Yes Paul Quiroz MD   fluticasone (FLONASE) 50 MCG/ACT nasal spray 1 spray by Nasal route daily 10/27/20  Yes Paul Quiroz MD   tamsulosin (FLOMAX) 0.4 MG capsule Take 1 capsule by mouth daily   Yes Paul Quiroz MD   mineral oil-hydrophilic petrolatum (AQUAPHOR) ointment Apply topically as needed for Dry Skin A   Yes Paul Quiroz MD   oxyCODONE HCl (OXY-IR) 10 MG immediate release tablet Take 1 tablet by mouth every 6 hours as needed for Pain. 18  Yes Paul Quiroz MD   metoprolol tartrate (LOPRESSOR) 50

## 2025-02-03 NOTE — PROGRESS NOTES
[facility] Hospitalist Progress Note    Admitting Date and Time: 1/31/2025  9:17 PM  Admit Dx: Lactic acidosis [E87.20]  Hyperbilirubinemia [E80.6]  Malfunction of Millan catheter, initial encounter (HCC) [T83.011A]  Cirrhosis of liver with ascites, unspecified hepatic cirrhosis type (HCC) [K74.60, R18.8]  Ascites due to alcoholic cirrhosis (HCC) [K70.31]  Anemia, unspecified type [D64.9]      Assessment:    Principal Problem:    Cirrhosis of liver with ascites, unspecified hepatic cirrhosis type (HCC)  Active Problems:    Dyslipidemia    Essential hypertension    UTI (urinary tract infection)    Elevated lactic acid level    Ascites due to alcoholic cirrhosis (HCC)  Resolved Problems:    * No resolved hospital problems. *      Plan:     1.  Decompensated liver cirrhosis with ascites : reports quitting alcohol two months ago  CT on admission revealed moderate ascites  anasarca . IR consulted C/w Lasix 40 mg daily Aldactone 25 mg daily midodrine 2.5 mg TID Lactulose 20 mg twice daily  MELD score 26 based on admission values  GI consult     Acute on chronic anemia : sec to liver disease HGB 8.0  s/p blood  2PRBCs transfusion will monitor transfuse < 7.0     Esophageal varices : possible etiology of anemia EGD 2/3 s/p banding x5     History of radiation proctitis : likely contributing to anemia      Millan malfunction : unclogged  in Ed and functioning       Thrombocytopenia : sec to liver cirrhosis   monitor      Coagulopathy : INR 2.8  patient will receive FFP      Urinary tract infection : as seen on CT bladder  wall thickening C/w IV Rocephin await Ucx       Lactic acidosis : resolved sec to liver cirrhosis     History of Hep c : Entecavir      GERD : Protonix      BPH : Flomax     Hypertension : Lopressor 25 mg twice daily         DVT px : SCDs   Code : Full        Greater than 30 minutes spent on discussing plan of care chart review lab data and physical examination      Synopsis       This is a 74 year  chloride, , PRN  sodium chloride, , PRN  sodium chloride flush, 5-40 mL, PRN  sodium chloride, , PRN  potassium chloride, 40 mEq, PRN   Or  potassium alternative oral replacement, 40 mEq, PRN   Or  potassium chloride, 10 mEq, PRN  magnesium sulfate, 2,000 mg, PRN  ondansetron, 4 mg, Q8H PRN   Or  ondansetron, 4 mg, Q6H PRN  polyethylene glycol, 17 g, Daily PRN  albuterol, 2.5 mg, Q6H PRN  mineral oil-hydrophilic petrolatum, , PRN  oxyCODONE HCl, 10 mg, Q6H PRN         Objective:    /72   Pulse 77   Temp 98.2 °F (36.8 °C) (Oral)   Resp 16   Ht 1.753 m (5' 9\")   Wt 86.5 kg (190 lb 11.2 oz)   SpO2 97%   BMI 28.16 kg/m²     General Appearance: alert and oriented to person, place and time and in no acute distress  Skin: warm and dry  Head: normocephalic and atraumatic  Eyes: pupils equal, round, and reactive to light, extraocular eye movements intact, conjunctivae normal  Neck: neck supple and non tender without mass   Pulmonary/Chest: clear to auscultation bilaterally- no wheezes, rales or rhonchi, normal air movement, no respiratory distress  Cardiovascular: normal rate, normal S1 and S2 and no carotid bruits  Abdomen: soft, non-tender, distended, normal bowel sounds, no masses or organomegaly  Extremities: no cyanosis, no clubbing and no 2+ bilateral lower extremity edema  Neurologic: no cranial nerve deficit and speech normal        Recent Labs     02/01/25  0657 02/02/25  0340 02/03/25  0612    139 136   K 3.9 3.8 4.0    105 102   CO2 22 23 21*   BUN 9 9 9   CREATININE 0.9 1.0 0.9   GLUCOSE 75 115* 89   CALCIUM 8.1* 8.4* 8.2*       Recent Labs     01/31/25  2225 02/02/25  0340 02/02/25  0425 02/02/25  2200 02/03/25  0612   WBC 7.3 7.3 7.8  --   --    RBC 2.30* 2.10* 2.25*  --   --    HGB 7.6* 6.9* 7.3* 6.8* 7.6*   HCT 22.7* 20.6* 22.2*  --   --    MCV 98.7 98.1 98.7  --   --    MCH 33.0 32.9 32.4  --   --    MCHC 33.5 33.5 32.9  --   --    RDW 18.2* 18.5* 18.6*  --   --    * 112* 103*

## 2025-02-03 NOTE — PROGRESS NOTES
Nursing Transfer Note    Data:  Summary of patients progress: general anesthesia recovery    Reason for transfer: PACU discharge criteria met, transferred to next level of care.    Action:  Explained reason for transfer to Patient/Family  Report given to: rn using RN Handoff Navigator.  Mode of transportation: Cart    Response:  RN Recommendations:continuation of care and pain control

## 2025-02-03 NOTE — CARE COORDINATION
Transition of Care-Met with patient, introduced myself and CM role in care coordination. Patient lives alone in a third floor apartment, he uses a WW at times. He is a readmit, left [facility] 1/22, he went to [facility]. He is active with [facility], confirmed. NOEMY ordered obtained. PCP is Dr. Xavi Swenson, preferred pharmacy is [facility] on [address]. PT/OT ordered.Patient stated his discharge plan is home with [facility] following.    Kusum ABREUN, RN  [facility]

## 2025-02-03 NOTE — ACP (ADVANCE CARE PLANNING)
Advance Care Planning   Healthcare Decision Maker:    Primary Decision Maker: [place] - [facility] - [phone]    Click here to complete Healthcare Decision Makers including selection of the Healthcare Decision Maker Relationship (ie \"Primary\").

## 2025-02-03 NOTE — ANESTHESIA POSTPROCEDURE EVALUATION
Department of Anesthesiology  Postprocedure Note    Patient: Hardik Gray  MRN: [id number]  YOB: 1950  Date of evaluation: 2/3/2025    Procedure Summary       Date: 02/03/25 Room / Location: [place] [address] / [facility]    Anesthesia Start: 1333 Anesthesia Stop: 1349    Procedures:       ESOPHAGOGASTRODUODENOSCOPY      ESOPHAGOGASTRODUODENOSCOPY BAND LIGATION Diagnosis:       Anemia, unspecified type      (Anemia, unspecified type [D64.9])    Surgeons: Pranav Lomas DO Responsible Provider: Aaron Kan MD    Anesthesia Type: MAC ASA Status: 4            Anesthesia Type: MAC    Jose Phase I:      Jose Phase II:      Anesthesia Post Evaluation    Patient location during evaluation: bedside  Patient participation: complete - patient participated  Level of consciousness: awake  Pain score: 0  Airway patency: patent  Nausea & Vomiting: no nausea and no vomiting  Cardiovascular status: blood pressure returned to baseline and hemodynamically stable  Respiratory status: acceptable  Hydration status: stable  Pain management: adequate and satisfactory to patient        No notable events documented.

## 2025-02-04 ENCOUNTER — APPOINTMENT (OUTPATIENT)
Dept: ULTRASOUND IMAGING | Age: 75
DRG: 433 | End: 2025-02-04
Payer: OTHER GOVERNMENT

## 2025-02-04 LAB
ALBUMIN FLD-MCNC: 1.2 G/DL
ALBUMIN SERPL-MCNC: 2.8 G/DL (ref 3.5–5.2)
ALP SERPL-CCNC: 86 U/L (ref 40–129)
ALT SERPL-CCNC: 11 U/L (ref 0–40)
AMYLASE FLD-CCNC: 20 U/L
ANION GAP SERPL CALCULATED.3IONS-SCNC: 10 MMOL/L (ref 7–16)
APPEARANCE FLD: NORMAL
ARM BAND NUMBER: NORMAL
AST SERPL-CCNC: 36 U/L (ref 0–39)
BASOPHILS # BLD: 0.1 K/UL (ref 0–0.2)
BASOPHILS NFR BLD: 2 % (ref 0–2)
BILIRUB SERPL-MCNC: 10.6 MG/DL (ref 0–1.2)
BLOOD BANK BLOOD PRODUCT EXPIRATION DATE: NORMAL
BLOOD BANK DISPENSE STATUS: NORMAL
BLOOD BANK ISBT PRODUCT BLOOD TYPE: 6200
BLOOD BANK PRODUCT CODE: NORMAL
BLOOD BANK UNIT TYPE AND RH: NORMAL
BODY FLD TYPE: NORMAL
BPU ID: NORMAL
BUN SERPL-MCNC: 9 MG/DL (ref 6–23)
CALCIUM SERPL-MCNC: 8.1 MG/DL (ref 8.6–10.2)
CHLORIDE SERPL-SCNC: 104 MMOL/L (ref 98–107)
CLOT CHECK: NORMAL
CO2 SERPL-SCNC: 21 MMOL/L (ref 22–29)
COLOR FLD: YELLOW
COMPONENT: NORMAL
CREAT SERPL-MCNC: 0.8 MG/DL (ref 0.7–1.2)
EOSINOPHIL # BLD: 0.05 K/UL (ref 0.05–0.5)
EOSINOPHILS RELATIVE PERCENT: 1 % (ref 0–6)
ERYTHROCYTE [DISTWIDTH] IN BLOOD BY AUTOMATED COUNT: 19.5 % (ref 11.5–15)
GFR, ESTIMATED: >90 ML/MIN/1.73M2
GLUCOSE SERPL-MCNC: 108 MG/DL (ref 74–99)
HCT VFR BLD AUTO: 25 % (ref 37–54)
HGB BLD-MCNC: 7.8 G/DL (ref 12.5–16.5)
HGB BLD-MCNC: 7.9 G/DL (ref 12.5–16.5)
INR PPP: 2.7
LYMPHOCYTES NFR BLD: 1.06 K/UL (ref 1.5–4)
LYMPHOCYTES RELATIVE PERCENT: 18 % (ref 20–42)
MAGNESIUM SERPL-MCNC: 1.6 MG/DL (ref 1.6–2.6)
MCH RBC QN AUTO: 31.7 PG (ref 26–35)
MCHC RBC AUTO-ENTMCNC: 31.6 G/DL (ref 32–34.5)
MCV RBC AUTO: 100.4 FL (ref 80–99.9)
MONOCYTES NFR BLD: 1.16 K/UL (ref 0.1–0.95)
MONOCYTES NFR BLD: 20 % (ref 2–12)
MONOCYTES NFR FLD: 84 %
NEUTROPHILS NFR BLD: 59 % (ref 43–80)
NEUTROPHILS NFR FLD: 17 %
NEUTS SEG NFR BLD: 3.43 K/UL (ref 1.8–7.3)
PHOSPHATE SERPL-MCNC: 2.2 MG/DL (ref 2.5–4.5)
PLATELET # BLD AUTO: 92 K/UL (ref 130–450)
PLATELET CONFIRMATION: NORMAL
PMV BLD AUTO: 9.5 FL (ref 7–12)
POTASSIUM SERPL-SCNC: 4.2 MMOL/L (ref 3.5–5)
PROT FLD-MCNC: 2.2 G/DL
PROT SERPL-MCNC: 6.2 G/DL (ref 6.4–8.3)
PROTHROMBIN TIME: 29.1 SEC (ref 9.3–12.4)
RBC # BLD AUTO: 2.49 M/UL (ref 3.8–5.8)
RBC # BLD: ABNORMAL 10*6/UL
RBC # FLD: <2000 CELLS/UL
SODIUM SERPL-SCNC: 135 MMOL/L (ref 132–146)
SPECIMEN TYPE: NORMAL
TRANSFUSION STATUS: NORMAL
UNIT DIVISION: 0
UNIT ISSUE DATE/TIME: NORMAL
WBC # FLD: 103 CELLS/UL
WBC OTHER # BLD: 5.8 K/UL (ref 4.5–11.5)

## 2025-02-04 PROCEDURE — 88305 TISSUE EXAM BY PATHOLOGIST: CPT

## 2025-02-04 PROCEDURE — 6360000002 HC RX W HCPCS: Performed by: INTERNAL MEDICINE

## 2025-02-04 PROCEDURE — 84100 ASSAY OF PHOSPHORUS: CPT

## 2025-02-04 PROCEDURE — 6370000000 HC RX 637 (ALT 250 FOR IP): Performed by: HOSPITALIST

## 2025-02-04 PROCEDURE — 99232 SBSQ HOSP IP/OBS MODERATE 35: CPT | Performed by: FAMILY MEDICINE

## 2025-02-04 PROCEDURE — 1200000000 HC SEMI PRIVATE

## 2025-02-04 PROCEDURE — 87070 CULTURE OTHR SPECIMN AEROBIC: CPT

## 2025-02-04 PROCEDURE — 89051 BODY FLUID CELL COUNT: CPT

## 2025-02-04 PROCEDURE — 2580000003 HC RX 258: Performed by: INTERNAL MEDICINE

## 2025-02-04 PROCEDURE — 85018 HEMOGLOBIN: CPT

## 2025-02-04 PROCEDURE — 87205 SMEAR GRAM STAIN: CPT

## 2025-02-04 PROCEDURE — 83735 ASSAY OF MAGNESIUM: CPT

## 2025-02-04 PROCEDURE — 82150 ASSAY OF AMYLASE: CPT

## 2025-02-04 PROCEDURE — 6370000000 HC RX 637 (ALT 250 FOR IP): Performed by: FAMILY MEDICINE

## 2025-02-04 PROCEDURE — 82042 OTHER SOURCE ALBUMIN QUAN EA: CPT

## 2025-02-04 PROCEDURE — C1729 CATH, DRAINAGE: HCPCS

## 2025-02-04 PROCEDURE — 97530 THERAPEUTIC ACTIVITIES: CPT

## 2025-02-04 PROCEDURE — 2500000003 HC RX 250 WO HCPCS: Performed by: INTERNAL MEDICINE

## 2025-02-04 PROCEDURE — 88112 CYTOPATH CELL ENHANCE TECH: CPT

## 2025-02-04 PROCEDURE — 97161 PT EVAL LOW COMPLEX 20 MIN: CPT

## 2025-02-04 PROCEDURE — 36415 COLL VENOUS BLD VENIPUNCTURE: CPT

## 2025-02-04 PROCEDURE — 97165 OT EVAL LOW COMPLEX 30 MIN: CPT

## 2025-02-04 PROCEDURE — 85610 PROTHROMBIN TIME: CPT

## 2025-02-04 PROCEDURE — 80053 COMPREHEN METABOLIC PANEL: CPT

## 2025-02-04 PROCEDURE — 85025 COMPLETE CBC W/AUTO DIFF WBC: CPT

## 2025-02-04 PROCEDURE — 6360000002 HC RX W HCPCS: Performed by: RADIOLOGY

## 2025-02-04 PROCEDURE — 6370000000 HC RX 637 (ALT 250 FOR IP): Performed by: INTERNAL MEDICINE

## 2025-02-04 PROCEDURE — 84157 ASSAY OF PROTEIN OTHER: CPT

## 2025-02-04 RX ORDER — FLUCONAZOLE 100 MG/1
400 TABLET ORAL DAILY
Status: DISCONTINUED | OUTPATIENT
Start: 2025-02-04 | End: 2025-02-06 | Stop reason: HOSPADM

## 2025-02-04 RX ORDER — SPIRONOLACTONE 25 MG/1
25 TABLET ORAL 2 TIMES DAILY
Status: DISCONTINUED | OUTPATIENT
Start: 2025-02-04 | End: 2025-02-06 | Stop reason: HOSPADM

## 2025-02-04 RX ORDER — LIDOCAINE HYDROCHLORIDE 10 MG/ML
5 INJECTION, SOLUTION INFILTRATION; PERINEURAL ONCE
Status: COMPLETED | OUTPATIENT
Start: 2025-02-04 | End: 2025-02-04

## 2025-02-04 RX ADMIN — OXYCODONE 10 MG: 5 TABLET ORAL at 04:13

## 2025-02-04 RX ADMIN — MIDODRINE HYDROCHLORIDE 2.5 MG: 5 TABLET ORAL at 17:25

## 2025-02-04 RX ADMIN — MIDODRINE HYDROCHLORIDE 2.5 MG: 5 TABLET ORAL at 09:48

## 2025-02-04 RX ADMIN — FOLIC ACID 1 MG: 1 TABLET ORAL at 09:47

## 2025-02-04 RX ADMIN — SODIUM CHLORIDE 40 MG: 9 INJECTION INTRAMUSCULAR; INTRAVENOUS; SUBCUTANEOUS at 20:01

## 2025-02-04 RX ADMIN — ENOXAPARIN SODIUM 40 MG: 100 INJECTION SUBCUTANEOUS at 09:46

## 2025-02-04 RX ADMIN — OXYCODONE 10 MG: 5 TABLET ORAL at 17:24

## 2025-02-04 RX ADMIN — METOPROLOL TARTRATE 25 MG: 50 TABLET, FILM COATED ORAL at 09:47

## 2025-02-04 RX ADMIN — RIFAXIMIN 400 MG: 200 TABLET ORAL at 15:00

## 2025-02-04 RX ADMIN — WATER 1000 MG: 1 INJECTION INTRAMUSCULAR; INTRAVENOUS; SUBCUTANEOUS at 03:11

## 2025-02-04 RX ADMIN — FLUCONAZOLE 400 MG: 100 TABLET ORAL at 17:25

## 2025-02-04 RX ADMIN — Medication 500 MG: at 09:46

## 2025-02-04 RX ADMIN — RIFAXIMIN 400 MG: 200 TABLET ORAL at 20:00

## 2025-02-04 RX ADMIN — FERROUS SULFATE TAB 325 MG (65 MG ELEMENTAL FE) 325 MG: 325 (65 FE) TAB at 17:25

## 2025-02-04 RX ADMIN — TAMSULOSIN HYDROCHLORIDE 0.4 MG: 0.4 CAPSULE ORAL at 09:46

## 2025-02-04 RX ADMIN — SPIRONOLACTONE 25 MG: 25 TABLET, FILM COATED ORAL at 09:46

## 2025-02-04 RX ADMIN — METOPROLOL TARTRATE 25 MG: 50 TABLET, FILM COATED ORAL at 20:01

## 2025-02-04 RX ADMIN — FUROSEMIDE 40 MG: 40 TABLET ORAL at 09:47

## 2025-02-04 RX ADMIN — OCTREOTIDE ACETATE 50 MCG/HR: 500 INJECTION, SOLUTION INTRAVENOUS; SUBCUTANEOUS at 12:38

## 2025-02-04 RX ADMIN — OXYCODONE 10 MG: 5 TABLET ORAL at 09:59

## 2025-02-04 RX ADMIN — FERROUS SULFATE TAB 325 MG (65 MG ELEMENTAL FE) 325 MG: 325 (65 FE) TAB at 09:47

## 2025-02-04 RX ADMIN — FLUTICASONE PROPIONATE 1 SPRAY: 50 SPRAY, METERED NASAL at 09:47

## 2025-02-04 RX ADMIN — LIDOCAINE HYDROCHLORIDE 5 ML: 10 INJECTION, SOLUTION INFILTRATION; PERINEURAL at 12:05

## 2025-02-04 RX ADMIN — OCTREOTIDE ACETATE 50 MCG/HR: 500 INJECTION, SOLUTION INTRAVENOUS; SUBCUTANEOUS at 01:08

## 2025-02-04 RX ADMIN — SPIRONOLACTONE 25 MG: 25 TABLET ORAL at 17:25

## 2025-02-04 RX ADMIN — SODIUM CHLORIDE 40 MG: 9 INJECTION INTRAMUSCULAR; INTRAVENOUS; SUBCUTANEOUS at 09:47

## 2025-02-04 ASSESSMENT — PAIN DESCRIPTION - PAIN TYPE: TYPE: CHRONIC PAIN

## 2025-02-04 ASSESSMENT — PAIN SCALES - GENERAL
PAINLEVEL_OUTOF10: 0
PAINLEVEL_OUTOF10: 7
PAINLEVEL_OUTOF10: 8
PAINLEVEL_OUTOF10: 8
PAINLEVEL_OUTOF10: 7

## 2025-02-04 ASSESSMENT — PAIN DESCRIPTION - ORIENTATION
ORIENTATION: RIGHT;LEFT
ORIENTATION: RIGHT;LEFT

## 2025-02-04 ASSESSMENT — PAIN DESCRIPTION - LOCATION
LOCATION: GENERALIZED
LOCATION: GENERALIZED;LEG
LOCATION: ANKLE;LEG

## 2025-02-04 ASSESSMENT — PAIN DESCRIPTION - ONSET: ONSET: ON-GOING

## 2025-02-04 ASSESSMENT — PAIN DESCRIPTION - FREQUENCY: FREQUENCY: CONTINUOUS

## 2025-02-04 ASSESSMENT — PAIN DESCRIPTION - DESCRIPTORS
DESCRIPTORS: DISCOMFORT;ACHING
DESCRIPTORS: DISCOMFORT
DESCRIPTORS: ACHING;SHARP;DISCOMFORT

## 2025-02-04 ASSESSMENT — PAIN - FUNCTIONAL ASSESSMENT
PAIN_FUNCTIONAL_ASSESSMENT: ACTIVITIES ARE NOT PREVENTED

## 2025-02-04 NOTE — PROGRESS NOTES
PROGRESS NOTE  By Clinton Cobb M.D.    [facility]  Dr. Angelo Xiao M.D.,  Dr. Kai Tesfaye M.D.,   JULIAN JenningsO.,  Dr. Chadd Mancia M.D.,  JULIAN AdamsO.,          Hardik Gray  74 y.o.  male    SUBJECTIVE:  No new complaints with improved abdominal distention after paracentesis.  Family (girlfriend) at bedside    OBJECTIVE:    BP (!) 178/79   Pulse 75   Temp 98.1 °F (36.7 °C) (Oral)   Resp 16   Ht 1.753 m (5' 9\")   Wt 86.9 kg (191 lb 9.3 oz)   SpO2 95%   BMI 28.29 kg/m²     General: NAD/older adult Black male.  AAOx3  HEENT: Persistent scleral icterus/moist oral mucosa/poor dentition  Neck: Supple with trachea midline  Chest: Symmetric excursion/nonlabored respirations  Cor: Regular  Abd.: Soft and nondistended  Extr.:  Persistent lower extremity edema  Skin: Warm and dry        DATA:    Monitor data reviewed -sinus rhythm noted.       Lab Results   Component Value Date/Time    WBC 7.8 02/02/2025 04:25 AM    RBC 2.25 02/02/2025 04:25 AM    HGB 7.8 02/04/2025 03:10 AM    HCT 24.2 02/03/2025 09:15 PM    MCV 98.7 02/02/2025 04:25 AM    MCH 32.4 02/02/2025 04:25 AM    MCHC 32.9 02/02/2025 04:25 AM    RDW 18.6 02/02/2025 04:25 AM     02/02/2025 04:25 AM    MPV 9.3 02/02/2025 04:25 AM     Lab Results   Component Value Date/Time     02/04/2025 07:31 AM    K 4.2 02/04/2025 07:31 AM    K 3.5 05/14/2021 01:00 AM     02/04/2025 07:31 AM    CO2 21 02/04/2025 07:31 AM    BUN 9 02/04/2025 07:31 AM    CREATININE 0.8 02/04/2025 07:31 AM    CALCIUM 8.1 02/04/2025 07:31 AM    BILITOT 10.6 02/04/2025 07:31 AM    ALKPHOS 86 02/04/2025 07:31 AM    AST 36 02/04/2025 07:31 AM    ALT 11 02/04/2025 07:31 AM     Lab Results   Component Value Date/Time    LIPASE 32 01/31/2025 10:25 PM     No results found for: \"AMYLASE\"      ASSESSMENT/PLAN:  Patient Active Problem List   Diagnosis    Prostate cancer (HCC)    Dyslipidemia    Essential hypertension    Asymptomatic PVCs

## 2025-02-04 NOTE — PROGRESS NOTES
Pt transported to IR room for paracentesis. Pt is alert and oriented, denies any pain prior to procedure. Ultrasound images taken prior to procedure. Procedure is explained to patient, including possible risks. Pt verbalizes understanding and consent form signed. Procedure done under sterile technique and guidance of ultrasound imaging. 1% Lidocaine is used during procedure for comfort measures. A total of 2230 ml's of hazy yellow colored ascitic fluid drained during procedure. Catheter removed and op-site dressing applied to site with no bleeding noted. Specimen collected and sent to lab for ordered testing. Pt tolerated procedure well and denies any pain after. Report given to floor RN. Pt transported out of department with no difficulties.      Procedure start : 1139  Procedure end: 1200  Timeout: 1139

## 2025-02-04 NOTE — PROGRESS NOTES
Physical Therapy  Facility/Department: [address] MED SURG/TELE  Physical Therapy Initial Assessment    Name: Hardik Gray  : 1950  MRN: [id number]  Date of Service: 2025    Attending Provider:  Missy Sanz MD    Evaluating PT:  Luis Brito Jr., P.T.    Room #:  35/0535-A  Diagnosis:  Lactic acidosis [E87.20]  Hyperbilirubinemia [E80.6]  Malfunction of Millan catheter, initial encounter (HCC) [T83.011A]  Cirrhosis of liver with ascites, unspecified hepatic cirrhosis type (HCC) [K74.60, R18.8]  Ascites due to alcoholic cirrhosis (HCC) [K70.31]  Anemia, unspecified type [D64.9]  Procedure/Surgery:  25 paracentesis 2230mL of fluid removed  Precautions:  falls, bed/chair alarm    SUBJECTIVE:    Pt lives with alone in a 3rd floor apt with 6+6 stairs and 1 rail to enter.  Pt ambulated with a ww with a seat PTA.    OBJECTIVE:   Initial Evaluation  Date: 25 Treatment Short Term/ Long Term   Goals   Was pt agreeable to Eval/treatment? yes     Does pt have pain? C/o mild abdominal pain     Bed Mobility  Rolling: Independent  Supine to sit: Independent  Sit to supine: Independent  Scooting: Independent  Independent   Transfers Sit to stand: supervision  Stand to sit: supervision  Stand pivot: supervision with ww  Independent   Ambulation   150 feet with ww SBA  250 feet with ww Independent    Stair negotiation: ascended and descended NA  10 steps with 1 rail supervision   AM-PAC 6 Clicks        BLE ROM is WFL.   BLE strength is grossly 4/5 to 4+/5.   Edema:  BLE  Balance: sitting is Independent and standing with ww is supervision  Endurance: fair+    ASSESSMENT:    Conditions Requiring Skilled Therapeutic Intervention:    [x]Decreased strength     []Decreased ROM  [x]Decreased functional mobility  []Decreased balance   [x]Decreased endurance   []Decreased posture  []Decreased sensation  []Decreased coordination   []Decreased vision  []Decreased safety awareness   [x]Increased pain

## 2025-02-04 NOTE — PROGRESS NOTES
Occupational Therapy  OCCUPATIONAL THERAPY INITIAL EVALUATION  [facility]  [address], [place]    Date: 2025     Patient Name: Hardik Gray  MRN: [id number]  : 1950  Room: [address] [place] [address]    Evaluating OT: Chary Ramirez, OTR/L - [id number]    Referring Provider: Pranav Lomas DO   Specific Provider Orders/Date: \"OT eval and treat\" - 2/3/2025    Diagnosis: Lactic acidosis [E87.20]  Hyperbilirubinemia [E80.6]  Malfunction of Millan catheter, initial encounter (HCC) [T83.011A]  Cirrhosis of liver with ascites, unspecified hepatic cirrhosis type (HCC) [K74.60, R18.8]  Ascites due to alcoholic cirrhosis (HCC) [K70.31]  Anemia, unspecified type [D64.9]    Surgery: Patient underwent paracentesis on 2025 and EGD 2/3/25.  Pertinent Medical History: arthritis, back pain, CAD, CVA, COPD, HLD, HTN, MI, prostate CA    Precautions: fall risk    Assessment of Current Deficits:    [x] Functional mobility   [x]ADLs  [x] Strength               []Cognition   [x] Functional transfers   [x] IADLs         [x] Safety Awareness   [x]Endurance   [] Fine Coordination              [x] Balance      [] Vision/perception   []Sensation    []Gross Motor Coordination  [] ROM  [] Delirium                   [] Motor Control     OT PLAN OF CARE   OT POC is based on physician orders, patient diagnosis, and results of clinical assessment.  Frequency/Duration 2-5 days/week for 2 weeks PRN   Specific OT Treatment Interventions to Include:   * Instruction/training on adapted ADL techniques and AE recommendations to increase functional independence within precautions       * Training on energy conservation strategies, correct breathing pattern and techniques to improve independence/tolerance for self-care routine  * Functional transfer/mobility training/DME recommendations for increased independence, safety, and fall prevention  * Patient/Family education to increase follow

## 2025-02-04 NOTE — PROGRESS NOTES
[facility] Hospitalist Progress Note    Admitting Date and Time: 1/31/2025  9:17 PM  Admit Dx: Lactic acidosis [E87.20]  Hyperbilirubinemia [E80.6]  Malfunction of Rodrigues catheter, initial encounter (HCC) [T83.011A]  Cirrhosis of liver with ascites, unspecified hepatic cirrhosis type (HCC) [K74.60, R18.8]  Ascites due to alcoholic cirrhosis (HCC) [K70.31]  Anemia, unspecified type [D64.9]      Assessment:    Principal Problem:    Cirrhosis of liver with ascites, unspecified hepatic cirrhosis type (HCC)  Active Problems:    Dyslipidemia    Essential hypertension    UTI (urinary tract infection)    Elevated lactic acid level    Ascites due to alcoholic cirrhosis (HCC)  Resolved Problems:    * No resolved hospital problems. *      Plan:        Decompensated liver cirrhosis with ascites : reports quitting alcohol two months ago  CT on admission revealed moderate ascites  anasarca . IR consulted C/w Lasix 40 mg daily Aldactone 25 mg daily midodrine 2.5 mg TID Lactulose 20 mg twice daily  MELD score 26 based on admission values s/p paracentesis on 2/4 with removal of  2230 cc of fluid not consistent with SBP Rocephin 1 mg IV x 7 days EGD 2/3 s/p banding x5      Acute on chronic anemia : sec to liver disease HGB 7.8   s/p blood  2PRBCs transfusion will monitor transfuse < 7.0      Esophageal varices : possible etiology of anemia EGD 2/3 s/p banding x5       Urinary tract infection : UCX revealed Candida albicans  discontinued Rocephin  ID recommended fluconazole  400 mg x 5 days  Candiduria likely related to chronic indwelling rodrigues possible urethritis         History of radiation proctitis : likely contributing to anemia      Rodrigues malfunction : unclogged  in Ed and functioning       Thrombocytopenia : sec to liver cirrhosis   monitor      Coagulopathy : INR 2.8  s/p  FFP on  2/3         Lactic acidosis : resolved sec to liver cirrhosis     History of Hep B and C : Entecavir      GERD : Protonix      BPH :

## 2025-02-04 NOTE — CARE COORDINATION
Transition of Care-Plan for paracentesis today. EGD yesterday-five banded varices. Sandostatin gtt continues. Discharge disposition is home with NOEMY Jiang in [facility].    Kusum WEBER, RN  [facility]

## 2025-02-04 NOTE — CONSULTS
[facility]  [facility]  Consultation Note     Admit Date: 1/31/2025  9:17 PM    Reason for Consult:   Candida and UA    Attending Physician:  Missy Sanz MD    HISTORY OF PRESENT ILLNESS:             The history is obtained from extensive review of available past medical records. The patient is a 74 y.o. male who is unknown to the ID service.  The patient has a history of hepatitis B infection and liver cirrhosis.  He is currently on Entecavir.  He also has ascites and gets regular therapeutic paracentesis.  The patient was recently admitted to the hospital on 1/19/2025 with a UTI.  He was discharged on Cefdinir on 1/23/2025.  ID was not asked to see him.  The patient comes back to the ED at [facility] on 1/31/25 because of Suarez catheter obstruction and abdominal pain.  On presentation vital signs were normal.  White count was 7.3.  Transaminases were normal.  Bilirubin was 12.3.  Creatinine was unremarkable.  Ammonia level was elevated.  He was treated with Ceftriaxone.    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 5/16/2023    History of tobacco use 5/16/2023    Hyperlipidemia     Hypertension     Late effect of radiation 5/16/2023    MI (myocardial infarction) (HCC) 2002    follows w PCP    Personal history of radiation therapy 5/16/2023    Prostate cancer (HCC) 2018    treated with radiation    Urinary frequency     Weak urinary stream      Past Surgical History:        Procedure Laterality Date    BLADDER SURGERY N/A 3/22/2023    CYSTOSCOPY RETROGRADE PYELOGRAM URETHERAL DILATION SUAREZ INSERTION performed by Rashaad Salvador MD at [facility] OR     children: None    Years of education: None    Highest education level: None   Tobacco Use    Smoking status: Former     Current packs/day: 0.00     Average packs/day: 1 pack/day for 42.3 years (42.3 ttl pk-yrs)     Types: Cigarettes     Start date: 1971     Quit date: 2013     Years since quittin.4    Smokeless tobacco: Never   Vaping Use    Vaping status: Never Used   Substance and Sexual Activity    Alcohol use: Yes     Comment: occasional beer. None since 8/15/2024    Drug use: Not Currently     Types: Cocaine     Comment: LAST USED     Sexual activity: Not Currently   Social History Narrative    Drinks 2 cups of coffee daily.      Social Determinants of Health     Food Insecurity: No Food Insecurity (2025)    Hunger Vital Sign     Worried About Running Out of Food in the Last Year: Never true     Ran Out of Food in the Last Year: Never true   Transportation Needs: No Transportation Needs (2025)    PRAPARE - Transportation     Lack of Transportation (Medical): No     Lack of Transportation (Non-Medical): No   Housing Stability: Low Risk  (2025)    Housing Stability Vital Sign     Unable to Pay for Housing in the Last Year: No     Number of Times Moved in the Last Year: 0     Homeless in the Last Year: No      Pets: None  Travel: Not recently  The patient lives at home with his girlfriend.  He is retired.    Family History:       Problem Relation Age of Onset    Stroke Mother     Diabetes Mother     Heart Attack Father     Heart Attack Brother    . Otherwise non-pertinent to the chief complaint.    REVIEW OF SYSTEMS:    Constitutional: Negative for fevers, chills, diaphoresis  Neurologic: Negative   Psychiatric: Negative  Rheumatologic: Negative   Endocrine: Negative  Hematologic: Negative  Immunologic: Negative  ENT: Negative  Respiratory: Negative   Cardiovascular: Negative  GI: As in the HPI  : As in the HPI  Musculoskeletal: Negative  Skin: No rashes.     PHYSICAL EXAM:

## 2025-02-05 LAB
AFP SERPL-MCNC: 2.3 UG/L
ALBUMIN SERPL-MCNC: 2.8 G/DL (ref 3.5–5.2)
ALP SERPL-CCNC: 76 U/L (ref 40–129)
ALT SERPL-CCNC: 10 U/L (ref 0–40)
ANION GAP SERPL CALCULATED.3IONS-SCNC: 11 MMOL/L (ref 7–16)
AST SERPL-CCNC: 34 U/L (ref 0–39)
BASOPHILS # BLD: 0 K/UL (ref 0–0.2)
BASOPHILS NFR BLD: 0 % (ref 0–2)
BILIRUB SERPL-MCNC: 11.5 MG/DL (ref 0–1.2)
BUN SERPL-MCNC: 9 MG/DL (ref 6–23)
CALCIUM SERPL-MCNC: 8 MG/DL (ref 8.6–10.2)
CHLORIDE SERPL-SCNC: 102 MMOL/L (ref 98–107)
CO2 SERPL-SCNC: 22 MMOL/L (ref 22–29)
CREAT SERPL-MCNC: 0.9 MG/DL (ref 0.7–1.2)
EOSINOPHIL # BLD: 0.13 K/UL (ref 0.05–0.5)
EOSINOPHILS RELATIVE PERCENT: 2 % (ref 0–6)
ERYTHROCYTE [DISTWIDTH] IN BLOOD BY AUTOMATED COUNT: 19.3 % (ref 11.5–15)
GFR, ESTIMATED: >90 ML/MIN/1.73M2
GLUCOSE SERPL-MCNC: 138 MG/DL (ref 74–99)
HCT VFR BLD AUTO: 23.1 % (ref 37–54)
HCT VFR BLD AUTO: 25.3 % (ref 37–54)
HGB BLD-MCNC: 7.4 G/DL (ref 12.5–16.5)
HGB BLD-MCNC: 7.7 G/DL (ref 12.5–16.5)
HGB BLD-MCNC: 7.7 G/DL (ref 12.5–16.5)
HGB BLD-MCNC: 7.9 G/DL (ref 12.5–16.5)
INR PPP: 2.6
LYMPHOCYTES NFR BLD: 1.51 K/UL (ref 1.5–4)
LYMPHOCYTES RELATIVE PERCENT: 24 % (ref 20–42)
MAGNESIUM SERPL-MCNC: 1.7 MG/DL (ref 1.6–2.6)
MCH RBC QN AUTO: 31.1 PG (ref 26–35)
MCHC RBC AUTO-ENTMCNC: 31.2 G/DL (ref 32–34.5)
MCV RBC AUTO: 99.6 FL (ref 80–99.9)
MONOCYTES NFR BLD: 1.26 K/UL (ref 0.1–0.95)
MONOCYTES NFR BLD: 20 % (ref 2–12)
NEUTROPHILS NFR BLD: 54 % (ref 43–80)
NEUTS SEG NFR BLD: 3.4 K/UL (ref 1.8–7.3)
NON-GYN CYTOLOGY REPORT: NORMAL
PHOSPHATE SERPL-MCNC: 1.9 MG/DL (ref 2.5–4.5)
PLATELET # BLD AUTO: 88 K/UL (ref 130–450)
PLATELET CONFIRMATION: NORMAL
PMV BLD AUTO: 9.6 FL (ref 7–12)
POTASSIUM SERPL-SCNC: 3.8 MMOL/L (ref 3.5–5)
PROT SERPL-MCNC: 6.7 G/DL (ref 6.4–8.3)
PROTHROMBIN TIME: 28.1 SEC (ref 9.3–12.4)
RBC # BLD AUTO: 2.54 M/UL (ref 3.8–5.8)
RBC # BLD: ABNORMAL 10*6/UL
SODIUM SERPL-SCNC: 135 MMOL/L (ref 132–146)
WBC OTHER # BLD: 6.3 K/UL (ref 4.5–11.5)

## 2025-02-05 PROCEDURE — 85018 HEMOGLOBIN: CPT

## 2025-02-05 PROCEDURE — 6370000000 HC RX 637 (ALT 250 FOR IP): Performed by: INTERNAL MEDICINE

## 2025-02-05 PROCEDURE — 85610 PROTHROMBIN TIME: CPT

## 2025-02-05 PROCEDURE — 6360000002 HC RX W HCPCS: Performed by: INTERNAL MEDICINE

## 2025-02-05 PROCEDURE — 85025 COMPLETE CBC W/AUTO DIFF WBC: CPT

## 2025-02-05 PROCEDURE — 2500000003 HC RX 250 WO HCPCS: Performed by: FAMILY MEDICINE

## 2025-02-05 PROCEDURE — 6370000000 HC RX 637 (ALT 250 FOR IP): Performed by: HOSPITALIST

## 2025-02-05 PROCEDURE — 2580000003 HC RX 258: Performed by: INTERNAL MEDICINE

## 2025-02-05 PROCEDURE — 80053 COMPREHEN METABOLIC PANEL: CPT

## 2025-02-05 PROCEDURE — 85014 HEMATOCRIT: CPT

## 2025-02-05 PROCEDURE — 6360000002 HC RX W HCPCS: Performed by: HOSPITALIST

## 2025-02-05 PROCEDURE — 2580000003 HC RX 258: Performed by: FAMILY MEDICINE

## 2025-02-05 PROCEDURE — 6370000000 HC RX 637 (ALT 250 FOR IP): Performed by: FAMILY MEDICINE

## 2025-02-05 PROCEDURE — 99232 SBSQ HOSP IP/OBS MODERATE 35: CPT | Performed by: FAMILY MEDICINE

## 2025-02-05 PROCEDURE — 83735 ASSAY OF MAGNESIUM: CPT

## 2025-02-05 PROCEDURE — 84100 ASSAY OF PHOSPHORUS: CPT

## 2025-02-05 PROCEDURE — 2500000003 HC RX 250 WO HCPCS: Performed by: HOSPITALIST

## 2025-02-05 PROCEDURE — 1200000000 HC SEMI PRIVATE

## 2025-02-05 PROCEDURE — 36415 COLL VENOUS BLD VENIPUNCTURE: CPT

## 2025-02-05 RX ORDER — PANTOPRAZOLE SODIUM 40 MG/1
40 TABLET, DELAYED RELEASE ORAL
Status: DISCONTINUED | OUTPATIENT
Start: 2025-02-05 | End: 2025-02-06 | Stop reason: HOSPADM

## 2025-02-05 RX ADMIN — FLUCONAZOLE 400 MG: 100 TABLET ORAL at 07:57

## 2025-02-05 RX ADMIN — MIDODRINE HYDROCHLORIDE 2.5 MG: 5 TABLET ORAL at 07:57

## 2025-02-05 RX ADMIN — RIFAXIMIN 400 MG: 200 TABLET ORAL at 21:07

## 2025-02-05 RX ADMIN — FLUTICASONE PROPIONATE 1 SPRAY: 50 SPRAY, METERED NASAL at 07:59

## 2025-02-05 RX ADMIN — MIDODRINE HYDROCHLORIDE 2.5 MG: 5 TABLET ORAL at 16:40

## 2025-02-05 RX ADMIN — FUROSEMIDE 40 MG: 40 TABLET ORAL at 07:57

## 2025-02-05 RX ADMIN — OXYCODONE 10 MG: 5 TABLET ORAL at 08:01

## 2025-02-05 RX ADMIN — RIFAXIMIN 400 MG: 200 TABLET ORAL at 16:42

## 2025-02-05 RX ADMIN — SPIRONOLACTONE 25 MG: 25 TABLET ORAL at 16:39

## 2025-02-05 RX ADMIN — OCTREOTIDE ACETATE 50 MCG/HR: 500 INJECTION, SOLUTION INTRAVENOUS; SUBCUTANEOUS at 18:15

## 2025-02-05 RX ADMIN — FOLIC ACID 1 MG: 1 TABLET ORAL at 07:56

## 2025-02-05 RX ADMIN — METOPROLOL TARTRATE 25 MG: 50 TABLET, FILM COATED ORAL at 07:56

## 2025-02-05 RX ADMIN — OCTREOTIDE ACETATE 50 MCG/HR: 500 INJECTION, SOLUTION INTRAVENOUS; SUBCUTANEOUS at 01:22

## 2025-02-05 RX ADMIN — RIFAXIMIN 400 MG: 200 TABLET ORAL at 07:57

## 2025-02-05 RX ADMIN — SODIUM CHLORIDE 40 MG: 9 INJECTION INTRAMUSCULAR; INTRAVENOUS; SUBCUTANEOUS at 12:16

## 2025-02-05 RX ADMIN — Medication 500 MG: at 07:57

## 2025-02-05 RX ADMIN — TAMSULOSIN HYDROCHLORIDE 0.4 MG: 0.4 CAPSULE ORAL at 07:57

## 2025-02-05 RX ADMIN — OXYCODONE 10 MG: 5 TABLET ORAL at 16:39

## 2025-02-05 RX ADMIN — WATER 1000 MG: 1 INJECTION INTRAMUSCULAR; INTRAVENOUS; SUBCUTANEOUS at 12:16

## 2025-02-05 RX ADMIN — FERROUS SULFATE TAB 325 MG (65 MG ELEMENTAL FE) 325 MG: 325 (65 FE) TAB at 16:40

## 2025-02-05 RX ADMIN — METOPROLOL TARTRATE 25 MG: 50 TABLET, FILM COATED ORAL at 21:08

## 2025-02-05 RX ADMIN — SPIRONOLACTONE 25 MG: 25 TABLET ORAL at 07:57

## 2025-02-05 RX ADMIN — OXYCODONE 10 MG: 5 TABLET ORAL at 23:33

## 2025-02-05 RX ADMIN — SODIUM PHOSPHATE, MONOBASIC, MONOHYDRATE AND SODIUM PHOSPHATE, DIBASIC, ANHYDROUS 10 MMOL: 276; 142 INJECTION, SOLUTION INTRAVENOUS at 18:14

## 2025-02-05 RX ADMIN — MIDODRINE HYDROCHLORIDE 2.5 MG: 5 TABLET ORAL at 12:16

## 2025-02-05 RX ADMIN — FERROUS SULFATE TAB 325 MG (65 MG ELEMENTAL FE) 325 MG: 325 (65 FE) TAB at 07:56

## 2025-02-05 ASSESSMENT — PAIN - FUNCTIONAL ASSESSMENT: PAIN_FUNCTIONAL_ASSESSMENT: ACTIVITIES ARE NOT PREVENTED

## 2025-02-05 ASSESSMENT — PAIN SCALES - GENERAL
PAINLEVEL_OUTOF10: 7
PAINLEVEL_OUTOF10: 8

## 2025-02-05 ASSESSMENT — PAIN DESCRIPTION - PAIN TYPE: TYPE: CHRONIC PAIN

## 2025-02-05 ASSESSMENT — PAIN DESCRIPTION - DESCRIPTORS: DESCRIPTORS: DISCOMFORT

## 2025-02-05 ASSESSMENT — PAIN DESCRIPTION - LOCATION: LOCATION: ABDOMEN

## 2025-02-05 NOTE — PROGRESS NOTES
PROGRESS NOTE  By Clinton Cobb M.D.    [facility]  Dr. Angelo Xiao M.D.,  Dr. Kai Tesfaye M.D.,   JULIAN JenningsO.,  Dr. Chadd Mancia M.D.,  Dr. Pranav Lomas D.O.,          Hardik Gray  74 y.o.  male    SUBJECTIVE:  Denies abdominal pain.  Denies nausea vomiting.  Reports that he had 1 bowel movement this morning but does not want to take the lactulose.    OBJECTIVE:    /74   Pulse 84   Temp 98.6 °F (37 °C) (Oral)   Resp 16   Ht 1.753 m (5' 9\")   Wt 84.8 kg (186 lb 15.2 oz)   SpO2 95%   BMI 27.61 kg/m²     General: NAD/older adult Black male.  AAOx3  HEENT: Persistent scleral icterus/moist oral mucosa/poor dentition  Neck: Supple with trachea midline  Chest: Symmetric excursion/CTAB  Cor: Regular/S1-S2  Abd.: Soft and mildly distended.  Extr.:  BLE 3+ edema.  Decreased muscle tone and bulk throughout  Skin: Warm and dry/anicteric      DATA:    Monitor data reviewed -sinus rhythm noted.       Lab Results   Component Value Date/Time    WBC 6.3 02/05/2025 09:50 AM    RBC 2.54 02/05/2025 09:50 AM    HGB 7.9 02/05/2025 09:50 AM    HCT 25.3 02/05/2025 09:50 AM    MCV 99.6 02/05/2025 09:50 AM    MCH 31.1 02/05/2025 09:50 AM    MCHC 31.2 02/05/2025 09:50 AM    RDW 19.3 02/05/2025 09:50 AM    PLT 88 02/05/2025 09:50 AM    MPV 9.6 02/05/2025 09:50 AM     Lab Results   Component Value Date/Time     02/05/2025 09:50 AM    K 3.8 02/05/2025 09:50 AM    K 3.5 05/14/2021 01:00 AM     02/05/2025 09:50 AM    CO2 22 02/05/2025 09:50 AM    BUN 9 02/05/2025 09:50 AM    CREATININE 0.9 02/05/2025 09:50 AM    CALCIUM 8.0 02/05/2025 09:50 AM    BILITOT 11.5 02/05/2025 09:50 AM    ALKPHOS 76 02/05/2025 09:50 AM    AST 34 02/05/2025 09:50 AM    ALT 10 02/05/2025 09:50 AM     Lab Results   Component Value Date/Time    LIPASE 32 01/31/2025 10:25 PM     No results found for: \"AMYLASE\"      ASSESSMENT/PLAN:  Patient Active Problem List   Diagnosis    Prostate cancer (HCC)    Dyslipidemia

## 2025-02-05 NOTE — PROGRESS NOTES
[facility] Hospitalist Progress Note    Admitting Date and Time: 1/31/2025  9:17 PM  Admit Dx: Lactic acidosis [E87.20]  Hyperbilirubinemia [E80.6]  Malfunction of Rodrigues catheter, initial encounter (HCC) [T83.011A]  Cirrhosis of liver with ascites, unspecified hepatic cirrhosis type (HCC) [K74.60, R18.8]  Ascites due to alcoholic cirrhosis (HCC) [K70.31]  Anemia, unspecified type [D64.9]      Assessment:    Principal Problem:    Cirrhosis of liver with ascites, unspecified hepatic cirrhosis type (HCC)  Active Problems:    Dyslipidemia    Essential hypertension    UTI (urinary tract infection)    Elevated lactic acid level    Ascites due to alcoholic cirrhosis (HCC)  Resolved Problems:    * No resolved hospital problems. *      Plan:  1.    Decompensated liver cirrhosis with ascites : reports quitting alcohol two months ago  CT on admission revealed moderate ascites  anasarca . IR consulted C/w Lasix 40 mg daily Aldactone 25 mg daily midodrine 2.5 mg TID Lactulose 20 mg twice daily  MELD score 26 based on admission values s/p paracentesis on 2/4 with removal of  2230 cc of fluid not consistent with SBP Rocephin 1 mg IV x 7 days EGD 2/3 s/p banding x5 body fluid culture no growth to date . Patient be discharged to home with Cipro 500 mg for a total of 7 days      Acute on chronic anemia : sec to liver disease HGB 7.8   s/p blood  2PRBCs transfusion will monitor transfuse < 7.0      Esophageal varices : possible etiology of anemia EGD 2/3 s/p banding x5 Patient will complete octreotide for a total of 3 days  to end on 2/6. PPI changed from IV to oral PPI BID       Hyperbilirubinemia : Tbili 11.5  chronic         Urinary tract infection : UCX revealed Candida albicans  discontinued Rocephin  ID recommended fluconazole  400 mg x 5 days  Candiduria likely related to chronic indwelling rodrigues possible urethritis           History of radiation proctitis : likely contributing to anemia      Rodrigues malfunction :

## 2025-02-05 NOTE — CARE COORDINATION
Transition of Care-GI will continue Sandostatin gtt until tomorrow, then if no drop in Hgb will be cleared for discharge. PT score 20/24. Discharge plan is home with NOEMY Jiang in [facility].     Kusum WEBER, RN  [facility]

## 2025-02-06 VITALS
SYSTOLIC BLOOD PRESSURE: 124 MMHG | RESPIRATION RATE: 18 BRPM | DIASTOLIC BLOOD PRESSURE: 66 MMHG | OXYGEN SATURATION: 96 % | TEMPERATURE: 97.5 F | HEIGHT: 69 IN | HEART RATE: 69 BPM | BODY MASS INDEX: 27.36 KG/M2 | WEIGHT: 184.75 LBS

## 2025-02-06 LAB
ABO/RH: NORMAL
ALBUMIN SERPL-MCNC: 2.6 G/DL (ref 3.5–5.2)
ALP SERPL-CCNC: 103 U/L (ref 40–129)
ALT SERPL-CCNC: 10 U/L (ref 0–40)
ANION GAP SERPL CALCULATED.3IONS-SCNC: 8 MMOL/L (ref 7–16)
ANTIBODY SCREEN: NEGATIVE
ARM BAND NUMBER: NORMAL
AST SERPL-CCNC: 33 U/L (ref 0–39)
BASOPHILS # BLD: 0 K/UL (ref 0–0.2)
BASOPHILS NFR BLD: 0 % (ref 0–2)
BILIRUB SERPL-MCNC: 9.8 MG/DL (ref 0–1.2)
BLOOD BANK BLOOD PRODUCT EXPIRATION DATE: NORMAL
BLOOD BANK DISPENSE STATUS: NORMAL
BLOOD BANK ISBT PRODUCT BLOOD TYPE: 6200
BLOOD BANK PRODUCT CODE: NORMAL
BLOOD BANK SAMPLE EXPIRATION: NORMAL
BLOOD BANK UNIT TYPE AND RH: NORMAL
BPU ID: NORMAL
BUN SERPL-MCNC: 10 MG/DL (ref 6–23)
CALCIUM SERPL-MCNC: 7.7 MG/DL (ref 8.6–10.2)
CHLORIDE SERPL-SCNC: 105 MMOL/L (ref 98–107)
CO2 SERPL-SCNC: 24 MMOL/L (ref 22–29)
COMPONENT: NORMAL
CREAT SERPL-MCNC: 0.8 MG/DL (ref 0.7–1.2)
CROSSMATCH RESULT: NORMAL
EKG ATRIAL RATE: 83 BPM
EKG P AXIS: 59 DEGREES
EKG P-R INTERVAL: 156 MS
EKG Q-T INTERVAL: 410 MS
EKG QRS DURATION: 70 MS
EKG QTC CALCULATION (BAZETT): 481 MS
EKG R AXIS: 60 DEGREES
EKG T AXIS: -4 DEGREES
EKG VENTRICULAR RATE: 83 BPM
EOSINOPHIL # BLD: 0.15 K/UL (ref 0.05–0.5)
EOSINOPHILS RELATIVE PERCENT: 2 % (ref 0–6)
ERYTHROCYTE [DISTWIDTH] IN BLOOD BY AUTOMATED COUNT: 19.1 % (ref 11.5–15)
GFR, ESTIMATED: >90 ML/MIN/1.73M2
GLUCOSE SERPL-MCNC: 99 MG/DL (ref 74–99)
HCT VFR BLD AUTO: 22.4 % (ref 37–54)
HCT VFR BLD AUTO: 24 % (ref 37–54)
HGB BLD-MCNC: 7.2 G/DL (ref 12.5–16.5)
HGB BLD-MCNC: 7.8 G/DL (ref 12.5–16.5)
LYMPHOCYTES NFR BLD: 2.04 K/UL (ref 1.5–4)
LYMPHOCYTES RELATIVE PERCENT: 28 % (ref 20–42)
MAGNESIUM SERPL-MCNC: 1.4 MG/DL (ref 1.6–2.6)
MCH RBC QN AUTO: 31.9 PG (ref 26–35)
MCHC RBC AUTO-ENTMCNC: 32.1 G/DL (ref 32–34.5)
MCV RBC AUTO: 99.1 FL (ref 80–99.9)
MONOCYTES NFR BLD: 1.9 K/UL (ref 0.1–0.95)
MONOCYTES NFR BLD: 26 % (ref 2–12)
NEUTROPHILS NFR BLD: 44 % (ref 43–80)
NEUTS SEG NFR BLD: 3.21 K/UL (ref 1.8–7.3)
PHOSPHATE SERPL-MCNC: 2.3 MG/DL (ref 2.5–4.5)
PLATELET # BLD AUTO: 68 K/UL (ref 130–450)
PLATELET CONFIRMATION: NORMAL
PMV BLD AUTO: 9.5 FL (ref 7–12)
POTASSIUM SERPL-SCNC: 3.8 MMOL/L (ref 3.5–5)
PROT SERPL-MCNC: 6 G/DL (ref 6.4–8.3)
RBC # BLD AUTO: 2.26 M/UL (ref 3.8–5.8)
RBC # BLD: ABNORMAL 10*6/UL
SODIUM SERPL-SCNC: 137 MMOL/L (ref 132–146)
TRANSFUSION STATUS: NORMAL
UNIT DIVISION: 0
UNIT ISSUE DATE/TIME: NORMAL
WBC OTHER # BLD: 7.3 K/UL (ref 4.5–11.5)

## 2025-02-06 PROCEDURE — 2580000003 HC RX 258: Performed by: INTERNAL MEDICINE

## 2025-02-06 PROCEDURE — 97535 SELF CARE MNGMENT TRAINING: CPT

## 2025-02-06 PROCEDURE — 6360000002 HC RX W HCPCS: Performed by: HOSPITALIST

## 2025-02-06 PROCEDURE — 6370000000 HC RX 637 (ALT 250 FOR IP): Performed by: INTERNAL MEDICINE

## 2025-02-06 PROCEDURE — 2500000003 HC RX 250 WO HCPCS: Performed by: INTERNAL MEDICINE

## 2025-02-06 PROCEDURE — 85018 HEMOGLOBIN: CPT

## 2025-02-06 PROCEDURE — 80053 COMPREHEN METABOLIC PANEL: CPT

## 2025-02-06 PROCEDURE — 84100 ASSAY OF PHOSPHORUS: CPT

## 2025-02-06 PROCEDURE — 6360000002 HC RX W HCPCS: Performed by: FAMILY MEDICINE

## 2025-02-06 PROCEDURE — 99239 HOSP IP/OBS DSCHRG MGMT >30: CPT | Performed by: FAMILY MEDICINE

## 2025-02-06 PROCEDURE — 97530 THERAPEUTIC ACTIVITIES: CPT

## 2025-02-06 PROCEDURE — 6360000002 HC RX W HCPCS: Performed by: INTERNAL MEDICINE

## 2025-02-06 PROCEDURE — 83735 ASSAY OF MAGNESIUM: CPT

## 2025-02-06 PROCEDURE — 2500000003 HC RX 250 WO HCPCS: Performed by: HOSPITALIST

## 2025-02-06 PROCEDURE — 36415 COLL VENOUS BLD VENIPUNCTURE: CPT

## 2025-02-06 PROCEDURE — 6370000000 HC RX 637 (ALT 250 FOR IP): Performed by: FAMILY MEDICINE

## 2025-02-06 PROCEDURE — 6370000000 HC RX 637 (ALT 250 FOR IP): Performed by: HOSPITALIST

## 2025-02-06 PROCEDURE — 85014 HEMATOCRIT: CPT

## 2025-02-06 PROCEDURE — 85025 COMPLETE CBC W/AUTO DIFF WBC: CPT

## 2025-02-06 RX ORDER — FLUCONAZOLE 200 MG/1
400 TABLET ORAL DAILY
Qty: 4 TABLET | Refills: 0 | Status: SHIPPED | OUTPATIENT
Start: 2025-02-07 | End: 2025-02-09

## 2025-02-06 RX ORDER — POLYETHYLENE GLYCOL 3350 17 G/17G
17 POWDER, FOR SOLUTION ORAL DAILY PRN
Qty: 30 PACKET | Refills: 0 | Status: ON HOLD | OUTPATIENT
Start: 2025-02-06 | End: 2025-03-08

## 2025-02-06 RX ORDER — LACTULOSE 10 G/15ML
20 SOLUTION ORAL 2 TIMES DAILY
Qty: 473 ML | Refills: 1 | Status: ON HOLD | OUTPATIENT
Start: 2025-02-06

## 2025-02-06 RX ORDER — SPIRONOLACTONE 25 MG/1
25 TABLET ORAL 2 TIMES DAILY
Qty: 30 TABLET | Refills: 3 | Status: ON HOLD | OUTPATIENT
Start: 2025-02-06

## 2025-02-06 RX ORDER — CALCIUM GLUCONATE 20 MG/ML
2000 INJECTION, SOLUTION INTRAVENOUS ONCE
Status: COMPLETED | OUTPATIENT
Start: 2025-02-06 | End: 2025-02-06

## 2025-02-06 RX ORDER — MAGNESIUM SULFATE IN WATER 40 MG/ML
2000 INJECTION, SOLUTION INTRAVENOUS ONCE
Status: COMPLETED | OUTPATIENT
Start: 2025-02-06 | End: 2025-02-06

## 2025-02-06 RX ORDER — CIPROFLOXACIN 500 MG/1
500 TABLET, FILM COATED ORAL 2 TIMES DAILY
Qty: 10 TABLET | Refills: 0 | Status: SHIPPED | OUTPATIENT
Start: 2025-02-06 | End: 2025-02-11

## 2025-02-06 RX ADMIN — FERROUS SULFATE TAB 325 MG (65 MG ELEMENTAL FE) 325 MG: 325 (65 FE) TAB at 08:15

## 2025-02-06 RX ADMIN — SODIUM CHLORIDE, PRESERVATIVE FREE 10 ML: 5 INJECTION INTRAVENOUS at 08:19

## 2025-02-06 RX ADMIN — FERROUS SULFATE TAB 325 MG (65 MG ELEMENTAL FE) 325 MG: 325 (65 FE) TAB at 16:04

## 2025-02-06 RX ADMIN — FLUTICASONE PROPIONATE 1 SPRAY: 50 SPRAY, METERED NASAL at 08:18

## 2025-02-06 RX ADMIN — RIFAXIMIN 400 MG: 200 TABLET ORAL at 13:13

## 2025-02-06 RX ADMIN — OCTREOTIDE ACETATE 50 MCG/HR: 500 INJECTION, SOLUTION INTRAVENOUS; SUBCUTANEOUS at 04:03

## 2025-02-06 RX ADMIN — FUROSEMIDE 40 MG: 40 TABLET ORAL at 08:15

## 2025-02-06 RX ADMIN — TAMSULOSIN HYDROCHLORIDE 0.4 MG: 0.4 CAPSULE ORAL at 08:15

## 2025-02-06 RX ADMIN — ENOXAPARIN SODIUM 40 MG: 100 INJECTION SUBCUTANEOUS at 08:16

## 2025-02-06 RX ADMIN — MIDODRINE HYDROCHLORIDE 2.5 MG: 5 TABLET ORAL at 13:16

## 2025-02-06 RX ADMIN — METOPROLOL TARTRATE 25 MG: 50 TABLET, FILM COATED ORAL at 08:15

## 2025-02-06 RX ADMIN — PANTOPRAZOLE SODIUM 40 MG: 40 TABLET, DELAYED RELEASE ORAL at 05:14

## 2025-02-06 RX ADMIN — MIDODRINE HYDROCHLORIDE 2.5 MG: 5 TABLET ORAL at 08:15

## 2025-02-06 RX ADMIN — WATER 1000 MG: 1 INJECTION INTRAMUSCULAR; INTRAVENOUS; SUBCUTANEOUS at 08:15

## 2025-02-06 RX ADMIN — CALCIUM GLUCONATE 2000 MG: 20 INJECTION, SOLUTION INTRAVENOUS at 10:18

## 2025-02-06 RX ADMIN — MIDODRINE HYDROCHLORIDE 2.5 MG: 5 TABLET ORAL at 16:04

## 2025-02-06 RX ADMIN — FOLIC ACID 1 MG: 1 TABLET ORAL at 08:15

## 2025-02-06 RX ADMIN — OXYCODONE 10 MG: 5 TABLET ORAL at 16:04

## 2025-02-06 RX ADMIN — MAGNESIUM SULFATE HEPTAHYDRATE 2000 MG: 40 INJECTION, SOLUTION INTRAVENOUS at 12:14

## 2025-02-06 RX ADMIN — RIFAXIMIN 400 MG: 200 TABLET ORAL at 08:16

## 2025-02-06 RX ADMIN — PANTOPRAZOLE SODIUM 40 MG: 40 TABLET, DELAYED RELEASE ORAL at 16:04

## 2025-02-06 RX ADMIN — SPIRONOLACTONE 25 MG: 25 TABLET ORAL at 08:15

## 2025-02-06 RX ADMIN — FLUCONAZOLE 400 MG: 100 TABLET ORAL at 08:15

## 2025-02-06 RX ADMIN — Medication 500 MG: at 08:15

## 2025-02-06 ASSESSMENT — PAIN SCALES - GENERAL
PAINLEVEL_OUTOF10: 0
PAINLEVEL_OUTOF10: 7

## 2025-02-06 ASSESSMENT — PAIN DESCRIPTION - ORIENTATION: ORIENTATION: MID;LOWER

## 2025-02-06 ASSESSMENT — PAIN DESCRIPTION - DESCRIPTORS: DESCRIPTORS: ACHING;DISCOMFORT

## 2025-02-06 ASSESSMENT — PAIN DESCRIPTION - LOCATION: LOCATION: ABDOMEN

## 2025-02-06 NOTE — PROGRESS NOTES
PROGRESS NOTE  By Clinton Cobb M.D.    [facility]  Dr. Angelo Xiao M.D.,  Dr. Kai Tesfaye M.D.,   JULIAN JenningsO.,  Dr. Chadd Mancia M.D.,  Dr. Pranav Lomas D.O.,          Hardik Gray  74 y.o.  male    SUBJECTIVE:  No new complaints.  No family at bedside    OBJECTIVE:    /66   Pulse 69   Temp 97.5 °F (36.4 °C) (Oral)   Resp 18   Ht 1.753 m (5' 9\")   Wt 83.8 kg (184 lb 11.9 oz)   SpO2 96%   BMI 27.28 kg/m²     General: ED/older adult Black male.  AAOx3.  HEENT: Persistent scleral icterus/moist oral mucosa/poor dentition  Neck: Appears to be supple with trachea midline  Chest: Symmetrical excursion/nonlabored respirations  Cor: Regular  Abd.: Soft and nontender  Extr.:  BLE edema  Skin: Warm and dry      DATA:    Monitor data reviewed -this rhythm noted.       Lab Results   Component Value Date/Time    WBC 7.3 02/06/2025 03:50 AM    RBC 2.26 02/06/2025 03:50 AM    HGB 7.8 02/06/2025 10:35 AM    HCT 24.0 02/06/2025 10:35 AM    MCV 99.1 02/06/2025 03:50 AM    MCH 31.9 02/06/2025 03:50 AM    MCHC 32.1 02/06/2025 03:50 AM    RDW 19.1 02/06/2025 03:50 AM    PLT 68 02/06/2025 03:50 AM    MPV 9.5 02/06/2025 03:50 AM     Lab Results   Component Value Date/Time     02/06/2025 03:50 AM    K 3.8 02/06/2025 03:50 AM    K 3.5 05/14/2021 01:00 AM     02/06/2025 03:50 AM    CO2 24 02/06/2025 03:50 AM    BUN 10 02/06/2025 03:50 AM    CREATININE 0.8 02/06/2025 03:50 AM    CALCIUM 7.7 02/06/2025 03:50 AM    BILITOT 9.8 02/06/2025 03:50 AM    ALKPHOS 103 02/06/2025 03:50 AM    AST 33 02/06/2025 03:50 AM    ALT 10 02/06/2025 03:50 AM     Lab Results   Component Value Date/Time    LIPASE 32 01/31/2025 10:25 PM     No results found for: \"AMYLASE\"      ASSESSMENT/PLAN:  Patient Active Problem List   Diagnosis    Prostate cancer (HCC)    Dyslipidemia    Essential hypertension    Asymptomatic PVCs    History of MI (myocardial infarction)    Coronary artery disease involving native  coronary artery of native heart without angina pectoris    Pain in both lower extremities    Hematuria    Late effect of radiation    Personal history of radiation therapy    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  Cirrhosis  -Chronic/decompensated  -MELD-Na=28 based on 1/31/2025 labs (28 on previous admission in September 2024)  -Nonelevated AFP in September 2024  -EGD 2/3/2025 revealing varices which were banded  -Paracentesis with removal of approximately 3 L of ascitic fluid -analysis not consistent with SBP-antibiotics x 7 days     2.  History of hepatitis B&C  -Positive hepatitis C antibody but nondetectable viral load in January of this year  -Pending hepatitis B serology.  -Follow-up as outpatient     3.  History of radiation proctitis  -Likely contributing to patient's anemia  -Colonoscopy September 2024 with radiation proctitis        4.  Anemia  -Acute on chronic  -Likely multifactorial from hematuria, hematochezia (radiation proctitis) however variceal etiology/upper GI etiology cannot be excluded  -EGD as above  -Monitor H&H; defer transfusion to admitting  -Obtaining iron studies given recent transfusion will be of limited clinical value     5.  Comorbidities  -Per admitting/pertinent consultants        H&H stable without evidence of overt bleed.  Discontinue Sandostatin drip.  Continue oral PPI including on discharge  Daily H&H checks while in the hospital  Continue antibiotics for total of 7 days -okay to switch to oral antibiotic ciprofloxacin 500 mg

## 2025-02-06 NOTE — DISCHARGE SUMMARY
[facility] Hospitalist Physician Discharge Summary       No follow-up provider specified.    Activity level: As tolerated     Dispo: Home with home health       Condition on discharge: Stable     Code : Full     Patient ID:  Hardik Gray  [id number]  74 y.o.  1950    Admit date: 1/31/2025    Discharge date and time:  2/6/2025  5:50 PM    Admission Diagnoses: Active Problems:    * No active hospital problems. *  Resolved Problems:    * No resolved hospital problems. *      Discharge Diagnoses: Active Problems:    * No active hospital problems. *  Resolved Problems:    * No resolved hospital problems. *      Consults:  None    Hospital Course:   Patient Hardik Gray is a 74 y.o. presented with No admission diagnoses are documented for this encounter.This is a 74 year old male with PMH significant for CAD, CVA, COPD, hematuria due to radiation cystitis, tobacco use, HLD, HTN, MI, prostate CA, and alcoholic liver cirrhosis with ascites.  Recently admitted 01/19/2025 - 01/23/2025 for UTI, chronic hepatitis B, cirrhosis with ascites, gross hematuria, penile lesion and elevated lactic acid level.  Patient treated with Rocephin at that time and also had paracentesis 01/20 with 4450 fluid removal.  Patient to ED due to Millan catheter malfunction.  Millan was clogged and not putting out urine.  Millan flushed in ED and now working.  Patient denies any and all complaints including fever, chills, shortness of breath, chest pain, nausea/vomiting, abdominal pain, changes in bowel/bladder habits.Patient was admitted due to liver cirrhosis with ascites .CT on admission revealed moderate ascites anasarca .  MELD score 26 based on admission values  Patient is s/p paracentesis on 2/4 with removal of  2230 cc of fluid not consistent with SBP .Body fluid culture no growth to date He completed 2 days of 1 mg IV Rocephin  He is s/p EGD  on 2/3 with banding x5  .  Patient was on octreotide drip for 72 hours . Patient was  transfused packed RBCs and FFP  . H/H remained stable and is  7.8 on discharge . Patient will be evaluated for TIPs as an outpatient . Patient be discharged to home with Cipro 500 mg for a total of 7 days . Urine culture revealed candida albicans discontinued Rocephin ID recommended fluconazole 400 mg x 5 days Candiduria likely related to chronic indwelling rodrigues possible urethritis . On discharge Lab data sodium 137 potassium 3.8 BUN 10 Scr 0.8 magnesium 1.4 -replaced  calcium 7.7 phos 2.3   WBC  7.3 HGB 7.2 -7.8 PLT 68  INR 2.6. On discharge patient is in no acute distress . Vitals are stable on discharge . Patient will be prescribed Cipro 500 mg BID x 5 days  Fluconazole 400 mg x  2 days  Rifaximin 550 mg bID  Aldcatone 25 mg BID              Discharge Exam:    General Appearance: alert and oriented to person, place and time and in no acute distress  Skin: warm and dry  Head: normocephalic and atraumatic  Eyes: pupils equal, round, and reactive to light, extraocular eye movements intact, conjunctivae normal  Neck: neck supple and non tender without mass   Pulmonary/Chest: clear to auscultation bilaterally- no wheezes, rales or rhonchi, normal air movement, no respiratory distress  Cardiovascular: normal rate, normal S1 and S2 and no carotid bruits  Abdomen: soft, non-tender, non-distended, normal bowel sounds, no masses or organomegaly  Extremities: no cyanosis, no clubbing and 1+ bilateral lower extremity  edema  Neurologic: no cranial nerve deficit and speech normal    No intake/output data recorded.  No intake/output data recorded.      LABS:  Recent Labs     02/04/25  0731 02/05/25  0950 02/06/25  0350    135 137   K 4.2 3.8 3.8    102 105   CO2 21* 22 24   BUN 9 9 10   CREATININE 0.8 0.9 0.8   GLUCOSE 108* 138* 99   CALCIUM 8.1* 8.0* 7.7*       Recent Labs     02/04/25  1805 02/05/25  0000 02/05/25  0950 02/05/25  1747 02/06/25  0350 02/06/25  1035   WBC 5.8  --  6.3  --  7.3  --    RBC 2.49*  --  positioning. Peritoneum/Retroperitoneum: Moderate abdominopelvic ascites.  Fluid-filled moderate left inguinal hernia.  Sclerotic aorta without aneurysm. Bones/Soft Tissues: No acute osseous or body wall soft tissue abnormalities. Anasarca.     1. Hepatic cirrhosis with moderate abdominopelvic ascites. 2. Circumferential wall thickening of the minimally distended urinary bladder.   Correlate with urinalysis to exclude cystitis.  Millan balloon catheter with good positioning.   No hydronephrosis. 3. Cholelithiasis. 4. Mild descending and sigmoid colonic diverticulosis. 5. Fluid-filled moderate left inguinal hernia. 6. Anasarca.     US ABDOMEN LIMITED    Result Date: 2/1/2025  EXAMINATION: RIGHT UPPER QUADRANT ULTRASOUND 1/31/2025 2:10 pm COMPARISON: None. HISTORY: ORDERING SYSTEM PROVIDED HISTORY: Other ascites TECHNOLOGIST PROVIDED HISTORY: Reason for exam:->ascites survey What reading provider will be dictating this exam?->CRC FINDINGS: Superficial sonographic evaluation of the 4 quadrants of the abdomen to assess for the presence of intra-abdominal ascites. Small volume simple appearing intra-abdominal ascites seen.     Small volume simple appearing intra-abdominal ascites.       Patient Instructions:      Medication List        ASK your doctor about these medications      entecavir 0.5 MG tablet  Commonly known as: BARACLUDE     ferrous sulfate 325 (65 Fe) MG tablet  Commonly known as: IRON 325     fluticasone 50 MCG/ACT nasal spray  Commonly known as: FLONASE     folic acid 1 MG tablet  Commonly known as: FOLVITE     furosemide 40 MG tablet  Commonly known as: LASIX  Take 1 tablet by mouth daily     lactulose 10 GM/15ML solution  Commonly known as: CHRONULAC  Take 30 mLs by mouth 2 times daily     magnesium gluconate 500 MG tablet  Commonly known as: MAGONATE     metoprolol tartrate 50 MG tablet  Commonly known as: LOPRESSOR     midodrine 2.5 MG tablet  Commonly known as: PROAMATINE  Take 1 tablet by mouth 3

## 2025-02-06 NOTE — PROGRESS NOTES
[facility] Hospitalist Physician Discharge Summary       [facility]  [address] [facility] [address]  [phone]        Xavi Swenson MD  [address] [place] [address]  [phone]    Go on 2/14/2025  Your hospital follow up appointment is on Friday February 14th @ 11am !    ! ! IT IS VERY IMPORTANT YOU KEEP THIS APPOINTMENT ! !      Activity level: As tolerated     Dispo: Home with home health       Condition on discharge: Stable     Patient ID:  Hardik Gray  [id number]  74 y.o.  1950    Admit date: 1/31/2025    Discharge date and time:  2/6/2025  5:31 PM    Admission Diagnoses: Principal Problem:    Cirrhosis of liver with ascites, unspecified hepatic cirrhosis type (HCC)  Active Problems:    Dyslipidemia    Essential hypertension    UTI (urinary tract infection)    Elevated lactic acid level    Ascites due to alcoholic cirrhosis (HCC)  Resolved Problems:    * No resolved hospital problems. *      This is a 74 year old male with PMH significant for CAD, CVA, COPD, hematuria due to radiation cystitis, tobacco use, HLD, HTN, MI, prostate CA, and alcoholic liver cirrhosis with ascites.  Recently admitted 01/19/2025 - 01/23/2025 for UTI, chronic hepatitis B, cirrhosis with ascites, gross hematuria, penile lesion and elevated lactic acid level.  Patient treated with Rocephin at that time and also had paracentesis 01/20 with 4450 fluid removal.  Patient to ED due to Millan catheter malfunction.  Millan was clogged and not putting out urine.  Millan flushed in ED and now working.  Patient denies any and all complaints including fever, chills, shortness of breath, chest pain, nausea/vomiting, abdominal pain, changes in bowel/bladder habits.Patient was admitted due to liver cirrhosis with ascites .CT on admission revealed moderate ascites anasarca .  MELD score 26 based on admission values  Patient is s/p paracentesis on 2/4 with removal of  2230 cc of fluid not  10 MG immediate release tablet  Commonly known as: OXY-IR     pantoprazole 40 MG tablet  Commonly known as: PROTONIX     tamsulosin 0.4 MG capsule  Commonly known as: FLOMAX     * Ventolin  (90 Base) MCG/ACT inhaler  Generic drug: albuterol sulfate HFA     * albuterol (2.5 MG/3ML) 0.083% nebulizer solution  Commonly known as: PROVENTIL  Take 3 mLs by nebulization every 6 hours as needed for Wheezing           * This list has 2 medication(s) that are the same as other medications prescribed for you. Read the directions carefully, and ask your doctor or other care provider to review them with you.                   Where to Get Your Medications        These medications were sent to [id number] - [place], [place] - [address]  - P [phone] - F [phone] [address] [facility] [address]      Phone: [phone]   fluconazole 200 MG tablet  lactulose 10 GM/15ML solution       You can get these medications from any pharmacy    Bring a paper prescription for each of these medications  ciprofloxacin 500 MG tablet  polyethylene glycol 17 g packet  rifAXIMin 550 MG tablet  spironolactone 25 MG tablet           Note that more than 30 minutes was spent in preparing discharge papers, discussing discharge with patient, medication review, etc.    Signed:  Electronically signed by Missy Sanz MD on 2/6/2025 at 5:31 PM

## 2025-02-06 NOTE — PLAN OF CARE
Problem: Chronic Conditions and Co-morbidities  Goal: Patient's chronic conditions and co-morbidity symptoms are monitored and maintained or improved  2/6/2025 1340 by Anne-Marie Bella RN  Outcome: Adequate for Discharge     Problem: Discharge Planning  Goal: Discharge to home or other facility with appropriate resources  2/6/2025 1340 by Anne-Marie Bella RN  Outcome: Adequate for Discharge     Problem: Safety - Adult  Goal: Free from fall injury  2/6/2025 1340 by Anne-Marie Bella RN  Outcome: Adequate for Discharge     Problem: Pain  Goal: Verbalizes/displays adequate comfort level or baseline comfort level  2/6/2025 1340 by Anne-Marie Bella RN  Outcome: Adequate for Discharge     Problem: ABCDS Injury Assessment  Goal: Absence of physical injury  2/6/2025 1340 by Anne-Marie Bella RN  Outcome: Adequate for Discharge

## 2025-02-06 NOTE — PROGRESS NOTES
Spiritual Health History and Assessment/Progress Note  [facility]    Spiritual/Emotional Needs,  ,  ,      Name: Hardik Gray MRN: [id number]    Age: 74 y.o.     Sex: male   Language: English   Spiritism: Unknown   Cirrhosis of liver with ascites, unspecified hepatic cirrhosis type (HCC)     Date: 2/6/2025                           Spiritual Assessment began in [facility] MED SURG/TELE        Referral/Consult From: Rounding   Encounter Overview/Reason: Spiritual/Emotional Needs  Service Provided For: Patient    Rakel, Belief, Meaning:   Patient has beliefs or practices that help with coping during difficult times  Family/Friends No family/friends present      Importance and Influence:  Patient unable to assess at this time  Family/Friends No family/friends present    Community:  Patient feels well-supported. Support system includes: Spouse/Partner  Family/Friends No family/friends present    Assessment and Plan of Care:     Patient Interventions include: Facilitated expression of thoughts and feelings  Family/Friends Interventions include: No family/friends present    Patient Plan of Care: No spiritual needs identified for follow-up  Family/Friends Plan of Care: No family/friends present    Electronically signed by Chaplain Janay on 2/6/2025 at 4:04 PM

## 2025-02-06 NOTE — CARE COORDINATION
Transition of Care-Hgb 7.2, repeat pending. Per GI, ok to stop Sandostatin gtt today. Await GI final plan. Discharge plan is home with [facility] following-ONEMY in [facility].     Kusum ABREUN, RN  [facility]

## 2025-02-06 NOTE — PROGRESS NOTES
Occupational Therapy  OT BEDSIDE TREATMENT NOTE      Date:2025  Patient Name: Hardik Gray  MRN: [id number]  : 1950  Room: [address] [place] [address]     Evaluating OT: Chary Ramirez, DAYANAR/L - [id number]     Referring Provider: Pranav Lomas DO   Specific Provider Orders/Date: \"OT eval and treat\" - 2/3/2025     Diagnosis: Lactic acidosis [E87.20]  Hyperbilirubinemia [E80.6]  Malfunction of Millan catheter, initial encounter (HCC) [T83.011A]  Cirrhosis of liver with ascites, unspecified hepatic cirrhosis type (HCC) [K74.60, R18.8]  Ascites due to alcoholic cirrhosis (HCC) [K70.31]  Anemia, unspecified type [D64.9]    Surgery: Patient underwent paracentesis on 2025 and EGD 2/3/25.  Pertinent Medical History: arthritis, back pain, CAD, CVA, COPD, HLD, HTN, MI, prostate CA     Precautions: fall risk     Assessment of Current Deficits:    [x] Functional mobility             [x]ADLs           [x] Strength                  []Cognition   [x] Functional transfers           [x] IADLs         [x] Safety Awareness   [x]Endurance   [] Fine Coordination              [x] Balance      [] Vision/perception   []Sensation     []Gross Motor Coordination  [] ROM           [] Delirium                   [] Motor Control      OT PLAN OF CARE   OT POC is based on physician orders, patient diagnosis, and results of clinical assessment.  Frequency/Duration 2-5 days/week for 2 weeks PRN   Specific OT Treatment Interventions to Include:   * Instruction/training on adapted ADL techniques and AE recommendations to increase functional independence within precautions       * Training on energy conservation strategies, correct breathing pattern and techniques to improve independence/tolerance for self-care routine  * Functional transfer/mobility training/DME recommendations for increased independence, safety, and fall prevention  * Patient/Family education to increase follow through with safety techniques and functional independence  *  SBA SBA on commode  Mod I   Bed Mobility  Supine-to-Sit: independent  Sit-to-Supine: independent       Functional Transfers Sit-to-Stand: supervision   from EOB Sit <> stand supervision  independent   Functional Mobility SBA (FWW) near bedside, reports had just walked with PT  SBA with WW in room and manning Mod I with functional mobility (with device, as needed/appropriate) in order to maximize independence with ADLs and functional tasks.   Balance Sitting: independent  Standing: SBA Standing balance SBA  Mod I dynamic standing balance in order to increase safety during self care and functional tasks   Activity Tolerance No noted SOB, no reported dizziness Fair+  Patient will demonstrate Good understanding of energy conservation techniques in order to increase functional independence   Visual/  Perceptual  Reading glasses      N/A   B UE Strength 4-/5  functional use of UEs Patient will demonstrate 4/5 B UE strength in order to maximize independence with ADLs and functional tasks.     Comments:  patient cleared with nursing and agreeable to session. Good effort and improvement demonstrated. Patient in bed with call light in reach    Education/treatment: ADL and functional transfer/activity performed to increase safety and independence during self care tasks. Education provided on safety awareness, adl reeducation, functional transfer training    Pt has made progress towards set goals.     Time In: 9:48  Time Out: 10:16     Min Units   Therapeutic Ex 08727     Therapeutic Activities 08860 10 1   ADL/Self Care 37633 18 1   Orthotic Management 57002     Neuro Re-Ed 38997     Non-Billable Time     TOTAL TIMED TREATMENT 28 2       Ariela SÁNCHEZ/JOHNNY [id number]

## 2025-02-07 LAB
MICROORGANISM SPEC CULT: NO GROWTH
MICROORGANISM/AGENT SPEC: NORMAL
SPECIMEN DESCRIPTION: NORMAL

## 2025-02-13 ENCOUNTER — APPOINTMENT (OUTPATIENT)
Dept: ULTRASOUND IMAGING | Age: 75
DRG: 432 | End: 2025-02-13
Payer: OTHER GOVERNMENT

## 2025-02-13 ENCOUNTER — APPOINTMENT (OUTPATIENT)
Dept: CT IMAGING | Age: 75
DRG: 432 | End: 2025-02-13
Payer: OTHER GOVERNMENT

## 2025-02-13 ENCOUNTER — HOSPITAL ENCOUNTER (INPATIENT)
Age: 75
LOS: 17 days | Discharge: HOME HEALTH | DRG: 432 | End: 2025-03-02
Attending: EMERGENCY MEDICINE | Admitting: FAMILY MEDICINE
Payer: OTHER GOVERNMENT

## 2025-02-13 ENCOUNTER — APPOINTMENT (OUTPATIENT)
Dept: GENERAL RADIOLOGY | Age: 75
DRG: 432 | End: 2025-02-13
Payer: OTHER GOVERNMENT

## 2025-02-13 DIAGNOSIS — R18.8 CIRRHOSIS OF LIVER WITH ASCITES, UNSPECIFIED HEPATIC CIRRHOSIS TYPE (HCC): ICD-10-CM

## 2025-02-13 DIAGNOSIS — D64.9 ANEMIA, UNSPECIFIED TYPE: ICD-10-CM

## 2025-02-13 DIAGNOSIS — I50.9 CONGESTIVE HEART FAILURE, UNSPECIFIED HF CHRONICITY, UNSPECIFIED HEART FAILURE TYPE (HCC): ICD-10-CM

## 2025-02-13 DIAGNOSIS — K74.60 CIRRHOSIS OF LIVER WITH ASCITES, UNSPECIFIED HEPATIC CIRRHOSIS TYPE (HCC): ICD-10-CM

## 2025-02-13 DIAGNOSIS — K70.31 ASCITES DUE TO ALCOHOLIC CIRRHOSIS (HCC): Primary | ICD-10-CM

## 2025-02-13 DIAGNOSIS — N39.0 COMPLICATED UTI (URINARY TRACT INFECTION): ICD-10-CM

## 2025-02-13 LAB
ALBUMIN SERPL-MCNC: 2.7 G/DL (ref 3.5–5.2)
ALP SERPL-CCNC: 148 U/L (ref 40–129)
ALT SERPL-CCNC: 14 U/L (ref 0–40)
ANION GAP SERPL CALCULATED.3IONS-SCNC: 11 MMOL/L (ref 7–16)
AST SERPL-CCNC: 55 U/L (ref 0–39)
BACTERIA URNS QL MICRO: ABNORMAL
BASOPHILS # BLD: 0.02 K/UL (ref 0–0.2)
BASOPHILS NFR BLD: 0 % (ref 0–2)
BILIRUB SERPL-MCNC: 11.9 MG/DL (ref 0–1.2)
BILIRUB UR QL STRIP: ABNORMAL
BNP SERPL-MCNC: 2212 PG/ML (ref 0–125)
BUN SERPL-MCNC: 7 MG/DL (ref 6–23)
CALCIUM SERPL-MCNC: 8.3 MG/DL (ref 8.6–10.2)
CHLORIDE SERPL-SCNC: 100 MMOL/L (ref 98–107)
CLARITY UR: ABNORMAL
CO2 SERPL-SCNC: 23 MMOL/L (ref 22–29)
COLOR UR: ABNORMAL
CREAT SERPL-MCNC: 0.8 MG/DL (ref 0.7–1.2)
EOSINOPHIL # BLD: 0.51 K/UL (ref 0.05–0.5)
EOSINOPHILS RELATIVE PERCENT: 7 % (ref 0–6)
ERYTHROCYTE [DISTWIDTH] IN BLOOD BY AUTOMATED COUNT: 21.4 % (ref 11.5–15)
GFR, ESTIMATED: >90 ML/MIN/1.73M2
GLUCOSE SERPL-MCNC: 111 MG/DL (ref 74–99)
GLUCOSE UR STRIP-MCNC: 250 MG/DL
HCT VFR BLD AUTO: 20.1 % (ref 37–54)
HCT VFR BLD AUTO: 22 % (ref 37–54)
HGB BLD-MCNC: 6.6 G/DL (ref 12.5–16.5)
HGB BLD-MCNC: 7.2 G/DL (ref 12.5–16.5)
HGB UR QL STRIP.AUTO: ABNORMAL
IMM GRANULOCYTES # BLD AUTO: <0.03 K/UL (ref 0–0.58)
IMM GRANULOCYTES NFR BLD: 0 % (ref 0–5)
KETONES UR STRIP-MCNC: ABNORMAL MG/DL
LACTATE BLDV-SCNC: 2.8 MMOL/L (ref 0.5–1.9)
LACTATE BLDV-SCNC: 3 MMOL/L (ref 0.5–1.9)
LACTATE BLDV-SCNC: 3.1 MMOL/L (ref 0.5–1.9)
LEUKOCYTE ESTERASE UR QL STRIP: ABNORMAL
LIPASE SERPL-CCNC: 30 U/L (ref 13–60)
LYMPHOCYTES NFR BLD: 2.15 K/UL (ref 1.5–4)
LYMPHOCYTES RELATIVE PERCENT: 29 % (ref 20–42)
MCH RBC QN AUTO: 33.2 PG (ref 26–35)
MCHC RBC AUTO-ENTMCNC: 32.8 G/DL (ref 32–34.5)
MCV RBC AUTO: 101 FL (ref 80–99.9)
MONOCYTES NFR BLD: 1.78 K/UL (ref 0.1–0.95)
MONOCYTES NFR BLD: 24 % (ref 2–12)
NEUTROPHILS NFR BLD: 40 % (ref 43–80)
NEUTS SEG NFR BLD: 3.05 K/UL (ref 1.8–7.3)
NITRITE UR QL STRIP: POSITIVE
PH UR STRIP: 6.5 [PH] (ref 5–8)
PLATELET CONFIRMATION: NORMAL
PLATELET, FLUORESCENCE: 97 K/UL (ref 130–450)
PMV BLD AUTO: 10.1 FL (ref 7–12)
POTASSIUM SERPL-SCNC: 3.6 MMOL/L (ref 3.5–5)
PROT SERPL-MCNC: 6.5 G/DL (ref 6.4–8.3)
PROT UR STRIP-MCNC: >=300 MG/DL
RBC # BLD AUTO: 1.99 M/UL (ref 3.8–5.8)
RBC # BLD: ABNORMAL 10*6/UL
RBC #/AREA URNS HPF: ABNORMAL /HPF
SODIUM SERPL-SCNC: 134 MMOL/L (ref 132–146)
SP GR UR STRIP: >1.03 (ref 1–1.03)
UROBILINOGEN UR STRIP-ACNC: >8 EU/DL (ref 0–1)
WBC #/AREA URNS HPF: ABNORMAL /HPF
WBC OTHER # BLD: 7.5 K/UL (ref 4.5–11.5)

## 2025-02-13 PROCEDURE — 83605 ASSAY OF LACTIC ACID: CPT

## 2025-02-13 PROCEDURE — 86850 RBC ANTIBODY SCREEN: CPT

## 2025-02-13 PROCEDURE — 83690 ASSAY OF LIPASE: CPT

## 2025-02-13 PROCEDURE — 74177 CT ABD & PELVIS W/CONTRAST: CPT

## 2025-02-13 PROCEDURE — 86923 COMPATIBILITY TEST ELECTRIC: CPT

## 2025-02-13 PROCEDURE — 83880 ASSAY OF NATRIURETIC PEPTIDE: CPT

## 2025-02-13 PROCEDURE — 6360000002 HC RX W HCPCS: Performed by: EMERGENCY MEDICINE

## 2025-02-13 PROCEDURE — P9016 RBC LEUKOCYTES REDUCED: HCPCS

## 2025-02-13 PROCEDURE — 96375 TX/PRO/DX INJ NEW DRUG ADDON: CPT

## 2025-02-13 PROCEDURE — 6370000000 HC RX 637 (ALT 250 FOR IP): Performed by: FAMILY MEDICINE

## 2025-02-13 PROCEDURE — 99285 EMERGENCY DEPT VISIT HI MDM: CPT

## 2025-02-13 PROCEDURE — 87040 BLOOD CULTURE FOR BACTERIA: CPT

## 2025-02-13 PROCEDURE — 85014 HEMATOCRIT: CPT

## 2025-02-13 PROCEDURE — 80053 COMPREHEN METABOLIC PANEL: CPT

## 2025-02-13 PROCEDURE — 86900 BLOOD TYPING SEROLOGIC ABO: CPT

## 2025-02-13 PROCEDURE — 6360000002 HC RX W HCPCS: Performed by: FAMILY MEDICINE

## 2025-02-13 PROCEDURE — 2580000003 HC RX 258

## 2025-02-13 PROCEDURE — 87086 URINE CULTURE/COLONY COUNT: CPT

## 2025-02-13 PROCEDURE — 71045 X-RAY EXAM CHEST 1 VIEW: CPT

## 2025-02-13 PROCEDURE — 85018 HEMOGLOBIN: CPT

## 2025-02-13 PROCEDURE — 6360000002 HC RX W HCPCS

## 2025-02-13 PROCEDURE — 6360000004 HC RX CONTRAST MEDICATION: Performed by: RADIOLOGY

## 2025-02-13 PROCEDURE — 96365 THER/PROPH/DIAG IV INF INIT: CPT

## 2025-02-13 PROCEDURE — 2500000003 HC RX 250 WO HCPCS: Performed by: FAMILY MEDICINE

## 2025-02-13 PROCEDURE — 85025 COMPLETE CBC W/AUTO DIFF WBC: CPT

## 2025-02-13 PROCEDURE — 86901 BLOOD TYPING SEROLOGIC RH(D): CPT

## 2025-02-13 PROCEDURE — 76870 US EXAM SCROTUM: CPT

## 2025-02-13 PROCEDURE — 2060000000 HC ICU INTERMEDIATE R&B

## 2025-02-13 PROCEDURE — 2580000003 HC RX 258: Performed by: FAMILY MEDICINE

## 2025-02-13 PROCEDURE — 81001 URINALYSIS AUTO W/SCOPE: CPT

## 2025-02-13 RX ORDER — MAGNESIUM HYDROXIDE/ALUMINUM HYDROXICE/SIMETHICONE 120; 1200; 1200 MG/30ML; MG/30ML; MG/30ML
30 SUSPENSION ORAL EVERY 6 HOURS PRN
Status: DISCONTINUED | OUTPATIENT
Start: 2025-02-13 | End: 2025-03-02 | Stop reason: HOSPADM

## 2025-02-13 RX ORDER — ACETAMINOPHEN 650 MG/1
650 SUPPOSITORY RECTAL EVERY 6 HOURS PRN
Status: DISCONTINUED | OUTPATIENT
Start: 2025-02-13 | End: 2025-03-02 | Stop reason: HOSPADM

## 2025-02-13 RX ORDER — SODIUM CHLORIDE 9 MG/ML
INJECTION, SOLUTION INTRAVENOUS PRN
Status: DISCONTINUED | OUTPATIENT
Start: 2025-02-13 | End: 2025-02-15

## 2025-02-13 RX ORDER — SODIUM CHLORIDE 9 MG/ML
INJECTION, SOLUTION INTRAVENOUS PRN
Status: DISCONTINUED | OUTPATIENT
Start: 2025-02-13 | End: 2025-02-17

## 2025-02-13 RX ORDER — ONDANSETRON 4 MG/1
4 TABLET, ORALLY DISINTEGRATING ORAL EVERY 8 HOURS PRN
Status: DISCONTINUED | OUTPATIENT
Start: 2025-02-13 | End: 2025-03-02 | Stop reason: HOSPADM

## 2025-02-13 RX ORDER — AMLODIPINE BESYLATE 10 MG/1
10 TABLET ORAL DAILY
Status: ON HOLD | COMMUNITY
Start: 2025-01-26 | End: 2025-03-01 | Stop reason: HOSPADM

## 2025-02-13 RX ORDER — METOPROLOL TARTRATE 25 MG/1
25 TABLET, FILM COATED ORAL 2 TIMES DAILY
Status: DISCONTINUED | OUTPATIENT
Start: 2025-02-13 | End: 2025-03-02 | Stop reason: HOSPADM

## 2025-02-13 RX ORDER — IOPAMIDOL 755 MG/ML
75 INJECTION, SOLUTION INTRAVASCULAR
Status: COMPLETED | OUTPATIENT
Start: 2025-02-13 | End: 2025-02-13

## 2025-02-13 RX ORDER — SPIRONOLACTONE 25 MG/1
25 TABLET ORAL 2 TIMES DAILY
Status: DISCONTINUED | OUTPATIENT
Start: 2025-02-13 | End: 2025-03-02 | Stop reason: HOSPADM

## 2025-02-13 RX ORDER — ALBUTEROL SULFATE 0.83 MG/ML
2.5 SOLUTION RESPIRATORY (INHALATION) EVERY 6 HOURS PRN
Status: DISCONTINUED | OUTPATIENT
Start: 2025-02-13 | End: 2025-03-02 | Stop reason: HOSPADM

## 2025-02-13 RX ORDER — POTASSIUM CHLORIDE 1500 MG/1
40 TABLET, EXTENDED RELEASE ORAL PRN
Status: DISCONTINUED | OUTPATIENT
Start: 2025-02-13 | End: 2025-03-02 | Stop reason: HOSPADM

## 2025-02-13 RX ORDER — SODIUM CHLORIDE 0.9 % (FLUSH) 0.9 %
5-40 SYRINGE (ML) INJECTION PRN
Status: DISCONTINUED | OUTPATIENT
Start: 2025-02-13 | End: 2025-03-02 | Stop reason: HOSPADM

## 2025-02-13 RX ORDER — PANTOPRAZOLE SODIUM 40 MG/10ML
40 INJECTION, POWDER, LYOPHILIZED, FOR SOLUTION INTRAVENOUS ONCE
Status: COMPLETED | OUTPATIENT
Start: 2025-02-13 | End: 2025-02-13

## 2025-02-13 RX ORDER — MIDODRINE HYDROCHLORIDE 2.5 MG/1
2.5 TABLET ORAL
Status: DISCONTINUED | OUTPATIENT
Start: 2025-02-13 | End: 2025-03-02 | Stop reason: HOSPADM

## 2025-02-13 RX ORDER — TAMSULOSIN HYDROCHLORIDE 0.4 MG/1
0.4 CAPSULE ORAL DAILY
Status: DISCONTINUED | OUTPATIENT
Start: 2025-02-13 | End: 2025-03-02 | Stop reason: HOSPADM

## 2025-02-13 RX ORDER — SODIUM CHLORIDE 0.9 % (FLUSH) 0.9 %
5-40 SYRINGE (ML) INJECTION EVERY 12 HOURS SCHEDULED
Status: DISCONTINUED | OUTPATIENT
Start: 2025-02-13 | End: 2025-02-17

## 2025-02-13 RX ORDER — ENTECAVIR 0.5 MG/1
1 TABLET, FILM COATED ORAL DAILY
Status: DISCONTINUED | OUTPATIENT
Start: 2025-02-13 | End: 2025-02-14 | Stop reason: CLARIF

## 2025-02-13 RX ORDER — FOLIC ACID 1 MG/1
1 TABLET ORAL DAILY
Status: DISCONTINUED | OUTPATIENT
Start: 2025-02-13 | End: 2025-03-02 | Stop reason: HOSPADM

## 2025-02-13 RX ORDER — FUROSEMIDE 10 MG/ML
40 INJECTION INTRAMUSCULAR; INTRAVENOUS ONCE
Status: COMPLETED | OUTPATIENT
Start: 2025-02-13 | End: 2025-02-13

## 2025-02-13 RX ORDER — OMEPRAZOLE 20 MG/1
20 CAPSULE, DELAYED RELEASE ORAL DAILY
Status: ON HOLD | COMMUNITY
Start: 2025-02-13

## 2025-02-13 RX ORDER — ONDANSETRON 2 MG/ML
4 INJECTION INTRAMUSCULAR; INTRAVENOUS EVERY 6 HOURS PRN
Status: DISCONTINUED | OUTPATIENT
Start: 2025-02-13 | End: 2025-03-02 | Stop reason: HOSPADM

## 2025-02-13 RX ORDER — LACTULOSE 10 G/15ML
20 SOLUTION ORAL 2 TIMES DAILY
Status: DISCONTINUED | OUTPATIENT
Start: 2025-02-13 | End: 2025-03-02 | Stop reason: HOSPADM

## 2025-02-13 RX ORDER — SODIUM CHLORIDE 9 MG/ML
INJECTION, SOLUTION INTRAVENOUS PRN
Status: DISCONTINUED | OUTPATIENT
Start: 2025-02-13 | End: 2025-03-02 | Stop reason: HOSPADM

## 2025-02-13 RX ORDER — 0.9 % SODIUM CHLORIDE 0.9 %
30 INTRAVENOUS SOLUTION INTRAVENOUS ONCE
Status: DISCONTINUED | OUTPATIENT
Start: 2025-02-13 | End: 2025-02-13

## 2025-02-13 RX ORDER — POTASSIUM CHLORIDE 7.45 MG/ML
10 INJECTION INTRAVENOUS PRN
Status: DISCONTINUED | OUTPATIENT
Start: 2025-02-13 | End: 2025-03-02 | Stop reason: HOSPADM

## 2025-02-13 RX ORDER — CLINDAMYCIN PHOSPHATE 900 MG/50ML
900 INJECTION, SOLUTION INTRAVENOUS ONCE
Status: DISCONTINUED | OUTPATIENT
Start: 2025-02-13 | End: 2025-02-13

## 2025-02-13 RX ORDER — ACETAMINOPHEN 325 MG/1
650 TABLET ORAL EVERY 6 HOURS PRN
Status: DISCONTINUED | OUTPATIENT
Start: 2025-02-13 | End: 2025-03-02 | Stop reason: HOSPADM

## 2025-02-13 RX ORDER — POLYETHYLENE GLYCOL 3350 17 G/17G
17 POWDER, FOR SOLUTION ORAL DAILY PRN
Status: DISCONTINUED | OUTPATIENT
Start: 2025-02-13 | End: 2025-03-02 | Stop reason: HOSPADM

## 2025-02-13 RX ORDER — OXYCODONE HYDROCHLORIDE 5 MG/1
10 TABLET ORAL EVERY 6 HOURS PRN
Status: DISCONTINUED | OUTPATIENT
Start: 2025-02-13 | End: 2025-02-19

## 2025-02-13 RX ORDER — SODIUM CHLORIDE 0.9 % (FLUSH) 0.9 %
5-40 SYRINGE (ML) INJECTION EVERY 12 HOURS SCHEDULED
Status: DISCONTINUED | OUTPATIENT
Start: 2025-02-13 | End: 2025-03-02 | Stop reason: HOSPADM

## 2025-02-13 RX ADMIN — FUROSEMIDE 40 MG: 10 INJECTION, SOLUTION INTRAMUSCULAR; INTRAVENOUS at 16:23

## 2025-02-13 RX ADMIN — PANTOPRAZOLE SODIUM 40 MG: 40 INJECTION, POWDER, FOR SOLUTION INTRAVENOUS at 14:27

## 2025-02-13 RX ADMIN — MIDODRINE HYDROCHLORIDE 2.5 MG: 2.5 TABLET ORAL at 19:49

## 2025-02-13 RX ADMIN — FOLIC ACID 1 MG: 1 TABLET ORAL at 19:48

## 2025-02-13 RX ADMIN — METOPROLOL TARTRATE 25 MG: 25 TABLET, FILM COATED ORAL at 19:49

## 2025-02-13 RX ADMIN — WATER 1000 MG: 1 INJECTION INTRAMUSCULAR; INTRAVENOUS; SUBCUTANEOUS at 23:50

## 2025-02-13 RX ADMIN — DOXYCYCLINE 100 MG: 100 INJECTION, POWDER, LYOPHILIZED, FOR SOLUTION INTRAVENOUS at 19:46

## 2025-02-13 RX ADMIN — IOPAMIDOL 75 ML: 755 INJECTION, SOLUTION INTRAVENOUS at 15:11

## 2025-02-13 RX ADMIN — SODIUM CHLORIDE, PRESERVATIVE FREE 10 ML: 5 INJECTION INTRAVENOUS at 19:49

## 2025-02-13 RX ADMIN — RIFAXIMIN 400 MG: 200 TABLET ORAL at 22:02

## 2025-02-13 RX ADMIN — SODIUM CHLORIDE, PRESERVATIVE FREE 10 ML: 5 INJECTION INTRAVENOUS at 19:50

## 2025-02-13 RX ADMIN — TAMSULOSIN HYDROCHLORIDE 0.4 MG: 0.4 CAPSULE ORAL at 19:49

## 2025-02-13 RX ADMIN — PIPERACILLIN AND TAZOBACTAM 4500 MG: 4; .5 INJECTION, POWDER, LYOPHILIZED, FOR SOLUTION INTRAVENOUS at 14:29

## 2025-02-13 RX ADMIN — OXYCODONE 10 MG: 5 TABLET ORAL at 19:48

## 2025-02-13 RX ADMIN — SPIRONOLACTONE 25 MG: 25 TABLET ORAL at 19:49

## 2025-02-13 ASSESSMENT — PAIN DESCRIPTION - DESCRIPTORS
DESCRIPTORS: ACHING;DISCOMFORT;SORE
DESCRIPTORS: ACHING

## 2025-02-13 ASSESSMENT — PAIN DESCRIPTION - ORIENTATION
ORIENTATION: RIGHT;LEFT
ORIENTATION: MID

## 2025-02-13 ASSESSMENT — PAIN SCALES - GENERAL
PAINLEVEL_OUTOF10: 0
PAINLEVEL_OUTOF10: 8

## 2025-02-13 ASSESSMENT — PAIN - FUNCTIONAL ASSESSMENT: PAIN_FUNCTIONAL_ASSESSMENT: NONE - DENIES PAIN

## 2025-02-13 ASSESSMENT — PAIN DESCRIPTION - LOCATION
LOCATION: PELVIS
LOCATION: ABDOMEN

## 2025-02-13 NOTE — ED NOTES
Provider made aware of pt's complicated rodrigues placement, provider okay with obtaining urine sample from current rodrigues.

## 2025-02-13 NOTE — H&P
[facility] Hospitalist Group History and Physical          CHIEF COMPLAINT:  Ascites     History of Present Illness:   74 year old male with apst medical history of  CAD, CVA, COPD, hematuria due to radiation cystitis, tobacco use, HLD, HTN, MI, prostate CA, and alcoholic liver cirrhosis with ascites.  Patient was recently admitted  1/31-2/6 due to alcoholic ascites  anasarca. Patient is s/p  s/p paracentesis on 2/4 with removal of 2230 cc of fluid not consistent with SBP .He is s/p EGD on 2/3 with banding x5 . Patient is seen at [facility] ED  due to worsening swelling of abdomen as well as pus coming from scrotum . Patient is due to have paracentesis on 2/14 . Patient has lower extremity edema . There is pus around rodrigues insertion site. Patient has no fever chills nausea vomiting . In the ED patient was afebrile  R 16 HR 85 /72 97 % RA Lab data reveal sodium 134 potassium 3.6 BUN 7 Scr 0.8 lactic acid 3.0 BNP 2212 albumin 2.7 ALKP 148 AST 55 Tbili 11.9  WBC 7.5 HGB 6.6 PLT 97  CT A/P reveals body wall edema and scrotal edema  and bilateral hydroceles  with no evidence of scrotal wall gas to suggest bubba gangrene  large volume of abdominal and pelvic ascites  periesophageal varices  CXR negative . Patient received  Protonix 40 mg  Lasix 40 mg IV x1 Zosyn 4500 mg . Patient will be admitted for further evaluation       Informant(s) for H&P:    REVIEW OF SYSTEMS:  A comprehensive review of systems was negative except for: what is in the HPI      PMH:  Past Medical History:   Diagnosis Date    Acid reflux     Arthritis     Back pain     CAD (coronary artery disease)     follows with PCP    Cerebral artery occlusion with cerebral infarction (HCC) 10/2009    affected vision initially; no deficits at this time    COPD (chronic obstructive pulmonary disease) (HCC)     Hematuria     Hematuria due to irradiation cystitis 5/16/2023    History of tobacco use 5/16/2023    Hyperlipidemia     Hypertension     Late

## 2025-02-13 NOTE — ED NOTES
Physician made aware of pt's complicated rodrigues catheter placement. Physician directed RN to obtain urine sample from existing rodrigues catheter.

## 2025-02-13 NOTE — ED NOTES
ED to Inpatient Handoff Report    Notified Griselda on floor that electronic handoff available and patient ready for transport to room 634.  Griselda also made aware of pt's chronic rodrigues in which we did not change due to complicated guidewire insertion via Urology.     Safety Risks: Risk of falls    Patient in Restraints: no    Constant Observer or Patient : no    Telemetry Monitoring Ordered: Yes          Order to transfer to unit without monitor: NO    Last MEWS: 1 Time completed: 1830    Deterioration Index: 20.32    Vitals:    02/13/25 1623 02/13/25 1722 02/13/25 1745 02/13/25 1827   BP: 126/74 131/67 135/68 121/78   Pulse:  82 80 76   Resp:  18 16 18   Temp:  98.3 °F (36.8 °C) 98.1 °F (36.7 °C) 98.5 °F (36.9 °C)   TempSrc:    Oral   SpO2:  97% 98% 96%   Weight:       Height:           Opportunity for questions and clarification was provided.

## 2025-02-13 NOTE — ED PROVIDER NOTES
Regular rate and rhythm. 2+ distal pulses  Abdomen: Soft, non tender, non distended  : Millan catheter in place, edema to scrotum and penis, no crepitus, some surrounding discharge at Millan insertion site  Extremities: Moves all extremities x 4. Warm and well perfused.  Severe pitting edema to bilateral lower extremities, soft and easily compressible compartments, extremities are warm and well-perfused  Skin: warm and dry without rash  Neurologic: GCS 15, no focal motor or sensory deficits   Psych: Normal Affect. Behavior normal.   [JA]   1337 Differential diagnosis includes ascites, CHF, complicated UTI, electrolyte abnormality.    Labs and imaging have been ordered. [JA]   1453 I reviewed and interpreted labs.  CBC showed severe anemia of 6.6.  Patient's hemoglobin is baseline around 7.2 to 7.8.  1 unit of PRBCs ordered for transfusion.  Thrombocytopenia of 97.  CMP showed normal kidney function and reassuring electrolytes.  LFTs mildly elevated with an alk phos of 148 and AST of 55.  Lactic acid is elevated at 3.  Patient appears severely fluid overloaded so IV fluids held.  Patient will receive PRBCs.  A repeat lactic acid was sent without any intervention.  BMP pending. [JA]   1454 Imaging pending. [JA]   1543 CT abdomen pelvis with contrast showed generalized body wall edema and bilateral hydroceles but no evidence of Lise's.  Large volume pelvic ascites.    Will order IV diuresis.      Chest x-ray interpreted by me.  Interpretation-lungs clear, no infiltrate.  Radiologist confirms read. [JA]   1550 Case discussed with Dr. Sanz who accepts patient for admission. [VG]      ED Course User Index  [JA] Florence Orellana MD  [VG] Ricky Crandall MD        Medical Decision Making  Amount and/or Complexity of Data Reviewed  Labs: ordered.  Radiology: ordered.    Risk  Prescription drug management.  Decision regarding hospitalization.        Patient is a 74-year-old male presenting with abdominal pain and

## 2025-02-14 ENCOUNTER — APPOINTMENT (OUTPATIENT)
Age: 75
DRG: 432 | End: 2025-02-14
Attending: HOSPITALIST
Payer: OTHER GOVERNMENT

## 2025-02-14 ENCOUNTER — APPOINTMENT (OUTPATIENT)
Dept: ULTRASOUND IMAGING | Age: 75
DRG: 432 | End: 2025-02-14
Payer: OTHER GOVERNMENT

## 2025-02-14 LAB
ALBUMIN SERPL-MCNC: 2.5 G/DL (ref 3.5–5.2)
ALP SERPL-CCNC: 109 U/L (ref 40–129)
ALT SERPL-CCNC: 12 U/L (ref 0–40)
ANION GAP SERPL CALCULATED.3IONS-SCNC: 10 MMOL/L (ref 7–16)
AST SERPL-CCNC: 47 U/L (ref 0–39)
BASOPHILS # BLD: 0 K/UL (ref 0–0.2)
BASOPHILS NFR BLD: 0 % (ref 0–2)
BILIRUB SERPL-MCNC: 11.4 MG/DL (ref 0–1.2)
BUN SERPL-MCNC: 7 MG/DL (ref 6–23)
CALCIUM SERPL-MCNC: 8 MG/DL (ref 8.6–10.2)
CHLORIDE SERPL-SCNC: 101 MMOL/L (ref 98–107)
CO2 SERPL-SCNC: 23 MMOL/L (ref 22–29)
CREAT SERPL-MCNC: 0.8 MG/DL (ref 0.7–1.2)
ECHO AO ASC DIAM: 2.4 CM
ECHO AO ASCENDING AORTA INDEX: 1.21 CM/M2
ECHO AV AREA PEAK VELOCITY: 1.5 CM2
ECHO AV AREA VTI: 1.7 CM2
ECHO AV AREA/BSA PEAK VELOCITY: 0.8 CM2/M2
ECHO AV AREA/BSA VTI: 0.9 CM2/M2
ECHO AV CUSP MM: 1.8 CM
ECHO AV MEAN GRADIENT: 7 MMHG
ECHO AV MEAN VELOCITY: 1.3 M/S
ECHO AV PEAK GRADIENT: 16 MMHG
ECHO AV PEAK VELOCITY: 2 M/S
ECHO AV VELOCITY RATIO: 0.45
ECHO AV VTI: 39.7 CM
ECHO BSA: 2 M2
ECHO EST RA PRESSURE: 3 MMHG
ECHO LA DIAMETER INDEX: 2.42 CM/M2
ECHO LA DIAMETER: 4.8 CM
ECHO LA VOL A-L A2C: 82 ML (ref 18–58)
ECHO LA VOL A-L A4C: 92 ML (ref 18–58)
ECHO LA VOL MOD A2C: 77 ML (ref 18–58)
ECHO LA VOL MOD A4C: 81 ML (ref 18–58)
ECHO LA VOLUME AREA LENGTH: 92 ML
ECHO LA VOLUME INDEX A-L A2C: 41 ML/M2 (ref 16–34)
ECHO LA VOLUME INDEX A-L A4C: 46 ML/M2 (ref 16–34)
ECHO LA VOLUME INDEX AREA LENGTH: 46 ML/M2 (ref 16–34)
ECHO LA VOLUME INDEX MOD A2C: 39 ML/M2 (ref 16–34)
ECHO LA VOLUME INDEX MOD A4C: 41 ML/M2 (ref 16–34)
ECHO LV E' LATERAL VELOCITY: 11 CM/S
ECHO LV E' SEPTAL VELOCITY: 9 CM/S
ECHO LV EDV A2C: 184 ML
ECHO LV EDV A4C: 155 ML
ECHO LV EDV BP: 170 ML (ref 67–155)
ECHO LV EDV INDEX A4C: 78 ML/M2
ECHO LV EDV INDEX BP: 86 ML/M2
ECHO LV EDV NDEX A2C: 93 ML/M2
ECHO LV EJECTION FRACTION A2C: 46 %
ECHO LV EJECTION FRACTION A4C: 46 %
ECHO LV EJECTION FRACTION BIPLANE: 45 % (ref 55–100)
ECHO LV ESV A2C: 100 ML
ECHO LV ESV A4C: 84 ML
ECHO LV ESV BP: 94 ML (ref 22–58)
ECHO LV ESV INDEX A2C: 51 ML/M2
ECHO LV ESV INDEX A4C: 42 ML/M2
ECHO LV ESV INDEX BP: 47 ML/M2
ECHO LV FRACTIONAL SHORTENING: 30 % (ref 28–44)
ECHO LV INTERNAL DIMENSION DIASTOLE INDEX: 2.73 CM/M2
ECHO LV INTERNAL DIMENSION DIASTOLIC: 5.4 CM (ref 4.2–5.9)
ECHO LV INTERNAL DIMENSION SYSTOLIC INDEX: 1.92 CM/M2
ECHO LV INTERNAL DIMENSION SYSTOLIC: 3.8 CM
ECHO LV ISOVOLUMETRIC RELAXATION TIME (IVRT): 69.2 MS
ECHO LV IVSD: 0.9 CM (ref 0.6–1)
ECHO LV IVSS: 1.1 CM
ECHO LV MASS 2D: 180.1 G (ref 88–224)
ECHO LV MASS INDEX 2D: 91 G/M2 (ref 49–115)
ECHO LV POSTERIOR WALL DIASTOLIC: 0.9 CM (ref 0.6–1)
ECHO LV POSTERIOR WALL SYSTOLIC: 1.5 CM
ECHO LV RELATIVE WALL THICKNESS RATIO: 0.33
ECHO LVOT AREA: 3.5 CM2
ECHO LVOT AV VTI INDEX: 0.51
ECHO LVOT DIAM: 2.1 CM
ECHO LVOT MEAN GRADIENT: 2 MMHG
ECHO LVOT PEAK GRADIENT: 3 MMHG
ECHO LVOT PEAK VELOCITY: 0.9 M/S
ECHO LVOT STROKE VOLUME INDEX: 35.1 ML/M2
ECHO LVOT SV: 69.6 ML
ECHO LVOT VTI: 20.1 CM
ECHO MV "A" WAVE DURATION: 152.3 MSEC
ECHO MV A VELOCITY: 1 M/S
ECHO MV AREA PHT: 2.7 CM2
ECHO MV AREA VTI: 2 CM2
ECHO MV E DECELERATION TIME (DT): 272.8 MS
ECHO MV E VELOCITY: 1.18 M/S
ECHO MV E/A RATIO: 1.18
ECHO MV E/E' LATERAL: 10.73
ECHO MV E/E' RATIO (AVERAGED): 11.92
ECHO MV E/E' SEPTAL: 13.11
ECHO MV EROA PISA: 0.2 CM2
ECHO MV LVOT VTI INDEX: 1.7
ECHO MV MAX VELOCITY: 1.2 M/S
ECHO MV MEAN GRADIENT: 2 MMHG
ECHO MV MEAN VELOCITY: 0.7 M/S
ECHO MV PEAK GRADIENT: 5 MMHG
ECHO MV PRESSURE HALF TIME (PHT): 82.8 MS
ECHO MV REGURGITANT ALIASING (NYQUIST) VELOCITY: 34 CM/S
ECHO MV REGURGITANT RADIUS PISA: 0.63 CM
ECHO MV REGURGITANT VELOCITY PISA: 5 M/S
ECHO MV REGURGITANT VOLUME PISA: 26.8 ML
ECHO MV REGURGITANT VTIA: 158.1 CM
ECHO MV VTI: 34.2 CM
ECHO PULMONARY ARTERY SYSTOLIC PRESSURE (PASP): 45 MMHG
ECHO PV MAX VELOCITY: 1.3 M/S
ECHO PV MEAN GRADIENT: 4 MMHG
ECHO PV MEAN VELOCITY: 0.9 M/S
ECHO PV PEAK GRADIENT: 6 MMHG
ECHO PV VTI: 31.8 CM
ECHO PVEIN A DURATION: 78.4 MS
ECHO PVEIN A VELOCITY: 0.2 M/S
ECHO PVEIN PEAK D VELOCITY: 0.5 M/S
ECHO PVEIN PEAK S VELOCITY: 0.7 M/S
ECHO PVEIN S/D RATIO: 1.4
ECHO RIGHT VENTRICULAR SYSTOLIC PRESSURE (RVSP): 45 MMHG
ECHO RV INTERNAL DIMENSION: 4 CM
ECHO RV TAPSE: 3.1 CM (ref 1.7–?)
ECHO TV REGURGITANT MAX VELOCITY: 3.23 M/S
ECHO TV REGURGITANT PEAK GRADIENT: 42 MMHG
EOSINOPHIL # BLD: 0.37 K/UL (ref 0.05–0.5)
EOSINOPHILS RELATIVE PERCENT: 6 % (ref 0–6)
ERYTHROCYTE [DISTWIDTH] IN BLOOD BY AUTOMATED COUNT: 21.4 % (ref 11.5–15)
GFR, ESTIMATED: >90 ML/MIN/1.73M2
GLUCOSE SERPL-MCNC: 80 MG/DL (ref 74–99)
HCT VFR BLD AUTO: 21.9 % (ref 37–54)
HCT VFR BLD AUTO: 23.3 % (ref 37–54)
HGB BLD-MCNC: 7.1 G/DL (ref 12.5–16.5)
HGB BLD-MCNC: 7.5 G/DL (ref 12.5–16.5)
INR PPP: 2.9
LACTATE BLDV-SCNC: 2.4 MMOL/L (ref 0.5–1.9)
LYMPHOCYTES NFR BLD: 2.29 K/UL (ref 1.5–4)
LYMPHOCYTES RELATIVE PERCENT: 37 % (ref 20–42)
MAGNESIUM SERPL-MCNC: 1.2 MG/DL (ref 1.6–2.6)
MCH RBC QN AUTO: 31.8 PG (ref 26–35)
MCHC RBC AUTO-ENTMCNC: 32.4 G/DL (ref 32–34.5)
MCV RBC AUTO: 98.2 FL (ref 80–99.9)
MICROORGANISM SPEC CULT: NO GROWTH
MONOCYTES NFR BLD: 0.87 K/UL (ref 0.1–0.95)
MONOCYTES NFR BLD: 14 % (ref 2–12)
NEUTROPHILS NFR BLD: 43 % (ref 43–80)
NEUTS SEG NFR BLD: 2.67 K/UL (ref 1.8–7.3)
PHOSPHATE SERPL-MCNC: 2.9 MG/DL (ref 2.5–4.5)
PLATELET CONFIRMATION: NORMAL
PLATELET, FLUORESCENCE: 83 K/UL (ref 130–450)
PMV BLD AUTO: 10 FL (ref 7–12)
POTASSIUM SERPL-SCNC: 3.5 MMOL/L (ref 3.5–5)
PROT SERPL-MCNC: 6.1 G/DL (ref 6.4–8.3)
PROTHROMBIN TIME: 30.7 SEC (ref 9.3–12.4)
RBC # BLD AUTO: 2.23 M/UL (ref 3.8–5.8)
RBC # BLD: ABNORMAL 10*6/UL
SERVICE CMNT-IMP: NORMAL
SODIUM SERPL-SCNC: 134 MMOL/L (ref 132–146)
SPECIMEN DESCRIPTION: NORMAL
WBC # BLD: ABNORMAL 10*3/UL
WBC OTHER # BLD: 6.2 K/UL (ref 4.5–11.5)

## 2025-02-14 PROCEDURE — 6370000000 HC RX 637 (ALT 250 FOR IP): Performed by: FAMILY MEDICINE

## 2025-02-14 PROCEDURE — C8929 TTE W OR WO FOL WCON,DOPPLER: HCPCS

## 2025-02-14 PROCEDURE — 6360000002 HC RX W HCPCS: Performed by: HOSPITALIST

## 2025-02-14 PROCEDURE — 2500000003 HC RX 250 WO HCPCS: Performed by: FAMILY MEDICINE

## 2025-02-14 PROCEDURE — 87591 N.GONORRHOEAE DNA AMP PROB: CPT

## 2025-02-14 PROCEDURE — 84100 ASSAY OF PHOSPHORUS: CPT

## 2025-02-14 PROCEDURE — 36415 COLL VENOUS BLD VENIPUNCTURE: CPT

## 2025-02-14 PROCEDURE — 99232 SBSQ HOSP IP/OBS MODERATE 35: CPT | Performed by: STUDENT IN AN ORGANIZED HEALTH CARE EDUCATION/TRAINING PROGRAM

## 2025-02-14 PROCEDURE — 6360000002 HC RX W HCPCS: Performed by: FAMILY MEDICINE

## 2025-02-14 PROCEDURE — 85610 PROTHROMBIN TIME: CPT

## 2025-02-14 PROCEDURE — 6360000004 HC RX CONTRAST MEDICATION: Performed by: HOSPITALIST

## 2025-02-14 PROCEDURE — 87491 CHLMYD TRACH DNA AMP PROBE: CPT

## 2025-02-14 PROCEDURE — 2060000000 HC ICU INTERMEDIATE R&B

## 2025-02-14 PROCEDURE — 82105 ALPHA-FETOPROTEIN SERUM: CPT

## 2025-02-14 PROCEDURE — 85014 HEMATOCRIT: CPT

## 2025-02-14 PROCEDURE — 76705 ECHO EXAM OF ABDOMEN: CPT

## 2025-02-14 PROCEDURE — 83735 ASSAY OF MAGNESIUM: CPT

## 2025-02-14 PROCEDURE — 97165 OT EVAL LOW COMPLEX 30 MIN: CPT

## 2025-02-14 PROCEDURE — 85025 COMPLETE CBC W/AUTO DIFF WBC: CPT

## 2025-02-14 PROCEDURE — 93306 TTE W/DOPPLER COMPLETE: CPT | Performed by: INTERNAL MEDICINE

## 2025-02-14 PROCEDURE — 2580000003 HC RX 258: Performed by: FAMILY MEDICINE

## 2025-02-14 PROCEDURE — 2580000003 HC RX 258: Performed by: HOSPITALIST

## 2025-02-14 PROCEDURE — 80053 COMPREHEN METABOLIC PANEL: CPT

## 2025-02-14 PROCEDURE — 97161 PT EVAL LOW COMPLEX 20 MIN: CPT

## 2025-02-14 PROCEDURE — 85018 HEMOGLOBIN: CPT

## 2025-02-14 RX ORDER — OCTREOTIDE ACETATE 50 UG/ML
50 INJECTION, SOLUTION INTRAVENOUS; SUBCUTANEOUS ONCE
Status: COMPLETED | OUTPATIENT
Start: 2025-02-14 | End: 2025-02-14

## 2025-02-14 RX ORDER — ENTECAVIR 1 MG/1
1 TABLET, FILM COATED ORAL DAILY
Status: DISCONTINUED | OUTPATIENT
Start: 2025-02-14 | End: 2025-03-02 | Stop reason: HOSPADM

## 2025-02-14 RX ORDER — DOXYCYCLINE 100 MG/1
100 CAPSULE ORAL EVERY 12 HOURS SCHEDULED
Status: DISCONTINUED | OUTPATIENT
Start: 2025-02-14 | End: 2025-02-14

## 2025-02-14 RX ORDER — PANTOPRAZOLE SODIUM 40 MG/1
40 TABLET, DELAYED RELEASE ORAL
Status: DISCONTINUED | OUTPATIENT
Start: 2025-02-14 | End: 2025-02-14

## 2025-02-14 RX ORDER — ALBUMIN (HUMAN) 12.5 G/50ML
50 SOLUTION INTRAVENOUS ONCE
Status: DISCONTINUED | OUTPATIENT
Start: 2025-02-14 | End: 2025-03-02 | Stop reason: HOSPADM

## 2025-02-14 RX ORDER — FUROSEMIDE 10 MG/ML
40 INJECTION INTRAMUSCULAR; INTRAVENOUS DAILY
Status: DISCONTINUED | OUTPATIENT
Start: 2025-02-14 | End: 2025-02-24

## 2025-02-14 RX ADMIN — MIDODRINE HYDROCHLORIDE 2.5 MG: 2.5 TABLET ORAL at 16:33

## 2025-02-14 RX ADMIN — MIDODRINE HYDROCHLORIDE 2.5 MG: 2.5 TABLET ORAL at 12:13

## 2025-02-14 RX ADMIN — SODIUM CHLORIDE, PRESERVATIVE FREE 10 ML: 5 INJECTION INTRAVENOUS at 19:57

## 2025-02-14 RX ADMIN — SULFUR HEXAFLUORIDE 2 ML: 60.7; .19; .19 INJECTION, POWDER, LYOPHILIZED, FOR SUSPENSION INTRAVENOUS; INTRAVESICAL at 12:51

## 2025-02-14 RX ADMIN — OXYCODONE 10 MG: 5 TABLET ORAL at 19:56

## 2025-02-14 RX ADMIN — OCTREOTIDE ACETATE 50 MCG/HR: 500 INJECTION, SOLUTION INTRAVENOUS; SUBCUTANEOUS at 12:43

## 2025-02-14 RX ADMIN — SODIUM CHLORIDE, PRESERVATIVE FREE 10 ML: 5 INJECTION INTRAVENOUS at 08:33

## 2025-02-14 RX ADMIN — METOPROLOL TARTRATE 25 MG: 25 TABLET, FILM COATED ORAL at 08:32

## 2025-02-14 RX ADMIN — METOPROLOL TARTRATE 25 MG: 25 TABLET, FILM COATED ORAL at 19:56

## 2025-02-14 RX ADMIN — RIFAXIMIN 400 MG: 200 TABLET ORAL at 14:32

## 2025-02-14 RX ADMIN — OXYCODONE 10 MG: 5 TABLET ORAL at 14:35

## 2025-02-14 RX ADMIN — PANTOPRAZOLE SODIUM 40 MG: 40 TABLET, DELAYED RELEASE ORAL at 08:32

## 2025-02-14 RX ADMIN — SODIUM CHLORIDE 40 MG: 9 INJECTION INTRAMUSCULAR; INTRAVENOUS; SUBCUTANEOUS at 12:13

## 2025-02-14 RX ADMIN — DOXYCYCLINE HYCLATE 100 MG: 100 CAPSULE ORAL at 08:32

## 2025-02-14 RX ADMIN — OXYCODONE 10 MG: 5 TABLET ORAL at 05:06

## 2025-02-14 RX ADMIN — TAMSULOSIN HYDROCHLORIDE 0.4 MG: 0.4 CAPSULE ORAL at 08:31

## 2025-02-14 RX ADMIN — ENTECAVIR 1 MG: 1 TABLET, FILM COATED ORAL at 10:19

## 2025-02-14 RX ADMIN — RIFAXIMIN 400 MG: 200 TABLET ORAL at 19:56

## 2025-02-14 RX ADMIN — FOLIC ACID 1 MG: 1 TABLET ORAL at 08:32

## 2025-02-14 RX ADMIN — SODIUM CHLORIDE 40 MG: 9 INJECTION INTRAMUSCULAR; INTRAVENOUS; SUBCUTANEOUS at 22:47

## 2025-02-14 RX ADMIN — POTASSIUM CHLORIDE 40 MEQ: 1500 TABLET, EXTENDED RELEASE ORAL at 10:19

## 2025-02-14 RX ADMIN — SODIUM CHLORIDE, PRESERVATIVE FREE 40 MG: 5 INJECTION INTRAVENOUS at 05:05

## 2025-02-14 RX ADMIN — OCTREOTIDE ACETATE 50 MCG/HR: 500 INJECTION, SOLUTION INTRAVENOUS; SUBCUTANEOUS at 22:20

## 2025-02-14 RX ADMIN — OCTREOTIDE ACETATE 50 MCG: 50 INJECTION, SOLUTION INTRAVENOUS; SUBCUTANEOUS at 12:39

## 2025-02-14 RX ADMIN — SPIRONOLACTONE 25 MG: 25 TABLET ORAL at 16:32

## 2025-02-14 RX ADMIN — SPIRONOLACTONE 25 MG: 25 TABLET ORAL at 08:32

## 2025-02-14 RX ADMIN — FUROSEMIDE 40 MG: 10 INJECTION, SOLUTION INTRAMUSCULAR; INTRAVENOUS at 12:39

## 2025-02-14 RX ADMIN — DOXYCYCLINE 100 MG: 100 INJECTION, POWDER, LYOPHILIZED, FOR SOLUTION INTRAVENOUS at 05:10

## 2025-02-14 RX ADMIN — RIFAXIMIN 400 MG: 200 TABLET ORAL at 08:33

## 2025-02-14 RX ADMIN — MIDODRINE HYDROCHLORIDE 2.5 MG: 2.5 TABLET ORAL at 08:32

## 2025-02-14 ASSESSMENT — PAIN SCALES - GENERAL
PAINLEVEL_OUTOF10: 10
PAINLEVEL_OUTOF10: 8
PAINLEVEL_OUTOF10: 3
PAINLEVEL_OUTOF10: 8

## 2025-02-14 ASSESSMENT — PAIN DESCRIPTION - ORIENTATION
ORIENTATION: RIGHT;MID;LEFT
ORIENTATION: RIGHT;LEFT
ORIENTATION: RIGHT;LEFT

## 2025-02-14 ASSESSMENT — PAIN DESCRIPTION - LOCATION
LOCATION: ABDOMEN
LOCATION: LEG
LOCATION: GENERALIZED

## 2025-02-14 ASSESSMENT — PAIN - FUNCTIONAL ASSESSMENT: PAIN_FUNCTIONAL_ASSESSMENT: PREVENTS OR INTERFERES SOME ACTIVE ACTIVITIES AND ADLS

## 2025-02-14 ASSESSMENT — PAIN DESCRIPTION - DESCRIPTORS
DESCRIPTORS: ACHING;DISCOMFORT;CRAMPING
DESCRIPTORS: DISCOMFORT;SORE

## 2025-02-14 NOTE — ACP (ADVANCE CARE PLANNING)
Advance Care Planning   Healthcare Decision Maker:    Primary Decision Maker: India Gray - [facility] - [phone]    Click here to complete Healthcare Decision Makers including selection of the Healthcare Decision Maker Relationship (ie \"Primary\").  Today we documented Decision Maker(s) consistent with Legal Next of Kin hierarchy.

## 2025-02-14 NOTE — FLOWSHEET NOTE
After review with ultrasound by Dr. Garrido, it was determined that there was not enough fluid for paracentesis.

## 2025-02-14 NOTE — CARE COORDINATION
Social Work / Discharge Planning : Patient is re-admit with recurrent UTI. ID and Urology has been consulted. Patient resides alone in a third floor apartment. Patient has a WW . Patient is active with [facility], confirmed. NOEMY ordered obtained. Hx at [facility]. PCP is Dr. Xavi Swenson. [facility] [address]. Therapy ordered. AWait Treatment plan and recommendations. SW to follow. Electronically signed by ARIE Singh on 2/14/25 at 12:31 PM EST     Addendum: Therapy am,/pac 18/24. Supports home with [facility]. Active with [facility]. SW to follow. Electronically signed by ARIE Singh on 2/14/25 at 1:51 PM EST

## 2025-02-15 LAB
AFP SERPL-MCNC: 2.2 UG/L
ALBUMIN SERPL-MCNC: 2.5 G/DL (ref 3.5–5.2)
ALP SERPL-CCNC: 94 U/L (ref 40–129)
ALT SERPL-CCNC: 15 U/L (ref 0–40)
ANION GAP SERPL CALCULATED.3IONS-SCNC: 7 MMOL/L (ref 7–16)
AST SERPL-CCNC: 44 U/L (ref 0–39)
BASOPHILS # BLD: 0 K/UL (ref 0–0.2)
BASOPHILS NFR BLD: 0 % (ref 0–2)
BILIRUB SERPL-MCNC: 10.6 MG/DL (ref 0–1.2)
BUN SERPL-MCNC: 9 MG/DL (ref 6–23)
CALCIUM SERPL-MCNC: 7.8 MG/DL (ref 8.6–10.2)
CHLORIDE SERPL-SCNC: 104 MMOL/L (ref 98–107)
CO2 SERPL-SCNC: 25 MMOL/L (ref 22–29)
CREAT SERPL-MCNC: 1 MG/DL (ref 0.7–1.2)
EOSINOPHIL # BLD: 0.34 K/UL (ref 0.05–0.5)
EOSINOPHILS RELATIVE PERCENT: 5 % (ref 0–6)
ERYTHROCYTE [DISTWIDTH] IN BLOOD BY AUTOMATED COUNT: 21.8 % (ref 11.5–15)
GFR, ESTIMATED: 84 ML/MIN/1.73M2
GLUCOSE SERPL-MCNC: 125 MG/DL (ref 74–99)
HCT VFR BLD AUTO: 20.3 % (ref 37–54)
HCT VFR BLD AUTO: 22.5 % (ref 37–54)
HGB BLD-MCNC: 6.4 G/DL (ref 12.5–16.5)
HGB BLD-MCNC: 7.2 G/DL (ref 12.5–16.5)
INR PPP: 2.8
LYMPHOCYTES NFR BLD: 1.51 K/UL (ref 1.5–4)
LYMPHOCYTES RELATIVE PERCENT: 20 % (ref 20–42)
MAGNESIUM SERPL-MCNC: 1.2 MG/DL (ref 1.6–2.6)
MCH RBC QN AUTO: 31.4 PG (ref 26–35)
MCHC RBC AUTO-ENTMCNC: 31.5 G/DL (ref 32–34.5)
MCV RBC AUTO: 99.5 FL (ref 80–99.9)
MONOCYTES NFR BLD: 0.83 K/UL (ref 0.1–0.95)
MONOCYTES NFR BLD: 11 % (ref 2–12)
MYELOCYTES ABSOLUTE COUNT: 0.07 K/UL
MYELOCYTES: 1 %
NEUTROPHILS NFR BLD: 64 % (ref 43–80)
NEUTS SEG NFR BLD: 4.95 K/UL (ref 1.8–7.3)
PHOSPHATE SERPL-MCNC: 2.7 MG/DL (ref 2.5–4.5)
PLATELET CONFIRMATION: NORMAL
PLATELET, FLUORESCENCE: 92 K/UL (ref 130–450)
PMV BLD AUTO: 10.4 FL (ref 7–12)
POTASSIUM SERPL-SCNC: 4 MMOL/L (ref 3.5–5)
PROT SERPL-MCNC: 6 G/DL (ref 6.4–8.3)
PROTHROMBIN TIME: 29.7 SEC (ref 9.3–12.4)
RBC # BLD AUTO: 2.04 M/UL (ref 3.8–5.8)
RBC # BLD: ABNORMAL 10*6/UL
SODIUM SERPL-SCNC: 136 MMOL/L (ref 132–146)
WBC OTHER # BLD: 7.7 K/UL (ref 4.5–11.5)

## 2025-02-15 PROCEDURE — 2580000003 HC RX 258: Performed by: HOSPITALIST

## 2025-02-15 PROCEDURE — 2060000000 HC ICU INTERMEDIATE R&B

## 2025-02-15 PROCEDURE — 83735 ASSAY OF MAGNESIUM: CPT

## 2025-02-15 PROCEDURE — P9016 RBC LEUKOCYTES REDUCED: HCPCS

## 2025-02-15 PROCEDURE — 85610 PROTHROMBIN TIME: CPT

## 2025-02-15 PROCEDURE — 2500000003 HC RX 250 WO HCPCS: Performed by: FAMILY MEDICINE

## 2025-02-15 PROCEDURE — 6360000002 HC RX W HCPCS: Performed by: HOSPITALIST

## 2025-02-15 PROCEDURE — 36430 TRANSFUSION BLD/BLD COMPNT: CPT

## 2025-02-15 PROCEDURE — 85018 HEMOGLOBIN: CPT

## 2025-02-15 PROCEDURE — 84100 ASSAY OF PHOSPHORUS: CPT

## 2025-02-15 PROCEDURE — 99232 SBSQ HOSP IP/OBS MODERATE 35: CPT | Performed by: STUDENT IN AN ORGANIZED HEALTH CARE EDUCATION/TRAINING PROGRAM

## 2025-02-15 PROCEDURE — 6360000002 HC RX W HCPCS: Performed by: STUDENT IN AN ORGANIZED HEALTH CARE EDUCATION/TRAINING PROGRAM

## 2025-02-15 PROCEDURE — 85014 HEMATOCRIT: CPT

## 2025-02-15 PROCEDURE — 85025 COMPLETE CBC W/AUTO DIFF WBC: CPT

## 2025-02-15 PROCEDURE — 80053 COMPREHEN METABOLIC PANEL: CPT

## 2025-02-15 PROCEDURE — 6370000000 HC RX 637 (ALT 250 FOR IP): Performed by: FAMILY MEDICINE

## 2025-02-15 RX ORDER — MAGNESIUM SULFATE IN WATER 40 MG/ML
2000 INJECTION, SOLUTION INTRAVENOUS ONCE
Status: COMPLETED | OUTPATIENT
Start: 2025-02-15 | End: 2025-02-15

## 2025-02-15 RX ORDER — SODIUM CHLORIDE 9 MG/ML
INJECTION, SOLUTION INTRAVENOUS PRN
Status: DISCONTINUED | OUTPATIENT
Start: 2025-02-15 | End: 2025-02-16

## 2025-02-15 RX ADMIN — MIDODRINE HYDROCHLORIDE 2.5 MG: 2.5 TABLET ORAL at 12:20

## 2025-02-15 RX ADMIN — TAMSULOSIN HYDROCHLORIDE 0.4 MG: 0.4 CAPSULE ORAL at 08:17

## 2025-02-15 RX ADMIN — SPIRONOLACTONE 25 MG: 25 TABLET ORAL at 08:19

## 2025-02-15 RX ADMIN — OCTREOTIDE ACETATE 50 MCG/HR: 500 INJECTION, SOLUTION INTRAVENOUS; SUBCUTANEOUS at 20:12

## 2025-02-15 RX ADMIN — OXYCODONE 10 MG: 5 TABLET ORAL at 09:41

## 2025-02-15 RX ADMIN — METOPROLOL TARTRATE 25 MG: 25 TABLET, FILM COATED ORAL at 08:19

## 2025-02-15 RX ADMIN — SODIUM CHLORIDE 40 MG: 9 INJECTION INTRAMUSCULAR; INTRAVENOUS; SUBCUTANEOUS at 12:20

## 2025-02-15 RX ADMIN — SODIUM CHLORIDE, PRESERVATIVE FREE 10 ML: 5 INJECTION INTRAVENOUS at 20:11

## 2025-02-15 RX ADMIN — MIDODRINE HYDROCHLORIDE 2.5 MG: 2.5 TABLET ORAL at 08:18

## 2025-02-15 RX ADMIN — FUROSEMIDE 40 MG: 10 INJECTION, SOLUTION INTRAMUSCULAR; INTRAVENOUS at 08:20

## 2025-02-15 RX ADMIN — MAGNESIUM SULFATE HEPTAHYDRATE 2000 MG: 2 INJECTION, SOLUTION INTRAVENOUS at 09:47

## 2025-02-15 RX ADMIN — OCTREOTIDE ACETATE 50 MCG/HR: 500 INJECTION, SOLUTION INTRAVENOUS; SUBCUTANEOUS at 09:15

## 2025-02-15 RX ADMIN — RIFAXIMIN 400 MG: 200 TABLET ORAL at 20:11

## 2025-02-15 RX ADMIN — RIFAXIMIN 400 MG: 200 TABLET ORAL at 08:19

## 2025-02-15 RX ADMIN — OXYCODONE 10 MG: 5 TABLET ORAL at 23:32

## 2025-02-15 RX ADMIN — SODIUM CHLORIDE 40 MG: 9 INJECTION INTRAMUSCULAR; INTRAVENOUS; SUBCUTANEOUS at 23:32

## 2025-02-15 RX ADMIN — METOPROLOL TARTRATE 25 MG: 25 TABLET, FILM COATED ORAL at 20:11

## 2025-02-15 RX ADMIN — ENTECAVIR 1 MG: 1 TABLET, FILM COATED ORAL at 08:17

## 2025-02-15 RX ADMIN — SPIRONOLACTONE 25 MG: 25 TABLET ORAL at 16:59

## 2025-02-15 RX ADMIN — RIFAXIMIN 400 MG: 200 TABLET ORAL at 14:40

## 2025-02-15 RX ADMIN — OXYCODONE 10 MG: 5 TABLET ORAL at 03:16

## 2025-02-15 RX ADMIN — OXYCODONE 10 MG: 5 TABLET ORAL at 17:03

## 2025-02-15 RX ADMIN — MIDODRINE HYDROCHLORIDE 2.5 MG: 2.5 TABLET ORAL at 16:59

## 2025-02-15 RX ADMIN — FOLIC ACID 1 MG: 1 TABLET ORAL at 08:19

## 2025-02-15 RX ADMIN — SODIUM CHLORIDE, PRESERVATIVE FREE 10 ML: 5 INJECTION INTRAVENOUS at 08:22

## 2025-02-15 ASSESSMENT — PAIN - FUNCTIONAL ASSESSMENT
PAIN_FUNCTIONAL_ASSESSMENT: PREVENTS OR INTERFERES SOME ACTIVE ACTIVITIES AND ADLS

## 2025-02-15 ASSESSMENT — PAIN DESCRIPTION - LOCATION
LOCATION: BACK;GENERALIZED
LOCATION: GENERALIZED
LOCATION: GENERALIZED;BACK
LOCATION: OTHER (COMMENT)

## 2025-02-15 ASSESSMENT — PAIN DESCRIPTION - DESCRIPTORS
DESCRIPTORS: DISCOMFORT;SHARP
DESCRIPTORS: ACHING;DISCOMFORT

## 2025-02-15 ASSESSMENT — PAIN DESCRIPTION - ONSET: ONSET: ON-GOING

## 2025-02-15 ASSESSMENT — PAIN SCALES - GENERAL
PAINLEVEL_OUTOF10: 6
PAINLEVEL_OUTOF10: 8
PAINLEVEL_OUTOF10: 8
PAINLEVEL_OUTOF10: 7
PAINLEVEL_OUTOF10: 10
PAINLEVEL_OUTOF10: 5

## 2025-02-15 ASSESSMENT — PAIN DESCRIPTION - ORIENTATION
ORIENTATION: OTHER (COMMENT)
ORIENTATION: LEFT;RIGHT;MID
ORIENTATION: LEFT;MID;RIGHT

## 2025-02-15 ASSESSMENT — PAIN DESCRIPTION - FREQUENCY: FREQUENCY: INTERMITTENT

## 2025-02-15 ASSESSMENT — PAIN DESCRIPTION - PAIN TYPE: TYPE: CHRONIC PAIN

## 2025-02-15 NOTE — CONSENT
Informed Consent for Blood Component Transfusion Note    I have discussed with the patient the rationale for blood component transfusion; its benefits in treating or preventing fatigue, organ damage, or death; and its risk which includes mild transfusion reactions, rare risk of blood borne infection, or more serious but rare reactions. I have discussed the alternatives to transfusion, including the risk and consequences of not receiving transfusion. The patient had an opportunity to ask questions and had agreed to proceed with transfusion of blood components.    Electronically signed by Juan Manuel Heath MD on 2/15/25 at 9:06 AM EST

## 2025-02-15 NOTE — DISCHARGE INSTRUCTIONS
GO for lab work on Monday 3/3 or Tuesday 3/4 to check your blood count    FOLLOW UP with the GI doctor for your liver cirrhosis and your colonoscopy    FOLLOW UP with the Urology doctor about getting the urinary (rodrigues) catheter removed    FOLLOW UP with your primary doctor within the next week    KEEP TAKING the lactulose to have 3 to 5 bowel movements every day    KEEP TAKING the iron supplement    RETURN to the EMERGENCY DEPARTMENT for any bleeding, confusion, chest pain, shortness of breath or trouble breathing, or for any other concerning symptoms               Call upon discharge to schedule a follow-up visit with Adrienne Perez/Dominick/Omid/Madeleine ([facility]rology) at [phone]

## 2025-02-16 LAB
ALBUMIN SERPL-MCNC: 2.4 G/DL (ref 3.5–5.2)
ALP SERPL-CCNC: 95 U/L (ref 40–129)
ALT SERPL-CCNC: 10 U/L (ref 0–40)
ANION GAP SERPL CALCULATED.3IONS-SCNC: 8 MMOL/L (ref 7–16)
AST SERPL-CCNC: 37 U/L (ref 0–39)
ATYPICAL LYMPHOCYTE ABSOLUTE COUNT: 0.07 K/UL (ref 0–0.46)
ATYPICAL LYMPHOCYTES: 1 % (ref 0–4)
BASOPHILS # BLD: 0 K/UL (ref 0–0.2)
BASOPHILS NFR BLD: 0 % (ref 0–2)
BILIRUB SERPL-MCNC: 10.1 MG/DL (ref 0–1.2)
BUN SERPL-MCNC: 10 MG/DL (ref 6–23)
CALCIUM SERPL-MCNC: 7.7 MG/DL (ref 8.6–10.2)
CHLORIDE SERPL-SCNC: 103 MMOL/L (ref 98–107)
CO2 SERPL-SCNC: 26 MMOL/L (ref 22–29)
CREAT SERPL-MCNC: 1 MG/DL (ref 0.7–1.2)
EOSINOPHIL # BLD: 0.59 K/UL (ref 0.05–0.5)
EOSINOPHILS RELATIVE PERCENT: 8 % (ref 0–6)
ERYTHROCYTE [DISTWIDTH] IN BLOOD BY AUTOMATED COUNT: 22.8 % (ref 11.5–15)
GFR, ESTIMATED: 82 ML/MIN/1.73M2
GLUCOSE SERPL-MCNC: 113 MG/DL (ref 74–99)
HCT VFR BLD AUTO: 19.7 % (ref 37–54)
HCT VFR BLD AUTO: 20.3 % (ref 37–54)
HCT VFR BLD AUTO: 21.6 % (ref 37–54)
HCT VFR BLD AUTO: 22.1 % (ref 37–54)
HGB BLD-MCNC: 6.4 G/DL (ref 12.5–16.5)
HGB BLD-MCNC: 6.6 G/DL (ref 12.5–16.5)
HGB BLD-MCNC: 7.1 G/DL (ref 12.5–16.5)
HGB BLD-MCNC: 7.1 G/DL (ref 12.5–16.5)
INR PPP: 2.8
LYMPHOCYTES NFR BLD: 2.22 K/UL (ref 1.5–4)
LYMPHOCYTES RELATIVE PERCENT: 30 % (ref 20–42)
MAGNESIUM SERPL-MCNC: 1.4 MG/DL (ref 1.6–2.6)
MAGNESIUM SERPL-MCNC: 1.7 MG/DL (ref 1.6–2.6)
MCH RBC QN AUTO: 32.1 PG (ref 26–35)
MCHC RBC AUTO-ENTMCNC: 32.9 G/DL (ref 32–34.5)
MCV RBC AUTO: 97.7 FL (ref 80–99.9)
MONOCYTES NFR BLD: 1.33 K/UL (ref 0.1–0.95)
MONOCYTES NFR BLD: 18 % (ref 2–12)
NEUTROPHILS NFR BLD: 43 % (ref 43–80)
NEUTS SEG NFR BLD: 3.18 K/UL (ref 1.8–7.3)
NUCLEATED RED BLOOD CELLS: 1 PER 100 WBC
PHOSPHATE SERPL-MCNC: 2.7 MG/DL (ref 2.5–4.5)
PLATELET CONFIRMATION: NORMAL
PLATELET, FLUORESCENCE: 99 K/UL (ref 130–450)
PMV BLD AUTO: 10.2 FL (ref 7–12)
POTASSIUM SERPL-SCNC: 3.9 MMOL/L (ref 3.5–5)
PROT SERPL-MCNC: 5.8 G/DL (ref 6.4–8.3)
PROTHROMBIN TIME: 30.6 SEC (ref 9.3–12.4)
RBC # BLD AUTO: 2.21 M/UL (ref 3.8–5.8)
RBC # BLD: ABNORMAL 10*6/UL
SODIUM SERPL-SCNC: 137 MMOL/L (ref 132–146)
WBC OTHER # BLD: 7.4 K/UL (ref 4.5–11.5)

## 2025-02-16 PROCEDURE — 84100 ASSAY OF PHOSPHORUS: CPT

## 2025-02-16 PROCEDURE — 85610 PROTHROMBIN TIME: CPT

## 2025-02-16 PROCEDURE — 6370000000 HC RX 637 (ALT 250 FOR IP): Performed by: FAMILY MEDICINE

## 2025-02-16 PROCEDURE — 85014 HEMATOCRIT: CPT

## 2025-02-16 PROCEDURE — 2500000003 HC RX 250 WO HCPCS: Performed by: FAMILY MEDICINE

## 2025-02-16 PROCEDURE — 6360000002 HC RX W HCPCS: Performed by: STUDENT IN AN ORGANIZED HEALTH CARE EDUCATION/TRAINING PROGRAM

## 2025-02-16 PROCEDURE — 2580000003 HC RX 258: Performed by: HOSPITALIST

## 2025-02-16 PROCEDURE — 36430 TRANSFUSION BLD/BLD COMPNT: CPT

## 2025-02-16 PROCEDURE — P9016 RBC LEUKOCYTES REDUCED: HCPCS

## 2025-02-16 PROCEDURE — 85025 COMPLETE CBC W/AUTO DIFF WBC: CPT

## 2025-02-16 PROCEDURE — 2060000000 HC ICU INTERMEDIATE R&B

## 2025-02-16 PROCEDURE — 6370000000 HC RX 637 (ALT 250 FOR IP): Performed by: STUDENT IN AN ORGANIZED HEALTH CARE EDUCATION/TRAINING PROGRAM

## 2025-02-16 PROCEDURE — 6360000002 HC RX W HCPCS: Performed by: HOSPITALIST

## 2025-02-16 PROCEDURE — 99232 SBSQ HOSP IP/OBS MODERATE 35: CPT | Performed by: STUDENT IN AN ORGANIZED HEALTH CARE EDUCATION/TRAINING PROGRAM

## 2025-02-16 PROCEDURE — 83735 ASSAY OF MAGNESIUM: CPT

## 2025-02-16 PROCEDURE — 80053 COMPREHEN METABOLIC PANEL: CPT

## 2025-02-16 PROCEDURE — 85018 HEMOGLOBIN: CPT

## 2025-02-16 RX ORDER — SODIUM CHLORIDE 9 MG/ML
INJECTION, SOLUTION INTRAVENOUS PRN
Status: DISCONTINUED | OUTPATIENT
Start: 2025-02-16 | End: 2025-02-17

## 2025-02-16 RX ORDER — SODIUM PHOSPHATE, DIBASIC AND SODIUM PHOSPHATE, MONOBASIC 7; 19 G/230ML; G/230ML
1 ENEMA RECTAL ONCE
Status: COMPLETED | OUTPATIENT
Start: 2025-02-17 | End: 2025-02-17

## 2025-02-16 RX ORDER — MAGNESIUM SULFATE IN WATER 40 MG/ML
2000 INJECTION, SOLUTION INTRAVENOUS ONCE
Status: COMPLETED | OUTPATIENT
Start: 2025-02-16 | End: 2025-02-16

## 2025-02-16 RX ORDER — CETIRIZINE HYDROCHLORIDE 10 MG/1
5 TABLET ORAL DAILY
Status: DISCONTINUED | OUTPATIENT
Start: 2025-02-16 | End: 2025-03-02 | Stop reason: HOSPADM

## 2025-02-16 RX ADMIN — SODIUM CHLORIDE, PRESERVATIVE FREE 10 ML: 5 INJECTION INTRAVENOUS at 20:31

## 2025-02-16 RX ADMIN — RIFAXIMIN 400 MG: 200 TABLET ORAL at 13:56

## 2025-02-16 RX ADMIN — MIDODRINE HYDROCHLORIDE 2.5 MG: 2.5 TABLET ORAL at 08:39

## 2025-02-16 RX ADMIN — SODIUM CHLORIDE 40 MG: 9 INJECTION INTRAMUSCULAR; INTRAVENOUS; SUBCUTANEOUS at 12:00

## 2025-02-16 RX ADMIN — OXYCODONE 10 MG: 5 TABLET ORAL at 15:57

## 2025-02-16 RX ADMIN — SPIRONOLACTONE 25 MG: 25 TABLET ORAL at 20:16

## 2025-02-16 RX ADMIN — METOPROLOL TARTRATE 25 MG: 25 TABLET, FILM COATED ORAL at 20:16

## 2025-02-16 RX ADMIN — FUROSEMIDE 40 MG: 10 INJECTION, SOLUTION INTRAMUSCULAR; INTRAVENOUS at 08:42

## 2025-02-16 RX ADMIN — SODIUM CHLORIDE, PRESERVATIVE FREE 10 ML: 5 INJECTION INTRAVENOUS at 08:43

## 2025-02-16 RX ADMIN — FOLIC ACID 1 MG: 1 TABLET ORAL at 08:39

## 2025-02-16 RX ADMIN — OCTREOTIDE ACETATE 50 MCG/HR: 500 INJECTION, SOLUTION INTRAVENOUS; SUBCUTANEOUS at 06:19

## 2025-02-16 RX ADMIN — TAMSULOSIN HYDROCHLORIDE 0.4 MG: 0.4 CAPSULE ORAL at 08:40

## 2025-02-16 RX ADMIN — MIDODRINE HYDROCHLORIDE 2.5 MG: 2.5 TABLET ORAL at 17:39

## 2025-02-16 RX ADMIN — RIFAXIMIN 400 MG: 200 TABLET ORAL at 20:17

## 2025-02-16 RX ADMIN — MAGNESIUM SULFATE HEPTAHYDRATE 2000 MG: 40 INJECTION, SOLUTION INTRAVENOUS at 10:38

## 2025-02-16 RX ADMIN — MIDODRINE HYDROCHLORIDE 2.5 MG: 2.5 TABLET ORAL at 11:59

## 2025-02-16 RX ADMIN — OXYCODONE 10 MG: 5 TABLET ORAL at 09:07

## 2025-02-16 RX ADMIN — ENTECAVIR 1 MG: 1 TABLET, FILM COATED ORAL at 08:40

## 2025-02-16 RX ADMIN — PETROLATUM: 420 OINTMENT TOPICAL at 13:56

## 2025-02-16 RX ADMIN — CETIRIZINE HYDROCHLORIDE 5 MG: 10 TABLET, FILM COATED ORAL at 10:40

## 2025-02-16 RX ADMIN — RIFAXIMIN 400 MG: 200 TABLET ORAL at 08:40

## 2025-02-16 RX ADMIN — METOPROLOL TARTRATE 25 MG: 25 TABLET, FILM COATED ORAL at 08:39

## 2025-02-16 RX ADMIN — SPIRONOLACTONE 25 MG: 25 TABLET ORAL at 08:40

## 2025-02-16 RX ADMIN — SODIUM CHLORIDE, PRESERVATIVE FREE 10 ML: 5 INJECTION INTRAVENOUS at 09:46

## 2025-02-16 ASSESSMENT — PAIN SCALES - GENERAL
PAINLEVEL_OUTOF10: 7
PAINLEVEL_OUTOF10: 8
PAINLEVEL_OUTOF10: 0

## 2025-02-16 ASSESSMENT — PAIN DESCRIPTION - ORIENTATION: ORIENTATION: RIGHT;MID;LEFT

## 2025-02-16 ASSESSMENT — PAIN DESCRIPTION - DESCRIPTORS: DESCRIPTORS: ACHING

## 2025-02-16 ASSESSMENT — PAIN DESCRIPTION - LOCATION: LOCATION: ABDOMEN

## 2025-02-17 ENCOUNTER — ANESTHESIA EVENT (OUTPATIENT)
Dept: ENDOSCOPY | Age: 75
End: 2025-02-17
Payer: OTHER GOVERNMENT

## 2025-02-17 ENCOUNTER — ANESTHESIA (OUTPATIENT)
Dept: ENDOSCOPY | Age: 75
End: 2025-02-17
Payer: OTHER GOVERNMENT

## 2025-02-17 LAB
ABO/RH: NORMAL
ANION GAP SERPL CALCULATED.3IONS-SCNC: 9 MMOL/L (ref 7–16)
ANTIBODY SCREEN: NEGATIVE
ARM BAND NUMBER: NORMAL
BASOPHILS # BLD: 0.02 K/UL (ref 0–0.2)
BASOPHILS NFR BLD: 0 % (ref 0–2)
BLOOD BANK BLOOD PRODUCT EXPIRATION DATE: NORMAL
BLOOD BANK DISPENSE STATUS: NORMAL
BLOOD BANK ISBT PRODUCT BLOOD TYPE: 6200
BLOOD BANK PRODUCT CODE: NORMAL
BLOOD BANK SAMPLE EXPIRATION: NORMAL
BLOOD BANK UNIT TYPE AND RH: NORMAL
BPU ID: NORMAL
BUN SERPL-MCNC: 17 MG/DL (ref 6–23)
CALCIUM SERPL-MCNC: 7.3 MG/DL (ref 8.6–10.2)
CHLORIDE SERPL-SCNC: 106 MMOL/L (ref 98–107)
CO2 SERPL-SCNC: 24 MMOL/L (ref 22–29)
COMPONENT: NORMAL
CREAT SERPL-MCNC: 0.9 MG/DL (ref 0.7–1.2)
CROSSMATCH RESULT: NORMAL
EOSINOPHIL # BLD: 0.34 K/UL (ref 0.05–0.5)
EOSINOPHILS RELATIVE PERCENT: 4 % (ref 0–6)
ERYTHROCYTE [DISTWIDTH] IN BLOOD BY AUTOMATED COUNT: 21 % (ref 11.5–15)
GFR, ESTIMATED: >90 ML/MIN/1.73M2
GLUCOSE SERPL-MCNC: 92 MG/DL (ref 74–99)
HCT VFR BLD AUTO: 19.3 % (ref 37–54)
HCT VFR BLD AUTO: 19.9 % (ref 37–54)
HCT VFR BLD AUTO: 22.6 % (ref 37–54)
HGB BLD-MCNC: 6.3 G/DL (ref 12.5–16.5)
HGB BLD-MCNC: 6.6 G/DL (ref 12.5–16.5)
HGB BLD-MCNC: 7.5 G/DL (ref 12.5–16.5)
IMM GRANULOCYTES # BLD AUTO: 0.06 K/UL (ref 0–0.58)
IMM GRANULOCYTES NFR BLD: 1 % (ref 0–5)
LYMPHOCYTES NFR BLD: 2.47 K/UL (ref 1.5–4)
LYMPHOCYTES RELATIVE PERCENT: 32 % (ref 20–42)
MAGNESIUM SERPL-MCNC: 1.6 MG/DL (ref 1.6–2.6)
MCH RBC QN AUTO: 31.7 PG (ref 26–35)
MCHC RBC AUTO-ENTMCNC: 33.2 G/DL (ref 32–34.5)
MCV RBC AUTO: 95.7 FL (ref 80–99.9)
MONOCYTES NFR BLD: 1.49 K/UL (ref 0.1–0.95)
MONOCYTES NFR BLD: 19 % (ref 2–12)
NEUTROPHILS NFR BLD: 43 % (ref 43–80)
NEUTS SEG NFR BLD: 3.31 K/UL (ref 1.8–7.3)
PLATELET # BLD AUTO: 83 K/UL (ref 130–450)
PLATELET CONFIRMATION: NORMAL
PMV BLD AUTO: 10.4 FL (ref 7–12)
POTASSIUM SERPL-SCNC: 4.4 MMOL/L (ref 3.5–5)
RBC # BLD AUTO: 2.08 M/UL (ref 3.8–5.8)
RBC # BLD: ABNORMAL 10*6/UL
SODIUM SERPL-SCNC: 139 MMOL/L (ref 132–146)
TRANSFUSION STATUS: NORMAL
UNIT DIVISION: 0
UNIT ISSUE DATE/TIME: NORMAL
WBC OTHER # BLD: 7.7 K/UL (ref 4.5–11.5)

## 2025-02-17 PROCEDURE — 3609012300 HC EGD BAND LIGATION ESOPHGEAL/GASTRIC VARICES: Performed by: INTERNAL MEDICINE

## 2025-02-17 PROCEDURE — 2709999900 HC NON-CHARGEABLE SUPPLY: Performed by: INTERNAL MEDICINE

## 2025-02-17 PROCEDURE — 85025 COMPLETE CBC W/AUTO DIFF WBC: CPT

## 2025-02-17 PROCEDURE — 3700000001 HC ADD 15 MINUTES (ANESTHESIA): Performed by: INTERNAL MEDICINE

## 2025-02-17 PROCEDURE — 6370000000 HC RX 637 (ALT 250 FOR IP): Performed by: NURSE PRACTITIONER

## 2025-02-17 PROCEDURE — 2720000010 HC SURG SUPPLY STERILE: Performed by: INTERNAL MEDICINE

## 2025-02-17 PROCEDURE — 3609008400 HC SIGMOIDOSCOPY DIAGNOSTIC: Performed by: INTERNAL MEDICINE

## 2025-02-17 PROCEDURE — 86923 COMPATIBILITY TEST ELECTRIC: CPT

## 2025-02-17 PROCEDURE — 36430 TRANSFUSION BLD/BLD COMPNT: CPT

## 2025-02-17 PROCEDURE — 3700000000 HC ANESTHESIA ATTENDED CARE: Performed by: INTERNAL MEDICINE

## 2025-02-17 PROCEDURE — 83735 ASSAY OF MAGNESIUM: CPT

## 2025-02-17 PROCEDURE — P9016 RBC LEUKOCYTES REDUCED: HCPCS

## 2025-02-17 PROCEDURE — 85018 HEMOGLOBIN: CPT

## 2025-02-17 PROCEDURE — 80048 BASIC METABOLIC PNL TOTAL CA: CPT

## 2025-02-17 PROCEDURE — 6360000002 HC RX W HCPCS: Performed by: INTERNAL MEDICINE

## 2025-02-17 PROCEDURE — 86850 RBC ANTIBODY SCREEN: CPT

## 2025-02-17 PROCEDURE — 06L38CZ OCCLUSION OF ESOPHAGEAL VEIN WITH EXTRALUMINAL DEVICE, VIA NATURAL OR ARTIFICIAL OPENING ENDOSCOPIC: ICD-10-PCS | Performed by: INTERNAL MEDICINE

## 2025-02-17 PROCEDURE — 6360000002 HC RX W HCPCS

## 2025-02-17 PROCEDURE — 2580000003 HC RX 258

## 2025-02-17 PROCEDURE — 87081 CULTURE SCREEN ONLY: CPT

## 2025-02-17 PROCEDURE — 6370000000 HC RX 637 (ALT 250 FOR IP): Performed by: FAMILY MEDICINE

## 2025-02-17 PROCEDURE — 99232 SBSQ HOSP IP/OBS MODERATE 35: CPT | Performed by: STUDENT IN AN ORGANIZED HEALTH CARE EDUCATION/TRAINING PROGRAM

## 2025-02-17 PROCEDURE — 2580000003 HC RX 258: Performed by: HOSPITALIST

## 2025-02-17 PROCEDURE — 2500000003 HC RX 250 WO HCPCS: Performed by: FAMILY MEDICINE

## 2025-02-17 PROCEDURE — 2500000003 HC RX 250 WO HCPCS: Performed by: INTERNAL MEDICINE

## 2025-02-17 PROCEDURE — 86901 BLOOD TYPING SEROLOGIC RH(D): CPT

## 2025-02-17 PROCEDURE — 86900 BLOOD TYPING SEROLOGIC ABO: CPT

## 2025-02-17 PROCEDURE — 6370000000 HC RX 637 (ALT 250 FOR IP): Performed by: STUDENT IN AN ORGANIZED HEALTH CARE EDUCATION/TRAINING PROGRAM

## 2025-02-17 PROCEDURE — 36415 COLL VENOUS BLD VENIPUNCTURE: CPT

## 2025-02-17 PROCEDURE — 85014 HEMATOCRIT: CPT

## 2025-02-17 PROCEDURE — 6360000002 HC RX W HCPCS: Performed by: HOSPITALIST

## 2025-02-17 PROCEDURE — 2000000000 HC ICU R&B

## 2025-02-17 PROCEDURE — 2580000003 HC RX 258: Performed by: INTERNAL MEDICINE

## 2025-02-17 RX ORDER — PROPOFOL 10 MG/ML
INJECTION, EMULSION INTRAVENOUS
Status: DISCONTINUED | OUTPATIENT
Start: 2025-02-17 | End: 2025-02-17 | Stop reason: SDUPTHER

## 2025-02-17 RX ORDER — SODIUM CHLORIDE 9 MG/ML
INJECTION, SOLUTION INTRAVENOUS PRN
Status: DISCONTINUED | OUTPATIENT
Start: 2025-02-17 | End: 2025-02-17

## 2025-02-17 RX ORDER — SODIUM CHLORIDE 9 MG/ML
INJECTION, SOLUTION INTRAVENOUS
Status: DISCONTINUED | OUTPATIENT
Start: 2025-02-17 | End: 2025-02-17 | Stop reason: SDUPTHER

## 2025-02-17 RX ORDER — SODIUM CHLORIDE 9 MG/ML
INJECTION, SOLUTION INTRAVENOUS PRN
Status: DISCONTINUED | OUTPATIENT
Start: 2025-02-17 | End: 2025-02-18

## 2025-02-17 RX ORDER — PHENYLEPHRINE HCL IN 0.9% NACL 1 MG/10 ML
SYRINGE (ML) INTRAVENOUS
Status: DISCONTINUED | OUTPATIENT
Start: 2025-02-17 | End: 2025-02-17 | Stop reason: SDUPTHER

## 2025-02-17 RX ADMIN — SODIUM CHLORIDE: 9 INJECTION, SOLUTION INTRAVENOUS at 13:47

## 2025-02-17 RX ADMIN — MIDODRINE HYDROCHLORIDE 2.5 MG: 2.5 TABLET ORAL at 12:02

## 2025-02-17 RX ADMIN — SODIUM CHLORIDE 40 MG: 9 INJECTION INTRAMUSCULAR; INTRAVENOUS; SUBCUTANEOUS at 03:19

## 2025-02-17 RX ADMIN — Medication 100 MCG: at 14:14

## 2025-02-17 RX ADMIN — RIFAXIMIN 550 MG: 550 TABLET ORAL at 09:34

## 2025-02-17 RX ADMIN — ENTECAVIR 1 MG: 1 TABLET, FILM COATED ORAL at 09:33

## 2025-02-17 RX ADMIN — SODIUM PHOSPHATE 1 ENEMA: 7; 19 ENEMA RECTAL at 11:39

## 2025-02-17 RX ADMIN — OCTREOTIDE ACETATE 50 MCG/HR: 500 INJECTION, SOLUTION INTRAVENOUS; SUBCUTANEOUS at 16:02

## 2025-02-17 RX ADMIN — Medication 100 MCG: at 14:09

## 2025-02-17 RX ADMIN — CETIRIZINE HYDROCHLORIDE 5 MG: 10 TABLET, FILM COATED ORAL at 09:33

## 2025-02-17 RX ADMIN — SODIUM CHLORIDE, PRESERVATIVE FREE 10 ML: 5 INJECTION INTRAVENOUS at 21:00

## 2025-02-17 RX ADMIN — FOLIC ACID 1 MG: 1 TABLET ORAL at 09:33

## 2025-02-17 RX ADMIN — SODIUM CHLORIDE, PRESERVATIVE FREE 10 ML: 5 INJECTION INTRAVENOUS at 09:35

## 2025-02-17 RX ADMIN — SPIRONOLACTONE 25 MG: 25 TABLET ORAL at 09:34

## 2025-02-17 RX ADMIN — PETROLATUM: 420 OINTMENT TOPICAL at 09:40

## 2025-02-17 RX ADMIN — SODIUM CHLORIDE 40 MG: 9 INJECTION INTRAMUSCULAR; INTRAVENOUS; SUBCUTANEOUS at 12:02

## 2025-02-17 RX ADMIN — PROPOFOL 100 MG: 10 INJECTION, EMULSION INTRAVENOUS at 13:50

## 2025-02-17 RX ADMIN — TAMSULOSIN HYDROCHLORIDE 0.4 MG: 0.4 CAPSULE ORAL at 09:33

## 2025-02-17 RX ADMIN — MIDODRINE HYDROCHLORIDE 2.5 MG: 2.5 TABLET ORAL at 09:34

## 2025-02-17 RX ADMIN — METOPROLOL TARTRATE 25 MG: 25 TABLET, FILM COATED ORAL at 09:34

## 2025-02-17 RX ADMIN — FUROSEMIDE 40 MG: 10 INJECTION, SOLUTION INTRAMUSCULAR; INTRAVENOUS at 09:34

## 2025-02-17 RX ADMIN — Medication 100 MCG: at 13:53

## 2025-02-17 RX ADMIN — Medication 100 MCG: at 13:59

## 2025-02-17 ASSESSMENT — PAIN SCALES - GENERAL
PAINLEVEL_OUTOF10: 0
PAINLEVEL_OUTOF10: 0

## 2025-02-17 ASSESSMENT — PAIN SCALES - WONG BAKER: WONGBAKER_NUMERICALRESPONSE: NO HURT

## 2025-02-17 NOTE — ANESTHESIA PRE PROCEDURE
Department of Anesthesiology  Preprocedure Note       Name:  Hardik Gray   Age:  75 y.o.  :  1950                                          MRN:  [id number]         Date:  2025      Surgeon: Surgeon(s):  Pranav Lomas DO    Procedure: Procedure(s):  ESOPHAGOGASTRODUODENOSCOPY  SIGMOIDOSCOPY DIAGNOSTIC FLEXIBLE    Medications prior to admission:   Prior to Admission medications    Medication Sig Start Date End Date Taking? Authorizing Provider   amLODIPine (NORVASC) 10 MG tablet Take 1 tablet by mouth daily 25  Yes Paul Quiroz MD   omeprazole (PRILOSEC) 20 MG delayed release capsule Take 1 capsule by mouth Daily 25  Yes Paul Quiroz MD   polyethylene glycol (GLYCOLAX) 17 g packet Take 1 packet by mouth daily as needed for Constipation 2/6/25 3/8/25  Clinton Cobb MD   rifAXIMin (XIFAXAN) 550 MG tablet Take 1 tablet by mouth 2 times daily 25   Clinton Cobb MD   spironolactone (ALDACTONE) 25 MG tablet Take 1 tablet by mouth in the morning and 1 tablet in the evening. 25   Clinton Cobb MD   lactulose (CHRONULAC) 10 GM/15ML solution Take 30 mLs by mouth 2 times daily 25   Missy Sanz MD   albuterol (PROVENTIL) (2.5 MG/3ML) 0.083% nebulizer solution Take 3 mLs by nebulization every 6 hours as needed for Wheezing 24   Bridgette Olmos MD   furosemide (LASIX) 40 MG tablet Take 1 tablet by mouth daily 24   Bridgette Olmos MD   midodrine (PROAMATINE) 2.5 MG tablet Take 1 tablet by mouth 3 times daily (with meals) 24   Bridgette Olmos MD   albuterol sulfate HFA (VENTOLIN HFA) 108 (90 Base) MCG/ACT inhaler Inhale 2 puffs into the lungs every 6 hours as needed for Wheezing    Paul Quiroz MD   ferrous sulfate (IRON 325) 325 (65 Fe) MG tablet Take 1 tablet by mouth in the morning and at bedtime    Paul Quiroz MD   magnesium gluconate (MAGONATE) 500 MG tablet Take 1 tablet by mouth daily

## 2025-02-18 LAB
ALBUMIN SERPL-MCNC: 2.2 G/DL (ref 3.5–5.2)
ALP SERPL-CCNC: 50 U/L (ref 40–129)
ALT SERPL-CCNC: 9 U/L (ref 0–40)
ANION GAP SERPL CALCULATED.3IONS-SCNC: 9 MMOL/L (ref 7–16)
AST SERPL-CCNC: 41 U/L (ref 0–39)
BASOPHILS # BLD: 0 K/UL (ref 0–0.2)
BASOPHILS # BLD: 0 K/UL (ref 0–0.2)
BASOPHILS NFR BLD: 0 % (ref 0–2)
BASOPHILS NFR BLD: 0 % (ref 0–2)
BILIRUB SERPL-MCNC: 9.5 MG/DL (ref 0–1.2)
BUN SERPL-MCNC: 21 MG/DL (ref 6–23)
CALCIUM SERPL-MCNC: 7.5 MG/DL (ref 8.6–10.2)
CHLORIDE SERPL-SCNC: 108 MMOL/L (ref 98–107)
CO2 SERPL-SCNC: 23 MMOL/L (ref 22–29)
CREAT SERPL-MCNC: 1.1 MG/DL (ref 0.7–1.2)
EOSINOPHIL # BLD: 0.22 K/UL (ref 0.05–0.5)
EOSINOPHIL # BLD: 0.24 K/UL (ref 0.05–0.5)
EOSINOPHILS RELATIVE PERCENT: 3 % (ref 0–6)
EOSINOPHILS RELATIVE PERCENT: 3 % (ref 0–6)
ERYTHROCYTE [DISTWIDTH] IN BLOOD BY AUTOMATED COUNT: 17.9 % (ref 11.5–15)
ERYTHROCYTE [DISTWIDTH] IN BLOOD BY AUTOMATED COUNT: 17.9 % (ref 11.5–15)
GFR, ESTIMATED: 74 ML/MIN/1.73M2
GLUCOSE SERPL-MCNC: 112 MG/DL (ref 74–99)
HCT VFR BLD AUTO: 21.9 % (ref 37–54)
HCT VFR BLD AUTO: 22.8 % (ref 37–54)
HCT VFR BLD AUTO: 23 % (ref 37–54)
HGB BLD-MCNC: 7.4 G/DL (ref 12.5–16.5)
HGB BLD-MCNC: 7.5 G/DL (ref 12.5–16.5)
HGB BLD-MCNC: 7.8 G/DL (ref 12.5–16.5)
LYMPHOCYTES NFR BLD: 0.73 K/UL (ref 1.5–4)
LYMPHOCYTES NFR BLD: 3.16 K/UL (ref 1.5–4)
LYMPHOCYTES RELATIVE PERCENT: 40 % (ref 20–42)
LYMPHOCYTES RELATIVE PERCENT: 9 % (ref 20–42)
MAGNESIUM SERPL-MCNC: 1.5 MG/DL (ref 1.6–2.6)
MCH RBC QN AUTO: 31.1 PG (ref 26–35)
MCH RBC QN AUTO: 31.3 PG (ref 26–35)
MCHC RBC AUTO-ENTMCNC: 33.9 G/DL (ref 32–34.5)
MCHC RBC AUTO-ENTMCNC: 34.2 G/DL (ref 32–34.5)
MCV RBC AUTO: 91.3 FL (ref 80–99.9)
MCV RBC AUTO: 91.6 FL (ref 80–99.9)
MICROORGANISM SPEC CULT: NORMAL
MICROORGANISM SPEC CULT: NORMAL
MONOCYTES NFR BLD: 0.58 K/UL (ref 0.1–0.95)
MONOCYTES NFR BLD: 0.71 K/UL (ref 0.1–0.95)
MONOCYTES NFR BLD: 7 % (ref 2–12)
MONOCYTES NFR BLD: 9 % (ref 2–12)
NEUTROPHILS NFR BLD: 48 % (ref 43–80)
NEUTROPHILS NFR BLD: 81 % (ref 43–80)
NEUTS SEG NFR BLD: 3.79 K/UL (ref 1.8–7.3)
NEUTS SEG NFR BLD: 6.57 K/UL (ref 1.8–7.3)
PHOSPHATE SERPL-MCNC: 2.9 MG/DL (ref 2.5–4.5)
PLATELET # BLD AUTO: 79 K/UL (ref 130–450)
PLATELET CONFIRMATION: NORMAL
PLATELET CONFIRMATION: NORMAL
PLATELET, FLUORESCENCE: 90 K/UL (ref 130–450)
PMV BLD AUTO: 10.1 FL (ref 7–12)
PMV BLD AUTO: 10.5 FL (ref 7–12)
POTASSIUM SERPL-SCNC: 4.6 MMOL/L (ref 3.5–5)
PROT SERPL-MCNC: 4.9 G/DL (ref 6.4–8.3)
RBC # BLD AUTO: 2.4 M/UL (ref 3.8–5.8)
RBC # BLD AUTO: 2.51 M/UL (ref 3.8–5.8)
RBC # BLD: ABNORMAL 10*6/UL
SERVICE CMNT-IMP: NORMAL
SERVICE CMNT-IMP: NORMAL
SODIUM SERPL-SCNC: 140 MMOL/L (ref 132–146)
SPECIMEN DESCRIPTION: NORMAL
SPECIMEN DESCRIPTION: NORMAL
WBC OTHER # BLD: 7.9 K/UL (ref 4.5–11.5)
WBC OTHER # BLD: 8.1 K/UL (ref 4.5–11.5)

## 2025-02-18 PROCEDURE — 6360000002 HC RX W HCPCS: Performed by: NURSE PRACTITIONER

## 2025-02-18 PROCEDURE — 2500000003 HC RX 250 WO HCPCS: Performed by: INTERNAL MEDICINE

## 2025-02-18 PROCEDURE — 85025 COMPLETE CBC W/AUTO DIFF WBC: CPT

## 2025-02-18 PROCEDURE — 97530 THERAPEUTIC ACTIVITIES: CPT

## 2025-02-18 PROCEDURE — 80053 COMPREHEN METABOLIC PANEL: CPT

## 2025-02-18 PROCEDURE — 6360000002 HC RX W HCPCS: Performed by: INTERNAL MEDICINE

## 2025-02-18 PROCEDURE — 85014 HEMATOCRIT: CPT

## 2025-02-18 PROCEDURE — 83735 ASSAY OF MAGNESIUM: CPT

## 2025-02-18 PROCEDURE — 97168 OT RE-EVAL EST PLAN CARE: CPT

## 2025-02-18 PROCEDURE — 6370000000 HC RX 637 (ALT 250 FOR IP): Performed by: INTERNAL MEDICINE

## 2025-02-18 PROCEDURE — 2700000000 HC OXYGEN THERAPY PER DAY

## 2025-02-18 PROCEDURE — 1200000000 HC SEMI PRIVATE

## 2025-02-18 PROCEDURE — 2580000003 HC RX 258: Performed by: INTERNAL MEDICINE

## 2025-02-18 PROCEDURE — 99232 SBSQ HOSP IP/OBS MODERATE 35: CPT | Performed by: STUDENT IN AN ORGANIZED HEALTH CARE EDUCATION/TRAINING PROGRAM

## 2025-02-18 PROCEDURE — 84100 ASSAY OF PHOSPHORUS: CPT

## 2025-02-18 PROCEDURE — 85018 HEMOGLOBIN: CPT

## 2025-02-18 RX ORDER — PROCHLORPERAZINE EDISYLATE 5 MG/ML
10 INJECTION INTRAMUSCULAR; INTRAVENOUS EVERY 6 HOURS PRN
Status: DISCONTINUED | OUTPATIENT
Start: 2025-02-18 | End: 2025-03-02 | Stop reason: HOSPADM

## 2025-02-18 RX ORDER — MAGNESIUM SULFATE IN WATER 40 MG/ML
2000 INJECTION, SOLUTION INTRAVENOUS ONCE
Status: COMPLETED | OUTPATIENT
Start: 2025-02-18 | End: 2025-02-18

## 2025-02-18 RX ADMIN — OXYCODONE 10 MG: 5 TABLET ORAL at 16:02

## 2025-02-18 RX ADMIN — FOLIC ACID 1 MG: 1 TABLET ORAL at 09:43

## 2025-02-18 RX ADMIN — PETROLATUM: 420 OINTMENT TOPICAL at 22:20

## 2025-02-18 RX ADMIN — FUROSEMIDE 40 MG: 10 INJECTION, SOLUTION INTRAMUSCULAR; INTRAVENOUS at 09:43

## 2025-02-18 RX ADMIN — SODIUM CHLORIDE 40 MG: 9 INJECTION INTRAMUSCULAR; INTRAVENOUS; SUBCUTANEOUS at 01:20

## 2025-02-18 RX ADMIN — OCTREOTIDE ACETATE 50 MCG/HR: 500 INJECTION, SOLUTION INTRAVENOUS; SUBCUTANEOUS at 22:18

## 2025-02-18 RX ADMIN — OXYCODONE 10 MG: 5 TABLET ORAL at 10:00

## 2025-02-18 RX ADMIN — ENTECAVIR 1 MG: 1 TABLET, FILM COATED ORAL at 09:44

## 2025-02-18 RX ADMIN — TAMSULOSIN HYDROCHLORIDE 0.4 MG: 0.4 CAPSULE ORAL at 09:43

## 2025-02-18 RX ADMIN — SPIRONOLACTONE 25 MG: 25 TABLET ORAL at 09:43

## 2025-02-18 RX ADMIN — PETROLATUM: 420 OINTMENT TOPICAL at 09:50

## 2025-02-18 RX ADMIN — MAGNESIUM SULFATE HEPTAHYDRATE 2000 MG: 40 INJECTION, SOLUTION INTRAVENOUS at 06:30

## 2025-02-18 RX ADMIN — RIFAXIMIN 550 MG: 550 TABLET ORAL at 09:42

## 2025-02-18 RX ADMIN — SPIRONOLACTONE 25 MG: 25 TABLET ORAL at 17:51

## 2025-02-18 RX ADMIN — OCTREOTIDE ACETATE 50 MCG/HR: 500 INJECTION, SOLUTION INTRAVENOUS; SUBCUTANEOUS at 13:21

## 2025-02-18 RX ADMIN — METOPROLOL TARTRATE 25 MG: 25 TABLET, FILM COATED ORAL at 09:43

## 2025-02-18 RX ADMIN — SODIUM CHLORIDE, PRESERVATIVE FREE 10 ML: 5 INJECTION INTRAVENOUS at 21:50

## 2025-02-18 RX ADMIN — PETROLATUM: 420 OINTMENT TOPICAL at 01:48

## 2025-02-18 RX ADMIN — CETIRIZINE HYDROCHLORIDE 5 MG: 10 TABLET, FILM COATED ORAL at 09:42

## 2025-02-18 RX ADMIN — SODIUM CHLORIDE 40 MG: 9 INJECTION INTRAMUSCULAR; INTRAVENOUS; SUBCUTANEOUS at 13:24

## 2025-02-18 RX ADMIN — METOPROLOL TARTRATE 25 MG: 25 TABLET, FILM COATED ORAL at 21:50

## 2025-02-18 RX ADMIN — SODIUM CHLORIDE, PRESERVATIVE FREE 10 ML: 5 INJECTION INTRAVENOUS at 09:44

## 2025-02-18 RX ADMIN — RIFAXIMIN 550 MG: 550 TABLET ORAL at 21:50

## 2025-02-18 ASSESSMENT — PAIN DESCRIPTION - LOCATION
LOCATION: FLANK;ABDOMEN
LOCATION: ABDOMEN

## 2025-02-18 ASSESSMENT — PAIN SCALES - GENERAL
PAINLEVEL_OUTOF10: 9
PAINLEVEL_OUTOF10: 8
PAINLEVEL_OUTOF10: 0

## 2025-02-18 ASSESSMENT — PAIN DESCRIPTION - ORIENTATION
ORIENTATION: MID
ORIENTATION: LEFT

## 2025-02-18 ASSESSMENT — PAIN DESCRIPTION - DESCRIPTORS
DESCRIPTORS: ACHING;SORE;SHARP
DESCRIPTORS: ACHING;SORE;SHARP

## 2025-02-18 NOTE — CARE COORDINATION
Transition of Care-met with patient, and his significant other. Discussed rehab ( had PT/OT see today). He is interested in [facility]-referral called. Awaiting acceptance. Call to Huy Correa, left message to initiate.     Kusum WEBER, RN  [facility]

## 2025-02-18 NOTE — PATIENT CARE CONFERENCE
Intensive Care Daily Quality Rounding Checklist      ICU Team Members: Dr Gee, bedside nurse, charge nurse    ICU Day #: NUMBER: 2    SOFA Score:10    Intubation Date:      Ventilator Day #:     Central Line Insertion Date:          Day #:         Indication:      Arterial Line Insertion Date:        Day #:     Temporary Hemodialysis Catheter Insertion Date:        Day #     DVT Prophylaxis:scd    GI Prophylaxis:Protonix    Rodrigues Catheter Insertion Date: February 14 Chronic exchanged in surgery with urology       Day #: chronic      Indications: Urinary retention chronic, to stay and be discharged with the rodrigues      Continued need (if yes, reason documented and discussed with physician): yes, with urology    Skin Issues/ Wounds and ordered treatment discussed on rounds: chronic wound    Goals/ Plans for the Day:  monitor labs and replace as needed  transfer to [facility]    Reviewed plan and goals for day with patient and/or representative: Yes    **SHARE this note so that the rounding nurse may edit**

## 2025-02-18 NOTE — ANESTHESIA POSTPROCEDURE EVALUATION
Department of Anesthesiology  Postprocedure Note    Patient: Hardik Gray  MRN: [id number]  YOB: 1950  Date of evaluation: 2/17/2025    Procedure Summary       Date: 02/17/25 Room / Location: [place] [address] / [facility]    Anesthesia Start: 1347 Anesthesia Stop: 1425    Procedures:       ESOPHAGOGASTRODUODENOSCOPY BAND LIGATION      SIGMOIDOSCOPY DIAGNOSTIC FLEXIBLE Diagnosis:       Anemia, unspecified type      (Anemia, unspecified type [D64.9])    Surgeons: Pranav Lomas DO Responsible Provider: Navneet Araujo DO    Anesthesia Type: MAC ASA Status: 4 - Emergent            Anesthesia Type: No value filed.    Jose Phase I: Jose Score: 10    Jose Phase II:      Anesthesia Post Evaluation    Patient location during evaluation: PACU  Patient participation: complete - patient participated  Level of consciousness: awake and alert  Pain score: 1  Airway patency: patent  Nausea & Vomiting: no nausea and no vomiting  Cardiovascular status: hemodynamically stable  Respiratory status: acceptable  Hydration status: euvolemic  Pain management: adequate    No notable events documented.

## 2025-02-19 ENCOUNTER — APPOINTMENT (OUTPATIENT)
Dept: NUCLEAR MEDICINE | Age: 75
DRG: 432 | End: 2025-02-19
Payer: OTHER GOVERNMENT

## 2025-02-19 ENCOUNTER — APPOINTMENT (OUTPATIENT)
Dept: CT IMAGING | Age: 75
DRG: 432 | End: 2025-02-19
Payer: OTHER GOVERNMENT

## 2025-02-19 LAB
ALBUMIN SERPL-MCNC: 2.2 G/DL (ref 3.5–5.2)
ALP SERPL-CCNC: 47 U/L (ref 40–129)
ALT SERPL-CCNC: 9 U/L (ref 0–40)
AMMONIA PLAS-SCNC: 143 UMOL/L (ref 16–60)
ANION GAP SERPL CALCULATED.3IONS-SCNC: 9 MMOL/L (ref 7–16)
AST SERPL-CCNC: 35 U/L (ref 0–39)
BASOPHILS # BLD: 0 K/UL (ref 0–0.2)
BASOPHILS NFR BLD: 0 % (ref 0–2)
BILIRUB SERPL-MCNC: 9.1 MG/DL (ref 0–1.2)
BUN SERPL-MCNC: 20 MG/DL (ref 6–23)
CALCIUM SERPL-MCNC: 7.8 MG/DL (ref 8.6–10.2)
CHLAMYDIA DNA UR QL NAA+PROBE: ABNORMAL
CHLORIDE SERPL-SCNC: 107 MMOL/L (ref 98–107)
CO2 SERPL-SCNC: 24 MMOL/L (ref 22–29)
CREAT SERPL-MCNC: 1.1 MG/DL (ref 0.7–1.2)
EOSINOPHIL # BLD: 0.26 K/UL (ref 0.05–0.5)
EOSINOPHILS RELATIVE PERCENT: 4 % (ref 0–6)
ERYTHROCYTE [DISTWIDTH] IN BLOOD BY AUTOMATED COUNT: 20.5 % (ref 11.5–15)
GFR, ESTIMATED: 69 ML/MIN/1.73M2
GLUCOSE SERPL-MCNC: 116 MG/DL (ref 74–99)
HCT VFR BLD AUTO: 19.8 % (ref 37–54)
HGB BLD-MCNC: 6.5 G/DL (ref 12.5–16.5)
INR PPP: 2.7
LYMPHOCYTES NFR BLD: 2.31 K/UL (ref 1.5–4)
LYMPHOCYTES RELATIVE PERCENT: 35 % (ref 20–42)
MAGNESIUM SERPL-MCNC: 1.8 MG/DL (ref 1.6–2.6)
MCH RBC QN AUTO: 30.8 PG (ref 26–35)
MCHC RBC AUTO-ENTMCNC: 32.8 G/DL (ref 32–34.5)
MCV RBC AUTO: 93.8 FL (ref 80–99.9)
MICROORGANISM SPEC CULT: NORMAL
MONOCYTES NFR BLD: 1.32 K/UL (ref 0.1–0.95)
MONOCYTES NFR BLD: 20 % (ref 2–12)
N GONORRHOEA DNA UR QL NAA+PROBE: ABNORMAL
NEUTROPHILS NFR BLD: 41 % (ref 43–80)
NEUTS SEG NFR BLD: 2.71 K/UL (ref 1.8–7.3)
PHOSPHATE SERPL-MCNC: 2.7 MG/DL (ref 2.5–4.5)
PLATELET # BLD AUTO: 75 K/UL (ref 130–450)
PLATELET CONFIRMATION: NORMAL
PMV BLD AUTO: 10 FL (ref 7–12)
POTASSIUM SERPL-SCNC: 3.7 MMOL/L (ref 3.5–5)
PROT SERPL-MCNC: 5 G/DL (ref 6.4–8.3)
PROTHROMBIN TIME: 29 SEC (ref 9.3–12.4)
RBC # BLD AUTO: 2.11 M/UL (ref 3.8–5.8)
RBC # BLD: ABNORMAL 10*6/UL
SERVICE CMNT-IMP: NORMAL
SODIUM SERPL-SCNC: 140 MMOL/L (ref 132–146)
SPECIMEN DESCRIPTION: ABNORMAL
SPECIMEN DESCRIPTION: NORMAL
WBC OTHER # BLD: 6.6 K/UL (ref 4.5–11.5)

## 2025-02-19 PROCEDURE — 6360000002 HC RX W HCPCS: Performed by: INTERNAL MEDICINE

## 2025-02-19 PROCEDURE — 82140 ASSAY OF AMMONIA: CPT

## 2025-02-19 PROCEDURE — 70496 CT ANGIOGRAPHY HEAD: CPT

## 2025-02-19 PROCEDURE — 2580000003 HC RX 258: Performed by: INTERNAL MEDICINE

## 2025-02-19 PROCEDURE — 2500000003 HC RX 250 WO HCPCS: Performed by: INTERNAL MEDICINE

## 2025-02-19 PROCEDURE — 6360000004 HC RX CONTRAST MEDICATION: Performed by: RADIOLOGY

## 2025-02-19 PROCEDURE — 70450 CT HEAD/BRAIN W/O DYE: CPT

## 2025-02-19 PROCEDURE — 80053 COMPREHEN METABOLIC PANEL: CPT

## 2025-02-19 PROCEDURE — 85025 COMPLETE CBC W/AUTO DIFF WBC: CPT

## 2025-02-19 PROCEDURE — 6370000000 HC RX 637 (ALT 250 FOR IP): Performed by: INTERNAL MEDICINE

## 2025-02-19 PROCEDURE — 2060000000 HC ICU INTERMEDIATE R&B

## 2025-02-19 PROCEDURE — 84100 ASSAY OF PHOSPHORUS: CPT

## 2025-02-19 PROCEDURE — 36430 TRANSFUSION BLD/BLD COMPNT: CPT

## 2025-02-19 PROCEDURE — 85610 PROTHROMBIN TIME: CPT

## 2025-02-19 PROCEDURE — 2500000003 HC RX 250 WO HCPCS: Performed by: HOSPITALIST

## 2025-02-19 PROCEDURE — 6370000000 HC RX 637 (ALT 250 FOR IP): Performed by: HOSPITALIST

## 2025-02-19 PROCEDURE — 83735 ASSAY OF MAGNESIUM: CPT

## 2025-02-19 PROCEDURE — 99233 SBSQ HOSP IP/OBS HIGH 50: CPT | Performed by: STUDENT IN AN ORGANIZED HEALTH CARE EDUCATION/TRAINING PROGRAM

## 2025-02-19 PROCEDURE — P9016 RBC LEUKOCYTES REDUCED: HCPCS

## 2025-02-19 PROCEDURE — 78278 ACUTE GI BLOOD LOSS IMAGING: CPT

## 2025-02-19 PROCEDURE — 6360000002 HC RX W HCPCS: Performed by: STUDENT IN AN ORGANIZED HEALTH CARE EDUCATION/TRAINING PROGRAM

## 2025-02-19 RX ORDER — SODIUM CHLORIDE 9 MG/ML
INJECTION, SOLUTION INTRAVENOUS PRN
Status: DISCONTINUED | OUTPATIENT
Start: 2025-02-19 | End: 2025-02-20 | Stop reason: SDUPTHER

## 2025-02-19 RX ORDER — OXYCODONE HYDROCHLORIDE 5 MG/1
5 TABLET ORAL EVERY 8 HOURS PRN
Status: DISCONTINUED | OUTPATIENT
Start: 2025-02-19 | End: 2025-02-21

## 2025-02-19 RX ORDER — DICYCLOMINE HYDROCHLORIDE 10 MG/ML
10 INJECTION INTRAMUSCULAR ONCE
Status: COMPLETED | OUTPATIENT
Start: 2025-02-19 | End: 2025-02-19

## 2025-02-19 RX ORDER — IOPAMIDOL 755 MG/ML
75 INJECTION, SOLUTION INTRAVASCULAR
Status: COMPLETED | OUTPATIENT
Start: 2025-02-19 | End: 2025-02-19

## 2025-02-19 RX ADMIN — SODIUM CHLORIDE, PRESERVATIVE FREE 10 ML: 5 INJECTION INTRAVENOUS at 09:59

## 2025-02-19 RX ADMIN — IOPAMIDOL 75 ML: 755 INJECTION, SOLUTION INTRAVENOUS at 16:18

## 2025-02-19 RX ADMIN — MIDODRINE HYDROCHLORIDE 2.5 MG: 2.5 TABLET ORAL at 09:55

## 2025-02-19 RX ADMIN — FUROSEMIDE 40 MG: 10 INJECTION, SOLUTION INTRAMUSCULAR; INTRAVENOUS at 09:56

## 2025-02-19 RX ADMIN — FOLIC ACID 1 MG: 1 TABLET ORAL at 09:55

## 2025-02-19 RX ADMIN — PETROLATUM: 420 OINTMENT TOPICAL at 21:05

## 2025-02-19 RX ADMIN — OCTREOTIDE ACETATE 50 MCG/HR: 500 INJECTION, SOLUTION INTRAVENOUS; SUBCUTANEOUS at 23:40

## 2025-02-19 RX ADMIN — TAMSULOSIN HYDROCHLORIDE 0.4 MG: 0.4 CAPSULE ORAL at 09:55

## 2025-02-19 RX ADMIN — OCTREOTIDE ACETATE 50 MCG/HR: 500 INJECTION, SOLUTION INTRAVENOUS; SUBCUTANEOUS at 09:54

## 2025-02-19 RX ADMIN — LACTULOSE 20 G: 20 SOLUTION ORAL at 09:56

## 2025-02-19 RX ADMIN — LACTULOSE: 10 SOLUTION ORAL; RECTAL at 18:43

## 2025-02-19 RX ADMIN — PETROLATUM: 420 OINTMENT TOPICAL at 10:00

## 2025-02-19 RX ADMIN — CETIRIZINE HYDROCHLORIDE 5 MG: 10 TABLET, FILM COATED ORAL at 09:55

## 2025-02-19 RX ADMIN — SODIUM CHLORIDE 40 MG: 9 INJECTION INTRAMUSCULAR; INTRAVENOUS; SUBCUTANEOUS at 23:17

## 2025-02-19 RX ADMIN — SPIRONOLACTONE 25 MG: 25 TABLET ORAL at 09:54

## 2025-02-19 RX ADMIN — OXYCODONE 10 MG: 5 TABLET ORAL at 11:23

## 2025-02-19 RX ADMIN — SODIUM CHLORIDE 40 MG: 9 INJECTION INTRAMUSCULAR; INTRAVENOUS; SUBCUTANEOUS at 00:42

## 2025-02-19 RX ADMIN — ENTECAVIR 1 MG: 1 TABLET, FILM COATED ORAL at 10:00

## 2025-02-19 RX ADMIN — DICYCLOMINE HYDROCHLORIDE 10 MG: 10 INJECTION, SOLUTION INTRAMUSCULAR at 20:39

## 2025-02-19 RX ADMIN — METOPROLOL TARTRATE 25 MG: 25 TABLET, FILM COATED ORAL at 09:55

## 2025-02-19 ASSESSMENT — PAIN SCALES - GENERAL
PAINLEVEL_OUTOF10: 0
PAINLEVEL_OUTOF10: 7
PAINLEVEL_OUTOF10: 0
PAINLEVEL_OUTOF10: 9
PAINLEVEL_OUTOF10: 0

## 2025-02-19 ASSESSMENT — PAIN SCALES - WONG BAKER: WONGBAKER_NUMERICALRESPONSE: NO HURT

## 2025-02-19 ASSESSMENT — PAIN DESCRIPTION - DESCRIPTORS
DESCRIPTORS: ACHING;DISCOMFORT;DULL
DESCRIPTORS: ACHING

## 2025-02-19 ASSESSMENT — PAIN DESCRIPTION - LOCATION
LOCATION: ABDOMEN
LOCATION: GENERALIZED

## 2025-02-19 NOTE — PATIENT CARE CONFERENCE
Intensive Care Daily Quality Rounding Checklist      ICU Team Members:     ICU Day #: IM     SOFA Score: 5     Intubation Date:      Ventilator Day #:     Central Line Insertion Date:          Day #:         Indication:      Arterial Line Insertion Date:        Day #:     Temporary Hemodialysis Catheter Insertion Date:        Day #     DVT Prophylaxis:  scd's    GI Prophylaxis: protonix     Millan Catheter Insertion Date: February 14, exchanged per urology in OR        Day #:       Indications: Urinary retention      Continued need (if yes, reason documented and discussed with physician): yes, chronic     Skin Issues/ Wounds and ordered treatment discussed on rounds: {    Goals/ Plans for the Day:     Reviewed plan and goals for day with patient and/or representative: Yes    **SHARE this note so that the rounding nurse may edit**

## 2025-02-19 NOTE — CARE COORDINATION
[facility] cannot accept. Call from [facility], patient IS NOT SERVICE CONNECTED AT ALL. Patient will need to use his Devoted Plan. Updated SW/CM following case today.     Kusum WEBER, RN  [facility]

## 2025-02-19 NOTE — CARE COORDINATION
Social Work/Discharge Planning;  Patient off unit at CAT scan.  Called patient girlfriend Tigist (ph: [phone]) and left a message in regards to discharge planning.  Will continue to follow.  Electronically signed by ARIE Nogueira on 2/19/2025 at 3:40 PM

## 2025-02-20 ENCOUNTER — ANESTHESIA (OUTPATIENT)
Dept: ENDOSCOPY | Age: 75
DRG: 432 | End: 2025-02-20
Payer: OTHER GOVERNMENT

## 2025-02-20 ENCOUNTER — ANESTHESIA EVENT (OUTPATIENT)
Dept: ENDOSCOPY | Age: 75
DRG: 432 | End: 2025-02-20
Payer: OTHER GOVERNMENT

## 2025-02-20 LAB
ABO/RH: NORMAL
ALBUMIN SERPL-MCNC: 2.4 G/DL (ref 3.5–5.2)
ALBUMIN SERPL-MCNC: 2.5 G/DL (ref 3.5–5.2)
ALP SERPL-CCNC: 52 U/L (ref 40–129)
ALP SERPL-CCNC: 57 U/L (ref 40–129)
ALT SERPL-CCNC: 12 U/L (ref 0–40)
ALT SERPL-CCNC: 13 U/L (ref 0–40)
AMMONIA PLAS-SCNC: 107 UMOL/L (ref 16–60)
ANION GAP SERPL CALCULATED.3IONS-SCNC: 10 MMOL/L (ref 7–16)
ANION GAP SERPL CALCULATED.3IONS-SCNC: 10 MMOL/L (ref 7–16)
ANTIBODY SCREEN: NEGATIVE
ARM BAND NUMBER: NORMAL
AST SERPL-CCNC: 50 U/L (ref 0–39)
AST SERPL-CCNC: 53 U/L (ref 0–39)
BASOPHILS # BLD: 0.06 K/UL (ref 0–0.2)
BASOPHILS # BLD: 0.06 K/UL (ref 0–0.2)
BASOPHILS NFR BLD: 1 % (ref 0–2)
BASOPHILS NFR BLD: 1 % (ref 0–2)
BILIRUB SERPL-MCNC: 10.1 MG/DL (ref 0–1.2)
BILIRUB SERPL-MCNC: 10.7 MG/DL (ref 0–1.2)
BLOOD BANK BLOOD PRODUCT EXPIRATION DATE: NORMAL
BLOOD BANK DISPENSE STATUS: NORMAL
BLOOD BANK ISBT PRODUCT BLOOD TYPE: 6200
BLOOD BANK PRODUCT CODE: NORMAL
BLOOD BANK SAMPLE EXPIRATION: NORMAL
BLOOD BANK UNIT TYPE AND RH: NORMAL
BPU ID: NORMAL
BUN SERPL-MCNC: 14 MG/DL (ref 6–23)
BUN SERPL-MCNC: 15 MG/DL (ref 6–23)
CALCIUM SERPL-MCNC: 8 MG/DL (ref 8.6–10.2)
CALCIUM SERPL-MCNC: 8.3 MG/DL (ref 8.6–10.2)
CHLORIDE SERPL-SCNC: 108 MMOL/L (ref 98–107)
CHLORIDE SERPL-SCNC: 108 MMOL/L (ref 98–107)
CO2 SERPL-SCNC: 22 MMOL/L (ref 22–29)
CO2 SERPL-SCNC: 23 MMOL/L (ref 22–29)
COMPONENT: NORMAL
CREAT SERPL-MCNC: 1 MG/DL (ref 0.7–1.2)
CREAT SERPL-MCNC: 1 MG/DL (ref 0.7–1.2)
CROSSMATCH RESULT: NORMAL
EOSINOPHIL # BLD: 0.38 K/UL (ref 0.05–0.5)
EOSINOPHIL # BLD: 0.45 K/UL (ref 0.05–0.5)
EOSINOPHILS RELATIVE PERCENT: 6 % (ref 0–6)
EOSINOPHILS RELATIVE PERCENT: 8 % (ref 0–6)
ERYTHROCYTE [DISTWIDTH] IN BLOOD BY AUTOMATED COUNT: 21.4 % (ref 11.5–15)
ERYTHROCYTE [DISTWIDTH] IN BLOOD BY AUTOMATED COUNT: 21.6 % (ref 11.5–15)
GFR, ESTIMATED: 85 ML/MIN/1.73M2
GFR, ESTIMATED: 85 ML/MIN/1.73M2
GLUCOSE SERPL-MCNC: 89 MG/DL (ref 74–99)
GLUCOSE SERPL-MCNC: 90 MG/DL (ref 74–99)
HCT VFR BLD AUTO: 26.3 % (ref 37–54)
HCT VFR BLD AUTO: 26.9 % (ref 37–54)
HGB BLD-MCNC: 8.7 G/DL (ref 12.5–16.5)
HGB BLD-MCNC: 8.9 G/DL (ref 12.5–16.5)
INR PPP: 2.1
INR PPP: 2.5
LYMPHOCYTES NFR BLD: 1.64 K/UL (ref 1.5–4)
LYMPHOCYTES NFR BLD: 2.13 K/UL (ref 1.5–4)
LYMPHOCYTES RELATIVE PERCENT: 26 % (ref 20–42)
LYMPHOCYTES RELATIVE PERCENT: 38 % (ref 20–42)
MAGNESIUM SERPL-MCNC: 1.7 MG/DL (ref 1.6–2.6)
MCH RBC QN AUTO: 30.5 PG (ref 26–35)
MCH RBC QN AUTO: 30.7 PG (ref 26–35)
MCHC RBC AUTO-ENTMCNC: 33.1 G/DL (ref 32–34.5)
MCHC RBC AUTO-ENTMCNC: 33.1 G/DL (ref 32–34.5)
MCV RBC AUTO: 92.3 FL (ref 80–99.9)
MCV RBC AUTO: 92.8 FL (ref 80–99.9)
METAMYELOCYTES ABSOLUTE COUNT: 0.06 K/UL (ref 0–0.12)
METAMYELOCYTES: 1 % (ref 0–1)
MONOCYTES NFR BLD: 0.45 K/UL (ref 0.1–0.95)
MONOCYTES NFR BLD: 0.95 K/UL (ref 0.1–0.95)
MONOCYTES NFR BLD: 15 % (ref 2–12)
MONOCYTES NFR BLD: 8 % (ref 2–12)
NEUTROPHILS NFR BLD: 45 % (ref 43–80)
NEUTROPHILS NFR BLD: 51 % (ref 43–80)
NEUTS SEG NFR BLD: 2.52 K/UL (ref 1.8–7.3)
NEUTS SEG NFR BLD: 3.21 K/UL (ref 1.8–7.3)
PARTIAL THROMBOPLASTIN TIME: 38.2 SEC (ref 24.5–35.1)
PHOSPHATE SERPL-MCNC: 3.4 MG/DL (ref 2.5–4.5)
PLATELET CONFIRMATION: NORMAL
PLATELET CONFIRMATION: NORMAL
PLATELET, FLUORESCENCE: 89 K/UL (ref 130–450)
PLATELET, FLUORESCENCE: 92 K/UL (ref 130–450)
PMV BLD AUTO: 10 FL (ref 7–12)
PMV BLD AUTO: 10 FL (ref 7–12)
POTASSIUM SERPL-SCNC: 3.5 MMOL/L (ref 3.5–5)
POTASSIUM SERPL-SCNC: 3.7 MMOL/L (ref 3.5–5)
PROT SERPL-MCNC: 5.5 G/DL (ref 6.4–8.3)
PROT SERPL-MCNC: 5.8 G/DL (ref 6.4–8.3)
PROTHROMBIN TIME: 22.4 SEC (ref 9.3–12.4)
PROTHROMBIN TIME: 26.7 SEC (ref 9.3–12.4)
RBC # BLD AUTO: 2.85 M/UL (ref 3.8–5.8)
RBC # BLD AUTO: 2.9 M/UL (ref 3.8–5.8)
RBC # BLD: ABNORMAL 10*6/UL
SODIUM SERPL-SCNC: 140 MMOL/L (ref 132–146)
SODIUM SERPL-SCNC: 141 MMOL/L (ref 132–146)
TRANSFUSION STATUS: NORMAL
UNIT DIVISION: 0
UNIT ISSUE DATE/TIME: NORMAL
WBC OTHER # BLD: 5.6 K/UL (ref 4.5–11.5)
WBC OTHER # BLD: 6.3 K/UL (ref 4.5–11.5)

## 2025-02-20 PROCEDURE — 36415 COLL VENOUS BLD VENIPUNCTURE: CPT

## 2025-02-20 PROCEDURE — 0DJD8ZZ INSPECTION OF LOWER INTESTINAL TRACT, VIA NATURAL OR ARTIFICIAL OPENING ENDOSCOPIC: ICD-10-PCS | Performed by: INTERNAL MEDICINE

## 2025-02-20 PROCEDURE — 82140 ASSAY OF AMMONIA: CPT

## 2025-02-20 PROCEDURE — 86927 PLASMA FRESH FROZEN: CPT

## 2025-02-20 PROCEDURE — 2060000000 HC ICU INTERMEDIATE R&B

## 2025-02-20 PROCEDURE — 85025 COMPLETE CBC W/AUTO DIFF WBC: CPT

## 2025-02-20 PROCEDURE — 80053 COMPREHEN METABOLIC PANEL: CPT

## 2025-02-20 PROCEDURE — 36430 TRANSFUSION BLD/BLD COMPNT: CPT

## 2025-02-20 PROCEDURE — 6370000000 HC RX 637 (ALT 250 FOR IP): Performed by: INTERNAL MEDICINE

## 2025-02-20 PROCEDURE — 6360000002 HC RX W HCPCS: Performed by: INTERNAL MEDICINE

## 2025-02-20 PROCEDURE — 92610 EVALUATE SWALLOWING FUNCTION: CPT

## 2025-02-20 PROCEDURE — 83735 ASSAY OF MAGNESIUM: CPT

## 2025-02-20 PROCEDURE — 85730 THROMBOPLASTIN TIME PARTIAL: CPT

## 2025-02-20 PROCEDURE — 2580000003 HC RX 258: Performed by: INTERNAL MEDICINE

## 2025-02-20 PROCEDURE — A9560 TC99M LABELED RBC: HCPCS | Performed by: RADIOLOGY

## 2025-02-20 PROCEDURE — 84100 ASSAY OF PHOSPHORUS: CPT

## 2025-02-20 PROCEDURE — P9017 PLASMA 1 DONOR FRZ W/IN 8 HR: HCPCS

## 2025-02-20 PROCEDURE — 99233 SBSQ HOSP IP/OBS HIGH 50: CPT | Performed by: INTERNAL MEDICINE

## 2025-02-20 PROCEDURE — 85610 PROTHROMBIN TIME: CPT

## 2025-02-20 PROCEDURE — 6370000000 HC RX 637 (ALT 250 FOR IP): Performed by: HOSPITALIST

## 2025-02-20 PROCEDURE — 2500000003 HC RX 250 WO HCPCS: Performed by: HOSPITALIST

## 2025-02-20 PROCEDURE — 2500000003 HC RX 250 WO HCPCS: Performed by: INTERNAL MEDICINE

## 2025-02-20 PROCEDURE — 6370000000 HC RX 637 (ALT 250 FOR IP): Performed by: STUDENT IN AN ORGANIZED HEALTH CARE EDUCATION/TRAINING PROGRAM

## 2025-02-20 PROCEDURE — 3430000000 HC RX DIAGNOSTIC RADIOPHARMACEUTICAL: Performed by: RADIOLOGY

## 2025-02-20 RX ORDER — SODIUM CHLORIDE 9 MG/ML
INJECTION, SOLUTION INTRAVENOUS PRN
Status: DISCONTINUED | OUTPATIENT
Start: 2025-02-20 | End: 2025-02-20 | Stop reason: SDUPTHER

## 2025-02-20 RX ORDER — MORPHINE SULFATE 2 MG/ML
2 INJECTION, SOLUTION INTRAMUSCULAR; INTRAVENOUS ONCE
Status: COMPLETED | OUTPATIENT
Start: 2025-02-20 | End: 2025-02-20

## 2025-02-20 RX ORDER — SODIUM CHLORIDE 9 MG/ML
INJECTION, SOLUTION INTRAVENOUS PRN
Status: DISCONTINUED | OUTPATIENT
Start: 2025-02-20 | End: 2025-02-20

## 2025-02-20 RX ADMIN — LACTULOSE 20 G: 20 SOLUTION ORAL at 20:07

## 2025-02-20 RX ADMIN — LACTULOSE: 10 SOLUTION ORAL; RECTAL at 10:31

## 2025-02-20 RX ADMIN — SPIRONOLACTONE 25 MG: 25 TABLET ORAL at 17:10

## 2025-02-20 RX ADMIN — OCTREOTIDE ACETATE 50 MCG/HR: 500 INJECTION, SOLUTION INTRAVENOUS; SUBCUTANEOUS at 10:11

## 2025-02-20 RX ADMIN — RIFAXIMIN 550 MG: 550 TABLET ORAL at 20:07

## 2025-02-20 RX ADMIN — METOPROLOL TARTRATE 25 MG: 25 TABLET, FILM COATED ORAL at 20:07

## 2025-02-20 RX ADMIN — PETROLATUM: 420 OINTMENT TOPICAL at 10:30

## 2025-02-20 RX ADMIN — MORPHINE SULFATE 2 MG: 2 INJECTION, SOLUTION INTRAMUSCULAR; INTRAVENOUS at 12:24

## 2025-02-20 RX ADMIN — SODIUM CHLORIDE, PRESERVATIVE FREE 10 ML: 5 INJECTION INTRAVENOUS at 20:07

## 2025-02-20 RX ADMIN — Medication 20 MILLICURIE: at 13:49

## 2025-02-20 RX ADMIN — FUROSEMIDE 40 MG: 10 INJECTION, SOLUTION INTRAMUSCULAR; INTRAVENOUS at 10:30

## 2025-02-20 RX ADMIN — SODIUM CHLORIDE 40 MG: 9 INJECTION INTRAMUSCULAR; INTRAVENOUS; SUBCUTANEOUS at 12:24

## 2025-02-20 RX ADMIN — PETROLATUM: 420 OINTMENT TOPICAL at 20:08

## 2025-02-20 RX ADMIN — SODIUM CHLORIDE, PRESERVATIVE FREE 10 ML: 5 INJECTION INTRAVENOUS at 10:31

## 2025-02-20 RX ADMIN — ACETAMINOPHEN 650 MG: 325 TABLET ORAL at 20:07

## 2025-02-20 RX ADMIN — OXYCODONE 5 MG: 5 TABLET ORAL at 17:10

## 2025-02-20 RX ADMIN — OCTREOTIDE ACETATE 50 MCG/HR: 500 INJECTION, SOLUTION INTRAVENOUS; SUBCUTANEOUS at 20:55

## 2025-02-20 ASSESSMENT — PAIN DESCRIPTION - ORIENTATION
ORIENTATION: RIGHT;LEFT
ORIENTATION: RIGHT;LEFT

## 2025-02-20 ASSESSMENT — PAIN SCALES - GENERAL
PAINLEVEL_OUTOF10: 2
PAINLEVEL_OUTOF10: 0
PAINLEVEL_OUTOF10: 8
PAINLEVEL_OUTOF10: 9

## 2025-02-20 ASSESSMENT — PAIN DESCRIPTION - DESCRIPTORS
DESCRIPTORS: ACHING;SHOOTING
DESCRIPTORS: ACHING

## 2025-02-20 ASSESSMENT — PAIN DESCRIPTION - LOCATION
LOCATION: ABDOMEN;GENERALIZED
LOCATION: ABDOMEN

## 2025-02-20 NOTE — ANESTHESIA PRE PROCEDURE
acid (FOLVITE) 1 MG tablet Take 1 tablet by mouth daily    Paul Quiroz MD   entecavir (BARACLUDE) 0.5 MG tablet Take 2 tablets by mouth daily    Paul Quiroz MD   fluticasone (FLONASE) 50 MCG/ACT nasal spray 1 spray by Nasal route daily 10/27/20   Paul Quiroz MD   tamsulosin (FLOMAX) 0.4 MG capsule Take 1 capsule by mouth daily    Paul Quiroz MD   mineral oil-hydrophilic petrolatum (AQUAPHOR) ointment Apply topically as needed for Dry Skin A    Paul Quiorz MD   oxyCODONE HCl (OXY-IR) 10 MG immediate release tablet Take 1 tablet by mouth every 6 hours as needed for Pain. 7/25/18   Paul Quiroz MD   metoprolol tartrate (LOPRESSOR) 50 MG tablet Take 0.5 tablets by mouth 2 times daily    Paul Quiroz MD       Current medications:    Current Facility-Administered Medications   Medication Dose Route Frequency Provider Last Rate Last Admin    morphine (PF) injection 2 mg  2 mg IntraVENous Once Anupama Olmos MD        0.9 % sodium chloride infusion   IntraVENous PRN Juan Manuel Heath MD        technetium labeled red blood cells (ULTRATAG) injection 20 millicurie  20 millicurie IntraVENous ONCE PRN Parveen Duran MD        oxyCODONE (ROXICODONE) immediate release tablet 5 mg  5 mg Oral Q8H PRN Juan Manuel Heath MD        Lip Balm ointment   Topical PRN Juan Manuel Heath MD        prochlorperazine (COMPAZINE) injection 10 mg  10 mg IntraVENous Q6H PRN Clinton Cobb MD        rifAXIMin (XIFAXAN) tablet 550 mg  550 mg Oral BID Pranav Lomas DO   550 mg at 02/18/25 2150    octreotide (SANDOSTATIN) 500 mcg in sodium chloride 0.9 % 100 mL infusion  50 mcg/hr IntraVENous Continuous Pranav Lomas DO 10 mL/hr at 02/20/25 1011 50 mcg/hr at 02/20/25 1011    cetirizine (ZYRTEC) tablet 5 mg  5 mg Oral Daily Pranav Lomas DO   5 mg at 02/19/25 0955    white petrolatum ointment   Topical BID Pranav Lomas DO   Given at 02/20/25 1030    entecavir

## 2025-02-20 NOTE — CONSULTS
[facility]  [facility]  Consultation Note     Admit Date: 2/13/2025 11:35 AM    Reason for Consult:   Urethritis    Attending Physician:  Missy Sanz MD    HISTORY OF PRESENT ILLNESS:             The history is obtained from extensive review of available past medical records. The patient is a 74 y.o. male who is previously known to the ID service.The patient has a history of hepatitis B infection and liver cirrhosis. He is currently on Entecavir. He also has ascites and gets regular therapeutic paracentesis.     He was recently discharged from [facility].  He returned to the ED on 2/13/2025 with complaints of groin pain and abdominal pain.  This has been ongoing for several months however in the last 2 days he has had worsening pain and discomfort.  He does have a chronic Millan catheter and there was concern for purulent drainage from around the catheter.  He denied any fevers or chills.    Labs in the ED showed a WBC 7.5.  UA showed brown cloudy urine with only 6-9 WBCs.  Blood cultures were sent.  A urine culture was sent.  He has been afebrile with relatively stable vital signs.  CT of the abdomen and pelvis did not suggest foreigners gangrene, as there was no gas in the scrotal wall.  He was given a dose of Zosyn.  He was then started on doxycycline and Rocephin.  ID was asked to see in consultation.    Past Medical History:      February 4 2025: Admitted to [facility] with Millan catheter obstruction and abdominal pain.  UA showed Candida albicans.  We recommended fluconazole x 5 days and ID signed off.        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    
redness  NEUROLOGICAL:  light headed, dizziness, loss of consciousness, weakness, change in memory, seizures, tremors    Physical Exam:  BP (!) 151/76   Pulse 79   Temp 97.4 °F (36.3 °C)   Resp 15   Ht 1.753 m (5' 9\")   Wt 85.3 kg (188 lb 0.8 oz)   SpO2 100%   BMI 27.77 kg/m²     Gen:  Appears stated age, in no acute distress  HEENT:  Normocephalic, conjunctiva pink, no drainage, mucosa moist  Neck:  Supple  Lungs:  CTA bilaterally, no audible rhonchi or wheezes noted  Heart: RRR, no murmur, rub, or gallop noted during exam  Abd:  Soft, non tender, distended  M/S/Ext:  Moving all extremities, no edema, pulses present  Skin:  Warm and dry  Neuro:  PERRL, Alert, oriented x 3; following commands    Objective data reviewed: labs, images, records, medication use, vitals, and chart    MDM/Time:  The total encounter time on this service date was 75 minutes, which was spent performing a face-to-face encounter and personally completing the provider-level activities documented in the note.  This includes time spent prior to the visit and after the visit in direct care of the patient.  This time does not include time spent in any separately reportable services.    DAREN Shepherd - CNP  Palliative Medicine    Patient and the plan of care discussed with the other IDT members of Palliative Care Team, and with consultants, Primary Attending, patient, family, and floor nurses, as appropriate and available.    Thank you for allowing Palliative Medicine to participate in the care of Hardik Gray.    Note: This report was completed using computerBaiyaxuan voiced recognition software.  Every effort has been made to ensure accuracy; however, inadvertent computerized transcription errors may be present.    
region superior to the scrotum with movement  during respiration, probably a large fat containing hernia.  This correlates  well with the appearance of on CT where a moderate-sized containing hernia is  present.     Epididymis: There is a epididymal head cyst measuring 0.4 x 0.5 x 0.6 cm of  no clinical concern.     IMPRESSION:  1. Prominent symmetric bilateral scrotal wall edema.  2. Tiny bilateral hydroceles.  3. Probable large fat containing left inguinal hernia.  4. Small left epididymal head cyst of no clinical concern.  5. Normal appearance of the testes with no sign of testicular torsion.                Narrative & Impression  EXAMINATION:  CT OF THE ABDOMEN AND PELVIS WITH CONTRAST2/13/2025 3:08 pm     TECHNIQUE:  CT of the abdomen and pelvis was performed with the administration of  intravenous contrast. Multiplanar reformatted images are provided for review.  Automated exposure control, iterative reconstruction, and/or weight based  adjustment of the mA/kV was utilized to reduce the radiation dose to as low  as reasonably achievable.     COMPARISON:  CT abdomen and pelvis 02/01/2025.     HISTORY:  ORDERING SYSTEM PROVIDED HISTORY: Lower abdominal infection, scrotal swelling  and pain, r/o fourniers  TECHNOLOGIST PROVIDED HISTORY:  Reason for exam:->Lower abdominal infection, scrotal swelling and pain, r/o  fourniers  Additional Contrast?->None  Decision Support Exception - unselect if not a suspected or confirmed  emergency medical condition->Emergency Medical Condition (MA)     FINDINGS:  No evidence of pneumonia or pleural or pericardial effusions.     Cirrhosis of the liver with stable simple cyst in the lateral segment of the  left hepatic lobe.  No suspicious enhancing hepatic lesions.  There is no  cholelithiasis.  There is no splenomegaly.  There are periesophageal varices.  There is no gross gastric abnormality.  There is no gross adrenal or  pancreatic abnormality.  The kidneys enhance normally 
not consistent with SBP-antibiotics x 7 days     2.  History of hepatitis B&C  -Positive hepatitis C antibody but nondetectable viral load in January of this year  -Follow-up as outpatient     3.  History of radiation proctitis  -Likely contributing to patient's anemia  -Colonoscopy September 2024 with radiation proctitis        4.  Anemia  -Acute on chronic  -Likely multifactorial from hematuria, hematochezia (radiation proctitis) however variceal etiology/upper GI etiology cannot be excluded  -EGD as above  -Monitor H&H; defer transfusion to admitting  -Obtaining iron studies given recent transfusion will be of limited clinical value  -Monitor H&H  -Consider repeat upper endoscopy versus sigmoidoscopy/full colonoscopy     5.  Comorbidities  -Per admitting/pertinent consultants     6.  Ascites  -Diuretic/paracentesis  -Patient should be evaluated for TIPS  -Obtain 2D echo  -Low-salt diet    Thank you for the opportunity to see this patient in consultation.    Clinton Cobb MD  2/14/2025  11:24 AM    NOTE:  This report was transcribed using voice recognition software.  Every effort was made to ensure accuracy; however, inadvertent computerized transcription errors may be present.  
procedure however stomach was filled with blood and clots. Hypotensive 80s/50s in endo, admitted to ICU. Additional unit of PRBC given with improvement in BP once here. Monitor closely, trend H&H, PPI BID, NPO for now.    During multidisciplinary team rounds the patient was seen, examined and discussed. This is confirmation that I have personally seen and examined the patient and that the key elements of the encounter were performed by me (> 85 % time).  The medications & laboratory data and imagery was discussed and adjusted where necessary. Key issues of the case were discussed among consultants.       This patient has a high probability of sudden clinically significant deterioration. I managed/supervised life or organ supporting interventions that required frequent physician assessment. I devoted my full attention to the direct care of this patient for the period of time indicated below. In addition to above, time was devoted to teaching and to any procedure.     NOTE: This report, in part or full, may have been transcribed using voice recognition software. Every effort was made to ensure accuracy; however, inadvertent computerized transcription errors may be present. Please excuse any transcriptional grammatical or spelling errors that may have escaped my editorial review.    Total critical care time caring for this patient with life threatening, unstable organ failure, including direct patient contact, management of life support systems, review of data including imaging and labs, discussions with other team members and physicians is at least 45 Min so far today, excluding procedures.    Linden Gee MD 5:31 PM 2/17/2025

## 2025-02-21 LAB
ALBUMIN SERPL-MCNC: 2.4 G/DL (ref 3.5–5.2)
ALP SERPL-CCNC: 54 U/L (ref 40–129)
ALT SERPL-CCNC: 14 U/L (ref 0–40)
ANION GAP SERPL CALCULATED.3IONS-SCNC: 11 MMOL/L (ref 7–16)
ARM BAND NUMBER: NORMAL
ARM BAND NUMBER: NORMAL
AST SERPL-CCNC: 56 U/L (ref 0–39)
BASOPHILS # BLD: 0 K/UL (ref 0–0.2)
BASOPHILS NFR BLD: 0 % (ref 0–2)
BILIRUB SERPL-MCNC: 10.4 MG/DL (ref 0–1.2)
BLOOD BANK BLOOD PRODUCT EXPIRATION DATE: NORMAL
BLOOD BANK BLOOD PRODUCT EXPIRATION DATE: NORMAL
BLOOD BANK DISPENSE STATUS: NORMAL
BLOOD BANK DISPENSE STATUS: NORMAL
BLOOD BANK ISBT PRODUCT BLOOD TYPE: 600
BLOOD BANK ISBT PRODUCT BLOOD TYPE: 6200
BLOOD BANK PRODUCT CODE: NORMAL
BLOOD BANK PRODUCT CODE: NORMAL
BLOOD BANK UNIT TYPE AND RH: NORMAL
BLOOD BANK UNIT TYPE AND RH: NORMAL
BPU ID: NORMAL
BPU ID: NORMAL
BUN SERPL-MCNC: 12 MG/DL (ref 6–23)
CALCIUM SERPL-MCNC: 8.2 MG/DL (ref 8.6–10.2)
CHLORIDE SERPL-SCNC: 105 MMOL/L (ref 98–107)
CO2 SERPL-SCNC: 24 MMOL/L (ref 22–29)
COMPONENT: NORMAL
COMPONENT: NORMAL
CREAT SERPL-MCNC: 1.1 MG/DL (ref 0.7–1.2)
EOSINOPHIL # BLD: 0.37 K/UL (ref 0.05–0.5)
EOSINOPHILS RELATIVE PERCENT: 8 % (ref 0–6)
ERYTHROCYTE [DISTWIDTH] IN BLOOD BY AUTOMATED COUNT: 22.7 % (ref 11.5–15)
GFR, ESTIMATED: 72 ML/MIN/1.73M2
GLUCOSE SERPL-MCNC: 84 MG/DL (ref 74–99)
HCT VFR BLD AUTO: 25.4 % (ref 37–54)
HGB BLD-MCNC: 8.1 G/DL (ref 12.5–16.5)
LYMPHOCYTES NFR BLD: 1.75 K/UL (ref 1.5–4)
LYMPHOCYTES RELATIVE PERCENT: 38 % (ref 20–42)
MAGNESIUM SERPL-MCNC: 1.6 MG/DL (ref 1.6–2.6)
MCH RBC QN AUTO: 30.3 PG (ref 26–35)
MCHC RBC AUTO-ENTMCNC: 31.9 G/DL (ref 32–34.5)
MCV RBC AUTO: 95.1 FL (ref 80–99.9)
MONOCYTES NFR BLD: 0.55 K/UL (ref 0.1–0.95)
MONOCYTES NFR BLD: 12 % (ref 2–12)
NEUTROPHILS NFR BLD: 42 % (ref 43–80)
NEUTS SEG NFR BLD: 1.93 K/UL (ref 1.8–7.3)
PHOSPHATE SERPL-MCNC: 3.8 MG/DL (ref 2.5–4.5)
PLATELET CONFIRMATION: NORMAL
PLATELET, FLUORESCENCE: 77 K/UL (ref 130–450)
PMV BLD AUTO: 9.8 FL (ref 7–12)
POTASSIUM SERPL-SCNC: 3.2 MMOL/L (ref 3.5–5)
PROT SERPL-MCNC: 5.8 G/DL (ref 6.4–8.3)
RBC # BLD AUTO: 2.67 M/UL (ref 3.8–5.8)
RBC # BLD: ABNORMAL 10*6/UL
SODIUM SERPL-SCNC: 140 MMOL/L (ref 132–146)
TRANSFUSION STATUS: NORMAL
TRANSFUSION STATUS: NORMAL
UNIT DIVISION: 0
UNIT DIVISION: 0
UNIT ISSUE DATE/TIME: NORMAL
UNIT ISSUE DATE/TIME: NORMAL
WBC # BLD: ABNORMAL 10*3/UL
WBC OTHER # BLD: 4.6 K/UL (ref 4.5–11.5)

## 2025-02-21 PROCEDURE — 6370000000 HC RX 637 (ALT 250 FOR IP): Performed by: INTERNAL MEDICINE

## 2025-02-21 PROCEDURE — 2580000003 HC RX 258: Performed by: INTERNAL MEDICINE

## 2025-02-21 PROCEDURE — 97530 THERAPEUTIC ACTIVITIES: CPT

## 2025-02-21 PROCEDURE — 84100 ASSAY OF PHOSPHORUS: CPT

## 2025-02-21 PROCEDURE — 2060000000 HC ICU INTERMEDIATE R&B

## 2025-02-21 PROCEDURE — 80053 COMPREHEN METABOLIC PANEL: CPT

## 2025-02-21 PROCEDURE — 99233 SBSQ HOSP IP/OBS HIGH 50: CPT | Performed by: INTERNAL MEDICINE

## 2025-02-21 PROCEDURE — 2500000003 HC RX 250 WO HCPCS: Performed by: INTERNAL MEDICINE

## 2025-02-21 PROCEDURE — 85025 COMPLETE CBC W/AUTO DIFF WBC: CPT

## 2025-02-21 PROCEDURE — 6370000000 HC RX 637 (ALT 250 FOR IP): Performed by: STUDENT IN AN ORGANIZED HEALTH CARE EDUCATION/TRAINING PROGRAM

## 2025-02-21 PROCEDURE — 83735 ASSAY OF MAGNESIUM: CPT

## 2025-02-21 PROCEDURE — 6360000002 HC RX W HCPCS: Performed by: INTERNAL MEDICINE

## 2025-02-21 RX ORDER — OXYCODONE HYDROCHLORIDE 5 MG/1
5 TABLET ORAL EVERY 4 HOURS PRN
Status: DISCONTINUED | OUTPATIENT
Start: 2025-02-21 | End: 2025-02-25

## 2025-02-21 RX ADMIN — SPIRONOLACTONE 25 MG: 25 TABLET ORAL at 10:08

## 2025-02-21 RX ADMIN — MIDODRINE HYDROCHLORIDE 2.5 MG: 2.5 TABLET ORAL at 13:01

## 2025-02-21 RX ADMIN — SODIUM CHLORIDE 40 MG: 9 INJECTION INTRAMUSCULAR; INTRAVENOUS; SUBCUTANEOUS at 13:01

## 2025-02-21 RX ADMIN — FOLIC ACID 1 MG: 1 TABLET ORAL at 10:08

## 2025-02-21 RX ADMIN — FUROSEMIDE 40 MG: 10 INJECTION, SOLUTION INTRAMUSCULAR; INTRAVENOUS at 10:08

## 2025-02-21 RX ADMIN — RIFAXIMIN 550 MG: 550 TABLET ORAL at 20:57

## 2025-02-21 RX ADMIN — OCTREOTIDE ACETATE 50 MCG/HR: 500 INJECTION, SOLUTION INTRAVENOUS; SUBCUTANEOUS at 10:22

## 2025-02-21 RX ADMIN — RIFAXIMIN 550 MG: 550 TABLET ORAL at 10:07

## 2025-02-21 RX ADMIN — LACTULOSE 20 G: 20 SOLUTION ORAL at 10:08

## 2025-02-21 RX ADMIN — MIDODRINE HYDROCHLORIDE 2.5 MG: 2.5 TABLET ORAL at 17:12

## 2025-02-21 RX ADMIN — OXYCODONE HYDROCHLORIDE 5 MG: 5 TABLET ORAL at 22:03

## 2025-02-21 RX ADMIN — SODIUM CHLORIDE, PRESERVATIVE FREE 10 ML: 5 INJECTION INTRAVENOUS at 10:12

## 2025-02-21 RX ADMIN — SODIUM CHLORIDE 40 MG: 9 INJECTION INTRAMUSCULAR; INTRAVENOUS; SUBCUTANEOUS at 00:35

## 2025-02-21 RX ADMIN — OXYCODONE 5 MG: 5 TABLET ORAL at 10:19

## 2025-02-21 RX ADMIN — PETROLATUM: 420 OINTMENT TOPICAL at 10:10

## 2025-02-21 RX ADMIN — METOPROLOL TARTRATE 25 MG: 25 TABLET, FILM COATED ORAL at 20:57

## 2025-02-21 RX ADMIN — OXYCODONE HYDROCHLORIDE 5 MG: 5 TABLET ORAL at 16:02

## 2025-02-21 RX ADMIN — OXYCODONE 5 MG: 5 TABLET ORAL at 00:35

## 2025-02-21 RX ADMIN — METOPROLOL TARTRATE 25 MG: 25 TABLET, FILM COATED ORAL at 10:08

## 2025-02-21 RX ADMIN — CETIRIZINE HYDROCHLORIDE 5 MG: 10 TABLET, FILM COATED ORAL at 10:08

## 2025-02-21 RX ADMIN — TAMSULOSIN HYDROCHLORIDE 0.4 MG: 0.4 CAPSULE ORAL at 10:08

## 2025-02-21 RX ADMIN — SPIRONOLACTONE 25 MG: 25 TABLET ORAL at 17:12

## 2025-02-21 RX ADMIN — LACTULOSE 20 G: 20 SOLUTION ORAL at 20:57

## 2025-02-21 ASSESSMENT — PAIN SCALES - GENERAL
PAINLEVEL_OUTOF10: 8
PAINLEVEL_OUTOF10: 2
PAINLEVEL_OUTOF10: 8
PAINLEVEL_OUTOF10: 3
PAINLEVEL_OUTOF10: 8

## 2025-02-21 ASSESSMENT — PAIN DESCRIPTION - LOCATION
LOCATION: ABDOMEN
LOCATION: KNEE;LEG
LOCATION: ABDOMEN

## 2025-02-21 ASSESSMENT — PAIN DESCRIPTION - DESCRIPTORS
DESCRIPTORS: ACHING
DESCRIPTORS: ACHING

## 2025-02-21 ASSESSMENT — PAIN DESCRIPTION - ORIENTATION
ORIENTATION: RIGHT;LEFT
ORIENTATION: RIGHT;LEFT

## 2025-02-21 NOTE — CARE COORDINATION
Per night nursing, no further bleeding/hematochezia.  A.m. labs pending.  Will hold off endoscopy at this time.  Start clear liquid diet with supplements and if laboratory work shows no significant worsening hemoglobin, would advance diet to regular.  Please, see orders for plan of care    Clinton Cobb MD

## 2025-02-22 LAB
ALBUMIN SERPL-MCNC: 2.6 G/DL (ref 3.5–5.2)
ALP SERPL-CCNC: 95 U/L (ref 40–129)
ALT SERPL-CCNC: 16 U/L (ref 0–40)
ANION GAP SERPL CALCULATED.3IONS-SCNC: 13 MMOL/L (ref 7–16)
AST SERPL-CCNC: 60 U/L (ref 0–39)
BASOPHILS # BLD: 0.05 K/UL (ref 0–0.2)
BASOPHILS NFR BLD: 1 % (ref 0–2)
BILIRUB SERPL-MCNC: 9.6 MG/DL (ref 0–1.2)
BUN SERPL-MCNC: 11 MG/DL (ref 6–23)
CALCIUM SERPL-MCNC: 8.1 MG/DL (ref 8.6–10.2)
CHLORIDE SERPL-SCNC: 103 MMOL/L (ref 98–107)
CO2 SERPL-SCNC: 23 MMOL/L (ref 22–29)
CREAT SERPL-MCNC: 1.1 MG/DL (ref 0.7–1.2)
EOSINOPHIL # BLD: 0.23 K/UL (ref 0.05–0.5)
EOSINOPHILS RELATIVE PERCENT: 4 % (ref 0–6)
ERYTHROCYTE [DISTWIDTH] IN BLOOD BY AUTOMATED COUNT: 22.7 % (ref 11.5–15)
GFR, ESTIMATED: 69 ML/MIN/1.73M2
GLUCOSE SERPL-MCNC: 135 MG/DL (ref 74–99)
HCT VFR BLD AUTO: 26.6 % (ref 37–54)
HGB BLD-MCNC: 8.5 G/DL (ref 12.5–16.5)
LYMPHOCYTES NFR BLD: 0.78 K/UL (ref 1.5–4)
LYMPHOCYTES RELATIVE PERCENT: 15 % (ref 20–42)
MAGNESIUM SERPL-MCNC: 1.5 MG/DL (ref 1.6–2.6)
MCH RBC QN AUTO: 31 PG (ref 26–35)
MCHC RBC AUTO-ENTMCNC: 32 G/DL (ref 32–34.5)
MCV RBC AUTO: 97.1 FL (ref 80–99.9)
MONOCYTES NFR BLD: 0.92 K/UL (ref 0.1–0.95)
MONOCYTES NFR BLD: 17 % (ref 2–12)
NEUTROPHILS NFR BLD: 63 % (ref 43–80)
NEUTS SEG NFR BLD: 3.32 K/UL (ref 1.8–7.3)
PHOSPHATE SERPL-MCNC: 3.2 MG/DL (ref 2.5–4.5)
PLATELET CONFIRMATION: NORMAL
PLATELET, FLUORESCENCE: 94 K/UL (ref 130–450)
PMV BLD AUTO: 10 FL (ref 7–12)
POTASSIUM SERPL-SCNC: 3 MMOL/L (ref 3.5–5)
PROT SERPL-MCNC: 6 G/DL (ref 6.4–8.3)
RBC # BLD AUTO: 2.74 M/UL (ref 3.8–5.8)
RBC # BLD: ABNORMAL 10*6/UL
SODIUM SERPL-SCNC: 139 MMOL/L (ref 132–146)
WBC OTHER # BLD: 5.3 K/UL (ref 4.5–11.5)

## 2025-02-22 PROCEDURE — 86850 RBC ANTIBODY SCREEN: CPT

## 2025-02-22 PROCEDURE — 86923 COMPATIBILITY TEST ELECTRIC: CPT

## 2025-02-22 PROCEDURE — 83735 ASSAY OF MAGNESIUM: CPT

## 2025-02-22 PROCEDURE — 86900 BLOOD TYPING SEROLOGIC ABO: CPT

## 2025-02-22 PROCEDURE — 80053 COMPREHEN METABOLIC PANEL: CPT

## 2025-02-22 PROCEDURE — 36415 COLL VENOUS BLD VENIPUNCTURE: CPT

## 2025-02-22 PROCEDURE — 6360000002 HC RX W HCPCS: Performed by: INTERNAL MEDICINE

## 2025-02-22 PROCEDURE — 99233 SBSQ HOSP IP/OBS HIGH 50: CPT | Performed by: INTERNAL MEDICINE

## 2025-02-22 PROCEDURE — 86901 BLOOD TYPING SEROLOGIC RH(D): CPT

## 2025-02-22 PROCEDURE — 2580000003 HC RX 258: Performed by: INTERNAL MEDICINE

## 2025-02-22 PROCEDURE — 2500000003 HC RX 250 WO HCPCS: Performed by: INTERNAL MEDICINE

## 2025-02-22 PROCEDURE — 2060000000 HC ICU INTERMEDIATE R&B

## 2025-02-22 PROCEDURE — 84100 ASSAY OF PHOSPHORUS: CPT

## 2025-02-22 PROCEDURE — 6370000000 HC RX 637 (ALT 250 FOR IP): Performed by: INTERNAL MEDICINE

## 2025-02-22 PROCEDURE — 85025 COMPLETE CBC W/AUTO DIFF WBC: CPT

## 2025-02-22 RX ADMIN — SODIUM CHLORIDE: 9 INJECTION, SOLUTION INTRAVENOUS at 12:31

## 2025-02-22 RX ADMIN — SPIRONOLACTONE 25 MG: 25 TABLET ORAL at 08:46

## 2025-02-22 RX ADMIN — SODIUM CHLORIDE 40 MG: 9 INJECTION INTRAMUSCULAR; INTRAVENOUS; SUBCUTANEOUS at 01:22

## 2025-02-22 RX ADMIN — RIFAXIMIN 550 MG: 550 TABLET ORAL at 21:15

## 2025-02-22 RX ADMIN — METOPROLOL TARTRATE 25 MG: 25 TABLET, FILM COATED ORAL at 21:15

## 2025-02-22 RX ADMIN — OXYCODONE HYDROCHLORIDE 5 MG: 5 TABLET ORAL at 17:23

## 2025-02-22 RX ADMIN — FOLIC ACID 1 MG: 1 TABLET ORAL at 08:46

## 2025-02-22 RX ADMIN — LACTULOSE 20 G: 20 SOLUTION ORAL at 08:47

## 2025-02-22 RX ADMIN — FUROSEMIDE 40 MG: 10 INJECTION, SOLUTION INTRAMUSCULAR; INTRAVENOUS at 08:48

## 2025-02-22 RX ADMIN — RIFAXIMIN 550 MG: 550 TABLET ORAL at 08:46

## 2025-02-22 RX ADMIN — PETROLATUM: 420 OINTMENT TOPICAL at 21:16

## 2025-02-22 RX ADMIN — POTASSIUM CHLORIDE 10 MEQ: 10 INJECTION, SOLUTION INTRAVENOUS at 12:33

## 2025-02-22 RX ADMIN — OXYCODONE HYDROCHLORIDE 5 MG: 5 TABLET ORAL at 04:08

## 2025-02-22 RX ADMIN — SPIRONOLACTONE 25 MG: 25 TABLET ORAL at 16:14

## 2025-02-22 RX ADMIN — POTASSIUM CHLORIDE 10 MEQ: 10 INJECTION, SOLUTION INTRAVENOUS at 20:01

## 2025-02-22 RX ADMIN — TAMSULOSIN HYDROCHLORIDE 0.4 MG: 0.4 CAPSULE ORAL at 08:46

## 2025-02-22 RX ADMIN — OXYCODONE HYDROCHLORIDE 5 MG: 5 TABLET ORAL at 08:46

## 2025-02-22 RX ADMIN — METOPROLOL TARTRATE 25 MG: 25 TABLET, FILM COATED ORAL at 08:46

## 2025-02-22 RX ADMIN — LACTULOSE 20 G: 20 SOLUTION ORAL at 21:15

## 2025-02-22 RX ADMIN — SODIUM CHLORIDE, PRESERVATIVE FREE 10 ML: 5 INJECTION INTRAVENOUS at 21:15

## 2025-02-22 RX ADMIN — POTASSIUM CHLORIDE 10 MEQ: 10 INJECTION, SOLUTION INTRAVENOUS at 14:05

## 2025-02-22 RX ADMIN — CETIRIZINE HYDROCHLORIDE 5 MG: 10 TABLET, FILM COATED ORAL at 08:47

## 2025-02-22 RX ADMIN — POTASSIUM CHLORIDE 10 MEQ: 10 INJECTION, SOLUTION INTRAVENOUS at 22:09

## 2025-02-22 RX ADMIN — ENTECAVIR 1 MG: 1 TABLET, FILM COATED ORAL at 08:47

## 2025-02-22 RX ADMIN — SODIUM CHLORIDE, PRESERVATIVE FREE 10 ML: 5 INJECTION INTRAVENOUS at 08:48

## 2025-02-22 RX ADMIN — OXYCODONE HYDROCHLORIDE 5 MG: 5 TABLET ORAL at 12:45

## 2025-02-22 RX ADMIN — PETROLATUM: 420 OINTMENT TOPICAL at 08:49

## 2025-02-22 RX ADMIN — SODIUM CHLORIDE 40 MG: 9 INJECTION INTRAMUSCULAR; INTRAVENOUS; SUBCUTANEOUS at 12:34

## 2025-02-22 ASSESSMENT — PAIN SCALES - GENERAL
PAINLEVEL_OUTOF10: 9
PAINLEVEL_OUTOF10: 6
PAINLEVEL_OUTOF10: 9
PAINLEVEL_OUTOF10: 8
PAINLEVEL_OUTOF10: 4
PAINLEVEL_OUTOF10: 8

## 2025-02-22 ASSESSMENT — PAIN DESCRIPTION - LOCATION
LOCATION: BACK;KNEE
LOCATION: GENERALIZED
LOCATION: OTHER (COMMENT)
LOCATION: GROIN
LOCATION: ABDOMEN
LOCATION: GENERALIZED

## 2025-02-22 ASSESSMENT — PAIN DESCRIPTION - FREQUENCY: FREQUENCY: CONTINUOUS

## 2025-02-22 ASSESSMENT — PAIN DESCRIPTION - DESCRIPTORS
DESCRIPTORS: ACHING;DISCOMFORT
DESCRIPTORS: ACHING;DISCOMFORT;GNAWING
DESCRIPTORS: DISCOMFORT;ACHING
DESCRIPTORS: ACHING;DISCOMFORT

## 2025-02-22 ASSESSMENT — PAIN DESCRIPTION - PAIN TYPE
TYPE: CHRONIC PAIN
TYPE: CHRONIC PAIN

## 2025-02-22 ASSESSMENT — PAIN DESCRIPTION - ORIENTATION: ORIENTATION: OTHER (COMMENT)

## 2025-02-22 ASSESSMENT — PAIN - FUNCTIONAL ASSESSMENT
PAIN_FUNCTIONAL_ASSESSMENT: PREVENTS OR INTERFERES SOME ACTIVE ACTIVITIES AND ADLS
PAIN_FUNCTIONAL_ASSESSMENT: PREVENTS OR INTERFERES SOME ACTIVE ACTIVITIES AND ADLS

## 2025-02-22 ASSESSMENT — PAIN DESCRIPTION - ONSET: ONSET: ON-GOING

## 2025-02-23 LAB
ALBUMIN SERPL-MCNC: 2.4 G/DL (ref 3.5–5.2)
ALP SERPL-CCNC: 91 U/L (ref 40–129)
ALT SERPL-CCNC: 16 U/L (ref 0–40)
ANION GAP SERPL CALCULATED.3IONS-SCNC: 9 MMOL/L (ref 7–16)
AST SERPL-CCNC: 63 U/L (ref 0–39)
ATYPICAL LYMPHOCYTE ABSOLUTE COUNT: 0.06 K/UL (ref 0–0.46)
ATYPICAL LYMPHOCYTES: 1 % (ref 0–4)
BASOPHILS # BLD: 0 K/UL (ref 0–0.2)
BASOPHILS NFR BLD: 0 % (ref 0–2)
BILIRUB SERPL-MCNC: 9.1 MG/DL (ref 0–1.2)
BUN SERPL-MCNC: 9 MG/DL (ref 6–23)
CALCIUM SERPL-MCNC: 7.7 MG/DL (ref 8.6–10.2)
CHLORIDE SERPL-SCNC: 104 MMOL/L (ref 98–107)
CO2 SERPL-SCNC: 22 MMOL/L (ref 22–29)
CREAT SERPL-MCNC: 1 MG/DL (ref 0.7–1.2)
EOSINOPHIL # BLD: 0.35 K/UL (ref 0.05–0.5)
EOSINOPHILS RELATIVE PERCENT: 6 % (ref 0–6)
ERYTHROCYTE [DISTWIDTH] IN BLOOD BY AUTOMATED COUNT: 22.6 % (ref 11.5–15)
GFR, ESTIMATED: 80 ML/MIN/1.73M2
GLUCOSE SERPL-MCNC: 95 MG/DL (ref 74–99)
HCT VFR BLD AUTO: 25.8 % (ref 37–54)
HGB BLD-MCNC: 8.2 G/DL (ref 12.5–16.5)
LYMPHOCYTES NFR BLD: 3.13 K/UL (ref 1.5–4)
LYMPHOCYTES RELATIVE PERCENT: 53 % (ref 20–42)
MAGNESIUM SERPL-MCNC: 1.4 MG/DL (ref 1.6–2.6)
MCH RBC QN AUTO: 31.2 PG (ref 26–35)
MCHC RBC AUTO-ENTMCNC: 31.8 G/DL (ref 32–34.5)
MCV RBC AUTO: 98.1 FL (ref 80–99.9)
MONOCYTES NFR BLD: 0.41 K/UL (ref 0.1–0.95)
MONOCYTES NFR BLD: 7 % (ref 2–12)
NEUTROPHILS NFR BLD: 33 % (ref 43–80)
NEUTS SEG NFR BLD: 1.95 K/UL (ref 1.8–7.3)
PHOSPHATE SERPL-MCNC: 2.8 MG/DL (ref 2.5–4.5)
PLATELET CONFIRMATION: NORMAL
PLATELET, FLUORESCENCE: 77 K/UL (ref 130–450)
PMV BLD AUTO: 9.7 FL (ref 7–12)
POTASSIUM SERPL-SCNC: 3.8 MMOL/L (ref 3.5–5)
PROT SERPL-MCNC: 5.9 G/DL (ref 6.4–8.3)
RBC # BLD AUTO: 2.63 M/UL (ref 3.8–5.8)
RBC # BLD: ABNORMAL 10*6/UL
SODIUM SERPL-SCNC: 135 MMOL/L (ref 132–146)
WBC OTHER # BLD: 5.9 K/UL (ref 4.5–11.5)

## 2025-02-23 PROCEDURE — 83735 ASSAY OF MAGNESIUM: CPT

## 2025-02-23 PROCEDURE — 6370000000 HC RX 637 (ALT 250 FOR IP): Performed by: INTERNAL MEDICINE

## 2025-02-23 PROCEDURE — 84100 ASSAY OF PHOSPHORUS: CPT

## 2025-02-23 PROCEDURE — 6360000002 HC RX W HCPCS: Performed by: STUDENT IN AN ORGANIZED HEALTH CARE EDUCATION/TRAINING PROGRAM

## 2025-02-23 PROCEDURE — 99233 SBSQ HOSP IP/OBS HIGH 50: CPT | Performed by: STUDENT IN AN ORGANIZED HEALTH CARE EDUCATION/TRAINING PROGRAM

## 2025-02-23 PROCEDURE — 2060000000 HC ICU INTERMEDIATE R&B

## 2025-02-23 PROCEDURE — 0W9G3ZZ DRAINAGE OF PERITONEAL CAVITY, PERCUTANEOUS APPROACH: ICD-10-PCS | Performed by: FAMILY MEDICINE

## 2025-02-23 PROCEDURE — 85025 COMPLETE CBC W/AUTO DIFF WBC: CPT

## 2025-02-23 PROCEDURE — 80053 COMPREHEN METABOLIC PANEL: CPT

## 2025-02-23 PROCEDURE — 2580000003 HC RX 258: Performed by: INTERNAL MEDICINE

## 2025-02-23 PROCEDURE — 2500000003 HC RX 250 WO HCPCS: Performed by: INTERNAL MEDICINE

## 2025-02-23 PROCEDURE — 6360000002 HC RX W HCPCS: Performed by: INTERNAL MEDICINE

## 2025-02-23 RX ORDER — ALBUMIN (HUMAN) 12.5 G/50ML
25 SOLUTION INTRAVENOUS SEE ADMIN INSTRUCTIONS
Status: DISCONTINUED | OUTPATIENT
Start: 2025-02-23 | End: 2025-03-02 | Stop reason: HOSPADM

## 2025-02-23 RX ORDER — MAGNESIUM SULFATE IN WATER 40 MG/ML
2000 INJECTION, SOLUTION INTRAVENOUS ONCE
Status: COMPLETED | OUTPATIENT
Start: 2025-02-23 | End: 2025-02-23

## 2025-02-23 RX ADMIN — MIDODRINE HYDROCHLORIDE 2.5 MG: 2.5 TABLET ORAL at 08:30

## 2025-02-23 RX ADMIN — SODIUM CHLORIDE 40 MG: 9 INJECTION INTRAMUSCULAR; INTRAVENOUS; SUBCUTANEOUS at 00:15

## 2025-02-23 RX ADMIN — SODIUM CHLORIDE, PRESERVATIVE FREE 10 ML: 5 INJECTION INTRAVENOUS at 08:30

## 2025-02-23 RX ADMIN — TAMSULOSIN HYDROCHLORIDE 0.4 MG: 0.4 CAPSULE ORAL at 08:30

## 2025-02-23 RX ADMIN — OXYCODONE HYDROCHLORIDE 5 MG: 5 TABLET ORAL at 04:44

## 2025-02-23 RX ADMIN — CETIRIZINE HYDROCHLORIDE 5 MG: 10 TABLET, FILM COATED ORAL at 08:29

## 2025-02-23 RX ADMIN — PETROLATUM: 420 OINTMENT TOPICAL at 08:29

## 2025-02-23 RX ADMIN — RIFAXIMIN 550 MG: 550 TABLET ORAL at 08:30

## 2025-02-23 RX ADMIN — OXYCODONE HYDROCHLORIDE 5 MG: 5 TABLET ORAL at 12:33

## 2025-02-23 RX ADMIN — PETROLATUM: 420 OINTMENT TOPICAL at 19:57

## 2025-02-23 RX ADMIN — METOPROLOL TARTRATE 25 MG: 25 TABLET, FILM COATED ORAL at 19:56

## 2025-02-23 RX ADMIN — OXYCODONE HYDROCHLORIDE 5 MG: 5 TABLET ORAL at 20:47

## 2025-02-23 RX ADMIN — METOPROLOL TARTRATE 25 MG: 25 TABLET, FILM COATED ORAL at 08:30

## 2025-02-23 RX ADMIN — OXYCODONE HYDROCHLORIDE 5 MG: 5 TABLET ORAL at 08:34

## 2025-02-23 RX ADMIN — SODIUM CHLORIDE 40 MG: 9 INJECTION INTRAMUSCULAR; INTRAVENOUS; SUBCUTANEOUS at 23:19

## 2025-02-23 RX ADMIN — SPIRONOLACTONE 25 MG: 25 TABLET ORAL at 16:41

## 2025-02-23 RX ADMIN — SPIRONOLACTONE 25 MG: 25 TABLET ORAL at 08:30

## 2025-02-23 RX ADMIN — FUROSEMIDE 40 MG: 10 INJECTION, SOLUTION INTRAMUSCULAR; INTRAVENOUS at 08:30

## 2025-02-23 RX ADMIN — OXYCODONE HYDROCHLORIDE 5 MG: 5 TABLET ORAL at 16:41

## 2025-02-23 RX ADMIN — LACTULOSE 20 G: 20 SOLUTION ORAL at 08:30

## 2025-02-23 RX ADMIN — SODIUM CHLORIDE, PRESERVATIVE FREE 10 ML: 5 INJECTION INTRAVENOUS at 19:57

## 2025-02-23 RX ADMIN — LACTULOSE 20 G: 20 SOLUTION ORAL at 19:56

## 2025-02-23 RX ADMIN — MIDODRINE HYDROCHLORIDE 2.5 MG: 2.5 TABLET ORAL at 16:42

## 2025-02-23 RX ADMIN — MIDODRINE HYDROCHLORIDE 2.5 MG: 2.5 TABLET ORAL at 12:14

## 2025-02-23 RX ADMIN — ENTECAVIR 1 MG: 1 TABLET, FILM COATED ORAL at 08:30

## 2025-02-23 RX ADMIN — FOLIC ACID 1 MG: 1 TABLET ORAL at 08:30

## 2025-02-23 RX ADMIN — SODIUM CHLORIDE 40 MG: 9 INJECTION INTRAMUSCULAR; INTRAVENOUS; SUBCUTANEOUS at 12:14

## 2025-02-23 RX ADMIN — RIFAXIMIN 550 MG: 550 TABLET ORAL at 19:56

## 2025-02-23 RX ADMIN — MAGNESIUM SULFATE HEPTAHYDRATE 2000 MG: 40 INJECTION, SOLUTION INTRAVENOUS at 09:23

## 2025-02-23 ASSESSMENT — PAIN DESCRIPTION - DESCRIPTORS
DESCRIPTORS: ACHING;DISCOMFORT
DESCRIPTORS: ACHING;DISCOMFORT;GNAWING

## 2025-02-23 ASSESSMENT — PAIN SCALES - GENERAL
PAINLEVEL_OUTOF10: 8
PAINLEVEL_OUTOF10: 3
PAINLEVEL_OUTOF10: 8
PAINLEVEL_OUTOF10: 6
PAINLEVEL_OUTOF10: 7
PAINLEVEL_OUTOF10: 7
PAINLEVEL_OUTOF10: 9
PAINLEVEL_OUTOF10: 4
PAINLEVEL_OUTOF10: 9
PAINLEVEL_OUTOF10: 4

## 2025-02-23 ASSESSMENT — PAIN DESCRIPTION - ORIENTATION: ORIENTATION: RIGHT;LEFT

## 2025-02-23 ASSESSMENT — PAIN DESCRIPTION - LOCATION
LOCATION: GENERALIZED
LOCATION: ABDOMEN;GROIN
LOCATION: BACK;GENERALIZED;KNEE

## 2025-02-23 ASSESSMENT — PAIN SCALES - WONG BAKER: WONGBAKER_NUMERICALRESPONSE: NO HURT

## 2025-02-24 ENCOUNTER — APPOINTMENT (OUTPATIENT)
Dept: ULTRASOUND IMAGING | Age: 75
DRG: 432 | End: 2025-02-24
Payer: OTHER GOVERNMENT

## 2025-02-24 PROBLEM — D64.9 ANEMIA: Status: ACTIVE | Noted: 2025-02-24

## 2025-02-24 PROBLEM — I85.00 ESOPHAGEAL VARICES DETERMINED BY ENDOSCOPY (HCC): Status: ACTIVE | Noted: 2025-02-24

## 2025-02-24 LAB
ALBUMIN FLD-MCNC: 0.7 G/DL
ALBUMIN SERPL-MCNC: 2.4 G/DL (ref 3.5–5.2)
ALP SERPL-CCNC: 150 U/L (ref 40–129)
ALT SERPL-CCNC: 16 U/L (ref 0–40)
AMYLASE FLD-CCNC: 24 U/L
ANION GAP SERPL CALCULATED.3IONS-SCNC: 9 MMOL/L (ref 7–16)
APPEARANCE FLD: NORMAL
AST SERPL-CCNC: 57 U/L (ref 0–39)
BASOPHILS # BLD: 0.02 K/UL (ref 0–0.2)
BASOPHILS NFR BLD: 0 % (ref 0–2)
BILIRUB SERPL-MCNC: 7.9 MG/DL (ref 0–1.2)
BODY FLD TYPE: NORMAL
BUN SERPL-MCNC: 10 MG/DL (ref 6–23)
CALCIUM SERPL-MCNC: 8.1 MG/DL (ref 8.6–10.2)
CHLORIDE SERPL-SCNC: 102 MMOL/L (ref 98–107)
CLOT CHECK: NORMAL
CO2 SERPL-SCNC: 24 MMOL/L (ref 22–29)
COLOR FLD: YELLOW
CREAT SERPL-MCNC: 1 MG/DL (ref 0.7–1.2)
EOSINOPHIL # BLD: 0.44 K/UL (ref 0.05–0.5)
EOSINOPHILS RELATIVE PERCENT: 6 % (ref 0–6)
ERYTHROCYTE [DISTWIDTH] IN BLOOD BY AUTOMATED COUNT: 22.3 % (ref 11.5–15)
GFR, ESTIMATED: 83 ML/MIN/1.73M2
GLUCOSE FLD-MCNC: 114 MG/DL
GLUCOSE SERPL-MCNC: 109 MG/DL (ref 74–99)
HCT VFR BLD AUTO: 23 % (ref 37–54)
HGB BLD-MCNC: 7.5 G/DL (ref 12.5–16.5)
IMM GRANULOCYTES # BLD AUTO: <0.03 K/UL (ref 0–0.58)
IMM GRANULOCYTES NFR BLD: 0 % (ref 0–5)
LDH FLD L TO P-CCNC: 82 U/L
LYMPHOCYTES NFR BLD: 2.74 K/UL (ref 1.5–4)
LYMPHOCYTES RELATIVE PERCENT: 37 % (ref 20–42)
MAGNESIUM SERPL-MCNC: 1.6 MG/DL (ref 1.6–2.6)
MCH RBC QN AUTO: 31.4 PG (ref 26–35)
MCHC RBC AUTO-ENTMCNC: 32.6 G/DL (ref 32–34.5)
MCV RBC AUTO: 96.2 FL (ref 80–99.9)
MONOCYTES NFR BLD: 1.78 K/UL (ref 0.1–0.95)
MONOCYTES NFR BLD: 24 % (ref 2–12)
MONOCYTES NFR FLD: 90 %
NEUTROPHILS NFR BLD: 33 % (ref 43–80)
NEUTROPHILS NFR FLD: 10 %
NEUTS SEG NFR BLD: 2.42 K/UL (ref 1.8–7.3)
PHOSPHATE SERPL-MCNC: 2.7 MG/DL (ref 2.5–4.5)
PLATELET CONFIRMATION: NORMAL
PLATELET, FLUORESCENCE: 79 K/UL (ref 130–450)
PMV BLD AUTO: 10.2 FL (ref 7–12)
POTASSIUM SERPL-SCNC: 3.6 MMOL/L (ref 3.5–5)
PROT FLD-MCNC: 1.4 G/DL
PROT SERPL-MCNC: 5.5 G/DL (ref 6.4–8.3)
RBC # BLD AUTO: 2.39 M/UL (ref 3.8–5.8)
RBC # BLD: ABNORMAL 10*6/UL
RBC # FLD: 3000 CELLS/UL
SODIUM SERPL-SCNC: 135 MMOL/L (ref 132–146)
SPECIMEN TYPE: NORMAL
WBC # FLD: 154 CELLS/UL
WBC OTHER # BLD: 7.4 K/UL (ref 4.5–11.5)

## 2025-02-24 PROCEDURE — 6360000002 HC RX W HCPCS: Performed by: INTERNAL MEDICINE

## 2025-02-24 PROCEDURE — 99232 SBSQ HOSP IP/OBS MODERATE 35: CPT | Performed by: STUDENT IN AN ORGANIZED HEALTH CARE EDUCATION/TRAINING PROGRAM

## 2025-02-24 PROCEDURE — 87070 CULTURE OTHR SPECIMN AEROBIC: CPT

## 2025-02-24 PROCEDURE — 6360000002 HC RX W HCPCS: Performed by: RADIOLOGY

## 2025-02-24 PROCEDURE — 6360000002 HC RX W HCPCS: Performed by: HOSPITALIST

## 2025-02-24 PROCEDURE — 89051 BODY FLUID CELL COUNT: CPT

## 2025-02-24 PROCEDURE — 84157 ASSAY OF PROTEIN OTHER: CPT

## 2025-02-24 PROCEDURE — 2500000003 HC RX 250 WO HCPCS: Performed by: INTERNAL MEDICINE

## 2025-02-24 PROCEDURE — 6370000000 HC RX 637 (ALT 250 FOR IP): Performed by: INTERNAL MEDICINE

## 2025-02-24 PROCEDURE — 84100 ASSAY OF PHOSPHORUS: CPT

## 2025-02-24 PROCEDURE — 2060000000 HC ICU INTERMEDIATE R&B

## 2025-02-24 PROCEDURE — 87205 SMEAR GRAM STAIN: CPT

## 2025-02-24 PROCEDURE — 80053 COMPREHEN METABOLIC PANEL: CPT

## 2025-02-24 PROCEDURE — 88305 TISSUE EXAM BY PATHOLOGIST: CPT

## 2025-02-24 PROCEDURE — 83615 LACTATE (LD) (LDH) ENZYME: CPT

## 2025-02-24 PROCEDURE — 82945 GLUCOSE OTHER FLUID: CPT

## 2025-02-24 PROCEDURE — 83735 ASSAY OF MAGNESIUM: CPT

## 2025-02-24 PROCEDURE — 2580000003 HC RX 258: Performed by: INTERNAL MEDICINE

## 2025-02-24 PROCEDURE — P9047 ALBUMIN (HUMAN), 25%, 50ML: HCPCS | Performed by: HOSPITALIST

## 2025-02-24 PROCEDURE — 76705 ECHO EXAM OF ABDOMEN: CPT

## 2025-02-24 PROCEDURE — 82042 OTHER SOURCE ALBUMIN QUAN EA: CPT

## 2025-02-24 PROCEDURE — 36415 COLL VENOUS BLD VENIPUNCTURE: CPT

## 2025-02-24 PROCEDURE — 85025 COMPLETE CBC W/AUTO DIFF WBC: CPT

## 2025-02-24 PROCEDURE — 88112 CYTOPATH CELL ENHANCE TECH: CPT

## 2025-02-24 PROCEDURE — 92526 ORAL FUNCTION THERAPY: CPT

## 2025-02-24 PROCEDURE — 6360000002 HC RX W HCPCS: Performed by: STUDENT IN AN ORGANIZED HEALTH CARE EDUCATION/TRAINING PROGRAM

## 2025-02-24 PROCEDURE — 2709999900 US GUIDED PARACENTESIS

## 2025-02-24 PROCEDURE — 82150 ASSAY OF AMYLASE: CPT

## 2025-02-24 RX ORDER — MAGNESIUM SULFATE IN WATER 40 MG/ML
2000 INJECTION, SOLUTION INTRAVENOUS ONCE
Status: COMPLETED | OUTPATIENT
Start: 2025-02-24 | End: 2025-02-24

## 2025-02-24 RX ORDER — ALBUMIN (HUMAN) 12.5 G/50ML
50 SOLUTION INTRAVENOUS
Status: COMPLETED | OUTPATIENT
Start: 2025-02-24 | End: 2025-02-24

## 2025-02-24 RX ORDER — FUROSEMIDE 10 MG/ML
40 INJECTION INTRAMUSCULAR; INTRAVENOUS 2 TIMES DAILY
Status: DISCONTINUED | OUTPATIENT
Start: 2025-02-24 | End: 2025-03-02 | Stop reason: HOSPADM

## 2025-02-24 RX ORDER — LIDOCAINE HYDROCHLORIDE 10 MG/ML
INJECTION, SOLUTION INFILTRATION; PERINEURAL PRN
Status: DISCONTINUED | OUTPATIENT
Start: 2025-02-24 | End: 2025-02-26 | Stop reason: HOSPADM

## 2025-02-24 RX ADMIN — SODIUM CHLORIDE, PRESERVATIVE FREE 10 ML: 5 INJECTION INTRAVENOUS at 21:34

## 2025-02-24 RX ADMIN — FUROSEMIDE 40 MG: 10 INJECTION, SOLUTION INTRAMUSCULAR; INTRAVENOUS at 10:58

## 2025-02-24 RX ADMIN — METOPROLOL TARTRATE 25 MG: 25 TABLET, FILM COATED ORAL at 21:34

## 2025-02-24 RX ADMIN — RIFAXIMIN 550 MG: 550 TABLET ORAL at 10:58

## 2025-02-24 RX ADMIN — LIDOCAINE HYDROCHLORIDE 10 ML: 10 INJECTION, SOLUTION INFILTRATION; PERINEURAL at 09:53

## 2025-02-24 RX ADMIN — OXYCODONE HYDROCHLORIDE 5 MG: 5 TABLET ORAL at 11:20

## 2025-02-24 RX ADMIN — MAGNESIUM SULFATE HEPTAHYDRATE 2000 MG: 40 INJECTION, SOLUTION INTRAVENOUS at 11:10

## 2025-02-24 RX ADMIN — PETROLATUM: 420 OINTMENT TOPICAL at 21:34

## 2025-02-24 RX ADMIN — MIDODRINE HYDROCHLORIDE 2.5 MG: 2.5 TABLET ORAL at 19:04

## 2025-02-24 RX ADMIN — ALBUMIN (HUMAN) 50 G: 0.25 INJECTION, SOLUTION INTRAVENOUS at 10:03

## 2025-02-24 RX ADMIN — SPIRONOLACTONE 25 MG: 25 TABLET ORAL at 19:04

## 2025-02-24 RX ADMIN — SODIUM CHLORIDE 40 MG: 9 INJECTION INTRAMUSCULAR; INTRAVENOUS; SUBCUTANEOUS at 11:19

## 2025-02-24 RX ADMIN — CETIRIZINE HYDROCHLORIDE 5 MG: 10 TABLET, FILM COATED ORAL at 10:57

## 2025-02-24 RX ADMIN — SODIUM CHLORIDE, PRESERVATIVE FREE 10 ML: 5 INJECTION INTRAVENOUS at 10:58

## 2025-02-24 RX ADMIN — SPIRONOLACTONE 25 MG: 25 TABLET ORAL at 10:57

## 2025-02-24 RX ADMIN — OXYCODONE HYDROCHLORIDE 5 MG: 5 TABLET ORAL at 00:57

## 2025-02-24 RX ADMIN — METOPROLOL TARTRATE 25 MG: 25 TABLET, FILM COATED ORAL at 10:57

## 2025-02-24 RX ADMIN — PETROLATUM: 420 OINTMENT TOPICAL at 11:10

## 2025-02-24 RX ADMIN — LACTULOSE 20 G: 20 SOLUTION ORAL at 10:58

## 2025-02-24 RX ADMIN — RIFAXIMIN 550 MG: 550 TABLET ORAL at 21:33

## 2025-02-24 RX ADMIN — ENTECAVIR 1 MG: 1 TABLET, FILM COATED ORAL at 10:58

## 2025-02-24 RX ADMIN — OXYCODONE HYDROCHLORIDE 5 MG: 5 TABLET ORAL at 21:34

## 2025-02-24 RX ADMIN — FUROSEMIDE 40 MG: 10 INJECTION, SOLUTION INTRAMUSCULAR; INTRAVENOUS at 19:04

## 2025-02-24 RX ADMIN — FOLIC ACID 1 MG: 1 TABLET ORAL at 10:57

## 2025-02-24 RX ADMIN — TAMSULOSIN HYDROCHLORIDE 0.4 MG: 0.4 CAPSULE ORAL at 10:57

## 2025-02-24 RX ADMIN — MIDODRINE HYDROCHLORIDE 2.5 MG: 2.5 TABLET ORAL at 10:56

## 2025-02-24 ASSESSMENT — PAIN DESCRIPTION - ORIENTATION: ORIENTATION: LEFT

## 2025-02-24 ASSESSMENT — PAIN DESCRIPTION - LOCATION: LOCATION: ANKLE;FOOT

## 2025-02-24 ASSESSMENT — PAIN SCALES - GENERAL: PAINLEVEL_OUTOF10: 8

## 2025-02-24 NOTE — OR NURSING
Patient brought into room, discussed procedure. Dr. Lam answered all questions and concerns related to the procedure. Treatment consent signed. Patient positioned and sterile prepped for the procedure. 1% Lidocaine administered by Dr. Lam.  A total of 2470 mL hazy yellow ascitic fluid was taken. Patient tolerated procedure well. Site cleaned and dressed with a bandage. Vitals monitored and remained stable throughout. Nurse to nurse called and patient transported back to room.

## 2025-02-25 ENCOUNTER — APPOINTMENT (OUTPATIENT)
Dept: CT IMAGING | Age: 75
DRG: 432 | End: 2025-02-25
Payer: OTHER GOVERNMENT

## 2025-02-25 LAB
ALBUMIN SERPL-MCNC: 2.5 G/DL (ref 3.5–5.2)
ALP SERPL-CCNC: 167 U/L (ref 40–129)
ALT SERPL-CCNC: 18 U/L (ref 0–40)
ANION GAP SERPL CALCULATED.3IONS-SCNC: 9 MMOL/L (ref 7–16)
AST SERPL-CCNC: 51 U/L (ref 0–39)
BASOPHILS # BLD: 0 K/UL (ref 0–0.2)
BASOPHILS NFR BLD: 0 % (ref 0–2)
BILIRUB SERPL-MCNC: 7.6 MG/DL (ref 0–1.2)
BUN SERPL-MCNC: 11 MG/DL (ref 6–23)
CALCIUM SERPL-MCNC: 7.9 MG/DL (ref 8.6–10.2)
CHLORIDE SERPL-SCNC: 102 MMOL/L (ref 98–107)
CO2 SERPL-SCNC: 26 MMOL/L (ref 22–29)
CREAT SERPL-MCNC: 0.9 MG/DL (ref 0.7–1.2)
EOSINOPHIL # BLD: 0.36 K/UL (ref 0.05–0.5)
EOSINOPHILS RELATIVE PERCENT: 6 % (ref 0–6)
ERYTHROCYTE [DISTWIDTH] IN BLOOD BY AUTOMATED COUNT: 22.6 % (ref 11.5–15)
GFR, ESTIMATED: 87 ML/MIN/1.73M2
GLUCOSE SERPL-MCNC: 96 MG/DL (ref 74–99)
HCT VFR BLD AUTO: 18.9 % (ref 37–54)
HCT VFR BLD AUTO: 19.8 % (ref 37–54)
HGB BLD-MCNC: 6.3 G/DL (ref 12.5–16.5)
HGB BLD-MCNC: 6.4 G/DL (ref 12.5–16.5)
LYMPHOCYTES NFR BLD: 2.58 K/UL (ref 1.5–4)
LYMPHOCYTES RELATIVE PERCENT: 43 % (ref 20–42)
MAGNESIUM SERPL-MCNC: 1.6 MG/DL (ref 1.6–2.6)
MCH RBC QN AUTO: 30.8 PG (ref 26–35)
MCHC RBC AUTO-ENTMCNC: 32.3 G/DL (ref 32–34.5)
MCV RBC AUTO: 95.2 FL (ref 80–99.9)
MONOCYTES NFR BLD: 0.96 K/UL (ref 0.1–0.95)
MONOCYTES NFR BLD: 16 % (ref 2–12)
NEUTROPHILS NFR BLD: 35 % (ref 43–80)
NEUTS SEG NFR BLD: 2.1 K/UL (ref 1.8–7.3)
NON-GYN CYTOLOGY REPORT: NORMAL
PHOSPHATE SERPL-MCNC: 2.7 MG/DL (ref 2.5–4.5)
PLATELET CONFIRMATION: NORMAL
PLATELET, FLUORESCENCE: 72 K/UL (ref 130–450)
PMV BLD AUTO: 10.5 FL (ref 7–12)
POTASSIUM SERPL-SCNC: 3.4 MMOL/L (ref 3.5–5)
PROT SERPL-MCNC: 5.3 G/DL (ref 6.4–8.3)
RBC # BLD AUTO: 2.08 M/UL (ref 3.8–5.8)
RBC # BLD: ABNORMAL 10*6/UL
SODIUM SERPL-SCNC: 137 MMOL/L (ref 132–146)
WBC OTHER # BLD: 6 K/UL (ref 4.5–11.5)

## 2025-02-25 PROCEDURE — 84100 ASSAY OF PHOSPHORUS: CPT

## 2025-02-25 PROCEDURE — 6370000000 HC RX 637 (ALT 250 FOR IP): Performed by: INTERNAL MEDICINE

## 2025-02-25 PROCEDURE — 85014 HEMATOCRIT: CPT

## 2025-02-25 PROCEDURE — 6370000000 HC RX 637 (ALT 250 FOR IP): Performed by: HOSPITALIST

## 2025-02-25 PROCEDURE — 85025 COMPLETE CBC W/AUTO DIFF WBC: CPT

## 2025-02-25 PROCEDURE — 2060000000 HC ICU INTERMEDIATE R&B

## 2025-02-25 PROCEDURE — 6360000002 HC RX W HCPCS: Performed by: STUDENT IN AN ORGANIZED HEALTH CARE EDUCATION/TRAINING PROGRAM

## 2025-02-25 PROCEDURE — 36430 TRANSFUSION BLD/BLD COMPNT: CPT

## 2025-02-25 PROCEDURE — 2500000003 HC RX 250 WO HCPCS: Performed by: INTERNAL MEDICINE

## 2025-02-25 PROCEDURE — P9016 RBC LEUKOCYTES REDUCED: HCPCS

## 2025-02-25 PROCEDURE — 85018 HEMOGLOBIN: CPT

## 2025-02-25 PROCEDURE — 6360000002 HC RX W HCPCS: Performed by: INTERNAL MEDICINE

## 2025-02-25 PROCEDURE — 83735 ASSAY OF MAGNESIUM: CPT

## 2025-02-25 PROCEDURE — 74174 CTA ABD&PLVS W/CONTRAST: CPT

## 2025-02-25 PROCEDURE — 6360000004 HC RX CONTRAST MEDICATION: Performed by: RADIOLOGY

## 2025-02-25 PROCEDURE — 2580000003 HC RX 258: Performed by: INTERNAL MEDICINE

## 2025-02-25 PROCEDURE — 36415 COLL VENOUS BLD VENIPUNCTURE: CPT

## 2025-02-25 PROCEDURE — 99232 SBSQ HOSP IP/OBS MODERATE 35: CPT | Performed by: STUDENT IN AN ORGANIZED HEALTH CARE EDUCATION/TRAINING PROGRAM

## 2025-02-25 PROCEDURE — 80053 COMPREHEN METABOLIC PANEL: CPT

## 2025-02-25 RX ORDER — IOPAMIDOL 755 MG/ML
75 INJECTION, SOLUTION INTRAVASCULAR
Status: COMPLETED | OUTPATIENT
Start: 2025-02-25 | End: 2025-02-25

## 2025-02-25 RX ORDER — SODIUM CHLORIDE 9 MG/ML
INJECTION, SOLUTION INTRAVENOUS PRN
Status: DISCONTINUED | OUTPATIENT
Start: 2025-02-25 | End: 2025-02-25

## 2025-02-25 RX ORDER — OXYCODONE HYDROCHLORIDE 5 MG/1
5 TABLET ORAL EVERY 4 HOURS PRN
Status: DISCONTINUED | OUTPATIENT
Start: 2025-02-25 | End: 2025-03-02 | Stop reason: HOSPADM

## 2025-02-25 RX ORDER — SODIUM CHLORIDE 9 MG/ML
INJECTION, SOLUTION INTRAVENOUS PRN
Status: DISCONTINUED | OUTPATIENT
Start: 2025-02-25 | End: 2025-03-01

## 2025-02-25 RX ADMIN — TAMSULOSIN HYDROCHLORIDE 0.4 MG: 0.4 CAPSULE ORAL at 08:26

## 2025-02-25 RX ADMIN — RIFAXIMIN 550 MG: 550 TABLET ORAL at 22:44

## 2025-02-25 RX ADMIN — MIDODRINE HYDROCHLORIDE 2.5 MG: 2.5 TABLET ORAL at 12:22

## 2025-02-25 RX ADMIN — LACTULOSE 20 G: 20 SOLUTION ORAL at 08:29

## 2025-02-25 RX ADMIN — SPIRONOLACTONE 25 MG: 25 TABLET ORAL at 18:10

## 2025-02-25 RX ADMIN — FUROSEMIDE 40 MG: 10 INJECTION, SOLUTION INTRAMUSCULAR; INTRAVENOUS at 18:10

## 2025-02-25 RX ADMIN — PETROLATUM: 420 OINTMENT TOPICAL at 22:42

## 2025-02-25 RX ADMIN — SODIUM CHLORIDE, PRESERVATIVE FREE 10 ML: 5 INJECTION INTRAVENOUS at 08:27

## 2025-02-25 RX ADMIN — MAJOR MAGNESIUM CITRATE ORAL SOLUTION - LEMON 296 ML: 1.75 LIQUID ORAL at 18:07

## 2025-02-25 RX ADMIN — PETROLATUM: 420 OINTMENT TOPICAL at 08:25

## 2025-02-25 RX ADMIN — RIFAXIMIN 550 MG: 550 TABLET ORAL at 08:25

## 2025-02-25 RX ADMIN — SPIRONOLACTONE 25 MG: 25 TABLET ORAL at 08:27

## 2025-02-25 RX ADMIN — SODIUM CHLORIDE 40 MG: 9 INJECTION INTRAMUSCULAR; INTRAVENOUS; SUBCUTANEOUS at 22:43

## 2025-02-25 RX ADMIN — CETIRIZINE HYDROCHLORIDE 5 MG: 10 TABLET, FILM COATED ORAL at 08:26

## 2025-02-25 RX ADMIN — FUROSEMIDE 40 MG: 10 INJECTION, SOLUTION INTRAMUSCULAR; INTRAVENOUS at 08:27

## 2025-02-25 RX ADMIN — METOPROLOL TARTRATE 25 MG: 25 TABLET, FILM COATED ORAL at 08:26

## 2025-02-25 RX ADMIN — POTASSIUM CHLORIDE 40 MEQ: 1500 TABLET, EXTENDED RELEASE ORAL at 08:38

## 2025-02-25 RX ADMIN — SODIUM CHLORIDE 40 MG: 9 INJECTION INTRAMUSCULAR; INTRAVENOUS; SUBCUTANEOUS at 12:23

## 2025-02-25 RX ADMIN — IOPAMIDOL 75 ML: 755 INJECTION, SOLUTION INTRAVENOUS at 17:02

## 2025-02-25 RX ADMIN — MIDODRINE HYDROCHLORIDE 2.5 MG: 2.5 TABLET ORAL at 08:26

## 2025-02-25 RX ADMIN — OXYCODONE 5 MG: 5 TABLET ORAL at 19:13

## 2025-02-25 RX ADMIN — OXYCODONE 5 MG: 5 TABLET ORAL at 12:24

## 2025-02-25 RX ADMIN — METOPROLOL TARTRATE 25 MG: 25 TABLET, FILM COATED ORAL at 22:44

## 2025-02-25 RX ADMIN — SODIUM CHLORIDE, PRESERVATIVE FREE 10 ML: 5 INJECTION INTRAVENOUS at 22:44

## 2025-02-25 RX ADMIN — ENTECAVIR 1 MG: 1 TABLET, FILM COATED ORAL at 08:25

## 2025-02-25 RX ADMIN — SODIUM CHLORIDE 40 MG: 9 INJECTION INTRAMUSCULAR; INTRAVENOUS; SUBCUTANEOUS at 00:33

## 2025-02-25 RX ADMIN — MIDODRINE HYDROCHLORIDE 2.5 MG: 2.5 TABLET ORAL at 18:10

## 2025-02-25 RX ADMIN — FOLIC ACID 1 MG: 1 TABLET ORAL at 08:26

## 2025-02-25 ASSESSMENT — PAIN SCALES - GENERAL
PAINLEVEL_OUTOF10: 8
PAINLEVEL_OUTOF10: 8

## 2025-02-25 ASSESSMENT — PAIN DESCRIPTION - LOCATION
LOCATION: SCROTUM;FOOT
LOCATION: FOOT
LOCATION: FOOT

## 2025-02-25 ASSESSMENT — PAIN DESCRIPTION - ORIENTATION: ORIENTATION: LEFT

## 2025-02-25 ASSESSMENT — PAIN DESCRIPTION - DESCRIPTORS
DESCRIPTORS: ACHING
DESCRIPTORS: ACHING

## 2025-02-25 NOTE — CARE COORDINATION
Social work / Discharge planning:         Social work met with patient and his girlfriend to discuss discharge plan.    Freedom of choice list provided.     Will need follow up to discuss choices.   Electronically signed by ARIE Anand on 2/25/2025 at 12:46 PM

## 2025-02-26 ENCOUNTER — ANESTHESIA (OUTPATIENT)
Dept: ENDOSCOPY | Age: 75
End: 2025-02-26
Payer: OTHER GOVERNMENT

## 2025-02-26 ENCOUNTER — ANESTHESIA EVENT (OUTPATIENT)
Dept: ENDOSCOPY | Age: 75
End: 2025-02-26
Payer: OTHER GOVERNMENT

## 2025-02-26 LAB
ABO/RH: NORMAL
ALBUMIN SERPL-MCNC: 2.7 G/DL (ref 3.5–5.2)
ALP SERPL-CCNC: 123 U/L (ref 40–129)
ALT SERPL-CCNC: 18 U/L (ref 0–40)
ANION GAP SERPL CALCULATED.3IONS-SCNC: 9 MMOL/L (ref 7–16)
ANTIBODY SCREEN: NEGATIVE
ARM BAND NUMBER: NORMAL
AST SERPL-CCNC: 55 U/L (ref 0–39)
ATYPICAL LYMPHOCYTE ABSOLUTE COUNT: 0.14 K/UL (ref 0–0.46)
ATYPICAL LYMPHOCYTES: 2 % (ref 0–4)
BASOPHILS # BLD: 0.07 K/UL (ref 0–0.2)
BASOPHILS NFR BLD: 1 % (ref 0–2)
BILIRUB SERPL-MCNC: 11 MG/DL (ref 0–1.2)
BLOOD BANK BLOOD PRODUCT EXPIRATION DATE: NORMAL
BLOOD BANK DISPENSE STATUS: NORMAL
BLOOD BANK ISBT PRODUCT BLOOD TYPE: 6200
BLOOD BANK PRODUCT CODE: NORMAL
BLOOD BANK SAMPLE EXPIRATION: NORMAL
BLOOD BANK UNIT TYPE AND RH: NORMAL
BPU ID: NORMAL
BUN SERPL-MCNC: 12 MG/DL (ref 6–23)
CALCIUM SERPL-MCNC: 8.4 MG/DL (ref 8.6–10.2)
CHLORIDE SERPL-SCNC: 102 MMOL/L (ref 98–107)
CO2 SERPL-SCNC: 26 MMOL/L (ref 22–29)
COMPONENT: NORMAL
CREAT SERPL-MCNC: 0.9 MG/DL (ref 0.7–1.2)
CROSSMATCH RESULT: NORMAL
EOSINOPHIL # BLD: 0.27 K/UL (ref 0.05–0.5)
EOSINOPHILS RELATIVE PERCENT: 4 % (ref 0–6)
ERYTHROCYTE [DISTWIDTH] IN BLOOD BY AUTOMATED COUNT: 22.4 % (ref 11.5–15)
GFR, ESTIMATED: >90 ML/MIN/1.73M2
GLUCOSE SERPL-MCNC: 86 MG/DL (ref 74–99)
HCT VFR BLD AUTO: 28 % (ref 37–54)
HGB BLD-MCNC: 9.4 G/DL (ref 12.5–16.5)
INR PPP: 2.8
LYMPHOCYTES NFR BLD: 2.31 K/UL (ref 1.5–4)
LYMPHOCYTES RELATIVE PERCENT: 34 % (ref 20–42)
MAGNESIUM SERPL-MCNC: 1.8 MG/DL (ref 1.6–2.6)
MCH RBC QN AUTO: 30.2 PG (ref 26–35)
MCHC RBC AUTO-ENTMCNC: 33.6 G/DL (ref 32–34.5)
MCV RBC AUTO: 90 FL (ref 80–99.9)
MICROORGANISM SPEC CULT: NO GROWTH
MICROORGANISM/AGENT SPEC: NORMAL
MONOCYTES NFR BLD: 1.02 K/UL (ref 0.1–0.95)
MONOCYTES NFR BLD: 15 % (ref 2–12)
MYELOCYTES ABSOLUTE COUNT: 0.07 K/UL
MYELOCYTES: 1 %
NEUTROPHILS NFR BLD: 43 % (ref 43–80)
NEUTS SEG NFR BLD: 2.92 K/UL (ref 1.8–7.3)
PHOSPHATE SERPL-MCNC: 2.4 MG/DL (ref 2.5–4.5)
PLATELET CONFIRMATION: NORMAL
PLATELET, FLUORESCENCE: 95 K/UL (ref 130–450)
PMV BLD AUTO: 10.1 FL (ref 7–12)
POTASSIUM SERPL-SCNC: 3.5 MMOL/L (ref 3.5–5)
PROT SERPL-MCNC: 6 G/DL (ref 6.4–8.3)
PROTHROMBIN TIME: 29.5 SEC (ref 9.3–12.4)
RBC # BLD AUTO: 3.11 M/UL (ref 3.8–5.8)
RBC # BLD: ABNORMAL 10*6/UL
SERVICE CMNT-IMP: NORMAL
SODIUM SERPL-SCNC: 137 MMOL/L (ref 132–146)
SPECIMEN DESCRIPTION: NORMAL
TRANSFUSION STATUS: NORMAL
UNIT DIVISION: 0
UNIT ISSUE DATE/TIME: NORMAL
WBC OTHER # BLD: 6.8 K/UL (ref 4.5–11.5)

## 2025-02-26 PROCEDURE — 86923 COMPATIBILITY TEST ELECTRIC: CPT

## 2025-02-26 PROCEDURE — 2060000000 HC ICU INTERMEDIATE R&B

## 2025-02-26 PROCEDURE — 3700000001 HC ADD 15 MINUTES (ANESTHESIA): Performed by: INTERNAL MEDICINE

## 2025-02-26 PROCEDURE — 97535 SELF CARE MNGMENT TRAINING: CPT

## 2025-02-26 PROCEDURE — 2580000003 HC RX 258: Performed by: NURSE ANESTHETIST, CERTIFIED REGISTERED

## 2025-02-26 PROCEDURE — 85025 COMPLETE CBC W/AUTO DIFF WBC: CPT

## 2025-02-26 PROCEDURE — 6370000000 HC RX 637 (ALT 250 FOR IP): Performed by: INTERNAL MEDICINE

## 2025-02-26 PROCEDURE — 97530 THERAPEUTIC ACTIVITIES: CPT

## 2025-02-26 PROCEDURE — 99232 SBSQ HOSP IP/OBS MODERATE 35: CPT | Performed by: STUDENT IN AN ORGANIZED HEALTH CARE EDUCATION/TRAINING PROGRAM

## 2025-02-26 PROCEDURE — P9016 RBC LEUKOCYTES REDUCED: HCPCS

## 2025-02-26 PROCEDURE — 86850 RBC ANTIBODY SCREEN: CPT

## 2025-02-26 PROCEDURE — 6360000002 HC RX W HCPCS: Performed by: NURSE ANESTHETIST, CERTIFIED REGISTERED

## 2025-02-26 PROCEDURE — 86901 BLOOD TYPING SEROLOGIC RH(D): CPT

## 2025-02-26 PROCEDURE — 83735 ASSAY OF MAGNESIUM: CPT

## 2025-02-26 PROCEDURE — 36415 COLL VENOUS BLD VENIPUNCTURE: CPT

## 2025-02-26 PROCEDURE — 84100 ASSAY OF PHOSPHORUS: CPT

## 2025-02-26 PROCEDURE — 6360000002 HC RX W HCPCS: Performed by: STUDENT IN AN ORGANIZED HEALTH CARE EDUCATION/TRAINING PROGRAM

## 2025-02-26 PROCEDURE — 2720000010 HC SURG SUPPLY STERILE: Performed by: INTERNAL MEDICINE

## 2025-02-26 PROCEDURE — 2500000003 HC RX 250 WO HCPCS: Performed by: INTERNAL MEDICINE

## 2025-02-26 PROCEDURE — 7100000010 HC PHASE II RECOVERY - FIRST 15 MIN: Performed by: INTERNAL MEDICINE

## 2025-02-26 PROCEDURE — 51702 INSERT TEMP BLADDER CATH: CPT

## 2025-02-26 PROCEDURE — 3609017100 HC EGD: Performed by: INTERNAL MEDICINE

## 2025-02-26 PROCEDURE — 3700000000 HC ANESTHESIA ATTENDED CARE: Performed by: INTERNAL MEDICINE

## 2025-02-26 PROCEDURE — 86900 BLOOD TYPING SEROLOGIC ABO: CPT

## 2025-02-26 PROCEDURE — 2709999900 HC NON-CHARGEABLE SUPPLY: Performed by: INTERNAL MEDICINE

## 2025-02-26 PROCEDURE — 36430 TRANSFUSION BLD/BLD COMPNT: CPT

## 2025-02-26 PROCEDURE — 3609008600 HC SIGMOIDOSCOPY CONTROL HEMORRHAGE: Performed by: INTERNAL MEDICINE

## 2025-02-26 PROCEDURE — 0DJ08ZZ INSPECTION OF UPPER INTESTINAL TRACT, VIA NATURAL OR ARTIFICIAL OPENING ENDOSCOPIC: ICD-10-PCS | Performed by: INTERNAL MEDICINE

## 2025-02-26 PROCEDURE — 85610 PROTHROMBIN TIME: CPT

## 2025-02-26 PROCEDURE — 80053 COMPREHEN METABOLIC PANEL: CPT

## 2025-02-26 PROCEDURE — 7100000011 HC PHASE II RECOVERY - ADDTL 15 MIN: Performed by: INTERNAL MEDICINE

## 2025-02-26 RX ORDER — LIDOCAINE HYDROCHLORIDE 20 MG/ML
INJECTION, SOLUTION EPIDURAL; INFILTRATION; INTRACAUDAL; PERINEURAL
Status: DISCONTINUED | OUTPATIENT
Start: 2025-02-26 | End: 2025-02-27 | Stop reason: SDUPTHER

## 2025-02-26 RX ORDER — PROPOFOL 10 MG/ML
INJECTION, EMULSION INTRAVENOUS
Status: DISCONTINUED | OUTPATIENT
Start: 2025-02-26 | End: 2025-02-27 | Stop reason: SDUPTHER

## 2025-02-26 RX ORDER — SODIUM CHLORIDE 9 MG/ML
INJECTION, SOLUTION INTRAVENOUS
Status: DISCONTINUED | OUTPATIENT
Start: 2025-02-26 | End: 2025-02-26

## 2025-02-26 RX ORDER — SODIUM CHLORIDE 9 MG/ML
INJECTION, SOLUTION INTRAVENOUS
Status: DISCONTINUED | OUTPATIENT
Start: 2025-02-26 | End: 2025-02-27 | Stop reason: SDUPTHER

## 2025-02-26 RX ORDER — SODIUM CHLORIDE 9 MG/ML
INJECTION, SOLUTION INTRAVENOUS PRN
Status: DISCONTINUED | OUTPATIENT
Start: 2025-02-26 | End: 2025-03-01

## 2025-02-26 RX ADMIN — SPIRONOLACTONE 25 MG: 25 TABLET ORAL at 08:26

## 2025-02-26 RX ADMIN — PETROLATUM: 420 OINTMENT TOPICAL at 21:23

## 2025-02-26 RX ADMIN — TAMSULOSIN HYDROCHLORIDE 0.4 MG: 0.4 CAPSULE ORAL at 08:26

## 2025-02-26 RX ADMIN — PETROLATUM: 420 OINTMENT TOPICAL at 09:00

## 2025-02-26 RX ADMIN — FOLIC ACID 1 MG: 1 TABLET ORAL at 08:26

## 2025-02-26 RX ADMIN — LACTULOSE 20 G: 20 SOLUTION ORAL at 08:25

## 2025-02-26 RX ADMIN — METOPROLOL TARTRATE 25 MG: 25 TABLET, FILM COATED ORAL at 21:17

## 2025-02-26 RX ADMIN — SPIRONOLACTONE 25 MG: 25 TABLET ORAL at 17:43

## 2025-02-26 RX ADMIN — FUROSEMIDE 40 MG: 10 INJECTION, SOLUTION INTRAMUSCULAR; INTRAVENOUS at 08:25

## 2025-02-26 RX ADMIN — CETIRIZINE HYDROCHLORIDE 5 MG: 10 TABLET, FILM COATED ORAL at 08:26

## 2025-02-26 RX ADMIN — MIDODRINE HYDROCHLORIDE 2.5 MG: 2.5 TABLET ORAL at 08:25

## 2025-02-26 RX ADMIN — MIDODRINE HYDROCHLORIDE 2.5 MG: 2.5 TABLET ORAL at 16:43

## 2025-02-26 RX ADMIN — OXYCODONE 5 MG: 5 TABLET ORAL at 08:55

## 2025-02-26 RX ADMIN — METOPROLOL TARTRATE 25 MG: 25 TABLET, FILM COATED ORAL at 08:26

## 2025-02-26 RX ADMIN — LACTULOSE 20 G: 20 SOLUTION ORAL at 21:18

## 2025-02-26 RX ADMIN — OXYCODONE 5 MG: 5 TABLET ORAL at 16:48

## 2025-02-26 RX ADMIN — SODIUM CHLORIDE: 9 INJECTION, SOLUTION INTRAVENOUS at 14:40

## 2025-02-26 RX ADMIN — RIFAXIMIN 550 MG: 550 TABLET ORAL at 08:25

## 2025-02-26 RX ADMIN — SODIUM CHLORIDE, PRESERVATIVE FREE 10 ML: 5 INJECTION INTRAVENOUS at 08:26

## 2025-02-26 RX ADMIN — FUROSEMIDE 40 MG: 10 INJECTION, SOLUTION INTRAMUSCULAR; INTRAVENOUS at 17:43

## 2025-02-26 RX ADMIN — SODIUM CHLORIDE, PRESERVATIVE FREE 10 ML: 5 INJECTION INTRAVENOUS at 21:18

## 2025-02-26 RX ADMIN — PROPOFOL 170 MG: 10 INJECTION, EMULSION INTRAVENOUS at 15:02

## 2025-02-26 RX ADMIN — LIDOCAINE HYDROCHLORIDE 40 MG: 20 INJECTION, SOLUTION EPIDURAL; INFILTRATION; INTRACAUDAL; PERINEURAL at 15:02

## 2025-02-26 RX ADMIN — ENTECAVIR 1 MG: 1 TABLET, FILM COATED ORAL at 08:25

## 2025-02-26 ASSESSMENT — PAIN DESCRIPTION - FREQUENCY: FREQUENCY: CONTINUOUS

## 2025-02-26 ASSESSMENT — PAIN DESCRIPTION - ONSET: ONSET: ON-GOING

## 2025-02-26 ASSESSMENT — PAIN DESCRIPTION - ORIENTATION
ORIENTATION: LEFT
ORIENTATION: RIGHT;LEFT

## 2025-02-26 ASSESSMENT — PAIN - FUNCTIONAL ASSESSMENT
PAIN_FUNCTIONAL_ASSESSMENT: ACTIVITIES ARE NOT PREVENTED
PAIN_FUNCTIONAL_ASSESSMENT: NONE - DENIES PAIN
PAIN_FUNCTIONAL_ASSESSMENT: ACTIVITIES ARE NOT PREVENTED

## 2025-02-26 ASSESSMENT — PAIN DESCRIPTION - DESCRIPTORS: DESCRIPTORS: ACHING

## 2025-02-26 ASSESSMENT — PAIN SCALES - GENERAL
PAINLEVEL_OUTOF10: 2
PAINLEVEL_OUTOF10: 8
PAINLEVEL_OUTOF10: 9
PAINLEVEL_OUTOF10: 10
PAINLEVEL_OUTOF10: 0
PAINLEVEL_OUTOF10: 2

## 2025-02-26 ASSESSMENT — PAIN DESCRIPTION - PAIN TYPE: TYPE: CHRONIC PAIN

## 2025-02-26 ASSESSMENT — PAIN DESCRIPTION - LOCATION
LOCATION: FOOT
LOCATION: GENERALIZED
LOCATION: FLANK

## 2025-02-26 NOTE — ANESTHESIA POSTPROCEDURE EVALUATION
Department of Anesthesiology  Postprocedure Note    Patient: Hardik Gray  MRN: [id number]  YOB: 1950  Date of evaluation: 2/26/2025    Procedure Summary       Date: 02/26/25 Room / Location: [place] [address] / [facility]    Anesthesia Start: 1458 Anesthesia Stop:     Procedures:       ESOPHAGOGASTRODUODENOSCOPY      SIGMOIDOSCOPY CONTROL HEMORRHAGE Diagnosis:       Anemia, unspecified type      (Anemia, unspecified type [D64.9])    Surgeons: Pranav Lomas DO Responsible Provider: Debo Earl DO    Anesthesia Type: MAC ASA Status: 4            Anesthesia Type: No value filed.    Jose Phase I: Jose Score: 10    Jose Phase II:      Anesthesia Post Evaluation    Patient location during evaluation: PACU  Patient participation: waiting for patient participation  Level of consciousness: awake  Airway patency: patent  Nausea & Vomiting: no nausea and no vomiting  Cardiovascular status: hemodynamically stable  Respiratory status: acceptable  Hydration status: euvolemic  Pain management: adequate        No notable events documented.

## 2025-02-26 NOTE — ANESTHESIA PRE PROCEDURE
Department of Anesthesiology  Preprocedure Note       Name:  Hardik Gray   Age:  75 y.o.  :  1950                                          MRN:  [id number]         Date:  2025      Surgeon: Surgeon(s):  Pranav Lomas DO    Procedure: Procedure(s):  COLONOSCOPY DIAGNOSTIC    (AVAIL. AT 10AM)    Medications prior to admission:   Prior to Admission medications    Medication Sig Start Date End Date Taking? Authorizing Provider   amLODIPine (NORVASC) 10 MG tablet Take 1 tablet by mouth daily 25  Yes Paul Quiroz MD   omeprazole (PRILOSEC) 20 MG delayed release capsule Take 1 capsule by mouth Daily 25  Yes Paul Quiroz MD   polyethylene glycol (GLYCOLAX) 17 g packet Take 1 packet by mouth daily as needed for Constipation 2/6/25 3/8/25  Clinton Cobb MD   rifAXIMin (XIFAXAN) 550 MG tablet Take 1 tablet by mouth 2 times daily 25   Clinton Cobb MD   spironolactone (ALDACTONE) 25 MG tablet Take 1 tablet by mouth in the morning and 1 tablet in the evening. 25   Clinton Cobb MD   lactulose (CHRONULAC) 10 GM/15ML solution Take 30 mLs by mouth 2 times daily 25   Missy Sanz MD   albuterol (PROVENTIL) (2.5 MG/3ML) 0.083% nebulizer solution Take 3 mLs by nebulization every 6 hours as needed for Wheezing 24   Bridgette Olmos MD   furosemide (LASIX) 40 MG tablet Take 1 tablet by mouth daily 24   Bridgette Olmos MD   midodrine (PROAMATINE) 2.5 MG tablet Take 1 tablet by mouth 3 times daily (with meals) 24   Bridgette Olmos MD   albuterol sulfate HFA (VENTOLIN HFA) 108 (90 Base) MCG/ACT inhaler Inhale 2 puffs into the lungs every 6 hours as needed for Wheezing    Paul Quiroz MD   ferrous sulfate (IRON 325) 325 (65 Fe) MG tablet Take 1 tablet by mouth in the morning and at bedtime    Paul Quiroz MD   magnesium gluconate (MAGONATE) 500 MG tablet Take 1 tablet by mouth daily    Provider, MD Paul

## 2025-02-26 NOTE — CARE COORDINATION
Patient off the floor during rounds.  Noted further decrease in H&H requiring blood transfusion.  CTA of the abdomen and pelvis 2/25/2025 revealed no evidence of active GI bleed.      Clinton Cobb MD

## 2025-02-26 NOTE — ANESTHESIA POSTPROCEDURE EVALUATION
Department of Anesthesiology  Postprocedure Note    Patient: Hardik Gray  MRN: [id number]  YOB: 1950  Date of evaluation: 2/26/2025    Procedure Summary       Date: 02/26/25 Room / Location: [place] [address] / [facility]    Anesthesia Start: 1458 Anesthesia Stop:     Procedures:       ESOPHAGOGASTRODUODENOSCOPY      SIGMOIDOSCOPY CONTROL HEMORRHAGE Diagnosis:       Anemia, unspecified type      (Anemia, unspecified type [D64.9])    Surgeons: Pranav Lomas DO Responsible Provider: Debo Earl DO    Anesthesia Type: MAC ASA Status: 4            Anesthesia Type: No value filed.    Jose Phase I: Jose Score: 10    Jose Phase II:      Anesthesia Post Evaluation    Patient location during evaluation: PACU  Patient participation: complete - patient participated  Level of consciousness: awake  Airway patency: patent  Nausea & Vomiting: no nausea and no vomiting  Cardiovascular status: hemodynamically stable  Respiratory status: acceptable  Hydration status: euvolemic  Pain management: adequate        No notable events documented.

## 2025-02-27 LAB
ALBUMIN SERPL-MCNC: 2.3 G/DL (ref 3.5–5.2)
ALP SERPL-CCNC: 88 U/L (ref 40–129)
ALT SERPL-CCNC: 16 U/L (ref 0–40)
ANION GAP SERPL CALCULATED.3IONS-SCNC: 10 MMOL/L (ref 7–16)
AST SERPL-CCNC: 53 U/L (ref 0–39)
BASOPHILS # BLD: 0 K/UL (ref 0–0.2)
BASOPHILS NFR BLD: 0 % (ref 0–2)
BILIRUB SERPL-MCNC: 9.1 MG/DL (ref 0–1.2)
BUN SERPL-MCNC: 13 MG/DL (ref 6–23)
CALCIUM SERPL-MCNC: 7.9 MG/DL (ref 8.6–10.2)
CHLORIDE SERPL-SCNC: 105 MMOL/L (ref 98–107)
CO2 SERPL-SCNC: 24 MMOL/L (ref 22–29)
CREAT SERPL-MCNC: 0.9 MG/DL (ref 0.7–1.2)
EOSINOPHIL # BLD: 0 K/UL (ref 0.05–0.5)
EOSINOPHILS RELATIVE PERCENT: 0 % (ref 0–6)
ERYTHROCYTE [DISTWIDTH] IN BLOOD BY AUTOMATED COUNT: 22.6 % (ref 11.5–15)
GFR, ESTIMATED: >90 ML/MIN/1.73M2
GLUCOSE SERPL-MCNC: 82 MG/DL (ref 74–99)
HCT VFR BLD AUTO: 22 % (ref 37–54)
HCT VFR BLD AUTO: 23 % (ref 37–54)
HGB BLD-MCNC: 7.4 G/DL (ref 12.5–16.5)
HGB BLD-MCNC: 7.7 G/DL (ref 12.5–16.5)
LYMPHOCYTES NFR BLD: 2.48 K/UL (ref 1.5–4)
LYMPHOCYTES RELATIVE PERCENT: 45 % (ref 20–42)
MAGNESIUM SERPL-MCNC: 1.7 MG/DL (ref 1.6–2.6)
MCH RBC QN AUTO: 30.2 PG (ref 26–35)
MCHC RBC AUTO-ENTMCNC: 33.5 G/DL (ref 32–34.5)
MCV RBC AUTO: 90.2 FL (ref 80–99.9)
MONOCYTES NFR BLD: 0.94 K/UL (ref 0.1–0.95)
MONOCYTES NFR BLD: 17 % (ref 2–12)
NEUTROPHILS NFR BLD: 38 % (ref 43–80)
NEUTS SEG NFR BLD: 2.09 K/UL (ref 1.8–7.3)
PHOSPHATE SERPL-MCNC: 2.3 MG/DL (ref 2.5–4.5)
PLATELET # BLD AUTO: 86 K/UL (ref 130–450)
PLATELET CONFIRMATION: NORMAL
PMV BLD AUTO: 10.3 FL (ref 7–12)
POTASSIUM SERPL-SCNC: 3.3 MMOL/L (ref 3.5–5)
PROT SERPL-MCNC: 5.3 G/DL (ref 6.4–8.3)
RBC # BLD AUTO: 2.55 M/UL (ref 3.8–5.8)
RBC # BLD: ABNORMAL 10*6/UL
SODIUM SERPL-SCNC: 139 MMOL/L (ref 132–146)
WBC # BLD: ABNORMAL 10*3/UL
WBC OTHER # BLD: 5.5 K/UL (ref 4.5–11.5)

## 2025-02-27 PROCEDURE — 6370000000 HC RX 637 (ALT 250 FOR IP): Performed by: INTERNAL MEDICINE

## 2025-02-27 PROCEDURE — 2500000003 HC RX 250 WO HCPCS: Performed by: INTERNAL MEDICINE

## 2025-02-27 PROCEDURE — 80053 COMPREHEN METABOLIC PANEL: CPT

## 2025-02-27 PROCEDURE — 6360000002 HC RX W HCPCS: Performed by: INTERNAL MEDICINE

## 2025-02-27 PROCEDURE — 83735 ASSAY OF MAGNESIUM: CPT

## 2025-02-27 PROCEDURE — 92526 ORAL FUNCTION THERAPY: CPT

## 2025-02-27 PROCEDURE — 85014 HEMATOCRIT: CPT

## 2025-02-27 PROCEDURE — 85025 COMPLETE CBC W/AUTO DIFF WBC: CPT

## 2025-02-27 PROCEDURE — 2580000003 HC RX 258: Performed by: INTERNAL MEDICINE

## 2025-02-27 PROCEDURE — 85018 HEMOGLOBIN: CPT

## 2025-02-27 PROCEDURE — 6360000002 HC RX W HCPCS: Performed by: STUDENT IN AN ORGANIZED HEALTH CARE EDUCATION/TRAINING PROGRAM

## 2025-02-27 PROCEDURE — 84100 ASSAY OF PHOSPHORUS: CPT

## 2025-02-27 PROCEDURE — 2060000000 HC ICU INTERMEDIATE R&B

## 2025-02-27 PROCEDURE — 99232 SBSQ HOSP IP/OBS MODERATE 35: CPT | Performed by: STUDENT IN AN ORGANIZED HEALTH CARE EDUCATION/TRAINING PROGRAM

## 2025-02-27 RX ADMIN — PETROLATUM: 420 OINTMENT TOPICAL at 08:57

## 2025-02-27 RX ADMIN — METOPROLOL TARTRATE 25 MG: 25 TABLET, FILM COATED ORAL at 22:31

## 2025-02-27 RX ADMIN — OXYCODONE 5 MG: 5 TABLET ORAL at 15:33

## 2025-02-27 RX ADMIN — MIDODRINE HYDROCHLORIDE 2.5 MG: 2.5 TABLET ORAL at 18:21

## 2025-02-27 RX ADMIN — LACTULOSE 20 G: 20 SOLUTION ORAL at 08:54

## 2025-02-27 RX ADMIN — ACETAMINOPHEN 650 MG: 325 TABLET ORAL at 22:46

## 2025-02-27 RX ADMIN — SODIUM CHLORIDE 40 MG: 9 INJECTION INTRAMUSCULAR; INTRAVENOUS; SUBCUTANEOUS at 00:29

## 2025-02-27 RX ADMIN — FUROSEMIDE 40 MG: 10 INJECTION, SOLUTION INTRAMUSCULAR; INTRAVENOUS at 08:54

## 2025-02-27 RX ADMIN — MIDODRINE HYDROCHLORIDE 2.5 MG: 2.5 TABLET ORAL at 08:53

## 2025-02-27 RX ADMIN — SODIUM CHLORIDE 40 MG: 9 INJECTION INTRAMUSCULAR; INTRAVENOUS; SUBCUTANEOUS at 13:19

## 2025-02-27 RX ADMIN — SPIRONOLACTONE 25 MG: 25 TABLET ORAL at 18:21

## 2025-02-27 RX ADMIN — OXYCODONE 5 MG: 5 TABLET ORAL at 09:02

## 2025-02-27 RX ADMIN — MIDODRINE HYDROCHLORIDE 2.5 MG: 2.5 TABLET ORAL at 13:18

## 2025-02-27 RX ADMIN — TAMSULOSIN HYDROCHLORIDE 0.4 MG: 0.4 CAPSULE ORAL at 08:53

## 2025-02-27 RX ADMIN — FUROSEMIDE 40 MG: 10 INJECTION, SOLUTION INTRAMUSCULAR; INTRAVENOUS at 18:21

## 2025-02-27 RX ADMIN — SPIRONOLACTONE 25 MG: 25 TABLET ORAL at 08:57

## 2025-02-27 RX ADMIN — METOPROLOL TARTRATE 25 MG: 25 TABLET, FILM COATED ORAL at 08:53

## 2025-02-27 RX ADMIN — RIFAXIMIN 550 MG: 550 TABLET ORAL at 08:56

## 2025-02-27 RX ADMIN — RIFAXIMIN 550 MG: 550 TABLET ORAL at 00:31

## 2025-02-27 RX ADMIN — RIFAXIMIN 550 MG: 550 TABLET ORAL at 22:31

## 2025-02-27 RX ADMIN — ACETAMINOPHEN 650 MG: 325 TABLET ORAL at 03:12

## 2025-02-27 RX ADMIN — FOLIC ACID 1 MG: 1 TABLET ORAL at 08:53

## 2025-02-27 RX ADMIN — SODIUM CHLORIDE 40 MG: 9 INJECTION INTRAMUSCULAR; INTRAVENOUS; SUBCUTANEOUS at 23:57

## 2025-02-27 RX ADMIN — PETROLATUM: 420 OINTMENT TOPICAL at 23:57

## 2025-02-27 RX ADMIN — SODIUM CHLORIDE, PRESERVATIVE FREE 10 ML: 5 INJECTION INTRAVENOUS at 08:57

## 2025-02-27 RX ADMIN — ENTECAVIR 1 MG: 1 TABLET, FILM COATED ORAL at 08:56

## 2025-02-27 RX ADMIN — CETIRIZINE HYDROCHLORIDE 5 MG: 10 TABLET, FILM COATED ORAL at 08:53

## 2025-02-27 RX ADMIN — LACTULOSE 20 G: 20 SOLUTION ORAL at 22:31

## 2025-02-27 ASSESSMENT — PAIN SCALES - GENERAL
PAINLEVEL_OUTOF10: 8
PAINLEVEL_OUTOF10: 5
PAINLEVEL_OUTOF10: 2
PAINLEVEL_OUTOF10: 8
PAINLEVEL_OUTOF10: 6
PAINLEVEL_OUTOF10: 5

## 2025-02-27 ASSESSMENT — PAIN DESCRIPTION - LOCATION
LOCATION: ANKLE
LOCATION: GENERALIZED
LOCATION: LEG
LOCATION: LEG

## 2025-02-27 ASSESSMENT — PAIN - FUNCTIONAL ASSESSMENT
PAIN_FUNCTIONAL_ASSESSMENT: ACTIVITIES ARE NOT PREVENTED
PAIN_FUNCTIONAL_ASSESSMENT: PREVENTS OR INTERFERES SOME ACTIVE ACTIVITIES AND ADLS

## 2025-02-27 ASSESSMENT — PAIN DESCRIPTION - ORIENTATION
ORIENTATION: LEFT
ORIENTATION: RIGHT;LOWER;LEFT;MID

## 2025-02-27 ASSESSMENT — PAIN DESCRIPTION - DESCRIPTORS
DESCRIPTORS: ACHING;DULL;SORE
DESCRIPTORS: ACHING;DULL;SORE;TENDER

## 2025-02-27 NOTE — CARE COORDINATION
Attempted to meet with patient, he was asleep. CM spoke with patients girlfriend, Tigist via phone. PT/17 OT/14 walked 140ft  Discharge plan is home with [facility] when medically stable. NOEMY order completed. Will continue to follow.  Kayley WEBER, RN  Case Management

## 2025-02-28 LAB
ALBUMIN SERPL-MCNC: 2.2 G/DL (ref 3.5–5.2)
ALP SERPL-CCNC: 102 U/L (ref 40–129)
ALT SERPL-CCNC: 16 U/L (ref 0–40)
ANION GAP SERPL CALCULATED.3IONS-SCNC: 9 MMOL/L (ref 7–16)
AST SERPL-CCNC: 50 U/L (ref 0–39)
ATYPICAL LYMPHOCYTE ABSOLUTE COUNT: 0.07 K/UL (ref 0–0.46)
ATYPICAL LYMPHOCYTES: 1 % (ref 0–4)
BASOPHILS # BLD: 0 K/UL (ref 0–0.2)
BASOPHILS NFR BLD: 0 % (ref 0–2)
BILIRUB SERPL-MCNC: 9.3 MG/DL (ref 0–1.2)
BUN SERPL-MCNC: 13 MG/DL (ref 6–23)
CALCIUM SERPL-MCNC: 8 MG/DL (ref 8.6–10.2)
CHLORIDE SERPL-SCNC: 103 MMOL/L (ref 98–107)
CO2 SERPL-SCNC: 25 MMOL/L (ref 22–29)
CREAT SERPL-MCNC: 1 MG/DL (ref 0.7–1.2)
EOSINOPHIL # BLD: 0.07 K/UL (ref 0.05–0.5)
EOSINOPHILS RELATIVE PERCENT: 1 % (ref 0–6)
ERYTHROCYTE [DISTWIDTH] IN BLOOD BY AUTOMATED COUNT: 23.5 % (ref 11.5–15)
GFR, ESTIMATED: 80 ML/MIN/1.73M2
GLUCOSE SERPL-MCNC: 84 MG/DL (ref 74–99)
HCT VFR BLD AUTO: 21.9 % (ref 37–54)
HGB BLD-MCNC: 7.3 G/DL (ref 12.5–16.5)
LYMPHOCYTES NFR BLD: 2.08 K/UL (ref 1.5–4)
LYMPHOCYTES RELATIVE PERCENT: 31 % (ref 20–42)
MAGNESIUM SERPL-MCNC: 1.6 MG/DL (ref 1.6–2.6)
MCH RBC QN AUTO: 30.8 PG (ref 26–35)
MCHC RBC AUTO-ENTMCNC: 33.3 G/DL (ref 32–34.5)
MCV RBC AUTO: 92.4 FL (ref 80–99.9)
MONOCYTES NFR BLD: 1.54 K/UL (ref 0.1–0.95)
MONOCYTES NFR BLD: 23 % (ref 2–12)
NEUTROPHILS NFR BLD: 44 % (ref 43–80)
NEUTS SEG NFR BLD: 2.95 K/UL (ref 1.8–7.3)
PHOSPHATE SERPL-MCNC: 2.8 MG/DL (ref 2.5–4.5)
PLATELET CONFIRMATION: NORMAL
PLATELET, FLUORESCENCE: 99 K/UL (ref 130–450)
PMV BLD AUTO: 9.8 FL (ref 7–12)
POTASSIUM SERPL-SCNC: 3 MMOL/L (ref 3.5–5)
PROT SERPL-MCNC: 5.1 G/DL (ref 6.4–8.3)
RBC # BLD AUTO: 2.37 M/UL (ref 3.8–5.8)
RBC # BLD: ABNORMAL 10*6/UL
SODIUM SERPL-SCNC: 137 MMOL/L (ref 132–146)
WBC OTHER # BLD: 6.7 K/UL (ref 4.5–11.5)

## 2025-02-28 PROCEDURE — 6360000002 HC RX W HCPCS: Performed by: INTERNAL MEDICINE

## 2025-02-28 PROCEDURE — 6360000002 HC RX W HCPCS: Performed by: STUDENT IN AN ORGANIZED HEALTH CARE EDUCATION/TRAINING PROGRAM

## 2025-02-28 PROCEDURE — 84100 ASSAY OF PHOSPHORUS: CPT

## 2025-02-28 PROCEDURE — 99232 SBSQ HOSP IP/OBS MODERATE 35: CPT | Performed by: STUDENT IN AN ORGANIZED HEALTH CARE EDUCATION/TRAINING PROGRAM

## 2025-02-28 PROCEDURE — 83735 ASSAY OF MAGNESIUM: CPT

## 2025-02-28 PROCEDURE — 6370000000 HC RX 637 (ALT 250 FOR IP): Performed by: INTERNAL MEDICINE

## 2025-02-28 PROCEDURE — 2500000003 HC RX 250 WO HCPCS: Performed by: INTERNAL MEDICINE

## 2025-02-28 PROCEDURE — 2060000000 HC ICU INTERMEDIATE R&B

## 2025-02-28 PROCEDURE — 36415 COLL VENOUS BLD VENIPUNCTURE: CPT

## 2025-02-28 PROCEDURE — 2580000003 HC RX 258: Performed by: INTERNAL MEDICINE

## 2025-02-28 PROCEDURE — 80053 COMPREHEN METABOLIC PANEL: CPT

## 2025-02-28 PROCEDURE — 85025 COMPLETE CBC W/AUTO DIFF WBC: CPT

## 2025-02-28 RX ADMIN — SODIUM CHLORIDE 40 MG: 9 INJECTION INTRAMUSCULAR; INTRAVENOUS; SUBCUTANEOUS at 11:23

## 2025-02-28 RX ADMIN — POTASSIUM CHLORIDE 10 MEQ: 10 INJECTION, SOLUTION INTRAVENOUS at 16:19

## 2025-02-28 RX ADMIN — MIDODRINE HYDROCHLORIDE 2.5 MG: 2.5 TABLET ORAL at 11:30

## 2025-02-28 RX ADMIN — RIFAXIMIN 550 MG: 550 TABLET ORAL at 20:56

## 2025-02-28 RX ADMIN — MIDODRINE HYDROCHLORIDE 2.5 MG: 2.5 TABLET ORAL at 08:31

## 2025-02-28 RX ADMIN — FUROSEMIDE 40 MG: 10 INJECTION, SOLUTION INTRAMUSCULAR; INTRAVENOUS at 08:30

## 2025-02-28 RX ADMIN — POTASSIUM CHLORIDE 10 MEQ: 10 INJECTION, SOLUTION INTRAVENOUS at 21:57

## 2025-02-28 RX ADMIN — RIFAXIMIN 550 MG: 550 TABLET ORAL at 08:33

## 2025-02-28 RX ADMIN — PETROLATUM: 420 OINTMENT TOPICAL at 20:57

## 2025-02-28 RX ADMIN — SODIUM CHLORIDE, PRESERVATIVE FREE 10 ML: 5 INJECTION INTRAVENOUS at 02:03

## 2025-02-28 RX ADMIN — MIDODRINE HYDROCHLORIDE 2.5 MG: 2.5 TABLET ORAL at 16:25

## 2025-02-28 RX ADMIN — SPIRONOLACTONE 25 MG: 25 TABLET ORAL at 18:10

## 2025-02-28 RX ADMIN — POTASSIUM CHLORIDE 10 MEQ: 10 INJECTION, SOLUTION INTRAVENOUS at 18:14

## 2025-02-28 RX ADMIN — TAMSULOSIN HYDROCHLORIDE 0.4 MG: 0.4 CAPSULE ORAL at 08:34

## 2025-02-28 RX ADMIN — FOLIC ACID 1 MG: 1 TABLET ORAL at 08:31

## 2025-02-28 RX ADMIN — POTASSIUM CHLORIDE 10 MEQ: 10 INJECTION, SOLUTION INTRAVENOUS at 20:51

## 2025-02-28 RX ADMIN — CETIRIZINE HYDROCHLORIDE 5 MG: 10 TABLET, FILM COATED ORAL at 08:30

## 2025-02-28 RX ADMIN — OXYCODONE 5 MG: 5 TABLET ORAL at 08:49

## 2025-02-28 RX ADMIN — SODIUM CHLORIDE, PRESERVATIVE FREE 10 ML: 5 INJECTION INTRAVENOUS at 08:33

## 2025-02-28 RX ADMIN — PETROLATUM: 420 OINTMENT TOPICAL at 08:30

## 2025-02-28 RX ADMIN — SPIRONOLACTONE 25 MG: 25 TABLET ORAL at 08:31

## 2025-02-28 RX ADMIN — POTASSIUM CHLORIDE 10 MEQ: 10 INJECTION, SOLUTION INTRAVENOUS at 19:28

## 2025-02-28 RX ADMIN — ENTECAVIR 1 MG: 1 TABLET, FILM COATED ORAL at 08:32

## 2025-02-28 RX ADMIN — OXYCODONE 5 MG: 5 TABLET ORAL at 02:44

## 2025-02-28 RX ADMIN — OXYCODONE 5 MG: 5 TABLET ORAL at 14:10

## 2025-02-28 RX ADMIN — OXYCODONE 5 MG: 5 TABLET ORAL at 18:11

## 2025-02-28 RX ADMIN — LACTULOSE 20 G: 20 SOLUTION ORAL at 08:34

## 2025-02-28 RX ADMIN — METOPROLOL TARTRATE 25 MG: 25 TABLET, FILM COATED ORAL at 09:20

## 2025-02-28 RX ADMIN — POTASSIUM CHLORIDE 10 MEQ: 10 INJECTION, SOLUTION INTRAVENOUS at 17:12

## 2025-02-28 RX ADMIN — OXYCODONE 5 MG: 5 TABLET ORAL at 22:48

## 2025-02-28 RX ADMIN — METOPROLOL TARTRATE 25 MG: 25 TABLET, FILM COATED ORAL at 20:56

## 2025-02-28 RX ADMIN — ACETAMINOPHEN 650 MG: 325 TABLET ORAL at 08:49

## 2025-02-28 RX ADMIN — FUROSEMIDE 40 MG: 10 INJECTION, SOLUTION INTRAMUSCULAR; INTRAVENOUS at 18:10

## 2025-02-28 ASSESSMENT — PAIN SCALES - GENERAL
PAINLEVEL_OUTOF10: 8
PAINLEVEL_OUTOF10: 2
PAINLEVEL_OUTOF10: 8

## 2025-02-28 ASSESSMENT — PAIN DESCRIPTION - DESCRIPTORS
DESCRIPTORS: ACHING;TIGHTNESS
DESCRIPTORS: ACHING;TIGHTNESS
DESCRIPTORS: ACHING;DISCOMFORT

## 2025-02-28 ASSESSMENT — PAIN DESCRIPTION - LOCATION
LOCATION: ABDOMEN
LOCATION: LEG;ARM
LOCATION: ANKLE
LOCATION: ABDOMEN
LOCATION: ANKLE
LOCATION: ABDOMEN;GENERALIZED

## 2025-02-28 ASSESSMENT — PAIN DESCRIPTION - ONSET: ONSET: ON-GOING

## 2025-02-28 ASSESSMENT — PAIN DESCRIPTION - FREQUENCY: FREQUENCY: CONTINUOUS

## 2025-02-28 ASSESSMENT — PAIN DESCRIPTION - ORIENTATION
ORIENTATION: RIGHT
ORIENTATION: RIGHT;LEFT;OUTER
ORIENTATION: RIGHT
ORIENTATION: RIGHT;LEFT

## 2025-02-28 ASSESSMENT — PAIN - FUNCTIONAL ASSESSMENT
PAIN_FUNCTIONAL_ASSESSMENT: PREVENTS OR INTERFERES SOME ACTIVE ACTIVITIES AND ADLS
PAIN_FUNCTIONAL_ASSESSMENT: PREVENTS OR INTERFERES WITH ALL ACTIVE AND SOME PASSIVE ACTIVITIES
PAIN_FUNCTIONAL_ASSESSMENT: PREVENTS OR INTERFERES WITH ALL ACTIVE AND SOME PASSIVE ACTIVITIES

## 2025-02-28 ASSESSMENT — PAIN DESCRIPTION - PAIN TYPE: TYPE: CHRONIC PAIN

## 2025-02-28 NOTE — CARE COORDINATION
Therapy evals 2/26/25, PT/17 OT/14 walked 140ft Discharge plan is home with [facility] when medically stable. NOEMY order completed.GI, Speech are following. Hgb 7.3 down trending. S/p repeat EGD 2/26, no active bleeding, s/p repeat flex sig with APC 2/26.  Will continue to follow.   Kayley ABREUN, RN  Case Management      Alcohol abuse

## 2025-03-01 LAB
ALBUMIN SERPL-MCNC: 2.4 G/DL (ref 3.5–5.2)
ALP SERPL-CCNC: 135 U/L (ref 40–129)
ALT SERPL-CCNC: 19 U/L (ref 0–40)
ANION GAP SERPL CALCULATED.3IONS-SCNC: 8 MMOL/L (ref 7–16)
AST SERPL-CCNC: 59 U/L (ref 0–39)
BASOPHILS # BLD: 0.14 K/UL (ref 0–0.2)
BASOPHILS NFR BLD: 2 % (ref 0–2)
BILIRUB SERPL-MCNC: 9.1 MG/DL (ref 0–1.2)
BUN SERPL-MCNC: 12 MG/DL (ref 6–23)
CALCIUM SERPL-MCNC: 8 MG/DL (ref 8.6–10.2)
CHLORIDE SERPL-SCNC: 100 MMOL/L (ref 98–107)
CO2 SERPL-SCNC: 27 MMOL/L (ref 22–29)
CREAT SERPL-MCNC: 1 MG/DL (ref 0.7–1.2)
EOSINOPHIL # BLD: 0.36 K/UL (ref 0.05–0.5)
EOSINOPHILS RELATIVE PERCENT: 4 % (ref 0–6)
ERYTHROCYTE [DISTWIDTH] IN BLOOD BY AUTOMATED COUNT: 23.7 % (ref 11.5–15)
GFR, ESTIMATED: 77 ML/MIN/1.73M2
GLUCOSE SERPL-MCNC: 72 MG/DL (ref 74–99)
HCT VFR BLD AUTO: 21.9 % (ref 37–54)
HCT VFR BLD AUTO: 27.1 % (ref 37–54)
HGB BLD-MCNC: 7.2 G/DL (ref 12.5–16.5)
HGB BLD-MCNC: 8.8 G/DL (ref 12.5–16.5)
LYMPHOCYTES NFR BLD: 2.58 K/UL (ref 1.5–4)
LYMPHOCYTES RELATIVE PERCENT: 32 % (ref 20–42)
MAGNESIUM SERPL-MCNC: 1.5 MG/DL (ref 1.6–2.6)
MCH RBC QN AUTO: 31 PG (ref 26–35)
MCHC RBC AUTO-ENTMCNC: 32.9 G/DL (ref 32–34.5)
MCV RBC AUTO: 94.4 FL (ref 80–99.9)
MONOCYTES NFR BLD: 1.29 K/UL (ref 0.1–0.95)
MONOCYTES NFR BLD: 16 % (ref 2–12)
NEUTROPHILS NFR BLD: 46 % (ref 43–80)
NEUTS SEG NFR BLD: 3.73 K/UL (ref 1.8–7.3)
PHOSPHATE SERPL-MCNC: 3.4 MG/DL (ref 2.5–4.5)
PLATELET, FLUORESCENCE: 114 K/UL (ref 130–450)
PMV BLD AUTO: 9.9 FL (ref 7–12)
POTASSIUM SERPL-SCNC: 3.9 MMOL/L (ref 3.5–5)
PROT SERPL-MCNC: 5.7 G/DL (ref 6.4–8.3)
RBC # BLD AUTO: 2.32 M/UL (ref 3.8–5.8)
RBC # BLD: ABNORMAL 10*6/UL
SODIUM SERPL-SCNC: 135 MMOL/L (ref 132–146)
WBC OTHER # BLD: 8.1 K/UL (ref 4.5–11.5)

## 2025-03-01 PROCEDURE — 6370000000 HC RX 637 (ALT 250 FOR IP): Performed by: INTERNAL MEDICINE

## 2025-03-01 PROCEDURE — 36430 TRANSFUSION BLD/BLD COMPNT: CPT

## 2025-03-01 PROCEDURE — 2580000003 HC RX 258: Performed by: INTERNAL MEDICINE

## 2025-03-01 PROCEDURE — 84100 ASSAY OF PHOSPHORUS: CPT

## 2025-03-01 PROCEDURE — 85025 COMPLETE CBC W/AUTO DIFF WBC: CPT

## 2025-03-01 PROCEDURE — 83735 ASSAY OF MAGNESIUM: CPT

## 2025-03-01 PROCEDURE — 2500000003 HC RX 250 WO HCPCS: Performed by: INTERNAL MEDICINE

## 2025-03-01 PROCEDURE — 6360000002 HC RX W HCPCS: Performed by: INTERNAL MEDICINE

## 2025-03-01 PROCEDURE — P9016 RBC LEUKOCYTES REDUCED: HCPCS

## 2025-03-01 PROCEDURE — 85014 HEMATOCRIT: CPT

## 2025-03-01 PROCEDURE — 2060000000 HC ICU INTERMEDIATE R&B

## 2025-03-01 PROCEDURE — 85018 HEMOGLOBIN: CPT

## 2025-03-01 PROCEDURE — 6360000002 HC RX W HCPCS: Performed by: STUDENT IN AN ORGANIZED HEALTH CARE EDUCATION/TRAINING PROGRAM

## 2025-03-01 PROCEDURE — 80053 COMPREHEN METABOLIC PANEL: CPT

## 2025-03-01 RX ORDER — SODIUM CHLORIDE 9 MG/ML
INJECTION, SOLUTION INTRAVENOUS PRN
Status: DISCONTINUED | OUTPATIENT
Start: 2025-03-01 | End: 2025-03-02 | Stop reason: HOSPADM

## 2025-03-01 RX ADMIN — OXYCODONE 5 MG: 5 TABLET ORAL at 21:09

## 2025-03-01 RX ADMIN — RIFAXIMIN 550 MG: 550 TABLET ORAL at 08:02

## 2025-03-01 RX ADMIN — FOLIC ACID 1 MG: 1 TABLET ORAL at 08:01

## 2025-03-01 RX ADMIN — SODIUM CHLORIDE, PRESERVATIVE FREE 10 ML: 5 INJECTION INTRAVENOUS at 21:07

## 2025-03-01 RX ADMIN — OXYCODONE 5 MG: 5 TABLET ORAL at 14:23

## 2025-03-01 RX ADMIN — MIDODRINE HYDROCHLORIDE 2.5 MG: 2.5 TABLET ORAL at 18:34

## 2025-03-01 RX ADMIN — CETIRIZINE HYDROCHLORIDE 5 MG: 10 TABLET, FILM COATED ORAL at 08:00

## 2025-03-01 RX ADMIN — PETROLATUM: 420 OINTMENT TOPICAL at 08:03

## 2025-03-01 RX ADMIN — SPIRONOLACTONE 25 MG: 25 TABLET ORAL at 18:34

## 2025-03-01 RX ADMIN — PETROLATUM: 420 OINTMENT TOPICAL at 21:07

## 2025-03-01 RX ADMIN — SODIUM CHLORIDE, PRESERVATIVE FREE 10 ML: 5 INJECTION INTRAVENOUS at 08:04

## 2025-03-01 RX ADMIN — MIDODRINE HYDROCHLORIDE 2.5 MG: 2.5 TABLET ORAL at 08:00

## 2025-03-01 RX ADMIN — SODIUM CHLORIDE 40 MG: 9 INJECTION INTRAMUSCULAR; INTRAVENOUS; SUBCUTANEOUS at 02:35

## 2025-03-01 RX ADMIN — METOPROLOL TARTRATE 25 MG: 25 TABLET, FILM COATED ORAL at 08:00

## 2025-03-01 RX ADMIN — ENTECAVIR 1 MG: 1 TABLET, FILM COATED ORAL at 08:01

## 2025-03-01 RX ADMIN — RIFAXIMIN 550 MG: 550 TABLET ORAL at 21:07

## 2025-03-01 RX ADMIN — OXYCODONE 5 MG: 5 TABLET ORAL at 08:18

## 2025-03-01 RX ADMIN — MIDODRINE HYDROCHLORIDE 2.5 MG: 2.5 TABLET ORAL at 13:30

## 2025-03-01 RX ADMIN — METOPROLOL TARTRATE 25 MG: 25 TABLET, FILM COATED ORAL at 21:07

## 2025-03-01 RX ADMIN — SPIRONOLACTONE 25 MG: 25 TABLET ORAL at 08:01

## 2025-03-01 RX ADMIN — FUROSEMIDE 40 MG: 10 INJECTION, SOLUTION INTRAMUSCULAR; INTRAVENOUS at 18:35

## 2025-03-01 RX ADMIN — SODIUM CHLORIDE 40 MG: 9 INJECTION INTRAMUSCULAR; INTRAVENOUS; SUBCUTANEOUS at 14:24

## 2025-03-01 RX ADMIN — TAMSULOSIN HYDROCHLORIDE 0.4 MG: 0.4 CAPSULE ORAL at 08:00

## 2025-03-01 ASSESSMENT — PAIN SCALES - GENERAL
PAINLEVEL_OUTOF10: 5
PAINLEVEL_OUTOF10: 0
PAINLEVEL_OUTOF10: 8
PAINLEVEL_OUTOF10: 0
PAINLEVEL_OUTOF10: 8
PAINLEVEL_OUTOF10: 2

## 2025-03-01 ASSESSMENT — PAIN DESCRIPTION - DESCRIPTORS
DESCRIPTORS: ACHING

## 2025-03-01 ASSESSMENT — PAIN DESCRIPTION - ORIENTATION: ORIENTATION: RIGHT;LEFT

## 2025-03-01 ASSESSMENT — PAIN - FUNCTIONAL ASSESSMENT: PAIN_FUNCTIONAL_ASSESSMENT: ACTIVITIES ARE NOT PREVENTED

## 2025-03-01 ASSESSMENT — PAIN DESCRIPTION - LOCATION: LOCATION: BACK

## 2025-03-01 ASSESSMENT — PAIN SCALES - WONG BAKER: WONGBAKER_NUMERICALRESPONSE: NO HURT

## 2025-03-01 NOTE — DISCHARGE SUMMARY
for paracentesis on 2/14, no repeat this admission d/t inadequate volume. Patient reports increased lower extremity edema and abdominal distention  Gi consult for paracentesis body fluid studies Lasix 40 mg IV daily Aldactone 25 mg daily. Would benefit from eval for possible TIPS procedure     Balanoposthitis, suspected urethritis: d/t chronic indwelling rodrigues, pus reportedly draining from scrotum. CT revealed no gas to suggest Lise gangrene. Scrotal US showing hydroceles and scrotal edema w/o evidence of infection, probable left inguinal hernia  ID c/s, recent treatment with both rocephin and fluconazole during prior admissions, suspect findings on admit UA r/t chronic rodrigues rather than acute infection. Has been doing well since stopping abx, follow Cx and monitor for s/s of infection  Urology consult, rodrigues exchanged 2/14, f/u outpt or with [facility] and may consider voiding trial in 1-2 weeks, otherwise may require SPC. Check GC/Chlamydia.      Acute on chronic macrocytic anemia: HGB 6.6 on admit and initial improvement with transfusion, now repeat 6.4 and s/p 9u RBC and 2u FFP so far this admit, transfuse HGB < 7. Patient is s/p esophogeal banding x5 on prior admission.  Cont PPI bid, consider carafate  GI following  Repeat EGD 2/17 reportedly w/o active bleeding but large clot burden in the stomach and also with banding of 4 esophageal varices  Flex sig 2/17 showing large clot burden in descending colon and rectosigmoid colon  Still with maroon stools, consider repeat flex sig vs full c-scope  Bleed scan 2/19 w/o evidence of acute bleeding noted during acquisition, consider delayed images  Repeat EGD 2/26 showing varices w/o evidence of recent bleeding, prior ulcerations at banding sites w/o blood or clots   Repeat flex sig 2/26, patchy inflammation s/p APC     Acute encephalopathy, fixed gaze defect, History of CVA (resolved) : no baseline deficits. CTH and CTA 2/19 for new right upward gaze deviation showing old

## 2025-03-02 VITALS
DIASTOLIC BLOOD PRESSURE: 82 MMHG | OXYGEN SATURATION: 100 % | SYSTOLIC BLOOD PRESSURE: 116 MMHG | BODY MASS INDEX: 26.96 KG/M2 | TEMPERATURE: 98.4 F | HEIGHT: 69 IN | RESPIRATION RATE: 16 BRPM | HEART RATE: 83 BPM | WEIGHT: 182 LBS

## 2025-03-02 LAB
ABO/RH: NORMAL
ALBUMIN SERPL-MCNC: 2.3 G/DL (ref 3.5–5.2)
ALP SERPL-CCNC: 139 U/L (ref 40–129)
ALT SERPL-CCNC: 18 U/L (ref 0–40)
ANION GAP SERPL CALCULATED.3IONS-SCNC: 11 MMOL/L (ref 7–16)
ANTIBODY SCREEN: NEGATIVE
ARM BAND NUMBER: NORMAL
AST SERPL-CCNC: 54 U/L (ref 0–39)
BASOPHILS # BLD: 0 K/UL (ref 0–0.2)
BASOPHILS NFR BLD: 0 % (ref 0–2)
BILIRUB SERPL-MCNC: 9.9 MG/DL (ref 0–1.2)
BLOOD BANK BLOOD PRODUCT EXPIRATION DATE: NORMAL
BLOOD BANK BLOOD PRODUCT EXPIRATION DATE: NORMAL
BLOOD BANK DISPENSE STATUS: NORMAL
BLOOD BANK DISPENSE STATUS: NORMAL
BLOOD BANK ISBT PRODUCT BLOOD TYPE: 6200
BLOOD BANK ISBT PRODUCT BLOOD TYPE: 6200
BLOOD BANK PRODUCT CODE: NORMAL
BLOOD BANK PRODUCT CODE: NORMAL
BLOOD BANK SAMPLE EXPIRATION: NORMAL
BLOOD BANK UNIT TYPE AND RH: NORMAL
BLOOD BANK UNIT TYPE AND RH: NORMAL
BPU ID: NORMAL
BPU ID: NORMAL
BUN SERPL-MCNC: 13 MG/DL (ref 6–23)
CALCIUM SERPL-MCNC: 8.1 MG/DL (ref 8.6–10.2)
CHLORIDE SERPL-SCNC: 98 MMOL/L (ref 98–107)
CO2 SERPL-SCNC: 23 MMOL/L (ref 22–29)
COMPONENT: NORMAL
COMPONENT: NORMAL
CREAT SERPL-MCNC: 0.9 MG/DL (ref 0.7–1.2)
CROSSMATCH RESULT: NORMAL
CROSSMATCH RESULT: NORMAL
EOSINOPHIL # BLD: 0.1 K/UL (ref 0.05–0.5)
EOSINOPHILS RELATIVE PERCENT: 2 % (ref 0–6)
ERYTHROCYTE [DISTWIDTH] IN BLOOD BY AUTOMATED COUNT: 22.6 % (ref 11.5–15)
GFR, ESTIMATED: >90 ML/MIN/1.73M2
GLUCOSE SERPL-MCNC: 129 MG/DL (ref 74–99)
HCT VFR BLD AUTO: 25.3 % (ref 37–54)
HGB BLD-MCNC: 8.4 G/DL (ref 12.5–16.5)
LYMPHOCYTES NFR BLD: 0.83 K/UL (ref 1.5–4)
LYMPHOCYTES RELATIVE PERCENT: 13 % (ref 20–42)
MAGNESIUM SERPL-MCNC: 1.5 MG/DL (ref 1.6–2.6)
MCH RBC QN AUTO: 30.3 PG (ref 26–35)
MCHC RBC AUTO-ENTMCNC: 33.2 G/DL (ref 32–34.5)
MCV RBC AUTO: 91.3 FL (ref 80–99.9)
MONOCYTES NFR BLD: 1.91 K/UL (ref 0.1–0.95)
MONOCYTES NFR BLD: 30 % (ref 2–12)
MYELOCYTES ABSOLUTE COUNT: 0.05 K/UL
MYELOCYTES: 1 %
NEUTROPHILS NFR BLD: 54 % (ref 43–80)
NEUTS SEG NFR BLD: 3.41 K/UL (ref 1.8–7.3)
PHOSPHATE SERPL-MCNC: 3.3 MG/DL (ref 2.5–4.5)
PLATELET, FLUORESCENCE: 111 K/UL (ref 130–450)
PMV BLD AUTO: 9.9 FL (ref 7–12)
POTASSIUM SERPL-SCNC: 3.9 MMOL/L (ref 3.5–5)
PROT SERPL-MCNC: 5.8 G/DL (ref 6.4–8.3)
RBC # BLD AUTO: 2.77 M/UL (ref 3.8–5.8)
RBC # BLD: ABNORMAL 10*6/UL
SODIUM SERPL-SCNC: 132 MMOL/L (ref 132–146)
TRANSFUSION STATUS: NORMAL
TRANSFUSION STATUS: NORMAL
UNIT DIVISION: 0
UNIT DIVISION: 0
UNIT ISSUE DATE/TIME: NORMAL
UNIT ISSUE DATE/TIME: NORMAL
WBC OTHER # BLD: 6.3 K/UL (ref 4.5–11.5)

## 2025-03-02 PROCEDURE — 83735 ASSAY OF MAGNESIUM: CPT

## 2025-03-02 PROCEDURE — 6370000000 HC RX 637 (ALT 250 FOR IP): Performed by: INTERNAL MEDICINE

## 2025-03-02 PROCEDURE — 84100 ASSAY OF PHOSPHORUS: CPT

## 2025-03-02 PROCEDURE — 6360000002 HC RX W HCPCS: Performed by: STUDENT IN AN ORGANIZED HEALTH CARE EDUCATION/TRAINING PROGRAM

## 2025-03-02 PROCEDURE — 36415 COLL VENOUS BLD VENIPUNCTURE: CPT

## 2025-03-02 PROCEDURE — 99239 HOSP IP/OBS DSCHRG MGMT >30: CPT | Performed by: STUDENT IN AN ORGANIZED HEALTH CARE EDUCATION/TRAINING PROGRAM

## 2025-03-02 PROCEDURE — 85025 COMPLETE CBC W/AUTO DIFF WBC: CPT

## 2025-03-02 PROCEDURE — 2500000003 HC RX 250 WO HCPCS: Performed by: INTERNAL MEDICINE

## 2025-03-02 PROCEDURE — 80053 COMPREHEN METABOLIC PANEL: CPT

## 2025-03-02 RX ADMIN — FOLIC ACID 1 MG: 1 TABLET ORAL at 07:22

## 2025-03-02 RX ADMIN — RIFAXIMIN 550 MG: 550 TABLET ORAL at 07:23

## 2025-03-02 RX ADMIN — MIDODRINE HYDROCHLORIDE 2.5 MG: 2.5 TABLET ORAL at 07:22

## 2025-03-02 RX ADMIN — METOPROLOL TARTRATE 25 MG: 25 TABLET, FILM COATED ORAL at 07:22

## 2025-03-02 RX ADMIN — PETROLATUM: 420 OINTMENT TOPICAL at 07:27

## 2025-03-02 RX ADMIN — SODIUM CHLORIDE, PRESERVATIVE FREE 10 ML: 5 INJECTION INTRAVENOUS at 07:23

## 2025-03-02 RX ADMIN — OXYCODONE 5 MG: 5 TABLET ORAL at 07:31

## 2025-03-02 RX ADMIN — FUROSEMIDE 40 MG: 10 INJECTION, SOLUTION INTRAMUSCULAR; INTRAVENOUS at 07:22

## 2025-03-02 RX ADMIN — CETIRIZINE HYDROCHLORIDE 5 MG: 10 TABLET, FILM COATED ORAL at 07:21

## 2025-03-02 RX ADMIN — SPIRONOLACTONE 25 MG: 25 TABLET ORAL at 07:22

## 2025-03-02 RX ADMIN — ENTECAVIR 1 MG: 1 TABLET, FILM COATED ORAL at 07:23

## 2025-03-02 RX ADMIN — TAMSULOSIN HYDROCHLORIDE 0.4 MG: 0.4 CAPSULE ORAL at 07:22

## 2025-03-02 ASSESSMENT — PAIN SCALES - GENERAL
PAINLEVEL_OUTOF10: 8
PAINLEVEL_OUTOF10: 0
PAINLEVEL_OUTOF10: 0

## 2025-03-02 ASSESSMENT — PAIN DESCRIPTION - ORIENTATION: ORIENTATION: LEFT

## 2025-03-02 ASSESSMENT — PAIN DESCRIPTION - LOCATION: LOCATION: ABDOMEN

## 2025-03-02 NOTE — PLAN OF CARE
Problem: ABCDS Injury Assessment  Goal: Absence of physical injury  2/14/2025 2212 by Ophelia Padilla RN  Outcome: Not Progressing  2/14/2025 1002 by Paul Uriostegui RN  Outcome: Progressing     Problem: Chronic Conditions and Co-morbidities  Goal: Patient's chronic conditions and co-morbidity symptoms are monitored and maintained or improved  2/14/2025 2212 by Ophelia Padilla RN  Outcome: Not Progressing  Flowsheets (Taken 2/14/2025 1955)  Care Plan - Patient's Chronic Conditions and Co-Morbidity Symptoms are Monitored and Maintained or Improved: Monitor and assess patient's chronic conditions and comorbid symptoms for stability, deterioration, or improvement  2/14/2025 1002 by Paul Uriostegui RN  Outcome: Not Progressing  Flowsheets (Taken 2/14/2025 0832)  Care Plan - Patient's Chronic Conditions and Co-Morbidity Symptoms are Monitored and Maintained or Improved: Monitor and assess patient's chronic conditions and comorbid symptoms for stability, deterioration, or improvement     Problem: Pain  Goal: Verbalizes/displays adequate comfort level or baseline comfort level  2/14/2025 2212 by Ophelia Padilla RN  Outcome: Not Progressing  2/14/2025 1002 by Paul Uriostegui RN  Outcome: Progressing     Problem: Safety - Adult  Goal: Free from fall injury  2/14/2025 2212 by Ophelia Padilla RN  Outcome: Not Progressing  2/14/2025 1002 by Paul Uriostegui RN  Outcome: Progressing     Problem: Skin/Tissue Integrity  Goal: Skin integrity remains intact  Description: 1.  Monitor for areas of redness and/or skin breakdown  2.  Assess vascular access sites hourly  3.  Every 4-6 hours minimum:  Change oxygen saturation probe site  4.  Every 4-6 hours:  If on nasal continuous positive airway pressure, respiratory therapy assess nares and determine need for appliance change or resting period  Outcome: Not Progressing  Flowsheets  Taken 2/14/2025 1955 by Ophelia Padilla RN  Skin Integrity Remains Intact:   Monitor for areas of redness and/or 
  Problem: ABCDS Injury Assessment  Goal: Absence of physical injury  2/15/2025 2159 by Ophelia Padilla RN  Outcome: Progressing  2/15/2025 0929 by Paul Uriostegui RN  Outcome: Progressing     Problem: Chronic Conditions and Co-morbidities  Goal: Patient's chronic conditions and co-morbidity symptoms are monitored and maintained or improved  2/15/2025 2159 by Ophelia Padilla RN  Outcome: Progressing  Flowsheets (Taken 2/15/2025 2015)  Care Plan - Patient's Chronic Conditions and Co-Morbidity Symptoms are Monitored and Maintained or Improved: Monitor and assess patient's chronic conditions and comorbid symptoms for stability, deterioration, or improvement  2/15/2025 0929 by Paul Uriostegui RN  Outcome: Not Progressing     Problem: Pain  Goal: Verbalizes/displays adequate comfort level or baseline comfort level  2/15/2025 2159 by Ophelia Padilla RN  Outcome: Progressing  2/15/2025 0929 by Paul Uriostegui RN  Outcome: Progressing     Problem: Safety - Adult  Goal: Free from fall injury  2/15/2025 2159 by Ophelia Padilla RN  Outcome: Progressing  2/15/2025 0929 by Paul Uriostegui RN  Outcome: Progressing     Problem: Skin/Tissue Integrity  Goal: Skin integrity remains intact  Description: 1.  Monitor for areas of redness and/or skin breakdown  2.  Assess vascular access sites hourly  3.  Every 4-6 hours minimum:  Change oxygen saturation probe site  4.  Every 4-6 hours:  If on nasal continuous positive airway pressure, respiratory therapy assess nares and determine need for appliance change or resting period  2/15/2025 2159 by Ophelia Padilla RN  Outcome: Progressing  Flowsheets (Taken 2/15/2025 2015)  Skin Integrity Remains Intact: Monitor for areas of redness and/or skin breakdown  2/15/2025 0929 by Paul Uriostegui RN  Outcome: Progressing  Flowsheets (Taken 2/15/2025 0808)  Skin Integrity Remains Intact: Monitor for areas of redness and/or skin breakdown     Problem: Chronic Conditions and Co-morbidities  Goal: Patient's 
  Problem: ABCDS Injury Assessment  Goal: Absence of physical injury  2/16/2025 2206 by Yeni Pandya RN  Outcome: Progressing  2/16/2025 1508 by Carlos Villaseñor RN  Outcome: Progressing     Problem: Chronic Conditions and Co-morbidities  Goal: Patient's chronic conditions and co-morbidity symptoms are monitored and maintained or improved  2/16/2025 2206 by Yeni Pandya RN  Outcome: Progressing  Flowsheets (Taken 2/16/2025 1540)  Care Plan - Patient's Chronic Conditions and Co-Morbidity Symptoms are Monitored and Maintained or Improved: Monitor and assess patient's chronic conditions and comorbid symptoms for stability, deterioration, or improvement  2/16/2025 1508 by Carlos Villaseñor RN  Outcome: Progressing     Problem: Pain  Goal: Verbalizes/displays adequate comfort level or baseline comfort level  2/16/2025 2206 by Yeni Pandya RN  Outcome: Progressing  2/16/2025 1508 by Carlos Villaseñor RN  Outcome: Progressing     Problem: Safety - Adult  Goal: Free from fall injury  2/16/2025 1508 by Carlos Villaseñor RN  Outcome: Progressing     Problem: Skin/Tissue Integrity  Goal: Skin integrity remains intact  Description: 1.  Monitor for areas of redness and/or skin breakdown  2.  Assess vascular access sites hourly  3.  Every 4-6 hours minimum:  Change oxygen saturation probe site  4.  Every 4-6 hours:  If on nasal continuous positive airway pressure, respiratory therapy assess nares and determine need for appliance change or resting period  2/16/2025 1508 by Carlos Villaseñor RN  Outcome: Progressing     
  Problem: ABCDS Injury Assessment  Goal: Absence of physical injury  2/17/2025 0245 by Raheem Coates RN  Outcome: Progressing  2/16/2025 2206 by Yeni Pandya RN  Outcome: Progressing  2/16/2025 1508 by Carlos Villaseñor RN  Outcome: Progressing     Problem: Chronic Conditions and Co-morbidities  Goal: Patient's chronic conditions and co-morbidity symptoms are monitored and maintained or improved  2/17/2025 0245 by Raheem Coates RN  Outcome: Progressing  2/16/2025 2206 by Yeni Pandya RN  Outcome: Progressing  Flowsheets (Taken 2/16/2025 1540)  Care Plan - Patient's Chronic Conditions and Co-Morbidity Symptoms are Monitored and Maintained or Improved: Monitor and assess patient's chronic conditions and comorbid symptoms for stability, deterioration, or improvement  2/16/2025 1508 by Carlos Villaseñor RN  Outcome: Progressing     Problem: Pain  Goal: Verbalizes/displays adequate comfort level or baseline comfort level  2/17/2025 0245 by Raheem Coates RN  Outcome: Progressing  2/16/2025 2206 by Yeni Pandya RN  Outcome: Progressing  2/16/2025 1508 by Carlos Villaseñor RN  Outcome: Progressing     Problem: Safety - Adult  Goal: Free from fall injury  2/17/2025 0245 by Raheem Coates RN  Outcome: Progressing  2/16/2025 1508 by Carlos Villaseñor RN  Outcome: Progressing     Problem: Skin/Tissue Integrity  Goal: Skin integrity remains intact  Description: 1.  Monitor for areas of redness and/or skin breakdown  2.  Assess vascular access sites hourly  3.  Every 4-6 hours minimum:  Change oxygen saturation probe site  4.  Every 4-6 hours:  If on nasal continuous positive airway pressure, respiratory therapy assess nares and determine need for appliance change or resting period  2/17/2025 0245 by Raheem Coates RN  Outcome: Progressing  2/16/2025 1508 by Carlos Villaseñor RN  Outcome: Progressing     
  Problem: ABCDS Injury Assessment  Goal: Absence of physical injury  2/17/2025 1239 by Cooper Ovalles RN  Outcome: Progressing  2/17/2025 0245 by Raheem Coates RN  Outcome: Progressing     Problem: Chronic Conditions and Co-morbidities  Goal: Patient's chronic conditions and co-morbidity symptoms are monitored and maintained or improved  2/17/2025 1239 by Cooper Ovalles RN  Outcome: Progressing  2/17/2025 0245 by Raheem Coates RN  Outcome: Progressing     Problem: Pain  Goal: Verbalizes/displays adequate comfort level or baseline comfort level  2/17/2025 1239 by Cooper Ovalles RN  Outcome: Progressing  2/17/2025 0245 by Raheem Coates RN  Outcome: Progressing     Problem: Safety - Adult  Goal: Free from fall injury  2/17/2025 0245 by Raheem Coates RN  Outcome: Progressing     
  Problem: ABCDS Injury Assessment  Goal: Absence of physical injury  2/17/2025 2110 by Drea Arias RN  Outcome: Progressing  2/17/2025 1239 by Cooper Ovalles RN  Outcome: Progressing     Problem: Chronic Conditions and Co-morbidities  Goal: Patient's chronic conditions and co-morbidity symptoms are monitored and maintained or improved  2/17/2025 2110 by Drea Arias RN  Outcome: Progressing  2/17/2025 1239 by Cooper Ovalles RN  Outcome: Progressing     Problem: Pain  Goal: Verbalizes/displays adequate comfort level or baseline comfort level  2/17/2025 2110 by Drea Arias RN  Outcome: Progressing  Flowsheets (Taken 2/17/2025 2000)  Verbalizes/displays adequate comfort level or baseline comfort level:   Encourage patient to monitor pain and request assistance   Assess pain using appropriate pain scale   Administer analgesics based on type and severity of pain and evaluate response  2/17/2025 1239 by Cooper Ovalles RN  Outcome: Progressing     Problem: Safety - Adult  Goal: Free from fall injury  Outcome: Progressing     Problem: Skin/Tissue Integrity  Goal: Skin integrity remains intact  Description: 1.  Monitor for areas of redness and/or skin breakdown  2.  Assess vascular access sites hourly  3.  Every 4-6 hours minimum:  Change oxygen saturation probe site  4.  Every 4-6 hours:  If on nasal continuous positive airway pressure, respiratory therapy assess nares and determine need for appliance change or resting period  Outcome: Progressing  Flowsheets (Taken 2/17/2025 2110)  Skin Integrity Remains Intact: Monitor for areas of redness and/or skin breakdown     Problem: Discharge Planning  Goal: Discharge to home or other facility with appropriate resources  Outcome: Progressing     
  Problem: ABCDS Injury Assessment  Goal: Absence of physical injury  2/19/2025 2130 by Nohemi Yates RN  Outcome: Progressing  2/19/2025 2130 by Nohemi Yates RN  Outcome: Progressing  2/19/2025 1817 by Charito Batista RN  Outcome: Progressing     Problem: Chronic Conditions and Co-morbidities  Goal: Patient's chronic conditions and co-morbidity symptoms are monitored and maintained or improved  2/19/2025 2130 by Nohemi Yates RN  Outcome: Progressing  2/19/2025 2130 by Nohemi Yates RN  Outcome: Progressing  2/19/2025 1817 by Charito Batista RN  Outcome: Progressing     Problem: Pain  Goal: Verbalizes/displays adequate comfort level or baseline comfort level  2/19/2025 2130 by Nohemi Yates RN  Outcome: Progressing  2/19/2025 2130 by Nohemi Yates RN  Outcome: Progressing  2/19/2025 1817 by Charito Batista RN  Outcome: Progressing     Problem: Safety - Adult  Goal: Free from fall injury  2/19/2025 2130 by Nohemi Yates RN  Outcome: Progressing  2/19/2025 2130 by Nohemi Yates RN  Outcome: Progressing  2/19/2025 1817 by Charito Batista RN  Outcome: Progressing     Problem: Skin/Tissue Integrity  Goal: Skin integrity remains intact  Description: 1.  Monitor for areas of redness and/or skin breakdown  2.  Assess vascular access sites hourly  3.  Every 4-6 hours minimum:  Change oxygen saturation probe site  4.  Every 4-6 hours:  If on nasal continuous positive airway pressure, respiratory therapy assess nares and determine need for appliance change or resting period  2/19/2025 2130 by Nohemi Yates RN  Outcome: Progressing  2/19/2025 2130 by Nohemi Yates RN  Outcome: Progressing  2/19/2025 1817 by Charito Batista RN  Outcome: Progressing     Problem: Discharge Planning  Goal: Discharge to home or other facility with appropriate resources  2/19/2025 2130 by Nohemi Yates RN  Outcome: Progressing  2/19/2025 2130 by Nohemi Yates RN  Outcome: Progressing  2/19/2025 1817 by Charito Batista, RN  Outcome: Progressing   
  Problem: ABCDS Injury Assessment  Goal: Absence of physical injury  2/20/2025 2228 by Bill Carbera RN  Outcome: Progressing     Problem: Chronic Conditions and Co-morbidities  Goal: Patient's chronic conditions and co-morbidity symptoms are monitored and maintained or improved  2/20/2025 2228 by Bill Cabrera RN  Outcome: Progressing  Flowsheets (Taken 2/20/2025 2000)  Care Plan - Patient's Chronic Conditions and Co-Morbidity Symptoms are Monitored and Maintained or Improved: Monitor and assess patient's chronic conditions and comorbid symptoms for stability, deterioration, or improvement     Problem: Pain  Goal: Verbalizes/displays adequate comfort level or baseline comfort level  2/20/2025 2228 by Bill Cabrera RN  Outcome: Progressing     Problem: Safety - Adult  Goal: Free from fall injury  2/20/2025 2228 by Bill Cabrera RN  Outcome: Progressing     Problem: Skin/Tissue Integrity  Goal: Skin integrity remains intact  Description: 1.  Monitor for areas of redness and/or skin breakdown  2.  Assess vascular access sites hourly  3.  Every 4-6 hours minimum:  Change oxygen saturation probe site  4.  Every 4-6 hours:  If on nasal continuous positive airway pressure, respiratory therapy assess nares and determine need for appliance change or resting period  2/20/2025 2228 by Bill Cabrera RN  Outcome: Progressing  Flowsheets (Taken 2/20/2025 2000)  Skin Integrity Remains Intact:   Monitor for areas of redness and/or skin breakdown   Assess vascular access sites hourly     Problem: Discharge Planning  Goal: Discharge to home or other facility with appropriate resources  2/20/2025 2228 by Bill Cabrera RN  Outcome: Progressing  Flowsheets (Taken 2/20/2025 2000)  Discharge to home or other facility with appropriate resources: Identify barriers to discharge with patient and caregiver     Problem: Nutrition Deficit:  Goal: Optimize nutritional status  2/20/2025 2228 by Bill Cabrera RN  Outcome: 
  Problem: ABCDS Injury Assessment  Goal: Absence of physical injury  2/21/2025 1212 by Lynnette Alejandro RN  Outcome: Progressing  2/20/2025 2228 by Bill Cabrera RN  Outcome: Progressing     Problem: Chronic Conditions and Co-morbidities  Goal: Patient's chronic conditions and co-morbidity symptoms are monitored and maintained or improved  2/21/2025 1212 by Lynnette Alejandro RN  Outcome: Progressing  2/20/2025 2228 by Bill Cabrera RN  Outcome: Progressing  Flowsheets (Taken 2/20/2025 2000)  Care Plan - Patient's Chronic Conditions and Co-Morbidity Symptoms are Monitored and Maintained or Improved: Monitor and assess patient's chronic conditions and comorbid symptoms for stability, deterioration, or improvement     Problem: Pain  Goal: Verbalizes/displays adequate comfort level or baseline comfort level  2/21/2025 1212 by Lynnette Alejandro RN  Outcome: Progressing  2/20/2025 2228 by Bill Cabrera RN  Outcome: Progressing     Problem: Safety - Adult  Goal: Free from fall injury  2/21/2025 1212 by Lynnette Alejandro RN  Outcome: Progressing  2/20/2025 2228 by Bill Cabrera RN  Outcome: Progressing     Problem: Skin/Tissue Integrity  Goal: Skin integrity remains intact  Description: 1.  Monitor for areas of redness and/or skin breakdown  2.  Assess vascular access sites hourly  3.  Every 4-6 hours minimum:  Change oxygen saturation probe site  4.  Every 4-6 hours:  If on nasal continuous positive airway pressure, respiratory therapy assess nares and determine need for appliance change or resting period  2/21/2025 1212 by Lynnette Alejandro RN  Outcome: Progressing  2/20/2025 2228 by Bill Cabrera RN  Outcome: Progressing  Flowsheets (Taken 2/20/2025 2000)  Skin Integrity Remains Intact:   Monitor for areas of redness and/or skin breakdown   Assess vascular access sites hourly     Problem: Discharge Planning  Goal: Discharge to home or other facility with appropriate resources  2/21/2025 1212 by Javon 
  Problem: ABCDS Injury Assessment  Goal: Absence of physical injury  2/26/2025 2329 by Sujata Hargrove, RN  Outcome: Progressing  2/26/2025 1809 by Emilie Alexis RN  Outcome: Progressing     Problem: Chronic Conditions and Co-morbidities  Goal: Patient's chronic conditions and co-morbidity symptoms are monitored and maintained or improved  2/26/2025 2329 by Sujata Hargrove, RN  Outcome: Progressing  2/26/2025 1809 by Emilie Alexis RN  Outcome: Progressing     Problem: Pain  Goal: Verbalizes/displays adequate comfort level or baseline comfort level  2/26/2025 2329 by Sujata Hargrove, RN  Outcome: Progressing  2/26/2025 1809 by Emilie Alexis RN  Outcome: Progressing     Problem: Safety - Adult  Goal: Free from fall injury  Outcome: Progressing     Problem: Skin/Tissue Integrity  Goal: Skin integrity remains intact  Description: 1.  Monitor for areas of redness and/or skin breakdown  2.  Assess vascular access sites hourly  3.  Every 4-6 hours minimum:  Change oxygen saturation probe site  4.  Every 4-6 hours:  If on nasal continuous positive airway pressure, respiratory therapy assess nares and determine need for appliance change or resting period  Outcome: Progressing  Flowsheets  Taken 2/26/2025 1808 by Emilie Alexis, RN  Skin Integrity Remains Intact: Monitor for areas of redness and/or skin breakdown  Taken 2/26/2025 1757 by Emilie Alexis, RN  Skin Integrity Remains Intact: Monitor for areas of redness and/or skin breakdown     Problem: Discharge Planning  Goal: Discharge to home or other facility with appropriate resources  Outcome: Progressing     Problem: Nutrition Deficit:  Goal: Optimize nutritional status  Outcome: Progressing     Problem: Hematologic - Adult  Goal: Maintains hematologic stability  Outcome: Progressing     Problem: ABCDS Injury Assessment  Goal: Absence of physical injury  2/26/2025 2329 by Sujata Hargrove, RN  Outcome: Progressing  2/26/2025 1809 by Emilie Alexis, 
  Problem: ABCDS Injury Assessment  Goal: Absence of physical injury  Outcome: Progressing     Problem: Chronic Conditions and Co-morbidities  Goal: Patient's chronic conditions and co-morbidity symptoms are monitored and maintained or improved  Outcome: Not Progressing  Flowsheets (Taken 2/14/2025 0832)  Care Plan - Patient's Chronic Conditions and Co-Morbidity Symptoms are Monitored and Maintained or Improved: Monitor and assess patient's chronic conditions and comorbid symptoms for stability, deterioration, or improvement     Problem: Safety - Adult  Goal: Free from fall injury  Outcome: Progressing     Problem: Chronic Conditions and Co-morbidities  Goal: Patient's chronic conditions and co-morbidity symptoms are monitored and maintained or improved  Outcome: Not Progressing  Flowsheets (Taken 2/14/2025 0832)  Care Plan - Patient's Chronic Conditions and Co-Morbidity Symptoms are Monitored and Maintained or Improved: Monitor and assess patient's chronic conditions and comorbid symptoms for stability, deterioration, or improvement     
  Problem: ABCDS Injury Assessment  Goal: Absence of physical injury  Outcome: Progressing     Problem: Chronic Conditions and Co-morbidities  Goal: Patient's chronic conditions and co-morbidity symptoms are monitored and maintained or improved  Outcome: Progressing     Problem: Pain  Goal: Verbalizes/displays adequate comfort level or baseline comfort level  Outcome: Progressing     Problem: Safety - Adult  Goal: Free from fall injury  Outcome: Progressing     Problem: Skin/Tissue Integrity  Goal: Skin integrity remains intact  Description: 1.  Monitor for areas of redness and/or skin breakdown  2.  Assess vascular access sites hourly  3.  Every 4-6 hours minimum:  Change oxygen saturation probe site  4.  Every 4-6 hours:  If on nasal continuous positive airway pressure, respiratory therapy assess nares and determine need for appliance change or resting period  Outcome: Progressing     
  Problem: ABCDS Injury Assessment  Goal: Absence of physical injury  Outcome: Progressing     Problem: Chronic Conditions and Co-morbidities  Goal: Patient's chronic conditions and co-morbidity symptoms are monitored and maintained or improved  Outcome: Progressing     Problem: Pain  Goal: Verbalizes/displays adequate comfort level or baseline comfort level  Outcome: Progressing     Problem: Safety - Adult  Goal: Free from fall injury  Outcome: Progressing     Problem: Skin/Tissue Integrity  Goal: Skin integrity remains intact  Description: 1.  Monitor for areas of redness and/or skin breakdown  2.  Assess vascular access sites hourly  3.  Every 4-6 hours minimum:  Change oxygen saturation probe site  4.  Every 4-6 hours:  If on nasal continuous positive airway pressure, respiratory therapy assess nares and determine need for appliance change or resting period  Outcome: Progressing     Problem: Discharge Planning  Goal: Discharge to home or other facility with appropriate resources  Outcome: Progressing     Problem: Nutrition Deficit:  Goal: Optimize nutritional status  2/19/2025 0133 by Zulma Diaz RN  Outcome: Progressing  2/18/2025 1515 by Lorin Lombardo RD, CNSC, LD  Flowsheets (Taken 2/18/2025 1515)  Nutrient intake appropriate for improving, restoring, or maintaining nutritional needs:   Assess nutritional status and recommend course of action   Monitor oral intake, labs, and treatment plans   Recommend appropriate diets, oral nutritional supplements, and vitamin/mineral supplements     
  Problem: ABCDS Injury Assessment  Goal: Absence of physical injury  Outcome: Progressing     Problem: Chronic Conditions and Co-morbidities  Goal: Patient's chronic conditions and co-morbidity symptoms are monitored and maintained or improved  Outcome: Progressing     Problem: Pain  Goal: Verbalizes/displays adequate comfort level or baseline comfort level  Outcome: Progressing     Problem: Safety - Adult  Goal: Free from fall injury  Outcome: Progressing     Problem: Skin/Tissue Integrity  Goal: Skin integrity remains intact  Description: 1.  Monitor for areas of redness and/or skin breakdown  2.  Assess vascular access sites hourly  3.  Every 4-6 hours minimum:  Change oxygen saturation probe site  4.  Every 4-6 hours:  If on nasal continuous positive airway pressure, respiratory therapy assess nares and determine need for appliance change or resting period  Outcome: Progressing     Problem: Discharge Planning  Goal: Discharge to home or other facility with appropriate resources  Outcome: Progressing     Problem: Nutrition Deficit:  Goal: Optimize nutritional status  Outcome: Progressing     
  Problem: ABCDS Injury Assessment  Goal: Absence of physical injury  Outcome: Progressing     Problem: Chronic Conditions and Co-morbidities  Goal: Patient's chronic conditions and co-morbidity symptoms are monitored and maintained or improved  Outcome: Progressing     Problem: Pain  Goal: Verbalizes/displays adequate comfort level or baseline comfort level  Outcome: Progressing     Problem: Safety - Adult  Goal: Free from fall injury  Outcome: Progressing     Problem: Skin/Tissue Integrity  Goal: Skin integrity remains intact  Description: 1.  Monitor for areas of redness and/or skin breakdown  2.  Assess vascular access sites hourly  3.  Every 4-6 hours minimum:  Change oxygen saturation probe site  4.  Every 4-6 hours:  If on nasal continuous positive airway pressure, respiratory therapy assess nares and determine need for appliance change or resting period  Outcome: Progressing     Problem: Discharge Planning  Goal: Discharge to home or other facility with appropriate resources  Outcome: Progressing     Problem: Nutrition Deficit:  Goal: Optimize nutritional status  Outcome: Progressing     Problem: Hematologic - Adult  Goal: Maintains hematologic stability  Outcome: Progressing     
  Problem: ABCDS Injury Assessment  Goal: Absence of physical injury  Outcome: Progressing     Problem: Chronic Conditions and Co-morbidities  Goal: Patient's chronic conditions and co-morbidity symptoms are monitored and maintained or improved  Outcome: Progressing     Problem: Pain  Goal: Verbalizes/displays adequate comfort level or baseline comfort level  Outcome: Progressing     Problem: Skin/Tissue Integrity  Goal: Skin integrity remains intact  Description: 1.  Monitor for areas of redness and/or skin breakdown  2.  Assess vascular access sites hourly  3.  Every 4-6 hours minimum:  Change oxygen saturation probe site  4.  Every 4-6 hours:  If on nasal continuous positive airway pressure, respiratory therapy assess nares and determine need for appliance change or resting period  Outcome: Progressing  Flowsheets (Taken 2/28/2025 0841 by Melanie Garcia, RN)  Skin Integrity Remains Intact: Monitor for areas of redness and/or skin breakdown     Problem: Discharge Planning  Goal: Discharge to home or other facility with appropriate resources  Outcome: Progressing     Problem: Nutrition Deficit:  Goal: Optimize nutritional status  Outcome: Progressing     Problem: Hematologic - Adult  Goal: Maintains hematologic stability  Outcome: Progressing     Problem: Safety - Adult  Goal: Free from fall injury  Outcome: Adequate for Discharge     
  Problem: ABCDS Injury Assessment  Goal: Absence of physical injury  Outcome: Progressing     Problem: Chronic Conditions and Co-morbidities  Goal: Patient's chronic conditions and co-morbidity symptoms are monitored and maintained or improved  Outcome: Progressing     Problem: Pain  Goal: Verbalizes/displays adequate comfort level or baseline comfort level  Outcome: Progressing     Problem: Skin/Tissue Integrity  Goal: Skin integrity remains intact  Description: 1.  Monitor for areas of redness and/or skin breakdown  2.  Assess vascular access sites hourly  3.  Every 4-6 hours minimum:  Change oxygen saturation probe site  4.  Every 4-6 hours:  If on nasal continuous positive airway pressure, respiratory therapy assess nares and determine need for appliance change or resting period  Recent Flowsheet Documentation  Taken 2/26/2025 1808 by Emilie Alexis, RN  Skin Integrity Remains Intact: Monitor for areas of redness and/or skin breakdown  Taken 2/26/2025 1757 by Emilie Alexis, RN  Skin Integrity Remains Intact: Monitor for areas of redness and/or skin breakdown     
  Problem: Chronic Conditions and Co-morbidities  Goal: Patient's chronic conditions and co-morbidity symptoms are monitored and maintained or improved  Outcome: Progressing     
  Problem: Safety - Adult  Goal: Free from fall injury  Outcome: Progressing     
Noted am hgb 6.4    -EGD 2/3/2025 by Dr. Lomas with findings of grade 3 varices banded x 5, nonbleeding superficial gastric ulcer 4 mm, gastritis, unremarkable examined proximal small bowel  Flexible sigmoidoscopy 2/17/2025 revealed maroon stool but no active bleeding   EGD 2/17/2025 revealing varices which were banded     Per GI  -Twice a day IV PPI  -Octreotide drip -wean off    Gib noted sp variceal banding, GI onsult  +/- radiation proctitis  +hematuria   Transfuse one PRBC  
hourly  Taken 2/14/2025 0832 by Paul Uriostegui, RN  Skin Integrity Remains Intact: Monitor for areas of redness and/or skin breakdown

## 2025-03-02 NOTE — PROGRESS NOTES
[facility] Hospitalist Progress Note    Admitting Date and Time: 2/13/2025 11:35 AM  Admit Dx: Complicated UTI (urinary tract infection) [N39.0]  Ascites of liver [R18.8]  Ascites due to alcoholic cirrhosis (HCC) [K70.31]  Anemia, unspecified type [D64.9]    Subjective:  Patient is being followed for Complicated UTI (urinary tract infection) [N39.0]  Ascites of liver [R18.8]  Ascites due to alcoholic cirrhosis (HCC) [K70.31]  Anemia, unspecified type [D64.9]   Pt feels about the same  Per RN: no additional concerns    ROS: denies fever, chills, cp, sob, n/v, HA unless otherwise noted above     entecavir  1 mg Oral Daily    albumin human 25%  50 g IntraVENous Once    furosemide  40 mg IntraVENous Daily    pantoprazole (PROTONIX) 40 mg in sodium chloride (PF) 0.9 % 10 mL injection  40 mg IntraVENous Q12H    sodium chloride flush  5-40 mL IntraVENous 2 times per day    folic acid  1 mg Oral Daily    lactulose  20 g Oral BID    metoprolol tartrate  25 mg Oral BID    midodrine  2.5 mg Oral TID WC    rifAXIMin  400 mg Oral TID    spironolactone  25 mg Oral BID    tamsulosin  0.4 mg Oral Daily    sodium chloride flush  5-40 mL IntraVENous 2 times per day     sodium chloride, , PRN  sodium chloride flush, 5-40 mL, PRN  sodium chloride, , PRN  albuterol, 2.5 mg, Q6H PRN  oxyCODONE HCl, 10 mg, Q6H PRN  sodium chloride flush, 5-40 mL, PRN  sodium chloride, , PRN  potassium chloride, 40 mEq, PRN   Or  potassium alternative oral replacement, 40 mEq, PRN   Or  potassium chloride, 10 mEq, PRN  ondansetron, 4 mg, Q8H PRN   Or  ondansetron, 4 mg, Q6H PRN  polyethylene glycol, 17 g, Daily PRN  acetaminophen, 650 mg, Q6H PRN   Or  acetaminophen, 650 mg, Q6H PRN  aluminum & magnesium hydroxide-simethicone, 30 mL, Q6H PRN         Objective:  /62   Pulse 76   Temp 98.4 °F (36.9 °C) (Oral)   Resp 18   Ht 1.753 m (5' 9\")   Wt 88.3 kg (194 lb 10.7 oz)   SpO2 97%   BMI 28.75 kg/m²     General Appearance: alert and 
       [facility] Hospitalist Progress Note    Admitting Date and Time: 2/13/2025 11:35 AM  Admit Dx: Complicated UTI (urinary tract infection) [N39.0]  Ascites of liver [R18.8]  Ascites due to alcoholic cirrhosis (HCC) [K70.31]  Anemia, unspecified type [D64.9]    Subjective:  Patient is being followed for Complicated UTI (urinary tract infection) [N39.0]  Ascites of liver [R18.8]  Ascites due to alcoholic cirrhosis (HCC) [K70.31]  Anemia, unspecified type [D64.9]   Pt feels about the same  Per RN: no additional concerns    ROS: denies fever, chills, cp, sob, n/v, HA unless otherwise noted above     entecavir  1 mg Oral Daily    albumin human 25%  50 g IntraVENous Once    furosemide  40 mg IntraVENous Daily    pantoprazole (PROTONIX) 40 mg in sodium chloride (PF) 0.9 % 10 mL injection  40 mg IntraVENous Q12H    sodium chloride flush  5-40 mL IntraVENous 2 times per day    folic acid  1 mg Oral Daily    lactulose  20 g Oral BID    metoprolol tartrate  25 mg Oral BID    midodrine  2.5 mg Oral TID WC    rifAXIMin  400 mg Oral TID    spironolactone  25 mg Oral BID    tamsulosin  0.4 mg Oral Daily    sodium chloride flush  5-40 mL IntraVENous 2 times per day     sodium chloride, , PRN  sodium chloride flush, 5-40 mL, PRN  sodium chloride, , PRN  albuterol, 2.5 mg, Q6H PRN  oxyCODONE HCl, 10 mg, Q6H PRN  sodium chloride flush, 5-40 mL, PRN  sodium chloride, , PRN  potassium chloride, 40 mEq, PRN   Or  potassium alternative oral replacement, 40 mEq, PRN   Or  potassium chloride, 10 mEq, PRN  ondansetron, 4 mg, Q8H PRN   Or  ondansetron, 4 mg, Q6H PRN  polyethylene glycol, 17 g, Daily PRN  acetaminophen, 650 mg, Q6H PRN   Or  acetaminophen, 650 mg, Q6H PRN  aluminum & magnesium hydroxide-simethicone, 30 mL, Q6H PRN         Objective:  /78   Pulse 80   Temp 97.9 °F (36.6 °C)   Resp 18   Ht 1.753 m (5' 9\")   Wt 86.9 kg (191 lb 9.3 oz)   SpO2 100%   BMI 28.29 kg/m²     General Appearance: alert and oriented to 
       [facility] Hospitalist Progress Note    Admitting Date and Time: 2/13/2025 11:35 AM  Admit Dx: Complicated UTI (urinary tract infection) [N39.0]  Ascites of liver [R18.8]  Ascites due to alcoholic cirrhosis (HCC) [K70.31]  Anemia, unspecified type [D64.9]    Subjective:  Patient is being followed for Complicated UTI (urinary tract infection) [N39.0]  Ascites of liver [R18.8]  Ascites due to alcoholic cirrhosis (HCC) [K70.31]  Anemia, unspecified type [D64.9]   Pt more confused today, unable to participate  Per RN: no additional concerns    ROS: BENNETT     furosemide  40 mg IntraVENous BID    albumin human 25%  25 g IntraVENous See Admin Instructions    rifAXIMin  550 mg Oral BID    cetirizine  5 mg Oral Daily    white petrolatum   Topical BID    entecavir  1 mg Oral Daily    albumin human 25%  50 g IntraVENous Once    pantoprazole (PROTONIX) 40 mg in sodium chloride (PF) 0.9 % 10 mL injection  40 mg IntraVENous Q12H    sodium chloride flush  5-40 mL IntraVENous 2 times per day    folic acid  1 mg Oral Daily    lactulose  20 g Oral BID    metoprolol tartrate  25 mg Oral BID    midodrine  2.5 mg Oral TID WC    spironolactone  25 mg Oral BID    tamsulosin  0.4 mg Oral Daily     sodium chloride, , PRN  oxyCODONE, 5 mg, Q4H PRN  sodium chloride, , PRN  lidocaine, , PRN  Lip Balm, , PRN  prochlorperazine, 10 mg, Q6H PRN  sodium chloride flush, 5-40 mL, PRN  sodium chloride, , PRN  albuterol, 2.5 mg, Q6H PRN  sodium chloride flush, 5-40 mL, PRN  potassium chloride, 40 mEq, PRN   Or  potassium alternative oral replacement, 40 mEq, PRN   Or  potassium chloride, 10 mEq, PRN  ondansetron, 4 mg, Q8H PRN   Or  ondansetron, 4 mg, Q6H PRN  polyethylene glycol, 17 g, Daily PRN  acetaminophen, 650 mg, Q6H PRN   Or  acetaminophen, 650 mg, Q6H PRN  aluminum & magnesium hydroxide-simethicone, 30 mL, Q6H PRN         Objective:  /74   Pulse 72   Temp 98 °F (36.7 °C) (Oral)   Resp 15   Ht 1.753 m (5' 9\")   Wt 82.6 kg (182 
    2/15/2025 9:41 AM  Hardik Gray  [id number]    Subjective: Patient is having a blood transfusion today.  Millan catheter in place draining well.  There is some sediment in the tubing but this overall appears clear yellow.      Scheduled Meds:   magnesium sulfate  2,000 mg IntraVENous Once    entecavir  1 mg Oral Daily    albumin human 25%  50 g IntraVENous Once    furosemide  40 mg IntraVENous Daily    pantoprazole (PROTONIX) 40 mg in sodium chloride (PF) 0.9 % 10 mL injection  40 mg IntraVENous Q12H    sodium chloride flush  5-40 mL IntraVENous 2 times per day    folic acid  1 mg Oral Daily    lactulose  20 g Oral BID    metoprolol tartrate  25 mg Oral BID    midodrine  2.5 mg Oral TID WC    rifAXIMin  400 mg Oral TID    spironolactone  25 mg Oral BID    tamsulosin  0.4 mg Oral Daily    sodium chloride flush  5-40 mL IntraVENous 2 times per day       Objective:  Vitals:    02/15/25 0808   BP: (!) 107/58   Pulse: 80   Resp: 18   Temp: 98.6 °F (37 °C)   SpO2: 100%         Allergies: Simvastatin and Crestor [rosuvastatin]    General Appearance: alert and oriented to person, place and time and in no acute distress  Skin: no rash or erythema  Head: normocephalic and atraumatic  Pulmonary/Chest: normal air movement, no respiratory distress  Abdomen: soft, non-tender, non-distended  Genitourinary: Millan catheter draining clear yellow urine with some sediment  Extremities: no cyanosis, clubbing or edema         Labs:     Recent Labs     02/15/25  0815      K 4.0      CO2 25   BUN 9   CREATININE 1.0   GLUCOSE 125*   CALCIUM 7.8*       Lab Results   Component Value Date/Time    HGB 6.4 02/15/2025 08:15 AM    HCT 20.3 02/15/2025 08:15 AM       Lab Results   Component Value Date    PSA 0.05 11/10/2023    PSA 0.10 11/04/2022    PSA 0.13 09/26/2022         Assessment/Plan:  Urinary retention  History of prostate cancer status post radiation  Urethral stricture disease  Penoscrotal edema related to volume 
   [facility]  [facility]  Progress Note    SUBJECTIVE:  Chief Complaint   Patient presents with    Groin Pain     States \"My [facility] nurse came and said that I have an infection. I have a catheter and supposed to get a catheter in my stomach tomorrow\"    Abdominal Pain     Sitting up in bed.  Getting cleaned up by nursing  No fevers    Review of systems:  As stated above in the chief complaint, otherwise negative.    Medications:  Scheduled Meds:   rifAXIMin  550 mg Oral BID    cetirizine  5 mg Oral Daily    white petrolatum   Topical BID    sodium phosphate  1 enema Rectal Once    entecavir  1 mg Oral Daily    albumin human 25%  50 g IntraVENous Once    furosemide  40 mg IntraVENous Daily    pantoprazole (PROTONIX) 40 mg in sodium chloride (PF) 0.9 % 10 mL injection  40 mg IntraVENous Q12H    sodium chloride flush  5-40 mL IntraVENous 2 times per day    folic acid  1 mg Oral Daily    lactulose  20 g Oral BID    metoprolol tartrate  25 mg Oral BID    midodrine  2.5 mg Oral TID WC    spironolactone  25 mg Oral BID    tamsulosin  0.4 mg Oral Daily    sodium chloride flush  5-40 mL IntraVENous 2 times per day     Continuous Infusions:   sodium chloride      sodium chloride      octreotide (SANDOSTATIN) 500 mcg in sodium chloride 0.9 % 100 mL infusion 50 mcg/hr (25 1530)    sodium chloride      sodium chloride       PRN Meds:sodium chloride, sodium chloride, sodium chloride flush, sodium chloride, albuterol, oxyCODONE HCl, sodium chloride flush, sodium chloride, potassium chloride **OR** potassium alternative oral replacement **OR** potassium chloride, ondansetron **OR** ondansetron, polyethylene glycol, acetaminophen **OR** acetaminophen, aluminum & magnesium hydroxide-simethicone    OBJECTIVE:  BP (!) 101/57   Pulse 76   Temp 97.5 °F (36.4 °C) (Oral)   Resp 18   Ht 1.753 m (5' 9\")   Wt 86.9 kg (191 lb 9.3 oz)   SpO2 100%   BMI 28.29 kg/m²   Temp  Av.7 °F (36.5 °C)  Min: 
  P Quality Flow/Interdisciplinary Rounds Progress Note        Quality Flow Rounds held on February 17, 2025    Disciplines Attending:  Bedside Nurse, , , and Nursing Unit Leadership    Hardik Gray was admitted on 2/13/2025 11:35 AM    Anticipated Discharge Date:       Disposition:    Sandeep Score:  Sandeep Scale Score: 18    [facility] RISK OF UNPLANNED READMISSION 2.0             32.4 Total Score        Discussed patient goal for the day, patient clinical progression, and barriers to discharge.  The following Goal(s) of the Day/Commitment(s) have been identified:   discharge planning, IV sandostatin, IV diuretics, EGD 2/17      Rafael Jean RN  February 17, 2025        
  Physician Progress Note      PATIENT:               JAKC LÓPEZ  CSN #:                  [id number]  :                       1950  ADMIT DATE:       2025 11:35 AM  DISCH DATE:  RESPONDING  PROVIDER #:        Juan Manuel Heath MD          QUERY TEXT:    Dear Attending Physician,    Pt underwent EGD 25, noted documentation of \" post-EGD for hypotension,   suspect hemorrhagic \" per ICU .? If possible, please document in progress   notes and discharge summary the relationship if any between the \"post-EGD for   hypotension, suspect hemorrhagic \" and the procedure:  ?  The medical record reflects the following:  Risk Factors: Decompensated liver cirrhosis with ascites, varices  Clinical Indicators: Per ICU,\"...Hypotension during EGD, transferred to ICU to   monitor Patient had 2 bowel movements on the floor that are currently maroon   color...post-EGD for hypotension, suspect hemorrhagic based on banding   performed by GI for known esophageal varices. Per GI, no active bleeding seen   by end of procedure however stomach was filled with blood and clots.   Hypotensive 80s/50s in endo, admitted to ICU. Additional unit of PRBC given   with improvement in BP once here ...\"  Per GI ,\"...EGD 2025 revealing   varices which were banded...octreotide drip/PPI-Flex  Treatment: monitor BP, H/H, transfusion    Thank you,  Analia Rey RN [facility]  Clinical Documentation Improvement Specialist  Phone# [phone]  Options provided:  -- Post-EGD hypotension, suspect hemorrhagic is due to the procedure  -- Post-EGD hypotension, suspect hemorrhagic is not due to the procedure, but   is due to other incidental risk factor, Please specify other incidental risk   factor.  -- Other - I will add my own diagnosis  -- Disagree - Not applicable / Not valid  -- Disagree - Clinically unable to determine / Unknown  -- Refer to Clinical Documentation Reviewer    PROVIDER RESPONSE TEXT:    Patient has post-EGD hypotension, suspect 
  Radiology Procedure Waiver   Name: Hardik Gray  : 1950  MRN: [id number]    Date:  25    Time: 12:38 PM EST    Benefits of immediately proceeding with Radiology exam(s) without pre-testing outweigh the risks or are not indicated as specified below and therefore the following is/are being waived:    [] Pregnancy test   [] Patients LMP on-time and regular.   [] Patient had Tubal Ligation or has other Contraception Device.   [] Patient  is Menopausal or Premenarcheal.    [] Patient had Full or Partial Hysterectomy.    [] Protocol for Iodine allergy    [] MRI Questionnaire     [x] BUN/Creatinine   [] Patient age w/no hx of renal dysfunction.   [] Patient on Dialysis.   [x] Recent Normal Labs.  Electronically signed by Ricky Crandall MD on 25 at 12:38 PM EST            
  [facility] Quality Flow/Interdisciplinary Rounds Progress Note        Quality Flow Rounds held on February 14, 2025    Disciplines Attending:  Bedside Nurse, , , and Nursing Unit Leadership    Hardik Gray was admitted on 2/13/2025 11:35 AM    Anticipated Discharge Date:       Disposition:    Sandeep Score:  Sandeep Scale Score: 19    [facility] RISK OF UNPLANNED READMISSION 2.0             33.5 Total Score        Discussed patient goal for the day, patient clinical progression, and barriers to discharge.  The following Goal(s) of the Day/Commitment(s) have been identified:   GI, ID and urology evals, iv abx, chronic rodrigues       Leonela Rizvi RN  February 14, 2025        
  SPEECH/LANGUAGE PATHOLOGY  CLINICAL ASSESSMENT OF SWALLOWING FUNCTION   and PLAN OF CARE      PATIENT NAME:  Hardik Gray  (male)     MRN:  [id number]    :  1950  (75 y.o.)  STATUS:  Inpatient: Room [facility]    TODAY'S DATE:  2025  ORDER DATE, DESCRIPTION AND REFERRING PROVIDER: Dr. Cobb   REASON FOR REFERRAL: Assess swallow function    EVALUATING THERAPIST: Shelby Hardy, SLP                 RESULTS:    DYSPHAGIA DIAGNOSIS:   Clinical indicators of inconsistent oropharyngeal phase dysphagia       DIET RECOMMENDATIONS:  Continue on clear liquid diet until cleared by Physician to advance NO STRAW (if straw needed small single sips)     Recommend further assessment once cleared for solid consistencies      FEEDING RECOMMENDATIONS:     Assistance level:  Full assistance is needed during all oral intake      Compensatory strategies recommended: Thorough oral care to prevent colonization of oral bacteria   Upright in bed/ chair as tolerated  Fully alert for all PO  Slow rate of intake   SINGLE cup sips  SMALL bites      Discussed recommendations with:  patient nurse in person    SPEECH THERAPY  PLAN OF CARE   The dysphagia POC is established based on physician order, dysphagia diagnosis and results of clinical assessment     Skilled SLP intervention for dysphagia management on acute care up to 5 x per week until goals met, pt plateaus in function and/or discharged from hospital    Conditions Requiring Skilled Therapeutic Intervention for dysphagia:    Patient is performing below functional baseline d/t  current acute condition, respiratory compromise, multiple medications, and/or increased dependency upon caregivers.  X1 latent weak Coughing during PO intake      Specific dysphagia interventions to include:     compensatory swallowing strategies to improve airway protection and swallow function.  Training in positioning for improved integrity of swallow  ongoing mealtime assessment to provide diet 
  Speech Language Pathology  NAME:  Hardik Gray  :  1950  DATE: 2025  ROOM:  [address] [place], [place] [address]A    Pt unavailable at 840 for Clinical Swallow Evaluation Discussed with patient nurse in person     REASON:  Patient NPO for procedure not able to address dysphagia goals due to this.    Will re-attempt as appropriate.       Thank You    Shelby Hardy M.S. CCC-SLP/L  Speech Language Pathologist  [id number]       
 Hardik Gray was ordered entecavir which is a nonformulary medication. Nurse is going to check with patient to see if home supply of this medication will be brought in to the hospital for inpatient use.  A pharmacist will follow-up with the nurse of the patient to assess the capability of the patient to bring in the medication.  If it is determined that the patient cannot supply the medication and it is not available to be dispensed from the pharmacy, a call will be placed to the ordering provider to discuss alternative options.     Marlon Ogden [facility]  02/13/25 4:14 PM              
1400 patient arrived back to unit from bleeding scan. Patient has fixed gaze at this time. Dr. Heath notified. Unit of PRBCs started. Went with patient to CT scan and then to room 610. Patient family notified.   
4 Eyes Skin Assessment     NAME:  Hardik Gray  YOB: 1950  MEDICAL RECORD NUMBER:  [id number]    The patient is being assessed for  Admission    I agree that at least one RN has performed a thorough Head to Toe Skin Assessment on the patient. ALL assessment sites listed below have been assessed.      Areas assessed by both nurses:    Head, Face, Ears, Shoulders, Back, Chest, Arms, Elbows, Hands, Sacrum. Buttock, Coccyx, Ischium, Legs. Feet and Heels, and Under Medical Devices         Does the Patient have a Wound? Yes wound(s) were present on assessment. LDA wound assessment was Initiated and completed by RN       Sandeep Prevention initiated by RN: Yes  Wound Care Orders initiated by RN: Yes, continued on transfer    Pressure Injury (Stage 3,4, Unstageable, DTI, NWPT, and Complex wounds) if present, place Wound referral order by RN under : No    New Ostomies, if present place, Ostomy referral order under : No     Nurse 1 eSignature: Electronically signed by Tommy Pinto RN on 2/17/25 at 6:19 PM EST    **SHARE this note so that the co-signing nurse can place an eSignature**    Nurse 2 eSignature: Electronically signed by Jimmy Mcwilliams RN on 2/17/25 at 6:20 PM EST   
4 Eyes Skin Assessment     NAME:  Hardik Gray  YOB: 1950  MEDICAL RECORD NUMBER:  [id number]    The patient is being assessed for  Admission    I agree that at least one RN has performed a thorough Head to Toe Skin Assessment on the patient. ALL assessment sites listed below have been assessed.      Areas assessed by both nurses:    Head, Face, Ears, Shoulders, Back, Chest, Arms, Elbows, Hands, Sacrum. Buttock, Coccyx, Ischium, and Legs. Feet and Heels        Does the Patient have a Wound? No noted wound(s)       Sandeep Prevention initiated by RN: Yes  Wound Care Orders initiated by RN: No     Pressure Injury (Stage 3,4, Unstageable, DTI, NWPT, and Complex wounds) if present, place Wound referral order by RN under : No    New Ostomies, if present place, Ostomy referral order under : No     Nurse 1 eSignature: Electronically signed by Libertad Valencia RN on 2/13/25 at 10:11 PM EST    **SHARE this note so that the co-signing nurse can place an eSignature**    Nurse 2 eSignature: {Esignature:[id number]}    
4 Eyes Skin Assessment     NAME:  Hardik Gray  YOB: 1950  MEDICAL RECORD NUMBER:  [id number]    The patient is being assessed for  Transfer to New Unit    I agree that at least one RN has performed a thorough Head to Toe Skin Assessment on the patient. ALL assessment sites listed below have been assessed.      Areas assessed by both nurses:    Head, Face, Ears, Shoulders, Back, Chest, Arms, Elbows, Hands, Sacrum. Buttock, Coccyx, Ischium, Legs. Feet and Heels, and Under Medical Devices         Does the Patient have a Wound? Yes wound(s) were present on assessment. LDA wound assessment was Initiated and completed by RN    3 dried blisters on R ankle  Dried blister on scrotum       Sandeep Prevention initiated by RN: Yes  Wound Care Orders initiated by RN: No    Wound care already consulted    Pressure Injury (Stage 3,4, Unstageable, DTI, NWPT, and Complex wounds) if present, place Wound referral order by RN under : No    New Ostomies, if present place, Ostomy referral order under : No     Nurse 1 eSignature: Electronically signed by Beatrice Alba RN on 2/19/25 at 5:44 AM EST    **SHARE this note so that the co-signing nurse can place an eSignature**    Nurse 2 eSignature: {Esignature:[id number]}  
4 Eyes Skin Assessment     NAME:  Hardik Gray  YOB: 1950  MEDICAL RECORD NUMBER:  [id number]    The patient is being assessed for  Admission    I agree that at least one RN has performed a thorough Head to Toe Skin Assessment on the patient. ALL assessment sites listed below have been assessed.      Areas assessed by both nurses:    Head, Face, Ears, Shoulders, Back, Chest, Arms, Elbows, Hands, Sacrum. Buttock, Coccyx, Ischium, Legs. Feet and Heels, and Under Medical Devices         Does the Patient have a Wound? Yes wound(s) were present on assessment. LDA wound assessment was Initiated and completed by RN       Sandeep Prevention initiated by RN: Yes  Wound Care Orders initiated by RN: Yes    Transfer from . Orders already in place.    Pressure Injury (Stage 3,4, Unstageable, DTI, NWPT, and Complex wounds) if present, place Wound referral order by RN under : No    New Ostomies, if present place, Ostomy referral order under : No     Nurse 1 eSignature: Electronically signed by Charito Batista RN on 2/19/25 at 7:01 PM EST    **SHARE this note so that the co-signing nurse can place an eSignature**    Nurse 2 eSignature: {Esignature:[id number]}   
Abdomen soft with active bowel sounds.  
Attempted to call doctors office of Dr. Cobb to make aware of increased blood in stool with no answer.   
Consult called to ID office , GI and urology answering service.   
Database initiated. Patient is A&O comes in from home alone but GF stays over often. He uses a cane and a walker and is RA at baseline. States he is having diarrhea. He has HHC.   
Dr. Heath notified of am Hgb 6.5, await return call.   
Dr. Mccullough notified of patients AM hgb of 6.4     Awaiting orders at this time.   
Entecavir is to be administered from patients own supply, which has been verified by pharmacy on 2/14/2025 9:52 AM #   2/14/25 = Paul on 6s was informed med is ready for    
Have received orders to transfuse another unit of blood. New type and screen collected and sent to lab.  
Incentive Spirometry given to patient and instructions given to patient, pt verbalizes understanding stating \"I'll try\".  
Initial Inpatient Wound Care    Admit Date: 2/13/2025 11:35 AM    Reason for consult:  Multiple wounds to right lower leg.    Significant history:  CAD, CVA, COPD, radiation cystitis, prostate CA. Alcoholic liver cirrhosis with ascites.    Wound history:  POA    Findings:  awake, minimal verbalization. Girlfriend present    Rt heel, ankle. Pink area is closed wound. Adaptic, opticel and dressing placed for prevention      Legs intact. No drainage at this time  Penis and scrotum edematous  meatus is split. Drainage. Urology on case.      Incontinent bloody stool per staff. Skin intact per staff        Plan:continue preventive care  Heel ankle dressing      Leticia Tan RN 2/17/2025 1:04 PM      
[facility]  Department of Internal Medicine   Internal Medicine Residency   MICU Progress Note    Patient:  Hardik Gray 75 y.o. male  MRN: [id number]     Date of Service: 2/18/2025    Allergy: Simvastatin and Crestor [rosuvastatin]    Overnight Events: No acute overnight events    Subjective     Patient feels comfortable this morning and has had a total of 3-4 bowel movements since yesterday evening patient claims that they are not tarry or maroon-colored color stools.  Per nursing however patient has had some maroon-colored stool.  He did have very large bowel movements.  However patient is not describing any discomfort claims he has an appetite and drink water.    ROS: Denies fever, chills, chest pain, shortness of breath, N/V/C/D, dysuria or blood in stool or urine  Objective     VS: /68   Pulse 91   Temp 97.4 °F (36.3 °C) (Temporal)   Resp 14   Ht 1.753 m (5' 9\")   Wt 86.9 kg (191 lb 9.3 oz)   SpO2 96%   BMI 28.29 kg/m²           I & O - 24hr:   Intake/Output Summary (Last 24 hours) at 2/18/2025 0727  Last data filed at 2/18/2025 0600  Gross per 24 hour   Intake 336.67 ml   Output 350 ml   Net -13.33 ml       Sedatives: None    Pressors: None    Oxygen: None    ABG:   No results found for: \"PHART\", \"PH\", \"CZR5NIU\", \"PCO2\", \"PO2ART\", \"PO2\", \"OTT2SLZ\", \"HCO3\", \"BEART\", \"BE\", \"THGBART\", \"THB\", \"ZUD1BFW\", \"S8OYQRHQ\", \"O2SAT\"    Physical Exam:  General Appearance: No acute distress.  Neuro: Round symmetric pupil that react to light bilaterally    Cardiovascular: regular rate and rhythm, S1 and S2 heard, no murmurs appreciated   Respiratory: Clear to auscultation bilaterally. No wheezing or crackles appreciated.  Abdomen: Soft, non-tender; bowel sounds normal; no masses, no organomegaly, rebound or guarding  Extremities: Extremities normal, no cyanosis, distal pulses intact, bilateral pitting edema.   Lines & Urinary Catheter:     Peripheral IV 02/15  Indwelling urinary catheter     Labs 
Message sent to Dr. Sanz regarding mag level of 1.2 on this am labs. Message was read.   
New consult sent to Palliative Medicine.  
Notification of IV TO PO conversion  This patient's order for DOXYCYCLINE AND PROTONIX  IV have been changed to  oral as approved by the pharmacy & therapeutics and Medical Executive Committees    
Notified Dr Heath of morning labs, hemoglobin of 6.6  
Notified Liliana Melchor NP via [facility] regarding pt's critical hemoglobin 6.3. Awaiting orders.  
Nurse to Nurse called with [facility] Nurse  
Nurse to Nurse giving to Tommy in ICU. Patient belongings brought down from room 634 to   
Occupational Therapy    OCCUPATIONAL THERAPY INITIAL EVALUATION    [facility]   [address], [place]         Date:2025                                                  Patient Name: Hardik Gray    MRN: [id number]    : 1950    Room: [address] [place] [address]      Evaluating OT: Deja Don OTR/L   [id number]      Referring Provider:Missy Sanz MD     Specific Provider Orders/Date:OT eval and treat 2025      Diagnosis:  Complicated UTI (urinary tract infection) [N39.0]  Ascites of liver [R18.8]  Ascites due to alcoholic cirrhosis (HCC) [K70.31]  Anemia, unspecified type [D64.9]     Pertinent Medical History: back pain, CVA, COPD, HTN, MI,      Precautions:  Fall Risk,      Assessment of current deficits    [x] Functional mobility  [x]ADLs  [x] Strength               []Cognition    [x] Functional transfers   [x] IADLs         [x] Safety Awareness   [x]Endurance    [] Fine Coordination              [x] Balance      [x] Vision/perception   []Sensation     []Gross Motor Coordination  [] ROM  [] Delirium                   [] Motor Control     OT PLAN OF CARE   OT POC based on physician orders, patient diagnosis and results of clinical assessment    Frequency/Duration  1-3 days/wk for  PRN   Specific OT Treatment Interventions to include:   ADL retraining/adapted techniques and AE recommendations to increase functional independence within precautions                    Energy conservation techniques to improve tolerance for selfcare routine   Functional transfer/mobility training/DME recommendations for increased independence, safety and fall prevention         Patient/family education to increase safety and functional independence             Environmental modifications for safe mobility and completion of ADLs                             Therapeutic activity to improve functional performance during ADLs.                                       
Occupational Therapy  Attempted therapy this AM.  Pt eating his lunch.  Will attempt another time.   Celine HUTTON/JOHNNY [id number]  
Occupational Therapy  Patient treatment attempted.  Patient on hold d/t surgery and transfer to ICU, will continue OT POC as able and appropriate.    Ariela SÁNCHEZ/JOHNNY [id number]  
PROGRESS NOTE  By Bob Harrison, STEFANIE    [facility]  Dr. Angelo Xiao M.D.,  Dr. Kai Tesfaye M.D.,   Dr. Louis Zayas D.O.,  Dr. Chadd Mancia M.D.,  Dr. Pranav Lomas D.O.,          Hardik Gray  75 y.o.  male    SUBJECTIVE:  Patient resting in bed.  Denies abdominal pain.  Complains of knee pain.  Reports bowel movement overnight.  Possibly some red in his stool at that time per patient report.    OBJECTIVE:    /77   Pulse 82   Temp 98.2 °F (36.8 °C) (Oral)   Resp 17   Ht 1.753 m (5' 9\")   Wt 78.7 kg (173 lb 8 oz)   SpO2 99%   BMI 25.62 kg/m²     General: NAD/adult Black male.  AAOx3  HEENT: Persistent scleral icterus/moist oral mucosa/poor dentition  Neck: Supple/trachea midline  Chest: Symmetric excursion/nonlabored respirations  Cor: Regular  Abd.: Soft.  Mildly distended.  Extr.:  BLE over 3+ edema.  Decreased muscle tone and bulk throughout  Skin: Warm and dry/anicteric      DATA:    Monitor data reviewed -sinus rhythm noted.       Lab Results   Component Value Date/Time    WBC 4.6 02/21/2025 09:55 AM    RBC 2.67 02/21/2025 09:55 AM    HGB 8.1 02/21/2025 09:55 AM    HCT 25.4 02/21/2025 09:55 AM    MCV 95.1 02/21/2025 09:55 AM    MCH 30.3 02/21/2025 09:55 AM    MCHC 31.9 02/21/2025 09:55 AM    RDW 22.7 02/21/2025 09:55 AM    PLT 75 02/19/2025 04:05 AM    MPV 9.8 02/21/2025 09:55 AM     Lab Results   Component Value Date/Time     02/21/2025 09:55 AM    K 3.2 02/21/2025 09:55 AM    K 3.5 05/14/2021 01:00 AM     02/21/2025 09:55 AM    CO2 24 02/21/2025 09:55 AM    BUN 12 02/21/2025 09:55 AM    CREATININE 1.1 02/21/2025 09:55 AM    CALCIUM 8.2 02/21/2025 09:55 AM    BILITOT 10.4 02/21/2025 09:55 AM    ALKPHOS 54 02/21/2025 09:55 AM    AST 56 02/21/2025 09:55 AM    ALT 14 02/21/2025 09:55 AM     Lab Results   Component Value Date/Time    LIPASE 30 02/13/2025 01:39 PM     No results found for: \"AMYLASE\"      ASSESSMENT/PLAN:  Patient Active Problem List   Diagnosis    
PROGRESS NOTE  By Clinton Cobb M.D.    [facility]  Dr. Angelo Xiao M.D.,  Dr. Kai Tesfaye M.D.,   Dr. Louis Zayas D.O.,  Dr. Chadd Mancia M.D.,  Dr. Pranav Lomas D.O.,          Hardik Gray  75 y.o.  male    SUBJECTIVE:  Denies abdominal pain.  Denies nausea vomiting.  Feels at bedside    OBJECTIVE:    /70   Pulse 78   Temp 98.3 °F (36.8 °C) (Axillary)   Resp 11   Ht 1.753 m (5' 9\")   Wt 83.4 kg (183 lb 13.8 oz)   SpO2 100%   BMI 27.15 kg/m²     General: NAD/older adult Black male  HEENT: Persistent scleral icterus/moist oral mucosa/poor dentition  Neck: Supple with trachea midline  Chest: Symmetric excursion/CTAB  Cor: Regular/S1-S2  Abd.: Soft.  Appears nontender.  Mildly distended  Extr.:  BLE 3+ edema.  Decreased muscle tone and bulk throughout  Skin: Warm and dry/anicteric      DATA:    Monitor data reviewed -rhythm noted.       Lab Results   Component Value Date/Time    WBC 7.9 02/18/2025 04:30 AM    RBC 2.40 02/18/2025 04:30 AM    HGB 7.5 02/18/2025 04:30 AM    HCT 21.9 02/18/2025 04:30 AM    MCV 91.3 02/18/2025 04:30 AM    MCH 31.3 02/18/2025 04:30 AM    MCHC 34.2 02/18/2025 04:30 AM    RDW 17.9 02/18/2025 04:30 AM    PLT 79 02/18/2025 01:47 AM    MPV 10.5 02/18/2025 04:30 AM     Lab Results   Component Value Date/Time     02/18/2025 04:30 AM    K 4.6 02/18/2025 04:30 AM    K 3.5 05/14/2021 01:00 AM     02/18/2025 04:30 AM    CO2 23 02/18/2025 04:30 AM    BUN 21 02/18/2025 04:30 AM    CREATININE 1.1 02/18/2025 04:30 AM    CALCIUM 7.5 02/18/2025 04:30 AM    BILITOT 9.5 02/18/2025 04:30 AM    ALKPHOS 50 02/18/2025 04:30 AM    AST 41 02/18/2025 04:30 AM    ALT 9 02/18/2025 04:30 AM     Lab Results   Component Value Date/Time    LIPASE 30 02/13/2025 01:39 PM     No results found for: \"AMYLASE\"      ASSESSMENT/PLAN:  Patient Active Problem List   Diagnosis    Prostate cancer (HCC)    Dyslipidemia    Essential hypertension    Asymptomatic PVCs    History of MI 
PROGRESS NOTE  By Clinton Cobb M.D.    [facility]  Dr. Angelo Xiao M.D.,  Dr. Kai Tesfaye M.D.,   Dr. Louis Zayas D.O.,  Dr. Chadd Mancia M.D.,  Dr. Pranav Lomas D.O.,          Hardik Gray  75 y.o.  male    SUBJECTIVE:  Reported blood clot per rectum earlier with decrease in H&H.  Patient has been transferred from the ICU however apparently altered mental status.  Fiancé at bedside confirming altered mental status.  Patient today significantly more obtunded and physical examination reveals fixed gaze deviation to the right    OBJECTIVE:    /76   Pulse 71   Temp 98.2 °F (36.8 °C) (Oral)   Resp 15   Ht 1.753 m (5' 9\")   Wt 85.3 kg (188 lb 0.8 oz)   SpO2 97%   BMI 27.77 kg/m²     General: Older adult Black male.  Appears more confused/altered  HEENT: Persistent scleral icterus.  Fixed gaze deviation to the right  Neck: Supple with trachea midline  Chest: Symmetrical excursion/nonlabored respirations  Cor: Regular  Abd.: Soft and moderately distended.  BS +  Extr.:  BLE 3+ edema.  Decreased muscle tone and bulk throughout  Skin: Warm and dry      DATA:     Lab Results   Component Value Date/Time    WBC 6.6 02/19/2025 04:05 AM    RBC 2.11 02/19/2025 04:05 AM    HGB 6.5 02/19/2025 04:05 AM    HCT 19.8 02/19/2025 04:05 AM    MCV 93.8 02/19/2025 04:05 AM    MCH 30.8 02/19/2025 04:05 AM    MCHC 32.8 02/19/2025 04:05 AM    RDW 20.5 02/19/2025 04:05 AM    PLT 75 02/19/2025 04:05 AM    MPV 10.0 02/19/2025 04:05 AM     Lab Results   Component Value Date/Time     02/19/2025 04:05 AM    K 3.7 02/19/2025 04:05 AM    K 3.5 05/14/2021 01:00 AM     02/19/2025 04:05 AM    CO2 24 02/19/2025 04:05 AM    BUN 20 02/19/2025 04:05 AM    CREATININE 1.1 02/19/2025 04:05 AM    CALCIUM 7.8 02/19/2025 04:05 AM    BILITOT 9.1 02/19/2025 04:05 AM    ALKPHOS 47 02/19/2025 04:05 AM    AST 35 02/19/2025 04:05 AM    ALT 9 02/19/2025 04:05 AM     Lab Results   Component Value Date/Time    LIPASE 30 
PROGRESS NOTE  By Clinton Cobb M.D.    [facility]  Dr. Angelo Xiao M.D.,  Dr. Kai Tesfaye M.D.,   JULIAN JenningsO.,  Dr. Chadd Mancia M.D.,  Dr. Pranav Lomas D.O.,          Hardik Gray  75 y.o.  male    SUBJECTIVE:  Denies abdominal pain    OBJECTIVE:    BP 94/69   Pulse 74   Temp 98.2 °F (36.8 °C) (Oral)   Resp 20   Ht 1.753 m (5' 9\")   Wt 82.6 kg (182 lb)   SpO2 100%   BMI 26.88 kg/m²     General: NAD/older adult male patient/AAOx3  HEENT: Persistent scleral icterus/moist oral mucosa  Neck: Supple with trachea midline  Chest: Symmetric excursion/CTAB  Cor: Regular  Abd.: Soft/moderately distended.  Nontender  Extr.:  BLE over 3+ edema  Skin: Warm and dry    DATA:    Monitor data reviewed        Lab Results   Component Value Date/Time    WBC 8.1 03/01/2025 02:40 AM    RBC 2.32 03/01/2025 02:40 AM    HGB 7.2 03/01/2025 02:40 AM    HCT 21.9 03/01/2025 02:40 AM    MCV 94.4 03/01/2025 02:40 AM    MCH 31.0 03/01/2025 02:40 AM    MCHC 32.9 03/01/2025 02:40 AM    RDW 23.7 03/01/2025 02:40 AM    PLT 86 02/27/2025 04:16 AM    MPV 9.9 03/01/2025 02:40 AM     Lab Results   Component Value Date/Time     03/01/2025 02:40 AM    K 3.9 03/01/2025 02:40 AM    K 3.5 05/14/2021 01:00 AM     03/01/2025 02:40 AM    CO2 27 03/01/2025 02:40 AM    BUN 12 03/01/2025 02:40 AM    CREATININE 1.0 03/01/2025 02:40 AM    CALCIUM 8.0 03/01/2025 02:40 AM    BILITOT 9.1 03/01/2025 02:40 AM    ALKPHOS 135 03/01/2025 02:40 AM    AST 59 03/01/2025 02:40 AM    ALT 19 03/01/2025 02:40 AM     Lab Results   Component Value Date/Time    LIPASE 30 02/13/2025 01:39 PM     No results found for: \"AMYLASE\"      ASSESSMENT/PLAN:  Patient Active Problem List   Diagnosis    Prostate cancer (HCC)    Dyslipidemia    Essential hypertension    Asymptomatic PVCs    History of MI (myocardial infarction)    Coronary artery disease involving native coronary artery of native heart without angina pectoris    Pain in both 
PROGRESS NOTE  By Clinton Cobb M.D.    [facility]  Dr. Angelo Xiao M.D.,  Dr. Kai Tesfaye M.D.,   JULIAN JenningsO.,  Dr. Chadd Mancia M.D.,  Dr. Pranav Lomas D.O.,          Hardik Gray  75 y.o.  male    SUBJECTIVE:  Denies abdominal pain.  Denies nausea vomiting.  Reports bowel movement and believes that he may have some blood in it.    OBJECTIVE:    /67   Pulse 89   Temp 98.1 °F (36.7 °C) (Oral)   Resp 16   Ht 1.753 m (5' 9\")   Wt 82.6 kg (182 lb)   SpO2 100%   BMI 26.88 kg/m²     General: NAD/older adult male patient  HEENT: Persistent scleral icterus/moist oral mucosa  Neck: Supple with trachea midline  Chest: Symmetric excursion/CTAB  Cor: Regular  Abd.: Soft/moderately distended.  Nontender  Extr.:  BLE over 3+ edema  Skin: Warm and dry/anicteric      DATA:    Monitor data reviewed -sinus rhythm noted.       Lab Results   Component Value Date/Time    WBC 5.5 02/27/2025 04:16 AM    RBC 2.55 02/27/2025 04:16 AM    HGB 7.7 02/27/2025 04:16 AM    HCT 23.0 02/27/2025 04:16 AM    MCV 90.2 02/27/2025 04:16 AM    MCH 30.2 02/27/2025 04:16 AM    MCHC 33.5 02/27/2025 04:16 AM    RDW 22.6 02/27/2025 04:16 AM    PLT 86 02/27/2025 04:16 AM    MPV 10.3 02/27/2025 04:16 AM     Lab Results   Component Value Date/Time     02/27/2025 04:16 AM    K 3.3 02/27/2025 04:16 AM    K 3.5 05/14/2021 01:00 AM     02/27/2025 04:16 AM    CO2 24 02/27/2025 04:16 AM    BUN 13 02/27/2025 04:16 AM    CREATININE 0.9 02/27/2025 04:16 AM    CALCIUM 7.9 02/27/2025 04:16 AM    BILITOT 9.1 02/27/2025 04:16 AM    ALKPHOS 88 02/27/2025 04:16 AM    AST 53 02/27/2025 04:16 AM    ALT 16 02/27/2025 04:16 AM     Lab Results   Component Value Date/Time    LIPASE 30 02/13/2025 01:39 PM     No results found for: \"AMYLASE\"      ASSESSMENT/PLAN:  Patient Active Problem List   Diagnosis    Prostate cancer (HCC)    Dyslipidemia    Essential hypertension    Asymptomatic PVCs    History of MI (myocardial 
PROGRESS NOTE  By Clinton Cobb M.D.    [facility]  Dr. Angelo Xiao M.D.,  Dr. Kai Tesfaye M.D.,   JULIAN JenningsO.,  Dr. Chadd Mancia M.D.,  Dr. Pranav Lomas D.O.,          Hardik Gray  75 y.o.  male    SUBJECTIVE:  No new complaints    OBJECTIVE:    /66   Pulse 89   Temp 97.7 °F (36.5 °C) (Oral)   Resp 18   Ht 1.753 m (5' 9\")   Wt 82.6 kg (182 lb)   SpO2 100%   BMI 26.88 kg/m²     General: NAD/older adult male patient/AAOx3  HEENT: Persistent scleral icterus/moist oral mucosa  Neck: Supple with trachea midline  Chest: Symmetric excursion/CTAB  Cor: Regular  Abd.: Soft/moderately distended.  Nontender  Extr.:  BLE over 3+ edema  Skin: Warm and dry    DATA:    Monitor data reviewed        Lab Results   Component Value Date/Time    WBC 6.7 02/28/2025 06:31 AM    RBC 2.37 02/28/2025 06:31 AM    HGB 7.3 02/28/2025 06:31 AM    HCT 21.9 02/28/2025 06:31 AM    MCV 92.4 02/28/2025 06:31 AM    MCH 30.8 02/28/2025 06:31 AM    MCHC 33.3 02/28/2025 06:31 AM    RDW 23.5 02/28/2025 06:31 AM    PLT 86 02/27/2025 04:16 AM    MPV 9.8 02/28/2025 06:31 AM     Lab Results   Component Value Date/Time     02/28/2025 06:31 AM    K 3.0 02/28/2025 06:31 AM    K 3.5 05/14/2021 01:00 AM     02/28/2025 06:31 AM    CO2 25 02/28/2025 06:31 AM    BUN 13 02/28/2025 06:31 AM    CREATININE 1.0 02/28/2025 06:31 AM    CALCIUM 8.0 02/28/2025 06:31 AM    BILITOT 9.3 02/28/2025 06:31 AM    ALKPHOS 102 02/28/2025 06:31 AM    AST 50 02/28/2025 06:31 AM    ALT 16 02/28/2025 06:31 AM     Lab Results   Component Value Date/Time    LIPASE 30 02/13/2025 01:39 PM     No results found for: \"AMYLASE\"      ASSESSMENT/PLAN:  Patient Active Problem List   Diagnosis    Prostate cancer (HCC)    Dyslipidemia    Essential hypertension    Asymptomatic PVCs    History of MI (myocardial infarction)    Coronary artery disease involving native coronary artery of native heart without angina pectoris    Pain in both lower 
PROGRESS NOTE  By Clinton Cobb M.D.    [facility]  Dr. Angelo Xiao M.D.,  Dr. Kai Tesfaye M.D.,   TETO Jennings.O.,  Dr. Chadd Mancia M.D.,  Dr. Pranav Lomas D.O.,          Hardik Gray  75 y.o.  male    Discharge orders noted.  Follow-up in 1 to 2 weeks after discharge -patient/family to call for appointment and with questions/concerns at [phone].  Thank you for the opportunity to participate in the care of Mr. Gray    Clinton oCbb MD  3/2/2025  9:12 AM    NOTE:  This report was transcribed using voice recognition software.  Every effort was made to ensure accuracy; however, inadvertent computerized transcription errors may be present.  
PROGRESS NOTE  By Clinton Cobb M.D.    [facility]  Dr. Angelo Xiao M.D.,  Dr. Kai Tesfaye M.D.,   TETO Jennings.O.,  Dr. Chadd Mancia M.D.,  JULIAN AdamsO.,          Hardik Gray  75 y.o.  male    SUBJECTIVE:  Denies any significant abdominal pain.  Denies nausea vomiting.  Patient is aware of any bloody bowel movements.  No bloody bowel movements per nursing either.  Patient denies nausea vomiting.      OBJECTIVE:    /63   Pulse 79   Temp 98.5 °F (36.9 °C) (Oral)   Resp 18   Ht 1.753 m (5' 9\")   Wt 82.6 kg (182 lb)   SpO2 100%   BMI 26.88 kg/m²     General: NAD/adult Black male.  AAOx3  HEENT: Persistent scleral icterus/moist oral mucosa/poor dentition  Neck: Supple with trachea midline  Chest: CTAB/symmetric excursions  Cor: Regular/S1-S2  Abd.: Soft.  Distended.  Nontender  Extr.:  BLE 3+ edema.  Decreased muscle tone and bulk throughout.  Generalized sarcopenia  Skin: Warm and dry      DATA:    Monitor data reviewed -sinus rhythm noted.       Lab Results   Component Value Date/Time    WBC 6.0 02/25/2025 04:57 AM    RBC 2.08 02/25/2025 04:57 AM    HGB 6.4 02/25/2025 04:57 AM    HCT 19.8 02/25/2025 04:57 AM    MCV 95.2 02/25/2025 04:57 AM    MCH 30.8 02/25/2025 04:57 AM    MCHC 32.3 02/25/2025 04:57 AM    RDW 22.6 02/25/2025 04:57 AM    PLT 75 02/19/2025 04:05 AM    MPV 10.5 02/25/2025 04:57 AM     Lab Results   Component Value Date/Time     02/25/2025 04:57 AM    K 3.4 02/25/2025 04:57 AM    K 3.5 05/14/2021 01:00 AM     02/25/2025 04:57 AM    CO2 26 02/25/2025 04:57 AM    BUN 11 02/25/2025 04:57 AM    CREATININE 0.9 02/25/2025 04:57 AM    CALCIUM 7.9 02/25/2025 04:57 AM    BILITOT 7.6 02/25/2025 04:57 AM    ALKPHOS 167 02/25/2025 04:57 AM    AST 51 02/25/2025 04:57 AM    ALT 18 02/25/2025 04:57 AM     Lab Results   Component Value Date/Time    LIPASE 30 02/13/2025 01:39 PM     No results found for: \"AMYLASE\"      ASSESSMENT/PLAN:  Patient Active 
Patient admitted from Endo to room 216, placed on monitor, patient oriented to room and unit visiting hours.  Patient guide at bedside, reviewed patient rights and responsibilities. MRSA nasal swab obtained. Bed alarm on, call light within reach.   
Patient in preoperative department. Patient did not receive bowel prep for the procedure. Notified Dr. Zayas. Per Dr. Zayas, case to be cancelled and plan to reschedule tomorrow. Notified 6w charge nurse. Notified Patient's daughter, [place]. All questions answered.   
Patient refused AM lactulose. Education ineffective. All needs met. Call light within reach.  
Patient seen and examined prior to procedure.  Daughter at bedside.  Significantly more awake complaining of some abdominal discomfort.  No further bleeding overnight with appropriate improvement of H&H after transfusion.  Plan for flexible sigmoidoscopy later today  Above has been discussed in detail with patient's daughter including transfusion of plasma/FFP and all questions answered to her satisfaction.  Please, see orders for plan of care.  Thank you    Clintno Cobb MD    
Per report from ICU nurse, pt's Hgb resulted at 6.5. ICU RN contacted provider Aleah Johnson NP, who stated to pass along to day shift.   
Physical Therapy  Facility/Department: [address] ICU  Daily Treatment Note  NAME: Hardik Gray  : 1950  MRN: [id number]    Date of Service: 2025    D      Patient Diagnosis(es): The primary encounter diagnosis was Ascites due to alcoholic cirrhosis (HCC). Diagnoses of Anemia, unspecified type, Complicated UTI (urinary tract infection), and Congestive heart failure, unspecified HF chronicity, unspecified heart failure type (HCC) were also pertinent to this visit.  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  Past Surgical History:  has a past surgical history that includes Prostate biopsy; Foot surgery; Foot surgery; Coronary angioplasty with stent (); Cystoscopy (N/A, 3/22/2021); Bladder surgery (N/A, 3/22/2023); sigmoidoscopy (N/A, 2024); and Upper gastrointestinal endoscopy (N/A, 2/3/2025).        Evaluating Therapist: Lila Stevens PT     Room #:  0634/0634-A  Diagnosis:  Complicated UTI (urinary tract infection) [N39.0]  Ascites of liver [R18.8]  Ascites due to alcoholic cirrhosis (HCC) [K70.31]  Anemia, unspecified type [D64.9]  PMHx/PSHx:  COPD, CAD, CVA, HTN  Precautions:  falls        Social:  Pt lives alone or with friend in a third floor apartment. Independent with cane/ww.        Initial Evaluation  Date: 25 Treatment  25    Short Term/ Long Term   Goals   Was pt agreeable to Eval/treatment? yes yes      Does pt have pain? None reported       Bed Mobility  Rolling: NT  Supine to sit: NT  Sit to supine: NT  Scooting: NT  supine to sit 
Physical Therapy  Facility/Department: [facility]   Daily Treatment Note  NAME: Hardik Gray  : 1950  MRN: [id number]    Date of Service: 2025    D      Patient Diagnosis(es): The primary encounter diagnosis was Ascites due to alcoholic cirrhosis (HCC). Diagnoses of Anemia, unspecified type, Complicated UTI (urinary tract infection), and Congestive heart failure, unspecified HF chronicity, unspecified heart failure type (HCC) were also pertinent to this visit.  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  Past Surgical History:  has a past surgical history that includes Prostate biopsy; Foot surgery; Foot surgery; Coronary angioplasty with stent (); Cystoscopy (N/A, 3/22/2021); Bladder surgery (N/A, 3/22/2023); sigmoidoscopy (N/A, 2024); and Upper gastrointestinal endoscopy (N/A, 2/3/2025).        Evaluating Therapist: Lila Stevens PT     Room #:  610  Diagnosis:  Complicated UTI (urinary tract infection) [N39.0]  Ascites of liver [R18.8]  Ascites due to alcoholic cirrhosis (HCC) [K70.31]  Anemia, unspecified type [D64.9]  PMHx/PSHx:  COPD, CAD, CVA, HTN  Precautions:  falls        Social:  Pt lives alone or with friend in a third floor apartment. Independent with cane/ww.        Initial Evaluation  Date: 25 Treatment  25    Short Term/ Long Term   Goals   Was pt agreeable to Eval/treatment? yes yes      Does pt have pain? None reported  no complaints     Bed Mobility  Rolling: NT  Supine to sit: NT  Sit to supine: NT  Scooting: NT NT 
Physical Therapy  Facility/Department: [facility]   Daily Treatment Note  NAME: Hardik Gray  : 1950  MRN: [id number]    Date of Service: 2025    D      Patient Diagnosis(es): The primary encounter diagnosis was Ascites due to alcoholic cirrhosis (HCC). Diagnoses of Anemia, unspecified type, Complicated UTI (urinary tract infection), and Congestive heart failure, unspecified HF chronicity, unspecified heart failure type (HCC) were also pertinent to this visit.  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  Past Surgical History:  has a past surgical history that includes Prostate biopsy; Foot surgery; Foot surgery; Coronary angioplasty with stent (); Cystoscopy (N/A, 3/22/2021); Bladder surgery (N/A, 3/22/2023); sigmoidoscopy (N/A, 2024); and Upper gastrointestinal endoscopy (N/A, 2/3/2025).        Evaluating Therapist: Lila Stevens PT     Room #:  610  Diagnosis:  Complicated UTI (urinary tract infection) [N39.0]  Ascites of liver [R18.8]  Ascites due to alcoholic cirrhosis (HCC) [K70.31]  Anemia, unspecified type [D64.9]  PMHx/PSHx:  COPD, CAD, CVA, HTN  Precautions:  falls        Social:  Pt lives alone or with friend in a third floor apartment. Independent with cane/ww.        Initial Evaluation  Date: 25 Treatment  25    Short Term/ Long Term   Goals   Was pt agreeable to Eval/treatment? yes yes      Does pt have pain? None reported  no complaints     Bed Mobility  Rolling: NT  Supine to sit: NT  Sit to supine: NT  Scooting: NT Supine to 
Potassium 3.0, PRN potassium scaled used to start replacement  
Pt had a small bowel movement that was just a blood clot. Pt's scrotum was also noted to be draining yellow pus.     Dr. Lomas notified of clot. No new orders at this time.   
Pt transferred to 7 with all belongings including cell phone, shoes, slippers, wallet, travel bag with pajamas , dentures, home medication in bottle and labeled, naida    
Pt unable to sign anesthesia consent  Attempted to contact both family members listed in chart left messages   Pt will have procedure done emergently per Dr Lomas due to bleeding  
Pts family (Dawson) notified of room number change per pt request.   
SPEECH LANGUAGE PATHOLOGY  DAILY PROGRESS NOTE      PATIENT NAME:  Hardik Gray      :  1950          TODAY'S DATE:  2025 ROOM:  [address] [place], [place] [address]A    Current Diet: ADULT DIET; Regular; Low Sodium (2 gm)    Patient seen for ongoing dysphagia tx during lunch meal. Patient seated upright in chair and able to feed self once given set up assistance. Patient with prolonged mastication but functional clearance of regular solids. Patient reported decreased appetite overall this date.   It should be noted, silent aspiration can not be ruled out at the bedside, and if silent aspiration is suspected based on clinical correlation an MBSS would be recommended.     Recommendation: cont current diet       CPT code(s) 20012  dysphagia tx  Total minutes :  10 minutes    Shelby Hardy M.S. CCC-SLP/L  Speech Language Pathologist  [id number]     
SPEECH LANGUAGE PATHOLOGY  DAILY PROGRESS NOTE      PATIENT NAME:  Hardik Gray      :  1950          TODAY'S DATE:  2025 ROOM:  [address] [place], [place] [address]A    Current Diet: ADULT DIET; Regular; No Drinking Straws  ADULT ORAL NUTRITION SUPPLEMENT; Breakfast, Dinner; Plant Based Oral Supplement    Patient seen for ongoing dysphagia tx. Patient seated upright in bed and feeding self lunch meal. Patient with mildly prolonged mastication with solid consistencies. Patient independently utilizing small sips. No overt s/s of aspiration noted with thin liquids or solid consistencies this date.   It should be noted, silent aspiration can not be ruled out at the bedside, and if silent aspiration is suspected based on clinical correlation an MBSS would be recommended.     Recommendation: cont current diet       CPT code(s) 09387  dysphagia tx  Total minutes :  10 minutes    Shelby Hardy M.S. CCC-SLP/L  Speech Language Pathologist  [id number]     
Secure message sent via perfect serve to Dr. Heath regarding hemoglobin of 6.4 this am.   
Spiritual Health History and Assessment/Progress Note  [facility]    Attempted Encounter,  ,  ,  Patient was out of room when  rounded. No Family Members present.  left prayer care.    Name: Hardik Gray MRN: [id number]    Age: 75 y.o.     Sex: male   Language: English   Amish: Unknown   Ascites of liver     Date: 2/20/2025                           Spiritual Assessment began in [address] MED SURG        Referral/Consult From: Palliative Care   Encounter Overview/Reason: Attempted Encounter  Service Provided For: Patient not available    Rakel, Belief, Meaning:   Patient unable to assess at this time  Family/Friends No family/friends present      Importance and Influence:  Patient unable to assess at this time  Family/Friends No family/friends present    Community:  Patient Other: N/A  Family/Friends No family/friends present    Assessment and Plan of Care:     Patient Interventions include: Other: N/A  Family/Friends Interventions include: No family/friends present    Patient Plan of Care: Spiritual Care available upon further referral  Family/Friends Plan of Care: Spiritual Care available upon further referral    Electronically signed by LUIS ALBERTO Murray on 2/20/2025 at 3:05 PM   
Sp[facility] and Assessment/Progress Note  [facility]    Initial Encounter,  ,  ,      Name: Hardik Gray MRN: [id number]    Age: 74 y.o.     Sex: male   Language: English   Anabaptist: Unknown   Ascites of liver     Date: 2/14/2025                           Spiritual Assessment began in [facility]        Referral/Consult From: Rounding   Encounter Overview/Reason: Initial Encounter  Service Provided For: Patient, Significant other    Rakel, Belief, Meaning:   Patient has beliefs or practices that help with coping during difficult times  Family/Friends have beliefs or practices that help with coping during difficult times      Importance and Influence:  Patient has spiritual/personal beliefs that influence decisions regarding their health  Family/Friends Other: did not discuss    Community:  Patient feels well-supported. Support system includes: Spouse/Partner, Children, and Friends  Family/Friends feel well-supported. Support system includes: Spouse/Partner    Assessment and Plan of Care:     Patient Interventions include: Affirmed coping skills/support systems  Family/Friends Interventions include: Affirmed coping skills/support systems    Patient Plan of Care: No spiritual needs identified for follow-up  Family/Friends Plan of Care: No spiritual needs identified for follow-up    Electronically signed by Chaplain Kylah on 2/14/2025 at 11:13 AM    
Spoke to Parisa Caro, JOSE with urology. Millan is to not be removed. Patient to follow up OP at [facility] Urology office.  
Spoke with Shayla NP with urology re the pt rodrigues that he presented to us with. Per Shayla this rodrigues is to remain in place at this time due to the pt severe level of swelling the rodrigues however will be changed out over a guide wire by urology as it is due to be changed at this time.   
Spoke with blood bank regarding status of PRBC's. States they did not receive order for blood. Reviewed orders in epic.States it would be ready in about 30 minutes.   
Wound care notified of consult.  
(kg/m2): 27.1  Usual Body Weight: 84.7 kg (186 lb 11.7 oz) (1/19/25 [facility])     % Weight Change (Calculated): -1.5                    BMI Categories: Overweight (BMI 25.0-29.9)    Estimated Daily Nutrient Needs:  Energy Requirements Based On: Kcal/kg  Weight Used for Energy Requirements: Current  Energy (kcal/day):  (22-25 danielle/kg)  Weight Used for Protein Requirements: Ideal  Protein (g/day): 75-85 (1.1-1.2 g/kg)  Method Used for Fluid Requirements: Defer to provider  Fluid (ml/day):      Nutrition Diagnosis:   Inadequate oral intake related to altered GI function as evidenced by lab values, GI abnormality, wounds, poor intake prior to admission    Nutrition Interventions:   Food and/or Nutrient Delivery: Continue Current Diet, Start Oral Nutrition Supplement  Nutrition Education/Counseling: No recommendation at this time  Coordination of Nutrition Care: Continue to monitor while inpatient       Goals:  Goals: PO intake 75% or greater, by next RD assessment  Type of Goal: New goal       Nutrition Monitoring and Evaluation:   Behavioral-Environmental Outcomes: None Identified  Food/Nutrient Intake Outcomes: Diet Advancement/Tolerance, Supplement Intake  Physical Signs/Symptoms Outcomes: Biochemical Data, GI Status, Fluid Status or Edema, Nutrition Focused Physical Findings, Skin, Weight    Discharge Planning:    Too soon to determine     Lorin Lombardo RD, CNSC, LD  Contact: x [phone]    
09:40 AM    HCT 19.9 02/17/2025 09:40 AM       Lab Results   Component Value Date    PSA 0.05 11/10/2023    PSA 0.10 11/04/2022    PSA 0.13 09/26/2022         Assessment/Plan:  Urinary retention  History of prostate cancer status post radiation  Urethral stricture disease  Penoscrotal edema related to volume overload  Ascites  Alcoholic cirrhosis  Prostate cancer post XRT   Urethritis   Liver cirrhosis   Hep B     Creatinine 0.9  Continue monitor creatinine  Urine culture reviewed  Antibiotics per infectious disease  Continue the Millan  He will need outpatient follow-up for trial of void in 1 to 2 weeks after his overall medical status improves  He will be discharged with the Millan  No further  interventions are planned at this time  Please call with any further questions or concerns  Thank you for allowing us to participate in Hardik's care       DAREN Amin - CNP   [facility]  Urology      
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  Cirrhosis  -Chronic/decompensated  -Suspect alcohol contributing  -Suspect hepatitis B/C contributing  -MELD-Na= 29 (2/14/2025)  -Prior MELD 28 (9/2024)  -CT abdomen pelvis 2/13/2025 showing cirrhotic liver, simple cyst in the left hepatic lobe, no suspicious hepatic lesions, evidence of portal hypertension, ascites  -Nonelevated AFP 2.3 (2/2/2025)  -Esophageal varices, see associated section  -Ascites, see associated section     2.  History of hepatitis B&C  -Positive hepatitis C antibody but nondetectable viral load in January of this year  -Follow-up as outpatient     3.  Esophageal varices  -EGD 2/3/2025 by Dr. Lomas with findings of grade 3 varices banded x 5, nonbleeding superficial gastric ulcer 4 mm, gastritis, unremarkable examined proximal small bowel  -Recommended EGD 4 weeks  -Twice a day IV PPI  -Octreotide drip -wean off  -EGD 2/17/2025 with banding     4.  Ascites  -Ultrasound abdomen 2/14/2024 with insufficient volume ascites for 
Requirements Based On: Kcal/kg  Weight Used for Energy Requirements: Current  Energy (kcal/day):  (22-25 danielle/kg)  Weight Used for Protein Requirements: Ideal  Protein (g/day): 75-85 (1.1-1.2 g/kg)  Method Used for Fluid Requirements: 1 ml/kcal  Fluid (ml/day): 6342-4225 ml    Nutrition Diagnosis:   Inadequate oral intake related to altered GI function as evidenced by lab values, GI abnormality, wounds, poor intake prior to admission    Nutrition Interventions:   Food and/or Nutrient Delivery: Continue Current Diet, Start Oral Nutrition Supplement  Nutrition Education/Counseling: No recommendation at this time  Coordination of Nutrition Care: Continue to monitor while inpatient       Goals:  Goals: PO intake 75% or greater, by next RD assessment  Type of Goal: Continue current goal  Previous Goal Met: Progressing toward Goal(s)    Nutrition Monitoring and Evaluation:   Behavioral-Environmental Outcomes: None Identified  Food/Nutrient Intake Outcomes: Food and Nutrient Intake, Supplement Intake  Physical Signs/Symptoms Outcomes: Biochemical Data, GI Status, Fluid Status or Edema, Nutrition Focused Physical Findings, Skin, Weight    Discharge Planning:    Too soon to determine     PARUL DALLAS MPH, RD, LD  Contact: x [phone]    
and oriented to person, place and time and in NAD, sitting in bed  Skin: warm and dry  Head: normocephalic and atraumatic  Eyes: PERRL, EOMI, conjunctivae normal  Neck: neck supple, trachea midline   Pulmonary/Chest: CTAB, no w/r/r, normal air movement, no respiratory distress, RA  Cardiovascular: RRR, no murmurs  Abdomen: soft, distended, firm, tender, 1+ soft tissue edema  Extremities: no cyanosis, no clubbing and 1+ BLE edema  Neurologic: no cranial nerve deficit and speech normal      Recent Labs     02/16/25  0240 02/17/25  0940 02/18/25  0430    139 140   K 3.9 4.4 4.6    106 108*   CO2 26 24 23   BUN 10 17 21   CREATININE 1.0 0.9 1.1   GLUCOSE 113* 92 112*   CALCIUM 7.7* 7.3* 7.5*       Recent Labs     02/17/25  0940 02/17/25  1810 02/18/25  0147 02/18/25  0430   WBC 7.7  --  8.1 7.9   RBC 2.08*  --  2.51* 2.40*   HGB 6.6* 6.3* 7.8* 7.5*   HCT 19.9* 19.3* 23.0* 21.9*   MCV 95.7  --  91.6 91.3   MCH 31.7  --  31.1 31.3   MCHC 33.2  --  33.9 34.2   RDW 21.0*  --  17.9* 17.9*   PLT 83*  --  79*  --    MPV 10.4  --  10.1 10.5       Radiology:  US ABDOMEN LIMITED   Final Result   Low volume ascites, therefore paracentesis was not performed.         US SCROTUM AND TESTICLES   Final Result   1. Prominent symmetric bilateral scrotal wall edema.   2. Tiny bilateral hydroceles.   3. Probable large fat containing left inguinal hernia.   4. Small left epididymal head cyst of no clinical concern.   5. Normal appearance of the testes with no sign of testicular torsion.         CT ABDOMEN PELVIS W IV CONTRAST Additional Contrast? None   Final Result   1. Stable generalized body wall edema and scrotal edema and bilateral   hydroceles with no evidence of scrotal wall gas to suggest Lise's   gangrene.   2. Stable large volume of abdominal and pelvic ascites.   3. Cirrhosis of the liver with varices including periesophageal varices.   4. Descending colon and sigmoid colon diverticulosis with no evidence of 
is awake, alert, and oriented.  Up in the chair.  In no distress.  Skin: Warm and dry. No rashes were noted.   HEENT: Round and reactive pupils.  Moist mucous membranes.  No ulcerations or thrush.  Neck: Supple to movements.   Chest: No use of accessory muscles to breathe. Symmetrical expansion.  Clear  Cardiovascular: S1 and S2 are rhythmic and regular. No murmurs appreciated.   Abdomen: Positive bowel sounds to auscultation. Benign to palpation. No masses felt.   Extremities: No edema.  Genitalia: Scrotal swelling noted. Millan catheter in place with some purulent drainage at the head of the meatus.  Penis is edematous  Lines: Peripheral.  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 I have seen and examined the patient and the key elements of the encounter have been performed by me. I agree with the assessment, plan and orders as documented.      Treatment plan as per my recommendation     Paul De La Cruz MD, FACP  JORGITO  2/16/2025  2:22 PM    
recommendations for increased independence, safety, and fall prevention  * Patient/Family education to increase follow through with safety techniques and functional independence  * Recommendation of environmental modifications for increased safety with functional transfers/mobility and ADLs  * Cognitive retraining/development of therapeutic activities to improve problem solving, judgement, memory, and attention for increased safety/participation in ADL/IADL tasks  * Therapeutic exercise to improve motor endurance, ROM, and functional strength for ADLs/functional transfers  * Therapeutic activities to facilitate/challenge dynamic balance, stand tolerance for increased safety and independence with ADLs  * Neuro-muscular re-education: facilitation of righting/equilibrium reactions, midline orientation, scapular stability/mobility, normalization of muscle tone, and facilitation of volitional active controled movement  * Positioning to improve skin integrity, interaction with environment and functional independence    Recommended Adaptive Equipment: TBD     Home Living: Patient lives alone in a third floor apartment.  Bathroom Setup: tub shower  Equipment Owned: walker, cane    Prior Level of Function (PLOF): Patient has assist, as needed with ADLs and IADLs. Patient had used a cane or walker for functional mobility.    Pain Level: No c/o pain.  Cognition: Patient alert and oriented grossly. Fair command follow demonstrated. Patient cooperative with provision of encouragement and patient education on the role of therapy services in acute care hospital setting, particularly with regard to discharge planing. Ptient motivated to return to his PLOF.  Memory: Fair  Sequencing: Fair  Problem Solving: Fair  Judgement/Safety: Fair    Functional Assessment:  -PAC Daily Activity Raw Score: 14/24   Initial Eval Status  Date: 2/14/2025 Re-Eval Status  Date: 2/18/2025 Treatment Status  Date:  Short Term Goals = Long Term Goals 
°F (36.8 °C)  Min: 97.9 °F (36.6 °C)  Max: 98.6 °F (37 °C)  Constitutional: The patient is awake, alert, and oriented.  Sitting up in bed in no distress.  Skin: Warm and dry. No rashes were noted.   HEENT: Round and reactive pupils.  Moist mucous membranes.  No ulcerations or thrush.  Neck: Supple to movements.   Chest: No use of accessory muscles to breathe. Symmetrical expansion.  No wheezing, crackles or rhonchi.  Cardiovascular: S1 and S2 are rhythmic and regular. No murmurs appreciated.   Abdomen: Positive bowel sounds to auscultation. Benign to palpation. No masses felt. No hepatosplenomegaly.  Extremities: No edema.  Genitalia: Scrotal swelling noted. Millan catheter in place with some purulent drainage at the head of the meatus   Lines: Peripheral.  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 I have seen and examined the patient and the key elements of the encounter have been performed by me. I agree with the assessment, plan and orders as documented.      Treatment plan as per my recommendation     Paul De La Cruz MD, FACP  FIDSA  2/15/2025  3:13 PM  
    Assessment:  Principal Problem:    Ascites of liver  Resolved Problems:    * No resolved hospital problems. *      Plan:  Decompensated  liver cirrhosis with ascites and esophageal varices:  patient was admitted 1/31-2/6  and is s/p paracentesis on 2/4 with removal of 2230 cc of fluid not consistent with SBP . Patient was discharged with Cipro 500 mg for 7 days  Patient was scheduled for paracentesis on 2/14, no repeat this admission d/t inadequate volume. Patient reports increased lower extremity edema and abdominal distention . Admit inpatient vitals telemetry Gi consult for paracentesis body fluid studies Lasix 40 mg IV daily Aldactone 25 mg daily. GI c/s, would benefit from eval for possible TIPS procedure     Balanoposthitis, suspected urethritis: d/t chronic indwelling rodrigues, pus reportedly draining from scrotum. CT revealed no gas to suggest Lise gangrene, will check scrotal US. Urology consult, rodrigues exchanged 2/14, [facility], may require SPC. Check GC/Chlamydia. ID c/s, recent treatment with both rocephin and fluconazole during prior admissions, suspect finding on UA r/t chronic rodrigues, will monitor off abx for now, suspect urethritis is also r/t chronic indwelling rodrigues. Follow Cx, monitor for s/s of infection     Acute on chronic macrocytic anemia: HGB 6.6 on admit will be transfused  1PRBC this admit,  transfuse  HGB < 7 . Patient is s/p esophogeal banding x5 on last  admission. GI c/s, consider repeat endoscopy     Lactic acidosis : sec to liver disease will trend      Esophageal varices: PPI BID s/p banding x5 on last admission. GI c/s, on octreotide gtt currently     Elevated BNP : 2212 sec to anasarca  2D echo 9/2023 reveals EF 60-65 %      History of radiation proctitis : likely contributing to anemia      Thrombocytopenia : sec to liver cirrhosis monitor      BPH : Flomax      Hypertension : Lopressor 25 mg twice daily      History of CVA       Dispo: anticipate home    NOTE: 
cardiac source contributing to ascites, elevated protein     5.  History of radiation proctitis  -Likely contributing to patient's anemia  -Colonoscopy 9/24/2024 with findings of mild radiation proctitis treated with APC, no other acute findings     6.  Anemia  -Acute on chronic  -Monitor serial hemoglobin  -Keep PRBCs on hold  -Defer transfusion to admitting  -Obtaining iron studies given recent transfusion will be of limited clinical value  -EGD 2/3/2025 by Dr. Lomas with findings of grade 3 varices banded x 5, nonbleeding superficial gastric ulcer 4 mm, gastritis, unremarkable examined proximal small bowel  -EGD 2/17/2025 revealing varices which were banded  -Recommended EGD 4 weeks  -Continue twice a day PPI-Flexible sigmoidoscopy 2/17/2025 revealed maroon stool but no active bleeding     7.  Comorbidities  -Per admitting/pertinent consultants     H&H appears stabilized without further evidence of rectal bleeding.  Continue current with plan for paracentesis later today.  Please, see orders for plan of care     Clinton Cobb MD  2/24/2025  8:01 AM    NOTE:  This report was transcribed using voice recognition software.  Every effort was made to ensure accuracy; however, inadvertent computerized transcription errors may be present.  
inguinal hernia.   4. Small left epididymal head cyst of no clinical concern.   5. Normal appearance of the testes with no sign of testicular torsion.         CT ABDOMEN PELVIS W IV CONTRAST Additional Contrast? None   Final Result   1. Stable generalized body wall edema and scrotal edema and bilateral   hydroceles with no evidence of scrotal wall gas to suggest Lise's   gangrene.   2. Stable large volume of abdominal and pelvic ascites.   3. Cirrhosis of the liver with varices including periesophageal varices.   4. Descending colon and sigmoid colon diverticulosis with no evidence of   diverticulitis.         XR CHEST PORTABLE   Final Result   No acute cardiopulmonary disease.         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 *      Plan:  GI consulted, appreciate recommendations, s/p EGD with variceal banding, s/p paracentesis 2.5 L removed  Continue Lasix (adjust to BID) and Aldactone  Continue Midodrine 2.5 mg TID  Continue Lactulose and Rifaximin  Protonix BID  Resume home medications as able    Code Status: Full code  DVT/GI ppx: PCD/Protonix  Dispo: Full code    Time spent reviewing chart, clinical exam, discussing case and answering questions with staff/consultants/patient/family = 35 min    NOTE: This report was transcribed using voice recognition software. Every effort was made to ensure accuracy; however, inadvertent computerized transcription errors may be present.  Electronically signed by German Wallace MD on 2/24/2025 at 3:18 PM      
mL removed  -Consider cardiac source contributing to ascites, elevated protein     5.  History of radiation proctitis  -Likely contributing to patient's anemia  -Colonoscopy 9/24/2024 with findings of mild radiation proctitis treated with APC, no other acute findings     6.  Anemia  -Acute on chronic  -Likely multifactorial from hematuria, hematochezia (radiation proctitis) however variceal etiology/upper GI etiology cannot be excluded  -Monitor serial hemoglobin  -Keep PRBCs on hold  -Defer transfusion to admitting  -Obtaining iron studies given recent transfusion will be of limited clinical value  -EGD 2/3/2025 by Dr. Lomas with findings of grade 3 varices banded x 5, nonbleeding superficial gastric ulcer 4 mm, gastritis, unremarkable examined proximal small bowel  -EGD 2/17/2025 revealing varices which were banded  -Recommended EGD 4 weeks  -Continue octreotide drip/PPI  -Flexible sigmoidoscopy 2/17/2025 revealed maroon stool but no active bleeding     7.  Comorbidities  -Per admitting/pertinent consultants        Hemoglobin overall fairly stable.  Continue to monitor.  Increased abdominal distention.  Plan for repeat paracentesis.  GI will follow.    Juan A Harrison, APRN - CNP  2/23/2025  11:13 AM    NOTE:  This report was transcribed using voice recognition software.  Every effort was made to ensure accuracy; however, inadvertent computerized transcription errors may be present.  
mild radiation proctitis treated with APC, no other acute findings     6.  Anemia  -Acute on chronic  -Likely multifactorial from hematuria, hematochezia (radiation proctitis) however variceal etiology/upper GI etiology cannot be excluded  -Monitor serial hemoglobin  -Keep PRBCs on hold  -Defer transfusion to admitting  -Obtaining iron studies given recent transfusion will be of limited clinical value  -EGD 2/3/2025 by Dr. Lomas with findings of grade 3 varices banded x 5, nonbleeding superficial gastric ulcer 4 mm, gastritis, unremarkable examined proximal small bowel  -Recommended EGD 4 weeks  -High-dose IV PPI  -Octreotide drip  -Likely repeat EGD Monday -see below  -Consider flexible sigmoidoscopy for repeat treatment with APC     7.  Comorbidities  -Per admitting/pertinent consultants        Decreased hemoglobin.  Currently receiving PRBCs.  Plan for EGD and flex sig today.      Clinton Cobb MD  2/17/2025  7:06 AM    NOTE:  This report was transcribed using voice recognition software.  Every effort was made to ensure accuracy; however, inadvertent computerized transcription errors may be present.  
performed.           US SCROTUM AND TESTICLES   Final Result   1. Prominent symmetric bilateral scrotal wall edema.   2. Tiny bilateral hydroceles.   3. Probable large fat containing left inguinal hernia.   4. Small left epididymal head cyst of no clinical concern.   5. Normal appearance of the testes with no sign of testicular torsion.           CT ABDOMEN PELVIS W IV CONTRAST Additional Contrast? None   Final Result   1. Stable generalized body wall edema and scrotal edema and bilateral   hydroceles with no evidence of scrotal wall gas to suggest Lise's   gangrene.   2. Stable large volume of abdominal and pelvic ascites.   3. Cirrhosis of the liver with varices including periesophageal varices.   4. Descending colon and sigmoid colon diverticulosis with no evidence of   diverticulitis.           XR CHEST PORTABLE   Final Result   No acute cardiopulmonary disease.       Assessment:    Principal Problem:    Ascites of liver  Resolved Problems:    * No resolved hospital problems. *      Plan:    Decompensated liver cirrhosis  -Secondary to alcohol, hepatitis B/C  -MELD NA = 29  -CT abdomen pelvis on 2/13/2025 noted with cirrhotic liver with evidence of portal hypertension and ascites  -Continue furosemide, spironolactone, lactulose and rifaximin    History of hepatitis B&C  -Positive hepatitis C antibody but nondetectable viral load January 2025  -Continue Entecavir    Esophageal varices  -EGD 2/3/2025 noted with grade 3 varices banded x 5, nonbleeding superficial gastric ulcer 4 mm, gastritis  -EGD 2/17/2025 with banding  -Continue PPI IV twice a day  -Octreotide drip, wean off    Ascites  -Abdominal ultrasound 2//2025 with 2230 mL removed  - Continue furosemide, spironolactone, lactulose and rifaximin    Acute on chronic anemia  -Likely secondary to hematochezia from radiation proctitis, with possible variceal/upper GI etiology  -Flexible sigmoidoscopy 2/17/2025 noted with maroon stool but no active 
transfers   [x] Gait Training to improve gait mechanics, endurance and assess need for appropriate assistive device  [x] Stair Training in preparation for safe discharge home and/or into the community   [] Positioning to prevent skin breakdown and contractures  [x] Safety and Education Training   [x] Patient/Caregiver Education   [] HEP  [] Other     PT long term treatment goals are located in above grid    Frequency of treatments: 2-5x/week x 5 days.    Time in  0900  Time out  0915      Evaluation Time includes thorough review of current medical information, gathering information on past medical history/social history and prior level of function, completion of standardized testing/informal observation of tasks, assessment of data and education on plan of care and goals.      CPT codes:  [x] Low Complexity PT evaluation 89935  [] Moderate Complexity PT evaluation 89641  [] High Complexity PT evaluation 75905  [] PT Re-evaluation 64174  [] Gait training 22832 minutes  [] Manual therapy 38341 minutes  [] Therapeutic activities 02702 minutes  [] Therapeutic exercises 21049 minutes  [] Neuromuscular reeducation 63801 minutes     Lila Stevens PT [id number]  
Stable large volume of abdominal and pelvic ascites.   3. Cirrhosis of the liver with varices including periesophageal varices.   4. Descending colon and sigmoid colon diverticulosis with no evidence of   diverticulitis.         XR CHEST PORTABLE   Final Result   No acute cardiopulmonary disease.             Assessment:    Principal Problem:    Ascites of liver  Resolved Problems:    * No resolved hospital problems. *      Plan:  Cirrhosis of liver, not well-controlled, MELD-Na of 29, GI on board, patient does remain on medications.  Anemia, requiring multiple PRBC transfusion, patient did have an EGD, plan for sigmoidoscopy today, okay from general medicine.  Urethritis, related to catheter, as well as balanitis, more related to catheter, no antibiotics needed.  Urinary retention, patient seen by urology, hematuria as well as retention resolved after changing catheter.  Esophageal varices, prior banding of varices x 5, patient does remain on PPI, GI on board, no change.  DC planning, patient not ready yet.        Electronically signed by Anupama Olmos MD on 2/20/2025 at 11:00 AM  
Strength: 4-/5 grossly    Fair Patient will demonstrate 4+/5 B UE strength in order to maximize independence with ADLs/IADLs and functional transfers.     Comments:  Pt laying in the bed.  Agreeable to therapy.  Going for colonoscopy later today.  Assist for LB dressing and toileting on BSC.  He completed functional mobility using w/w from home.  Assisted back to bed at the end of the session.  Pt's girlfriend present during this session.       Education/treatment:  ADL retraining with facilitation of movement to increase self care skills. Therapeutic activity to address balance and endurance for ADL and transfers.  Pt education of walker safety, transfer safety, daily orientation.       Pt has made  progress towards set goals.       Time In: 1120  Time Out: 1135     Min Units   Therapeutic Ex 14504     Therapeutic Activities 25736 7    ADL/Self Care 61510 8    Orthotic Management 13798     Neuro Re-Ed 01733     Non-Billable Time     TOTAL TIMED TREATMENT 15 1         Celine HUTTON/L [id number]    
acute cardiopulmonary disease.             Assessment:    Principal Problem:    Ascites of liver  Resolved Problems:    * No resolved hospital problems. *      Plan:  Cirrhosis of liver, not well-controlled, MELD-Na of 29, GI on board, patient does remain on medications.  Defer to GI.  Anemia, requiring multiple PRBC transfusion, patient did have an EGD, sigmoidoscopic evaluation remains on hold with relatively stable hemoglobin.  Pain control, patient has been restarted on Roxicodone, at this time 5 mg every 4 as needed.  Patient has been followed by palliative care, decision maker is daughter, unfortunately patient does remain full code.  Urethritis, related to catheter, as well as balanitis, more related to catheter, no antibiotics needed.  Urinary retention, patient seen by urology, hematuria as well as retention resolved after changing catheter.  Esophageal varices, prior banding of varices x 5, patient does remain on PPI, GI on board, no change.  DC planning, patient not ready yet.        Electronically signed by Anupama Olmos MD on 2/22/2025 at 11:19 AM  
endoscopy 2/17  Continues to downtrend after second unit of RBCs, GI following  Repeat EGD as below  Flex sig showing large clot burden in descending colon and rectosigmoid colon     Lactic acidosis : sec to liver disease, will trend      Esophageal varices: PPI BID s/p banding x5 on last admission. GI c/s, on octreotide gtt currently, repeat EGD 2/17 with banding x4     Elevated BNP : 2212 sec to anasarca  2D echo 9/2023 reveals EF 60-65 %      History of radiation proctitis : likely contributing to anemia      Thrombocytopenia : sec to liver cirrhosis monitor      BPH : Flomax      Hypertension : Lopressor 25 mg twice daily      History of CVA       -s/p 5u RBC this admit for recurrent Hgb <7  -for EGD and flex sig today, reportedly w/o active bleeding but large clot burden in the stomach and also with banding of 4 esophageal varices, notably with HoTN during procedure and was transferred to the ICU, monitor  Dispo: anticipate home pending clinical course    NOTE: Portions of this report may have been transcribed using voice recognition software. Every effort was made to ensure accuracy; however, inadvertent computerized transcription errors may be present.  Electronically signed by Juan Manuel Heath MD on 2/17/2025 at 3:12 PM     
in order to maximize independence with ADLs/IADLs and functional transfers.     Comments:  patient cleared with nursing and agreeable to session. Good effort and improvement demonstrated despite fatigue and limitations. Patient in chair with call light in reach and alarm on    Education/treatment: ADL and functional transfer/activity performed to increase safety and independence during self care tasks. Education provided on safety awareness, adl reeducation, functional transfer training    Pt has made progress towards set goals.     Time In: 11:30  Time Out: 11:42     Min Units   Therapeutic Ex 71313     Therapeutic Activities 88079 12 1   ADL/Self Care 27323     Orthotic Management 38504     Neuro Re-Ed 73288     Non-Billable Time     TOTAL TIMED TREATMENT 12 1       Ariela SÁNCHEZ/JOHNNY 99946    
varices including periesophageal varices.   4. Descending colon and sigmoid colon diverticulosis with no evidence of   diverticulitis.         XR CHEST PORTABLE   Final Result   No acute cardiopulmonary disease.             Assessment:    Principal Problem:    Ascites of liver  Resolved Problems:    * No resolved hospital problems. *      Plan:  Cirrhosis of liver, not well-controlled, MELD-Na of 29, GI on board, patient does remain on medications.  Anemia, requiring multiple PRBC transfusion, patient did have an EGD, plan for sigmoidoscopy today, okay from general medicine.  Pain control, patient has been restarted on Roxicodone, at this time 5 mg every 4 as needed.  Patient has been followed by palliative care, decision maker is daughter, unfortunately patient does remain full code.  Urethritis, related to catheter, as well as balanitis, more related to catheter, no antibiotics needed.  Urinary retention, patient seen by urology, hematuria as well as retention resolved after changing catheter.  Esophageal varices, prior banding of varices x 5, patient does remain on PPI, GI on board, no change.  DC planning, patient not ready yet.        Electronically signed by Anupama Olmos MD on 2/21/2025 at 11:27 AM  
be present.  Electronically signed by Juan Manuel Heath MD on 2/25/2025 at 9:55 AM     
encephalopathy, now resolved     History of radiation proctitis : likely contributing to anemia      Thrombocytopenia : sec to liver cirrhosis monitor      BPH : Flomax      Hypertension : Lopressor 25 mg twice daily        -Hgb downtrending today 9.4>7.7 though not yet to transfusion threshold, repeat H/H this afternoon  -rpt EGD 2/26 no active bleeding, rpt flex sig 2/26 with APC  Dispo: pending clinical course, prognosis remains guarded, anticipate SNF    NOTE: Portions of this report may have been transcribed using voice recognition software. Every effort was made to ensure accuracy; however, inadvertent computerized transcription errors may be present.  Electronically signed by Juan Manuel Heath MD on 2/27/2025 at 3:59 PM     
likely contributing to anemia      Thrombocytopenia : sec to liver cirrhosis monitor      BPH : Flomax      Hypertension : Lopressor 25 mg twice daily        -Hgb 7.4>7.3>7.2 today, fairly stable but given gradual decline will plan for 1u RBC today and repeat labs as outpt in 2-3 days for further monitoring  Dispo: prognosis remains guarded, anticipate home today    NOTE: Portions of this report may have been transcribed using voice recognition software. Every effort was made to ensure accuracy; however, inadvertent computerized transcription errors may be present.  Electronically signed by Juan Manuel Heath MD on 3/1/2025 at 7:10 PM     
of radiation proctitis : likely contributing to anemia      Thrombocytopenia : sec to liver cirrhosis monitor      BPH : Flomax      Hypertension : Lopressor 25 mg twice daily        -Hgb 7.4>7.3 today, fairly stable, if remains stable tomorrow will plan for dispo at that time  -replace K+  Dispo: pending clinical course, prognosis remains guarded, anticipate home    NOTE: Portions of this report may have been transcribed using voice recognition software. Every effort was made to ensure accuracy; however, inadvertent computerized transcription errors may be present.  Electronically signed by Juan Manuel Heath MD on 2/28/2025 at 6:24 PM     
stomach\", unable to obtain meaningful history today, apparently has been confused since transfer to unit this AM    Interval update:   Following morning evaluation was notified of new fixed gaze. Does have hx of CVA. Unable to obtain comprehensive neurological evaluation as patient remains unable to follow commands as during earlier exam, however does clearly have fixed right upward gaze. STAT CTH and CTA head/neck ordered. Given active GIB patient will obviously not be a candidate for thrombolytics, uncertain time of LKW. If no evidence of acute stroke on imaging consider furher evaluation for seizure (2/2 hepatic encephalopathy/ hyperammonemia) vs recrudescence of old stroke (2/2 hypoperfusion from anemia)    Addendum:  Imaging negative for acute hemorrhage or bleeding, ammonia elevated 143 and transition to rectal lactulose for now, suspect hyperammonemia/ hepatic encephalopathy as etiology of AMS at this time, will attempt to obtain EEG given fixed gaze.     Dispo: pending clinical course, prognosis guarded    NOTE: Portions of this report may have been transcribed using voice recognition software. Every effort was made to ensure accuracy; however, inadvertent computerized transcription errors may be present.  Electronically signed by Juan Manuel Heath MD on 2/19/2025 at 3:16 PM     
control, iterative reconstruction, and/or weight based adjustment of the mA/kV was utilized to reduce the radiation dose to as low as reasonably achievable. COMPARISON: CT abdomen and pelvis 02/01/2025. HISTORY: ORDERING SYSTEM PROVIDED HISTORY: Lower abdominal infection, scrotal swelling and pain, r/o fourniers TECHNOLOGIST PROVIDED HISTORY: Reason for exam:->Lower abdominal infection, scrotal swelling and pain, r/o fourniers Additional Contrast?->None Decision Support Exception - unselect if not a suspected or confirmed emergency medical condition->Emergency Medical Condition (MA) FINDINGS: No evidence of pneumonia or pleural or pericardial effusions. Cirrhosis of the liver with stable simple cyst in the lateral segment of the left hepatic lobe.  No suspicious enhancing hepatic lesions.  There is no cholelithiasis.  There is no splenomegaly.  There are periesophageal varices. There is no gross gastric abnormality.  There is no gross adrenal or pancreatic abnormality.  The kidneys enhance normally with no calculus disease or hydronephrosis.  There is a simple cortical cyst along the posterior lower pole of the right kidney measuring 1.7 cm.  Caliber of the abdominal aorta is normal.  There is large volume of abdominal and pelvic ascites.  There is no free intraperitoneal air.  There is sigmoid diverticulosis with no gross evidence of diverticulitis.  Millan catheter is seen within the urinary bladder and although the urinary bladder is incompletely distended there is diffuse wall thickening.  There is no bladder calculus disease.  There is pelvic ascites extending into a distended the left inguinal canal.  There is diffuse body wall and scrotal edema with no body wall or scrotal subcutaneous gas to suggest Lise's gangrene.  There are at least moderate size bilateral hydroceles. Bones: Osseous structures are intact.     1. Stable generalized body wall edema and scrotal edema and bilateral hydroceles with no 
for repeat treatment with APC     7.  Comorbidities  -Per admitting/pertinent consultants      Decreased hemoglobin.  Defer transfusion to admitting.  Plan tentatively for EGD and flexible sigmoidoscopy on Monday.  Continue to monitor labs.  GI will follow.    Juan A Harrison, APRN - CNP  10:22 AM  2/15/2025

## 2025-03-03 PROBLEM — N39.0 UTI (URINARY TRACT INFECTION): Status: RESOLVED | Noted: 2025-01-19 | Resolved: 2025-03-03

## 2025-03-04 ENCOUNTER — APPOINTMENT (OUTPATIENT)
Dept: GENERAL RADIOLOGY | Age: 75
DRG: 432 | End: 2025-03-04
Payer: OTHER GOVERNMENT

## 2025-03-04 ENCOUNTER — HOSPITAL ENCOUNTER (INPATIENT)
Age: 75
LOS: 6 days | Discharge: HOME HEALTH | DRG: 432 | End: 2025-03-11
Attending: EMERGENCY MEDICINE | Admitting: STUDENT IN AN ORGANIZED HEALTH CARE EDUCATION/TRAINING PROGRAM
Payer: OTHER GOVERNMENT

## 2025-03-04 ENCOUNTER — APPOINTMENT (OUTPATIENT)
Dept: CT IMAGING | Age: 75
DRG: 432 | End: 2025-03-04
Payer: OTHER GOVERNMENT

## 2025-03-04 DIAGNOSIS — M79.604 PAIN IN BOTH LOWER EXTREMITIES: Chronic | ICD-10-CM

## 2025-03-04 DIAGNOSIS — M79.605 PAIN IN BOTH LOWER EXTREMITIES: Chronic | ICD-10-CM

## 2025-03-04 DIAGNOSIS — K92.2 GASTROINTESTINAL HEMORRHAGE, UNSPECIFIED GASTROINTESTINAL HEMORRHAGE TYPE: ICD-10-CM

## 2025-03-04 DIAGNOSIS — K74.60 DECOMPENSATED CIRRHOSIS (HCC): Primary | ICD-10-CM

## 2025-03-04 DIAGNOSIS — K72.90 DECOMPENSATED CIRRHOSIS (HCC): Primary | ICD-10-CM

## 2025-03-04 DIAGNOSIS — N48.9 PENILE LESION: ICD-10-CM

## 2025-03-04 LAB
ALBUMIN SERPL-MCNC: 2.7 G/DL (ref 3.5–5.2)
ALP SERPL-CCNC: 106 U/L (ref 40–129)
ALT SERPL-CCNC: 20 U/L (ref 0–40)
ANION GAP SERPL CALCULATED.3IONS-SCNC: 13 MMOL/L (ref 7–16)
AST SERPL-CCNC: 70 U/L (ref 0–39)
BACTERIA URNS QL MICRO: ABNORMAL
BASOPHILS # BLD: 0.11 K/UL (ref 0–0.2)
BASOPHILS NFR BLD: 1 % (ref 0–2)
BILIRUB SERPL-MCNC: 16.7 MG/DL (ref 0–1.2)
BILIRUB UR QL STRIP: ABNORMAL
BUN SERPL-MCNC: 15 MG/DL (ref 6–23)
CALCIUM SERPL-MCNC: 8.5 MG/DL (ref 8.6–10.2)
CHLORIDE SERPL-SCNC: 97 MMOL/L (ref 98–107)
CLARITY UR: ABNORMAL
CO2 SERPL-SCNC: 21 MMOL/L (ref 22–29)
COLOR UR: ABNORMAL
CREAT SERPL-MCNC: 1 MG/DL (ref 0.7–1.2)
EOSINOPHIL # BLD: 0.32 K/UL (ref 0.05–0.5)
EOSINOPHILS RELATIVE PERCENT: 3 % (ref 0–6)
ERYTHROCYTE [DISTWIDTH] IN BLOOD BY AUTOMATED COUNT: 23 % (ref 11.5–15)
GFR, ESTIMATED: 80 ML/MIN/1.73M2
GLUCOSE SERPL-MCNC: 102 MG/DL (ref 74–99)
GLUCOSE UR STRIP-MCNC: NEGATIVE MG/DL
HCT VFR BLD AUTO: 19 % (ref 37–54)
HGB BLD-MCNC: 6.5 G/DL (ref 12.5–16.5)
HGB UR QL STRIP.AUTO: ABNORMAL
INFLUENZA A BY PCR: NOT DETECTED
INFLUENZA B BY PCR: NOT DETECTED
KETONES UR STRIP-MCNC: ABNORMAL MG/DL
LEUKOCYTE ESTERASE UR QL STRIP: ABNORMAL
LYMPHOCYTES NFR BLD: 2.35 K/UL (ref 1.5–4)
LYMPHOCYTES RELATIVE PERCENT: 22 % (ref 20–42)
MCH RBC QN AUTO: 31.7 PG (ref 26–35)
MCHC RBC AUTO-ENTMCNC: 34.2 G/DL (ref 32–34.5)
MCV RBC AUTO: 92.7 FL (ref 80–99.9)
MONOCYTES NFR BLD: 2.14 K/UL (ref 0.1–0.95)
MONOCYTES NFR BLD: 20 % (ref 2–12)
NEUTROPHILS NFR BLD: 54 % (ref 43–80)
NEUTS SEG NFR BLD: 5.78 K/UL (ref 1.8–7.3)
NITRITE UR QL STRIP: POSITIVE
PH UR STRIP: 5 [PH] (ref 5–8)
PLATELET, FLUORESCENCE: 117 K/UL (ref 130–450)
PMV BLD AUTO: 9.9 FL (ref 7–12)
POTASSIUM SERPL-SCNC: 4.6 MMOL/L (ref 3.5–5)
PROT SERPL-MCNC: 6.2 G/DL (ref 6.4–8.3)
PROT UR STRIP-MCNC: 100 MG/DL
RBC # BLD AUTO: 2.05 M/UL (ref 3.8–5.8)
RBC # BLD: ABNORMAL 10*6/UL
RBC #/AREA URNS HPF: ABNORMAL /HPF
SARS-COV-2 RDRP RESP QL NAA+PROBE: NOT DETECTED
SODIUM SERPL-SCNC: 131 MMOL/L (ref 132–146)
SP GR UR STRIP: 1.02 (ref 1–1.03)
SPECIMEN DESCRIPTION: NORMAL
TROPONIN I SERPL HS-MCNC: 25 NG/L (ref 0–11)
TROPONIN I SERPL HS-MCNC: 27 NG/L (ref 0–11)
UROBILINOGEN UR STRIP-ACNC: 4 EU/DL (ref 0–1)
WBC # BLD: ABNORMAL 10*3/UL
WBC #/AREA URNS HPF: ABNORMAL /HPF
WBC OTHER # BLD: 10.7 K/UL (ref 4.5–11.5)

## 2025-03-04 PROCEDURE — 72125 CT NECK SPINE W/O DYE: CPT

## 2025-03-04 PROCEDURE — 71046 X-RAY EXAM CHEST 2 VIEWS: CPT

## 2025-03-04 PROCEDURE — 70450 CT HEAD/BRAIN W/O DYE: CPT

## 2025-03-04 PROCEDURE — 6360000004 HC RX CONTRAST MEDICATION: Performed by: RADIOLOGY

## 2025-03-04 PROCEDURE — 86900 BLOOD TYPING SEROLOGIC ABO: CPT

## 2025-03-04 PROCEDURE — 2580000003 HC RX 258: Performed by: EMERGENCY MEDICINE

## 2025-03-04 PROCEDURE — 6360000002 HC RX W HCPCS: Performed by: EMERGENCY MEDICINE

## 2025-03-04 PROCEDURE — 84484 ASSAY OF TROPONIN QUANT: CPT

## 2025-03-04 PROCEDURE — 86850 RBC ANTIBODY SCREEN: CPT

## 2025-03-04 PROCEDURE — 87077 CULTURE AEROBIC IDENTIFY: CPT

## 2025-03-04 PROCEDURE — 74177 CT ABD & PELVIS W/CONTRAST: CPT

## 2025-03-04 PROCEDURE — 87635 SARS-COV-2 COVID-19 AMP PRB: CPT

## 2025-03-04 PROCEDURE — 80053 COMPREHEN METABOLIC PANEL: CPT

## 2025-03-04 PROCEDURE — 96375 TX/PRO/DX INJ NEW DRUG ADDON: CPT

## 2025-03-04 PROCEDURE — 2500000003 HC RX 250 WO HCPCS: Performed by: EMERGENCY MEDICINE

## 2025-03-04 PROCEDURE — 87086 URINE CULTURE/COLONY COUNT: CPT

## 2025-03-04 PROCEDURE — 99285 EMERGENCY DEPT VISIT HI MDM: CPT

## 2025-03-04 PROCEDURE — 96374 THER/PROPH/DIAG INJ IV PUSH: CPT

## 2025-03-04 PROCEDURE — 86923 COMPATIBILITY TEST ELECTRIC: CPT

## 2025-03-04 PROCEDURE — 87502 INFLUENZA DNA AMP PROBE: CPT

## 2025-03-04 PROCEDURE — 85025 COMPLETE CBC W/AUTO DIFF WBC: CPT

## 2025-03-04 PROCEDURE — 81001 URINALYSIS AUTO W/SCOPE: CPT

## 2025-03-04 PROCEDURE — 86901 BLOOD TYPING SEROLOGIC RH(D): CPT

## 2025-03-04 RX ORDER — SODIUM CHLORIDE 9 MG/ML
INJECTION, SOLUTION INTRAVENOUS PRN
Status: DISCONTINUED | OUTPATIENT
Start: 2025-03-04 | End: 2025-03-11 | Stop reason: HOSPADM

## 2025-03-04 RX ORDER — IOPAMIDOL 755 MG/ML
75 INJECTION, SOLUTION INTRAVASCULAR
Status: COMPLETED | OUTPATIENT
Start: 2025-03-04 | End: 2025-03-04

## 2025-03-04 RX ORDER — PANTOPRAZOLE SODIUM 40 MG/10ML
40 INJECTION, POWDER, LYOPHILIZED, FOR SOLUTION INTRAVENOUS ONCE
Status: COMPLETED | OUTPATIENT
Start: 2025-03-04 | End: 2025-03-04

## 2025-03-04 RX ORDER — OCTREOTIDE ACETATE 50 UG/ML
50 INJECTION, SOLUTION INTRAVENOUS; SUBCUTANEOUS ONCE
Status: COMPLETED | OUTPATIENT
Start: 2025-03-04 | End: 2025-03-04

## 2025-03-04 RX ADMIN — OCTREOTIDE ACETATE 50 MCG: 50 INJECTION, SOLUTION INTRAVENOUS; SUBCUTANEOUS at 22:32

## 2025-03-04 RX ADMIN — PANTOPRAZOLE SODIUM 40 MG: 40 INJECTION, POWDER, FOR SOLUTION INTRAVENOUS at 22:28

## 2025-03-04 RX ADMIN — OCTREOTIDE ACETATE 50 MCG/HR: 500 INJECTION, SOLUTION INTRAVENOUS; SUBCUTANEOUS at 22:34

## 2025-03-04 RX ADMIN — IOPAMIDOL 75 ML: 755 INJECTION, SOLUTION INTRAVENOUS at 21:59

## 2025-03-04 RX ADMIN — WATER 2000 MG: 1 INJECTION INTRAMUSCULAR; INTRAVENOUS; SUBCUTANEOUS at 22:28

## 2025-03-04 ASSESSMENT — PAIN SCALES - GENERAL: PAINLEVEL_OUTOF10: 9

## 2025-03-04 ASSESSMENT — PAIN DESCRIPTION - LOCATION: LOCATION: ABDOMEN

## 2025-03-04 ASSESSMENT — PAIN DESCRIPTION - ORIENTATION: ORIENTATION: LEFT

## 2025-03-04 NOTE — ED PROVIDER NOTES
[facility] INTERMEDIATE  EMERGENCY DEPARTMENT ENCOUNTER        Pt Name: Hardik Gray  MRN: [id number]  Birthdate 1950  Date of evaluation: 3/4/2025  Provider: Margaux Cordero DO  PCP: Xavi Swenson MD  Note Started: 5:35 PM EST 3/4/25    CHIEF COMPLAINT       Chief Complaint   Patient presents with    Unable to ambulate     Started two weeks ago, had one fall, on thinners, did not hit head, looking for rehab placement       HISTORY OF PRESENT ILLNESS: 1 or more Elements       Hardik Gray is a 75 y.o. male who presents to the emergency department the chief complaint of fatigue.  The history is obtained from patient as well as patient's significant other.  Is documented in triage note that the patient wants rehab placement.  The patient states he actually does not want rehab placement.  He states he has been extremely fatigued since his last hospital discharge.  He states that he wants to figure out why he is so fatigued why he is not able to ambulate around the house.  Fatigue is generalized.  He states he has had a fall secondary to this.  He states that he does not believe he hit his head.  He does complain of mild diffuse abdominal pain.  He does have a history of ascites secondary to alcoholic cirrhosis.  He did have esophageal variceal banding during his last admission.  He denies any particular chest pain, shortness of breath, nausea or vomiting. The patient does have hematuria over the last few days.  The patient has had urine leaking around his Millan catheter-the patient has had history of hematuria in the past.  He does have history of radiation     Nursing Notes were all reviewed and agreed with or any disagreements were addressed in the HPI.    REVIEW OF SYSTEMS :      Review of Systems   Constitutional:  Positive for activity change, appetite change and fatigue. Negative for fever.   HENT:  Negative for congestion.    Eyes:  Negative for redness.   Respiratory:  Negative for shortness of breath.

## 2025-03-04 NOTE — ED TRIAGE NOTES
Department of Emergency Medicine    FIRST PROVIDER TRIAGE NOTE             Independent MLP           3/4/25  3:29 PM EST    Date of Encounter: 3/4/25   MRN: [id number]    Vitals:    03/04/25 1527 03/04/25 1528   BP:  105/69   Pulse:  96   Resp:  18   Temp:  98.9 °F (37.2 °C)   TempSrc:  Temporal   SpO2:  100%   Weight: 76.2 kg (168 lb)    Height: 1.753 m (5' 9\")         HPI: Hardik Gray is a 75 y.o. male who presents to the ED for Unable to ambulate (Started two weeks ago, had one fall, on thinners, did not hit head, looking for rehab placement)     Patient doesn't feel safe at home and asking for placement     ROS: Negative for cp, sob, fever, or urinary complaints.    Physical Exam:   Gen Appearance/Constitutional: alert  CV: regular rate     Initial Plan of Care: All treatment areas with department are currently occupied.     Plan to order/Initiate the following while awaiting opening in ED: Triage evaluation  labs.    Provider-Patient relationship only established for Provider In Triage (PIT).  Full assessment, HPI, and examination not performed, therefore, it is not yet possible to state whether or not an emergency medical condition exists.  This provider not responsible to follow or interpret any labs or testing ordered in triage.  Supervisor request for SNEHAL to initiate contact and input an assessment note in triage during high volume surges.     Initial Plan of Care: Initiate Treatment-Testing, Proceed toTreatment Area When Bed Available for ED Attending/MLP to Continue Care  Secondary to high volume, low staffing, and/or boarding- patient to await bed availability.    This ends my PIT-Patient relationship.  Care of patient relinquished after triage    Electronically signed by Zenaida Almeida PA-C   DD: 3/4/25

## 2025-03-05 ENCOUNTER — APPOINTMENT (OUTPATIENT)
Dept: CT IMAGING | Age: 75
DRG: 432 | End: 2025-03-05
Payer: OTHER GOVERNMENT

## 2025-03-05 PROBLEM — K72.90 DECOMPENSATED CIRRHOSIS (HCC): Status: ACTIVE | Noted: 2024-09-19

## 2025-03-05 PROBLEM — N30.01 ACUTE CYSTITIS WITH HEMATURIA: Status: ACTIVE | Noted: 2025-03-05

## 2025-03-05 PROBLEM — K92.2 GI BLEED: Status: ACTIVE | Noted: 2025-03-05

## 2025-03-05 LAB
ALBUMIN SERPL-MCNC: 2.5 G/DL (ref 3.5–5.2)
ALP SERPL-CCNC: 98 U/L (ref 40–129)
ALT SERPL-CCNC: 21 U/L (ref 0–40)
ANION GAP SERPL CALCULATED.3IONS-SCNC: 8 MMOL/L (ref 7–16)
AST SERPL-CCNC: 73 U/L (ref 0–39)
BILIRUB SERPL-MCNC: 16.7 MG/DL (ref 0–1.2)
BUN SERPL-MCNC: 14 MG/DL (ref 6–23)
CALCIUM SERPL-MCNC: 8.5 MG/DL (ref 8.6–10.2)
CHLORIDE SERPL-SCNC: 96 MMOL/L (ref 98–107)
CO2 SERPL-SCNC: 24 MMOL/L (ref 22–29)
CREAT SERPL-MCNC: 0.9 MG/DL (ref 0.7–1.2)
GFR, ESTIMATED: >90 ML/MIN/1.73M2
GLUCOSE SERPL-MCNC: 115 MG/DL (ref 74–99)
HCT VFR BLD AUTO: 26.6 % (ref 37–54)
HGB BLD-MCNC: 9.1 G/DL (ref 12.5–16.5)
POTASSIUM SERPL-SCNC: 4.7 MMOL/L (ref 3.5–5)
PROT SERPL-MCNC: 6.1 G/DL (ref 6.4–8.3)
SODIUM SERPL-SCNC: 128 MMOL/L (ref 132–146)

## 2025-03-05 PROCEDURE — 74174 CTA ABD&PLVS W/CONTRAST: CPT

## 2025-03-05 PROCEDURE — 85018 HEMOGLOBIN: CPT

## 2025-03-05 PROCEDURE — 6370000000 HC RX 637 (ALT 250 FOR IP): Performed by: NURSE PRACTITIONER

## 2025-03-05 PROCEDURE — 2500000003 HC RX 250 WO HCPCS: Performed by: NURSE PRACTITIONER

## 2025-03-05 PROCEDURE — 2060000000 HC ICU INTERMEDIATE R&B

## 2025-03-05 PROCEDURE — 2580000003 HC RX 258: Performed by: NURSE PRACTITIONER

## 2025-03-05 PROCEDURE — 30233N1 TRANSFUSION OF NONAUTOLOGOUS RED BLOOD CELLS INTO PERIPHERAL VEIN, PERCUTANEOUS APPROACH: ICD-10-PCS | Performed by: INTERNAL MEDICINE

## 2025-03-05 PROCEDURE — 6360000002 HC RX W HCPCS: Performed by: NURSE PRACTITIONER

## 2025-03-05 PROCEDURE — 85014 HEMATOCRIT: CPT

## 2025-03-05 PROCEDURE — 6360000004 HC RX CONTRAST MEDICATION: Performed by: RADIOLOGY

## 2025-03-05 PROCEDURE — P9016 RBC LEUKOCYTES REDUCED: HCPCS

## 2025-03-05 PROCEDURE — 6370000000 HC RX 637 (ALT 250 FOR IP): Performed by: STUDENT IN AN ORGANIZED HEALTH CARE EDUCATION/TRAINING PROGRAM

## 2025-03-05 PROCEDURE — 80053 COMPREHEN METABOLIC PANEL: CPT

## 2025-03-05 RX ORDER — TAMSULOSIN HYDROCHLORIDE 0.4 MG/1
0.4 CAPSULE ORAL DAILY
Status: DISCONTINUED | OUTPATIENT
Start: 2025-03-05 | End: 2025-03-11 | Stop reason: HOSPADM

## 2025-03-05 RX ORDER — METOPROLOL TARTRATE 25 MG/1
25 TABLET, FILM COATED ORAL 2 TIMES DAILY
Status: DISCONTINUED | OUTPATIENT
Start: 2025-03-05 | End: 2025-03-11 | Stop reason: HOSPADM

## 2025-03-05 RX ORDER — FERROUS SULFATE 325(65) MG
325 TABLET ORAL 2 TIMES DAILY
Status: DISCONTINUED | OUTPATIENT
Start: 2025-03-05 | End: 2025-03-11 | Stop reason: HOSPADM

## 2025-03-05 RX ORDER — ONDANSETRON 2 MG/ML
4 INJECTION INTRAMUSCULAR; INTRAVENOUS EVERY 6 HOURS PRN
Status: DISCONTINUED | OUTPATIENT
Start: 2025-03-05 | End: 2025-03-11 | Stop reason: HOSPADM

## 2025-03-05 RX ORDER — MIDODRINE HYDROCHLORIDE 5 MG/1
2.5 TABLET ORAL
Status: DISCONTINUED | OUTPATIENT
Start: 2025-03-05 | End: 2025-03-11 | Stop reason: HOSPADM

## 2025-03-05 RX ORDER — NALOXONE HYDROCHLORIDE 0.4 MG/ML
INJECTION, SOLUTION INTRAMUSCULAR; INTRAVENOUS; SUBCUTANEOUS
Status: DISPENSED
Start: 2025-03-05 | End: 2025-03-06

## 2025-03-05 RX ORDER — LACTULOSE 10 G/15ML
20 SOLUTION ORAL 2 TIMES DAILY
Status: DISCONTINUED | OUTPATIENT
Start: 2025-03-05 | End: 2025-03-10

## 2025-03-05 RX ORDER — ONDANSETRON 4 MG/1
4 TABLET, ORALLY DISINTEGRATING ORAL EVERY 8 HOURS PRN
Status: DISCONTINUED | OUTPATIENT
Start: 2025-03-05 | End: 2025-03-11 | Stop reason: HOSPADM

## 2025-03-05 RX ORDER — OXYCODONE HYDROCHLORIDE 5 MG/1
10 TABLET ORAL EVERY 6 HOURS PRN
Status: DISCONTINUED | OUTPATIENT
Start: 2025-03-05 | End: 2025-03-05

## 2025-03-05 RX ORDER — OXYCODONE HYDROCHLORIDE 5 MG/1
10 TABLET ORAL EVERY 6 HOURS PRN
Status: DISCONTINUED | OUTPATIENT
Start: 2025-03-05 | End: 2025-03-11 | Stop reason: HOSPADM

## 2025-03-05 RX ORDER — ALBUTEROL SULFATE 0.83 MG/ML
2.5 SOLUTION RESPIRATORY (INHALATION) EVERY 6 HOURS PRN
Status: DISCONTINUED | OUTPATIENT
Start: 2025-03-05 | End: 2025-03-11 | Stop reason: HOSPADM

## 2025-03-05 RX ORDER — ALBUMIN (HUMAN) 12.5 G/50ML
50 SOLUTION INTRAVENOUS
Status: COMPLETED | OUTPATIENT
Start: 2025-03-06 | End: 2025-03-06

## 2025-03-05 RX ORDER — ALBUTEROL SULFATE 90 UG/1
2 INHALANT RESPIRATORY (INHALATION) EVERY 6 HOURS PRN
Status: DISCONTINUED | OUTPATIENT
Start: 2025-03-05 | End: 2025-03-05 | Stop reason: SDUPTHER

## 2025-03-05 RX ORDER — ENTECAVIR 0.5 MG/1
1 TABLET, FILM COATED ORAL DAILY
Status: DISCONTINUED | OUTPATIENT
Start: 2025-03-05 | End: 2025-03-11

## 2025-03-05 RX ORDER — SODIUM CHLORIDE 9 MG/ML
INJECTION, SOLUTION INTRAVENOUS PRN
Status: DISCONTINUED | OUTPATIENT
Start: 2025-03-05 | End: 2025-03-11 | Stop reason: HOSPADM

## 2025-03-05 RX ORDER — SODIUM CHLORIDE 0.9 % (FLUSH) 0.9 %
5-40 SYRINGE (ML) INJECTION PRN
Status: DISCONTINUED | OUTPATIENT
Start: 2025-03-05 | End: 2025-03-11 | Stop reason: HOSPADM

## 2025-03-05 RX ORDER — FOLIC ACID 1 MG/1
1 TABLET ORAL DAILY
Status: DISCONTINUED | OUTPATIENT
Start: 2025-03-05 | End: 2025-03-11 | Stop reason: HOSPADM

## 2025-03-05 RX ORDER — SPIRONOLACTONE 25 MG/1
25 TABLET ORAL 2 TIMES DAILY
Status: DISCONTINUED | OUTPATIENT
Start: 2025-03-05 | End: 2025-03-11 | Stop reason: HOSPADM

## 2025-03-05 RX ORDER — FUROSEMIDE 40 MG/1
40 TABLET ORAL DAILY
Status: DISCONTINUED | OUTPATIENT
Start: 2025-03-05 | End: 2025-03-11 | Stop reason: HOSPADM

## 2025-03-05 RX ORDER — FLUTICASONE PROPIONATE 50 MCG
1 SPRAY, SUSPENSION (ML) NASAL DAILY PRN
Status: DISCONTINUED | OUTPATIENT
Start: 2025-03-05 | End: 2025-03-11 | Stop reason: HOSPADM

## 2025-03-05 RX ORDER — SODIUM CHLORIDE 0.9 % (FLUSH) 0.9 %
5-40 SYRINGE (ML) INJECTION EVERY 12 HOURS SCHEDULED
Status: DISCONTINUED | OUTPATIENT
Start: 2025-03-05 | End: 2025-03-11 | Stop reason: HOSPADM

## 2025-03-05 RX ORDER — IOPAMIDOL 755 MG/ML
75 INJECTION, SOLUTION INTRAVASCULAR
Status: COMPLETED | OUTPATIENT
Start: 2025-03-05 | End: 2025-03-05

## 2025-03-05 RX ADMIN — SPIRONOLACTONE 25 MG: 25 TABLET ORAL at 17:59

## 2025-03-05 RX ADMIN — LACTULOSE 20 G: 20 SOLUTION ORAL at 20:32

## 2025-03-05 RX ADMIN — TAMSULOSIN HYDROCHLORIDE 0.4 MG: 0.4 CAPSULE ORAL at 10:10

## 2025-03-05 RX ADMIN — RIFAXIMIN 550 MG: 550 TABLET ORAL at 22:42

## 2025-03-05 RX ADMIN — LACTULOSE 20 G: 20 SOLUTION ORAL at 10:10

## 2025-03-05 RX ADMIN — OXYCODONE 10 MG: 5 TABLET ORAL at 18:03

## 2025-03-05 RX ADMIN — WATER 1000 MG: 1 INJECTION INTRAMUSCULAR; INTRAVENOUS; SUBCUTANEOUS at 20:32

## 2025-03-05 RX ADMIN — RIFAXIMIN 550 MG: 550 TABLET ORAL at 10:55

## 2025-03-05 RX ADMIN — SPIRONOLACTONE 25 MG: 25 TABLET ORAL at 10:10

## 2025-03-05 RX ADMIN — IOPAMIDOL 75 ML: 755 INJECTION, SOLUTION INTRAVENOUS at 15:21

## 2025-03-05 RX ADMIN — OXYCODONE 10 MG: 5 TABLET ORAL at 11:47

## 2025-03-05 RX ADMIN — FOLIC ACID 1 MG: 1 TABLET ORAL at 10:09

## 2025-03-05 RX ADMIN — METOPROLOL TARTRATE 25 MG: 25 TABLET, FILM COATED ORAL at 10:10

## 2025-03-05 RX ADMIN — SODIUM CHLORIDE, PRESERVATIVE FREE 10 ML: 5 INJECTION INTRAVENOUS at 20:36

## 2025-03-05 RX ADMIN — FERROUS SULFATE TAB 325 MG (65 MG ELEMENTAL FE) 325 MG: 325 (65 FE) TAB at 10:10

## 2025-03-05 RX ADMIN — FERROUS SULFATE TAB 325 MG (65 MG ELEMENTAL FE) 325 MG: 325 (65 FE) TAB at 20:31

## 2025-03-05 RX ADMIN — Medication 500 MG: at 10:55

## 2025-03-05 RX ADMIN — SODIUM CHLORIDE, PRESERVATIVE FREE 40 MG: 5 INJECTION INTRAVENOUS at 20:32

## 2025-03-05 RX ADMIN — SODIUM CHLORIDE, PRESERVATIVE FREE 40 MG: 5 INJECTION INTRAVENOUS at 10:09

## 2025-03-05 RX ADMIN — MIDODRINE HYDROCHLORIDE 2.5 MG: 5 TABLET ORAL at 10:10

## 2025-03-05 RX ADMIN — MIDODRINE HYDROCHLORIDE 2.5 MG: 5 TABLET ORAL at 17:59

## 2025-03-05 RX ADMIN — FUROSEMIDE 40 MG: 40 TABLET ORAL at 10:09

## 2025-03-05 ASSESSMENT — PAIN SCALES - GENERAL
PAINLEVEL_OUTOF10: 8
PAINLEVEL_OUTOF10: 10

## 2025-03-05 ASSESSMENT — PAIN DESCRIPTION - DESCRIPTORS: DESCRIPTORS: DISCOMFORT

## 2025-03-05 ASSESSMENT — PAIN DESCRIPTION - LOCATION
LOCATION: BACK
LOCATION: GENERALIZED

## 2025-03-05 NOTE — ED NOTES
ED to Inpatient Handoff Report    Notified PIERRE Gipson that electronic handoff available and patient ready for transport to room 632.    Safety Risks: Risk of falls    Patient in Restraints: no    Constant Observer or Patient : no    Telemetry Monitoring Ordered :Yes      Cardiac Rhythm: Sinus rhythm, Sinus rhythm with PVC    Order to transfer to unit without monitor:YES      Deterioration Index Score:   Predictive Model Details          27 (Normal)  Factor Value    Calculated 3/5/2025 15:58 44% Age 75 years old    Deterioration Index Model 14% Respiratory rate 14     14% Potassium 4.7 mmol/L     7% Sodium abnormal (128 mmol/L)     6% Hematocrit abnormal (26.6 %)     6% WBC count 10.7 k/uL     5% Pulse oximetry 100 %     3% Systolic 125     0% Temperature 97.3 °F (36.3 °C)     0% Pulse 71        Vitals:    03/05/25 1142 03/05/25 1152 03/05/25 1202 03/05/25 1212   BP: 113/70 (!) 121/92  125/72   Pulse: 78 72 74 71   Resp: 13 11 20 14   Temp:       TempSrc:       SpO2: 100% 100% 100% 100%   Weight:       Height:             Opportunity for questions and clarification was provided.

## 2025-03-05 NOTE — PROGRESS NOTES
4 Eyes Skin Assessment     NAME:  Hardik Gray  YOB: 1950  MEDICAL RECORD NUMBER:  [id number]    The patient is being assessed for  Admission    I agree that at least one RN has performed a thorough Head to Toe Skin Assessment on the patient. ALL assessment sites listed below have been assessed.      Areas assessed by both nurses:    Head, Face, Ears, Shoulders, Back, Chest, Arms, Elbows, Hands, Sacrum. Buttock, Coccyx, Ischium, Legs. Feet and Heels, and Under Medical Devices         Does the Patient have a Wound? Yes wound(s) were present on assessment. LDA wound assessment was Initiated and completed by RN       Sandeep Prevention initiated by RN: Yes  Wound Care Orders initiated by RN: Yes    Pressure Injury (Stage 3,4, Unstageable, DTI, NWPT, and Complex wounds) if present, place Wound referral order by RN under : No    New Ostomies, if present place, Ostomy referral order under : No     Nurse 1 eSignature: Electronically signed by Roxanne Paez RN on 3/5/25 at 5:51 PM EST    **SHARE this note so that the co-signing nurse can place an eSignature**    Nurse 2 eSignature: Electronically signed by Cooper Ovalles RN on 3/5/25 at 6:09 PM EST

## 2025-03-05 NOTE — H&P
[facility] Hospitalist Group History and Physical      CHIEF COMPLAINT:  Right lower back pain and difficulty getting around    History of Present Illness:  This is a 75 year old male with PMH significant for prostate CA, radiation, anemia, hyperbilirubinemia, MI, hep B, HTN, esophageal varices, HLD, alcohol abuse (quit 4-5 months ago), alcoholic cirrhosis tobacco abuse, CVA, COPD, and GI bleed.  Patient recently admitted 2/13/2025 - 3/2/2025 with decompensated alcohol cirrhosis with esophageal varices, chronic anemia, and recurrent GI bleed.  Patient underwent EGD 2/17, 2/26 and flex sig 2/17, 2/26.  Patient had esophageal varices x 5 banded and APC of rectum.  Required total of 10 units of packed red blood cells and 2 units of fresh frozen plasma during admission.  Patient discharged in stable condition with a Millan catheter that was replaced during admission.  Patient to ED due to right lower back pain and difficulty getting around.  Patient reports being unable to walk and care for himself.  Possible rehab on discharge.  Patient does report fall at home but denies hitting his head.  Associated symptoms include fatigue.  Patient also reports leaking urine around his Millan catheter.  Labs remarkable for sodium 131, calcium 8.5, albumin 2.7, AST 70, total bili 16.7, total protein 6.2, platelets 117, and H&H 6.5/19.  Patient does report dark red blood in his stool.  Also reports medication compliance.  New Millan catheter inserted and urine sent for UA and culture.  Urine positive for infection.  Started on Rocephin, Sandostatin drip, Protonix bolus, and general surgery consulted.  Will admit for further evaluation and treatment.    Informant(s) for H&P: Patient and chart review    REVIEW OF SYSTEMS:  A comprehensive review of systems was negative except for: what is in the HPI      PMH:  Past Medical History:   Diagnosis Date    Acid reflux     Arthritis     Back pain     CAD (coronary artery disease)

## 2025-03-05 NOTE — CARE COORDINATION
Social Work/Transition of Care:    SW met with pt at bedside introduced self and role, pts friend Tigist friend and caregiver at bedside and able to assist with questions.  Patient recently discharged from hospital to home, patient reports he was doing fine in the hospital and once he got home he became very weak and unable to ambulate.  Patient reports he lives in a 3 story house and his apartment is on the third floor, patient's friend reported it may be time for him to look at a ground-floor apartment.  Patient has a wheeled walker and had 1 visit from [facility].  Pt is open to placement if recommended plans for patient to be admitted medically and will be evaluated by PT/OT.    Electronically signed by ARIE bautista on 3/5/2025 at 6:09 PM

## 2025-03-05 NOTE — PROGRESS NOTES
[facility] Hospitalist Progress Note    SYNOPSIS: Patient admitted on 3/4/2025 for GI bleed    This is a 75 year old male with PMH significant for prostate CA, radiation, anemia, hyperbilirubinemia, MI, hep B, HTN, esophageal varices, HLD, alcohol abuse (quit 4-5 months ago), alcoholic cirrhosis tobacco abuse, CVA, COPD, and GI bleed.  Patient recently admitted 2/13/2025 - 3/2/2025 with decompensated alcohol cirrhosis with esophageal varices, chronic anemia, and recurrent GI bleed.  Patient underwent EGD 2/17, 2/26 and flex sig 2/17, 2/26.  Patient had esophageal varices x 5 banded and APC of rectum.  Required total of 10 units of packed red blood cells and 2 units of fresh frozen plasma during admission.  Patient discharged in stable condition with a Millan catheter that was replaced during admission.  Patient to ED due to right lower back pain and difficulty getting around.  Patient reports being unable to walk and care for himself.  Possible rehab on discharge.  Patient does report fall at home but denies hitting his head.  Associated symptoms include fatigue.  Patient also reports leaking urine around his Millan catheter.  Labs remarkable for sodium 131, calcium 8.5, albumin 2.7, AST 70, total bili 16.7, total protein 6.2, platelets 117, and H&H 6.5/19.  Patient does report dark red blood in his stool.  Also reports medication compliance.  New Millan catheter inserted and urine sent for UA and culture.  Urine positive for infection.  Started on Rocephin, Sandostatin drip, Protonix bolus, and general surgery consulted.      3/5 repeat H&H stable at 9.1.  Urine bag consistent with hematuria.  Anemia contributing from hematuria.  Urology consulted.  Octreotide weaned off.  Recommend EGD in 4 weeks.  Continue with PPI twice daily  IR consulted for possible paracentesis.      SUBJECTIVE:  Stable overnight. No other overnight issues reported.   Patient seen and examined  Records reviewed.         Temp (24hrs),

## 2025-03-05 NOTE — CONSULTS
CONSULT  Clinton Cobb M.D.  [facility]  Dr. Angelo Xiao M.D.,  Dr. Kai Tesfaye M.D.,  Dr. Louis Zayas D.O.,  Dr. Pranav Lomas D.O. ,  Dr. Chadd Mancia M.D.,          Hardik Gray  75 y.o.  male      Re: \"GI bleed\"  Requesting physician: Dr. Cordero/emergency department  Admitting physician: Dr. TASHA Reilly  Date:1:57 PM 3/5/2025          HPI: 75-year-old male patient seen in the hospital for above described issue.  Patient is well-known to me from previous hospital admissions including most recently discharged from the hospital on 2 March.  Patient has history of decompensated liver cirrhosis with history of alcohol abuse and most recent alcohol use approximately 5 months ago.  Patient also has history of hepatitis C hepatitis B and on treatment for hepatitis B.  During most recent hospital admission patient did had several blood transfusion secondary to decreasing H&H.  On prior admission he underwent upper endoscopy on 3 February revealing grade 3 varices which were banded (5 bands) and subsequently repeat upper endoscopy on 17 February also with banding on most recent admission.  Subsequently he did undergo repeat upper endoscopy on 26 February revealing only grade 1 varices which did not require banding.  Patient also had flexible sigmoidoscopy on the 17th and repeat flexible sigmoidoscopy on the 26th with APC treatment of inflammation changes in the rectum.  Above described endoscopy evaluated the colon to the level of the transverse colon revealing multiple medium mouth diverticula.  Upon discharge at home patient continued to notice small amount of blood with his bowel movements but also according to him and his wife she noticed bright red blood from his Millan catheter.  Patient denies any significant abdominal pain and denies nausea or vomiting including no hematemesis or emesis of coffee-ground material.  Patient complains of persistent distention of the abdomen as well as lower

## 2025-03-06 ENCOUNTER — APPOINTMENT (OUTPATIENT)
Dept: ULTRASOUND IMAGING | Age: 75
DRG: 432 | End: 2025-03-06
Payer: OTHER GOVERNMENT

## 2025-03-06 ENCOUNTER — APPOINTMENT (OUTPATIENT)
Dept: NUCLEAR MEDICINE | Age: 75
DRG: 432 | End: 2025-03-06
Payer: OTHER GOVERNMENT

## 2025-03-06 PROBLEM — Z71.89 GOALS OF CARE, COUNSELING/DISCUSSION: Status: ACTIVE | Noted: 2025-03-06

## 2025-03-06 PROBLEM — Z51.5 PALLIATIVE CARE ENCOUNTER: Status: ACTIVE | Noted: 2025-03-06

## 2025-03-06 LAB
ALBUMIN FLD-MCNC: 0.7 G/DL
AMMONIA PLAS-SCNC: 52 UMOL/L (ref 16–60)
AMYLASE FLD-CCNC: 17 U/L
ANION GAP SERPL CALCULATED.3IONS-SCNC: 9 MMOL/L (ref 7–16)
APPEARANCE FLD: NORMAL
BODY FLD TYPE: NORMAL
BUN SERPL-MCNC: 15 MG/DL (ref 6–23)
CALCIUM SERPL-MCNC: 8.6 MG/DL (ref 8.6–10.2)
CHLORIDE SERPL-SCNC: 99 MMOL/L (ref 98–107)
CLOT CHECK: NORMAL
CO2 SERPL-SCNC: 24 MMOL/L (ref 22–29)
COLOR FLD: YELLOW
CREAT SERPL-MCNC: 0.9 MG/DL (ref 0.7–1.2)
FERRITIN SERPL-MCNC: 852 NG/ML
GFR, ESTIMATED: 86 ML/MIN/1.73M2
GLUCOSE SERPL-MCNC: 133 MG/DL (ref 74–99)
HAPTOGLOB SERPL-MCNC: <10 MG/DL (ref 30–200)
HCT VFR BLD AUTO: 19.6 % (ref 37–54)
HCT VFR BLD AUTO: 21.3 % (ref 37–54)
HGB BLD-MCNC: 6.4 G/DL (ref 12.5–16.5)
HGB BLD-MCNC: 7.2 G/DL (ref 12.5–16.5)
IMM RETICS NFR: 26.9 % (ref 2.3–13.4)
INR PPP: 2.5
IRON SATN MFR SERPL: NORMAL % (ref 20–55)
IRON SERPL-MCNC: 106 UG/DL (ref 59–158)
MONOCYTES NFR FLD: 83 %
NEUTROPHILS NFR FLD: 17 %
PARTIAL THROMBOPLASTIN TIME: 38.1 SEC (ref 24.5–35.1)
POTASSIUM SERPL-SCNC: 4.2 MMOL/L (ref 3.5–5)
PROT FLD-MCNC: 1.2 G/DL
PROTHROMBIN TIME: 26.1 SEC (ref 9.3–12.4)
RBC # FLD: 2000 CELLS/UL
RETIC HEMOGLOBIN: 40.9 PG (ref 28.2–36.6)
RETICS # AUTO: 0.09 M/UL
RETICS/RBC NFR AUTO: 4.2 % (ref 0.4–1.9)
SODIUM SERPL-SCNC: 132 MMOL/L (ref 132–146)
SPECIMEN TYPE: NORMAL
TIBC SERPL-MCNC: NORMAL UG/DL (ref 250–450)
WBC # FLD: 143 CELLS/UL

## 2025-03-06 PROCEDURE — 36415 COLL VENOUS BLD VENIPUNCTURE: CPT

## 2025-03-06 PROCEDURE — 49083 ABD PARACENTESIS W/IMAGING: CPT

## 2025-03-06 PROCEDURE — 89051 BODY FLUID CELL COUNT: CPT

## 2025-03-06 PROCEDURE — 87205 SMEAR GRAM STAIN: CPT

## 2025-03-06 PROCEDURE — 0W9G3ZX DRAINAGE OF PERITONEAL CAVITY, PERCUTANEOUS APPROACH, DIAGNOSTIC: ICD-10-PCS | Performed by: INTERNAL MEDICINE

## 2025-03-06 PROCEDURE — 6360000002 HC RX W HCPCS: Performed by: HOSPITALIST

## 2025-03-06 PROCEDURE — 3430000000 HC RX DIAGNOSTIC RADIOPHARMACEUTICAL: Performed by: RADIOLOGY

## 2025-03-06 PROCEDURE — 83540 ASSAY OF IRON: CPT

## 2025-03-06 PROCEDURE — 2060000000 HC ICU INTERMEDIATE R&B

## 2025-03-06 PROCEDURE — P9047 ALBUMIN (HUMAN), 25%, 50ML: HCPCS | Performed by: HOSPITALIST

## 2025-03-06 PROCEDURE — P9016 RBC LEUKOCYTES REDUCED: HCPCS

## 2025-03-06 PROCEDURE — 6370000000 HC RX 637 (ALT 250 FOR IP): Performed by: NURSE PRACTITIONER

## 2025-03-06 PROCEDURE — 80048 BASIC METABOLIC PNL TOTAL CA: CPT

## 2025-03-06 PROCEDURE — 87070 CULTURE OTHR SPECIMN AEROBIC: CPT

## 2025-03-06 PROCEDURE — 82140 ASSAY OF AMMONIA: CPT

## 2025-03-06 PROCEDURE — 88112 CYTOPATH CELL ENHANCE TECH: CPT

## 2025-03-06 PROCEDURE — 85045 AUTOMATED RETICULOCYTE COUNT: CPT

## 2025-03-06 PROCEDURE — 2580000003 HC RX 258: Performed by: NURSE PRACTITIONER

## 2025-03-06 PROCEDURE — 2500000003 HC RX 250 WO HCPCS: Performed by: NURSE PRACTITIONER

## 2025-03-06 PROCEDURE — 83010 ASSAY OF HAPTOGLOBIN QUANT: CPT

## 2025-03-06 PROCEDURE — 78278 ACUTE GI BLOOD LOSS IMAGING: CPT

## 2025-03-06 PROCEDURE — 6370000000 HC RX 637 (ALT 250 FOR IP): Performed by: HOSPITALIST

## 2025-03-06 PROCEDURE — 85610 PROTHROMBIN TIME: CPT

## 2025-03-06 PROCEDURE — 85730 THROMBOPLASTIN TIME PARTIAL: CPT

## 2025-03-06 PROCEDURE — A9560 TC99M LABELED RBC: HCPCS | Performed by: RADIOLOGY

## 2025-03-06 PROCEDURE — 6360000002 HC RX W HCPCS: Performed by: EMERGENCY MEDICINE

## 2025-03-06 PROCEDURE — 83550 IRON BINDING TEST: CPT

## 2025-03-06 PROCEDURE — 6370000000 HC RX 637 (ALT 250 FOR IP): Performed by: STUDENT IN AN ORGANIZED HEALTH CARE EDUCATION/TRAINING PROGRAM

## 2025-03-06 PROCEDURE — 84157 ASSAY OF PROTEIN OTHER: CPT

## 2025-03-06 PROCEDURE — 85018 HEMOGLOBIN: CPT

## 2025-03-06 PROCEDURE — 2580000003 HC RX 258: Performed by: EMERGENCY MEDICINE

## 2025-03-06 PROCEDURE — 36430 TRANSFUSION BLD/BLD COMPNT: CPT

## 2025-03-06 PROCEDURE — 82042 OTHER SOURCE ALBUMIN QUAN EA: CPT

## 2025-03-06 PROCEDURE — 82150 ASSAY OF AMYLASE: CPT

## 2025-03-06 PROCEDURE — 85014 HEMATOCRIT: CPT

## 2025-03-06 PROCEDURE — 6360000002 HC RX W HCPCS: Performed by: NURSE PRACTITIONER

## 2025-03-06 PROCEDURE — 88305 TISSUE EXAM BY PATHOLOGIST: CPT

## 2025-03-06 PROCEDURE — 82728 ASSAY OF FERRITIN: CPT

## 2025-03-06 PROCEDURE — 51700 IRRIGATION OF BLADDER: CPT

## 2025-03-06 RX ORDER — BISACODYL 5 MG/1
15 TABLET, DELAYED RELEASE ORAL ONCE
Status: COMPLETED | OUTPATIENT
Start: 2025-03-06 | End: 2025-03-06

## 2025-03-06 RX ORDER — SODIUM CHLORIDE 9 MG/ML
INJECTION, SOLUTION INTRAVENOUS PRN
Status: DISCONTINUED | OUTPATIENT
Start: 2025-03-06 | End: 2025-03-11 | Stop reason: HOSPADM

## 2025-03-06 RX ORDER — POLYETHYLENE GLYCOL 3350 17 G/17G
238 POWDER, FOR SOLUTION ORAL ONCE
Status: COMPLETED | OUTPATIENT
Start: 2025-03-06 | End: 2025-03-06

## 2025-03-06 RX ADMIN — SODIUM CHLORIDE, PRESERVATIVE FREE 40 MG: 5 INJECTION INTRAVENOUS at 10:18

## 2025-03-06 RX ADMIN — SODIUM CHLORIDE, PRESERVATIVE FREE 40 MG: 5 INJECTION INTRAVENOUS at 22:16

## 2025-03-06 RX ADMIN — ALBUMIN (HUMAN) 50 G: 0.25 INJECTION, SOLUTION INTRAVENOUS at 12:10

## 2025-03-06 RX ADMIN — FERROUS SULFATE TAB 325 MG (65 MG ELEMENTAL FE) 325 MG: 325 (65 FE) TAB at 10:17

## 2025-03-06 RX ADMIN — METOPROLOL TARTRATE 25 MG: 25 TABLET, FILM COATED ORAL at 10:17

## 2025-03-06 RX ADMIN — MIDODRINE HYDROCHLORIDE 2.5 MG: 5 TABLET ORAL at 17:56

## 2025-03-06 RX ADMIN — SPIRONOLACTONE 25 MG: 25 TABLET ORAL at 17:56

## 2025-03-06 RX ADMIN — MIDODRINE HYDROCHLORIDE 2.5 MG: 5 TABLET ORAL at 10:17

## 2025-03-06 RX ADMIN — Medication 500 MG: at 10:17

## 2025-03-06 RX ADMIN — POLYETHYLENE GLYCOL 3350 238 G: 17 POWDER, FOR SOLUTION ORAL at 17:09

## 2025-03-06 RX ADMIN — OXYCODONE 10 MG: 5 TABLET ORAL at 18:33

## 2025-03-06 RX ADMIN — BISACODYL 15 MG: 5 TABLET, COATED ORAL at 17:09

## 2025-03-06 RX ADMIN — RIFAXIMIN 550 MG: 550 TABLET ORAL at 22:17

## 2025-03-06 RX ADMIN — METOPROLOL TARTRATE 25 MG: 25 TABLET, FILM COATED ORAL at 22:16

## 2025-03-06 RX ADMIN — SODIUM CHLORIDE, PRESERVATIVE FREE 10 ML: 5 INJECTION INTRAVENOUS at 10:36

## 2025-03-06 RX ADMIN — WATER 1000 MG: 1 INJECTION INTRAMUSCULAR; INTRAVENOUS; SUBCUTANEOUS at 22:16

## 2025-03-06 RX ADMIN — LACTULOSE 20 G: 20 SOLUTION ORAL at 22:17

## 2025-03-06 RX ADMIN — FOLIC ACID 1 MG: 1 TABLET ORAL at 10:17

## 2025-03-06 RX ADMIN — OXYCODONE 10 MG: 5 TABLET ORAL at 05:02

## 2025-03-06 RX ADMIN — FUROSEMIDE 40 MG: 40 TABLET ORAL at 10:17

## 2025-03-06 RX ADMIN — MIDODRINE HYDROCHLORIDE 2.5 MG: 5 TABLET ORAL at 14:38

## 2025-03-06 RX ADMIN — MAJOR MAGNESIUM CITRATE ORAL SOLUTION - LEMON 296 ML: 1.75 LIQUID ORAL at 17:09

## 2025-03-06 RX ADMIN — RIFAXIMIN 550 MG: 550 TABLET ORAL at 10:17

## 2025-03-06 RX ADMIN — Medication 25 MILLICURIE: at 07:42

## 2025-03-06 RX ADMIN — SPIRONOLACTONE 25 MG: 25 TABLET ORAL at 10:17

## 2025-03-06 RX ADMIN — OXYCODONE 10 MG: 5 TABLET ORAL at 12:02

## 2025-03-06 RX ADMIN — FERROUS SULFATE TAB 325 MG (65 MG ELEMENTAL FE) 325 MG: 325 (65 FE) TAB at 22:16

## 2025-03-06 RX ADMIN — OCTREOTIDE ACETATE 50 MCG/HR: 500 INJECTION, SOLUTION INTRAVENOUS; SUBCUTANEOUS at 05:11

## 2025-03-06 RX ADMIN — OCTREOTIDE ACETATE 50 MCG/HR: 500 INJECTION, SOLUTION INTRAVENOUS; SUBCUTANEOUS at 14:43

## 2025-03-06 RX ADMIN — TAMSULOSIN HYDROCHLORIDE 0.4 MG: 0.4 CAPSULE ORAL at 10:17

## 2025-03-06 ASSESSMENT — PAIN DESCRIPTION - LOCATION
LOCATION: BACK

## 2025-03-06 ASSESSMENT — PAIN SCALES - GENERAL
PAINLEVEL_OUTOF10: 4
PAINLEVEL_OUTOF10: 8
PAINLEVEL_OUTOF10: 0

## 2025-03-06 ASSESSMENT — PAIN DESCRIPTION - ORIENTATION: ORIENTATION: LEFT

## 2025-03-06 ASSESSMENT — PAIN DESCRIPTION - DESCRIPTORS: DESCRIPTORS: ACHING;DISCOMFORT

## 2025-03-06 ASSESSMENT — PAIN DESCRIPTION - PAIN TYPE: TYPE: CHRONIC PAIN

## 2025-03-06 NOTE — PROGRESS NOTES
PROGRESS NOTE  By Clinton Cobb M.D.    [facility]  Dr. Angelo Xiao M.D.,  Dr. Kai Tesfaye M.D.,   Dr. Louis Zayas D.O.,  Dr. Chadd Mancia M.D.,  Dr. Pranav Lomas D.O.,          Hardik Gray  75 y.o.  male    SUBJECTIVE:  No new complaints.  Wife at bedside.  Patient reports that he has not had bowel movement overnight or this morning including no bloody bowel movement    OBJECTIVE:    /70   Pulse 79   Temp 98.4 °F (36.9 °C)   Resp 16   Ht 1.753 m (5' 9\")   Wt 76.2 kg (168 lb)   SpO2 97%   BMI 24.81 kg/m²     General: NAD/older adult Black male.  AAOx3  HEENT: Persistent scleral icterus/moist oral mucosa  Neck: Supple with trachea midline  Chest: Symmetric excursion/CTAB  Cor: Regular/S1-S2  Abd.: Soft/moderately distended.  No significant tenderness.  BS +  Extr.:  BLE over 3+ edema  Skin: Warm and dry  Other: Millan catheter in place      DATA:    Monitor data reviewed -sinus rhythm noted.       Lab Results   Component Value Date/Time    WBC 10.7 03/04/2025 05:30 PM    RBC 2.05 03/04/2025 05:30 PM    HGB 6.4 03/06/2025 03:15 PM    HCT 19.6 03/06/2025 03:15 PM    MCV 92.7 03/04/2025 05:30 PM    MCH 31.7 03/04/2025 05:30 PM    MCHC 34.2 03/04/2025 05:30 PM    RDW 23.0 03/04/2025 05:30 PM    PLT 86 02/27/2025 04:16 AM    MPV 9.9 03/04/2025 05:30 PM     Lab Results   Component Value Date/Time     03/06/2025 03:15 PM    K 4.2 03/06/2025 03:15 PM    K 3.5 05/14/2021 01:00 AM    CL 99 03/06/2025 03:15 PM    CO2 24 03/06/2025 03:15 PM    BUN 15 03/06/2025 03:15 PM    CREATININE 0.9 03/06/2025 03:15 PM    CALCIUM 8.6 03/06/2025 03:15 PM    BILITOT 16.7 03/05/2025 09:05 AM    ALKPHOS 98 03/05/2025 09:05 AM    AST 73 03/05/2025 09:05 AM    ALT 21 03/05/2025 09:05 AM     Lab Results   Component Value Date/Time    LIPASE 30 02/13/2025 01:39 PM     No results found for: \"AMYLASE\"      ASSESSMENT/PLAN:  Patient Active Problem List   Diagnosis    Prostate cancer (HCC)    Dyslipidemia

## 2025-03-06 NOTE — CONSULTS
Palliative Care Department  [phone]  Palliative Care Initial Consult  Provider Shayla Rivera, APRN - CNP    Hardik Gray  [id number]  Hospital Day: 3  Date of Initial Consult: 3/6/2025  Referring Provider: Kenneth Whitehead DO  Palliative Medicine was consulted for assistance with: Goals of care and CODE STATUS    HPI:   Hardik Gray is a 75 y.o. with a medical history of prostate cancer, anemia, hyperbilirubinemia, hep B, HTN, esophageal varices, chronic anemia and recurrent GI bleed who was admitted on 3/4/2025 from home with a CHIEF COMPLAINT of right lower back pain and difficulty getting around.  Patient underwent EGD 2/17, 2/26 was and flex sig 2/17 and 2/26.  Patient had esophageal varices x 5 banded and APC of rectum.  Required total of 10 units PRBCs and 2 units FFP during admission.  Patient was discharged in stable condition.  Patient back to ED for evaluation of right lower back pain and difficulty getting around.  Patient has been unable to walk and care for himself.  Patient does report fall at home but denies hitting his head.  In ED patient reports dark red blood in stool.  Urine positive for UTI.  Patient admitted for further medical management.  IR consulted for paracentesis.  ASSESSMENT/PLAN:     Pertinent Hospital Diagnoses     GI bleed  Alcoholic cirrhosis of the liver with ascites  Anemia  Acute cystitis with hematuria    Palliative Care Encounter / Counseling Regarding Goals of Care  Please see detailed goals of care discussion as below  At this time, Hardik Gray, Does have capacity for medical decision-making.  Capacity is time limited and situation/question specific  During encounter there was no surrogate medical decision-maker needed  Outcome of goals of care meeting:   Confirmed full CODE STATUS  Code status Full Code  Advanced Directives: no POA or living will in [facility]  Surrogate/Legal NOK:  India Gray (child) [phone]    Spiritual assessment: no spiritual distress

## 2025-03-06 NOTE — PROGRESS NOTES
Physical Therapy  PT eval attempted. Pt off floor and refused on second attempt. Will re-attempt at later date.

## 2025-03-06 NOTE — CONSULTS
Kala Guerra MD   ·   MD Ro Cummings APRN  ·  DAREN Mortensen      [facility] [place]  [address] [place] [address]  P: [phone]  F: [phone] [place] [place]  [address] [place] [address]  P: [phone]  F: [phone]  [facility] [place]  [address] [place] [address]  P: [phone]  F: [phone]           Inpatient Medical Oncology/Hematology Consult Note            Patient Name: Hardik Gray  YOB: 1950    DATE OF ADMISSION: 3/4/2025  DATE OF CONSULTATION: 3/6/2025  CONSULTING PROVIDER: Dr. Whitehead  REASON FOR CONSULTATION: Chronic anemia in light of cirrhosis  PCP: Xavi Swenson    [facility]      CHIEF COMPLAINT:  Abdominal pain  HISTORY OF PRESENT ILLNESS   Patient is a 75 y.o. male with past medical history of prostate cancer, status post radiation, anemia, hyperbilirubin, MI hepatitis B, hypertension esophageal varices, ascites alcohol abuse quit 4 to 5 months ago alcoholic cirrhosis tobacco abuse, CVA, COPD and GI bleed.Patient recently admitted 2/13/2025 - 3/2/2025 with decompensated alcohol cirrhosis with esophageal varices, chronic anemia, and recurrent GI bleed. Patient underwent EGD 2/17, 2/26 and flex sig 2/17, 2/26. Patient had esophageal varices x 5 banded and APC of rectum. Required total of 10 units of packed red blood cells and 2 units of fresh frozen plasma during admission.  Patient presented to the ED with right lower back pain and difficulty getting around.  Patient reports he is unable to take care of himself.  Patient complained of extreme fatigue and has continues indwelling catheter.Labs remarkable for sodium 131, calcium 8.5, albumin 2.7, AST 70, total bili 16.7, total protein 6.2, platelets 117, and H&H 6.5/19.   Patient seen in room he is sitting up in bed appears in no acute distress he was just returned from getting a paracentesis.  Does report decrease

## 2025-03-06 NOTE — PLAN OF CARE
Problem: Safety - Adult  Goal: Free from fall injury  3/6/2025 1246 by Roxanne Paez RN  Outcome: Progressing  Flowsheets (Taken 3/6/2025 1000)  Free From Fall Injury: Instruct family/caregiver on patient safety  3/6/2025 0227 by Esthela Brown RN  Outcome: Progressing  Flowsheets (Taken 3/5/2025 1740 by Roxanne Paez, RN)  Free From Fall Injury:   Based on caregiver fall risk screen, instruct family/caregiver to ask for assistance with transferring infant if caregiver noted to have fall risk factors   Instruct family/caregiver on patient safety     Problem: Chronic Conditions and Co-morbidities  Goal: Patient's chronic conditions and co-morbidity symptoms are monitored and maintained or improved  3/6/2025 1246 by Roxanne Paez RN  Outcome: Progressing  Flowsheets (Taken 3/6/2025 1000)  Care Plan - Patient's Chronic Conditions and Co-Morbidity Symptoms are Monitored and Maintained or Improved: Monitor and assess patient's chronic conditions and comorbid symptoms for stability, deterioration, or improvement  3/6/2025 0227 by Esthela Brown RN  Outcome: Progressing  Flowsheets (Taken 3/5/2025 1740 by Roxanne Paez RN)  Care Plan - Patient's Chronic Conditions and Co-Morbidity Symptoms are Monitored and Maintained or Improved: Monitor and assess patient's chronic conditions and comorbid symptoms for stability, deterioration, or improvement     Problem: Pain  Goal: Verbalizes/displays adequate comfort level or baseline comfort level  3/6/2025 1246 by Roxanne Paez RN  Outcome: Progressing  3/6/2025 0227 by Esthela Brown RN  Outcome: Progressing     Problem: Skin/Tissue Integrity  Goal: Skin integrity remains intact  Description: 1.  Monitor for areas of redness and/or skin breakdown  2.  Assess vascular access sites hourly  3.  Every 4-6 hours minimum:  Change oxygen saturation probe site  4.  Every 4-6 hours:  If on nasal continuous positive airway pressure, respiratory therapy assess nares

## 2025-03-06 NOTE — CONSULTS
3/6/2025 2:11 PM  Hardik Gray  [id number]     Chief Complaint:    Hematuria      History of Present Illness:      The patient is a 75 y.o. male patient who presented to the hospital with complaints of back pain and difficulty getting around. He had his suarez catheter exchanged in the ER. Currently this is draining yellow urine. His girlfriend is present.     He is known to our service and was recently seen in consult on 1/19/2025 for urinary retention and difficult Suarez placement. He has significant urologic history and has undergone cystoscopy with urethral dilation in the past for Suarez placement for retention. He also has a history of prostate cancer and has undergone radiation in the past with [facility].       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    History of tobacco use 5/16/2023    Hyperlipidemia     Hypertension     Late effect of radiation 5/16/2023    MI (myocardial infarction) ([facility]) 2002    follows w PCP    Personal history of radiation therapy 5/16/2023    Prostate cancer ([facility]) 2018    treated with radiation    Urinary frequency     Weak urinary stream          Past Surgical History:   Procedure Laterality Date    BLADDER SURGERY N/A 3/22/2023    CYSTOSCOPY RETROGRADE PYELOGRAM URETHERAL DILATION SUAREZ INSERTION performed by Rashaad Salvador MD at [facility] OR    CORONARY ANGIOPLASTY WITH STENT PLACEMENT  2002    @ [facility]    CYSTOSCOPY N/A 3/22/2021    CYSTOSCOPY, RETROGRADE PYELOGRAM, URETHRAL DIALATION, TURBT performed by Rashaad Salvador MD at [facility] OR    FOOT SURGERY      lt foot    FOOT SURGERY      rt foot as well    PROSTATE BIOPSY      SIGMOIDOSCOPY N/A

## 2025-03-06 NOTE — OR NURSING
Patient arrival from room 632 to IR for paracentesis. Vitals taken, Dr. Aguilera in to speak with the patient about the procedure, all questions answered and patient is agreeable. Abdomen scanned, prepped and centesis catheter inserted to LLQ with ultrasound guidance by Dr Aguilera @ 1055. Patient tolerated well. 1800 ml drained of clear yellow colored ascitic fluid. Centesis catheter removed @ 1103. Puncture site cleansed and dry dressing applied. No bleeding, swelling or complications noted. Discharge instructions placed in chart. Patient transported back to room 632, nurse handoff report called to floor.

## 2025-03-06 NOTE — CARE COORDINATION
Social Work / Discharge Planning :RE-ADMISSION: Patient admitted with GI Bleed. GI consulted. SW attempted to meet with patient but currently off unit for a paracentesis   Patient resides alone in a third floor apartment. Patient has a WW . Patient is active with [facility], confirmed. NOEMY order needed if home is plan.Hx at [facility]. PCP is Dr. Xavi Swenson. [facility]acy is [facility] [address]. Therapy ordered. AWait Treatment plan and recommendations. SW did note consult for polacement. SW to follow. Electronically signed by ARIE Singh on 3/6/25 at 11:43 AM EST

## 2025-03-06 NOTE — PLAN OF CARE
Problem: Safety - Adult  Goal: Free from fall injury  Outcome: Progressing  Flowsheets (Taken 3/5/2025 1740 by Roxanne Paez, RN)  Free From Fall Injury:   Based on caregiver fall risk screen, instruct family/caregiver to ask for assistance with transferring infant if caregiver noted to have fall risk factors   Instruct family/caregiver on patient safety     Problem: Chronic Conditions and Co-morbidities  Goal: Patient's chronic conditions and co-morbidity symptoms are monitored and maintained or improved  Outcome: Progressing  Flowsheets (Taken 3/5/2025 1740 by Roxanne Paez, RN)  Care Plan - Patient's Chronic Conditions and Co-Morbidity Symptoms are Monitored and Maintained or Improved: Monitor and assess patient's chronic conditions and comorbid symptoms for stability, deterioration, or improvement     Problem: Pain  Goal: Verbalizes/displays adequate comfort level or baseline comfort level  Outcome: Progressing     Problem: Skin/Tissue Integrity  Goal: Skin integrity remains intact  Description: 1.  Monitor for areas of redness and/or skin breakdown  2.  Assess vascular access sites hourly  3.  Every 4-6 hours minimum:  Change oxygen saturation probe site  4.  Every 4-6 hours:  If on nasal continuous positive airway pressure, respiratory therapy assess nares and determine need for appliance change or resting period  Outcome: Progressing  Flowsheets (Taken 3/5/2025 1740 by Roxanne Paez, RN)  Skin Integrity Remains Intact: Monitor for areas of redness and/or skin breakdown

## 2025-03-06 NOTE — PROGRESS NOTES
Occupational Therapy  OT consult received to eval/treat and chart review complete. Attempted OT evaluation x 2, first attempt patient off floor for procedure, second attempt patient declined. OT to re-attempt at a later date/time as able and appropriate.     Chary Ramirez OTR/L  License Number: [id number]

## 2025-03-06 NOTE — PROGRESS NOTES
Spiritual Health History and Assessment/Progress Note  [facility]    Attempted Encounter,  ,  ,      Name: Hardik Gray MRN: [id number]    Age: 75 y.o.     Sex: male   Language: English   Jewish: Unknown   GI bleed     Date: 3/6/2025                           Spiritual Assessment began in [facility] 6S INTERMEDIATE        Referral/Consult From: Rounding   Encounter Overview/Reason: Attempted Encounter  Service Provided For: Patient not available    Rakel, Belief, Meaning:   Patient unable to assess at this time  Family/Friends No family/friends present      Importance and Influence:  Patient unable to assess at this time  Family/Friends No family/friends present    Community:  Patient feels well-supported. Support system includes: Spouse/Partner, Children, and Other: caregiver  Family/Friends No family/friends present    Assessment and Plan of Care:     Patient Interventions include: Other: Patient off unit while rounding  Family/Friends Interventions include: No family/friends present    Patient Plan of Care: Spiritual Care available upon further referral  Family/Friends Plan of Care: No family/friends present    Electronically signed by Chaplain Kylah on 3/6/2025 at 10:31 AM

## 2025-03-06 NOTE — PROGRESS NOTES
Spiritual Health History and Assessment/Progress Note  [facility]    Palliative Care, Attempted Encounter,  ,  ,  Patient was not in room when  rounded.  left prayer card. No Family Members present.    Name: Hardik Gray MRN: [id number]    Age: 75 y.o.     Sex: male   Language: English   Yarsanism: Unknown   GI bleed     Date: 3/6/2025                           Spiritual Assessment began in [facility]S INTERMEDIATE        Referral/Consult From: Palliative Care   Encounter Overview/Reason: Palliative Care, Attempted Encounter  Service Provided For: Patient not available    Rakel, Belief, Meaning:   Patient unable to assess at this time  Family/Friends No family/friends present      Importance and Influence:  Patient unable to assess at this time  Family/Friends No family/friends present    Community:  Patient Other: N/A  Family/Friends No family/friends present    Assessment and Plan of Care:     Patient Interventions include: Other: N/A  Family/Friends Interventions include: No family/friends present    Patient Plan of Care: Spiritual Care available upon further referral  Family/Friends Plan of Care: Spiritual Care available upon further referral    Electronically signed by LUIS ALBERTO Murray on 3/6/2025 at 3:01 PM

## 2025-03-06 NOTE — PROGRESS NOTES
Spiritual Health History and Assessment/Progress Note  [facility]    Palliative Care,  ,  ,      Name: Hardik Gray MRN: [id number]    Age: 75 y.o.     Sex: male   Language: English   Yazidi: Unknown   GI bleed     Date: 3/6/2025                           Spiritual Assessment began in [facility]        Referral/Consult From: Palliative Care   Encounter Overview/Reason: Palliative Care  Service Provided For: Patient, Significant other    Rakel, Belief, Meaning:   Patient has beliefs or practices that help with coping during difficult times  Family/Friends have beliefs or practices that help with coping during difficult times      Importance and Influence:  Patient has no beliefs influential to healthcare decision-making identified during this visit  Family/Friends have no beliefs influential to healthcare decision-making identified during this visit    Community:  Patient feels well-supported. Support system includes: Spouse/Partner  Family/Friends feel well-supported. Support system includes: Spouse/Partner    Assessment and Plan of Care:     Patient Interventions include: Facilitated expression of thoughts and feelings and Provided sacramental/Evangelical ritual  Family/Friends Interventions include: Facilitated expression of thoughts and feelings and Provided sacramental/Evangelical ritual    Patient Plan of Care: No spiritual needs identified for follow-up  Family/Friends Plan of Care: No spiritual needs identified for follow-up    Electronically signed by Chaplain Janay on 3/6/2025 at 4:05 PM

## 2025-03-06 NOTE — ACP (ADVANCE CARE PLANNING)
Advance Care Planning   Healthcare Decision Maker:    Primary Decision Maker: India Gray - Child - [phone]    Click here to complete Healthcare Decision Makers including selection of the Healthcare Decision Maker Relationship (ie \"Primary\").  Today we documented Decision Maker(s) consistent with Legal Next of Kin hierarchy.

## 2025-03-06 NOTE — PROGRESS NOTES
[facility] Hospitalist Progress Note    Admitting Date and Time: 3/4/2025  5:00 PM  Admit Dx: GI bleed [K92.2]  Gastrointestinal hemorrhage, unspecified gastrointestinal hemorrhage type [K92.2]  Decompensated cirrhosis (HCC) [K72.90, K74.60]    Subjective:  Patient is being followed for GI bleed [K92.2]  Gastrointestinal hemorrhage, unspecified gastrointestinal hemorrhage type [K92.2]  Decompensated cirrhosis (HCC) [K72.90, K74.60]       Patient was seen and examined  ROS: denies fever, chills, cp, sob, n/v, HA unless stated above.      sodium chloride flush  5-40 mL IntraVENous 2 times per day    pantoprazole (PROTONIX) 40 mg in sodium chloride (PF) 0.9 % 10 mL injection  40 mg IntraVENous Q12H    entecavir  1 mg Oral Daily    ferrous sulfate  325 mg Oral BID    folic acid  1 mg Oral Daily    furosemide  40 mg Oral Daily    lactulose  20 g Oral BID    Magnesium Gluconate  500 mg Oral Daily    metoprolol tartrate  25 mg Oral BID    midodrine  2.5 mg Oral TID WC    rifAXIMin  550 mg Oral BID    spironolactone  25 mg Oral BID    tamsulosin  0.4 mg Oral Daily    cefTRIAXone (ROCEPHIN) IV  1,000 mg IntraVENous Q24H     sodium chloride flush, 5-40 mL, PRN  sodium chloride, , PRN  ondansetron, 4 mg, Q8H PRN   Or  ondansetron, 4 mg, Q6H PRN  albuterol, 2.5 mg, Q6H PRN  fluticasone, 1 spray, Daily PRN  white petrolatum, , PRN  oxyCODONE HCl, 10 mg, Q6H PRN  sodium chloride, , PRN         Objective:    BP (!) 140/74   Pulse 85   Temp 98.2 °F (36.8 °C) (Oral)   Resp 16   Ht 1.753 m (5' 9\")   Wt 76.2 kg (168 lb)   SpO2 99%   BMI 24.81 kg/m²     General Appearance: alert and oriented to person, place and time and in no acute distress  Skin: warm and dry  Head: normocephalic and atraumatic  Eyes: pupils equal, round, and reactive to light, extraocular eye movements intact, conjunctivae normal  Neck: neck supple and non tender without mass   Pulmonary/Chest: clear to auscultation bilaterally- no wheezes, rales or

## 2025-03-07 LAB
ALBUMIN SERPL-MCNC: 2.6 G/DL (ref 3.5–5.2)
ALBUMIN SERPL-MCNC: 2.6 G/DL (ref 3.5–5.2)
ALP SERPL-CCNC: 57 U/L (ref 40–129)
ALP SERPL-CCNC: 61 U/L (ref 40–129)
ALT SERPL-CCNC: 15 U/L (ref 0–40)
ALT SERPL-CCNC: 16 U/L (ref 0–40)
ANION GAP SERPL CALCULATED.3IONS-SCNC: 10 MMOL/L (ref 7–16)
ANION GAP SERPL CALCULATED.3IONS-SCNC: 10 MMOL/L (ref 7–16)
AST SERPL-CCNC: 54 U/L (ref 0–39)
AST SERPL-CCNC: 55 U/L (ref 0–39)
BASOPHILS # BLD: 0.07 K/UL (ref 0–0.2)
BASOPHILS NFR BLD: 1 % (ref 0–2)
BILIRUB SERPL-MCNC: 15.2 MG/DL (ref 0–1.2)
BILIRUB SERPL-MCNC: 17.1 MG/DL (ref 0–1.2)
BUN SERPL-MCNC: 13 MG/DL (ref 6–23)
BUN SERPL-MCNC: 14 MG/DL (ref 6–23)
CALCIUM SERPL-MCNC: 8.3 MG/DL (ref 8.6–10.2)
CALCIUM SERPL-MCNC: 8.6 MG/DL (ref 8.6–10.2)
CHLORIDE SERPL-SCNC: 101 MMOL/L (ref 98–107)
CHLORIDE SERPL-SCNC: 101 MMOL/L (ref 98–107)
CO2 SERPL-SCNC: 22 MMOL/L (ref 22–29)
CO2 SERPL-SCNC: 23 MMOL/L (ref 22–29)
CREAT SERPL-MCNC: 0.8 MG/DL (ref 0.7–1.2)
CREAT SERPL-MCNC: 0.9 MG/DL (ref 0.7–1.2)
EOSINOPHIL # BLD: 0.22 K/UL (ref 0.05–0.5)
EOSINOPHILS RELATIVE PERCENT: 3 % (ref 0–6)
ERYTHROCYTE [DISTWIDTH] IN BLOOD BY AUTOMATED COUNT: 20.5 % (ref 11.5–15)
FOLATE SERPL-MCNC: 19.1 NG/ML (ref 4.8–24.2)
GFR, ESTIMATED: >90 ML/MIN/1.73M2
GFR, ESTIMATED: >90 ML/MIN/1.73M2
GLUCOSE SERPL-MCNC: 104 MG/DL (ref 74–99)
GLUCOSE SERPL-MCNC: 126 MG/DL (ref 74–99)
HCT VFR BLD AUTO: 20.7 % (ref 37–54)
HCT VFR BLD AUTO: 26.7 % (ref 37–54)
HCT VFR BLD AUTO: 28.5 % (ref 37–54)
HCT VFR BLD AUTO: 29.8 % (ref 37–54)
HGB BLD-MCNC: 10 G/DL (ref 12.5–16.5)
HGB BLD-MCNC: 6.9 G/DL (ref 12.5–16.5)
HGB BLD-MCNC: 9 G/DL (ref 12.5–16.5)
HGB BLD-MCNC: 9.6 G/DL (ref 12.5–16.5)
INR PPP: 2.5
LYMPHOCYTES NFR BLD: 2.37 K/UL (ref 1.5–4)
LYMPHOCYTES RELATIVE PERCENT: 32 % (ref 20–42)
MAGNESIUM SERPL-MCNC: 1.8 MG/DL (ref 1.6–2.6)
MCH RBC QN AUTO: 32 PG (ref 26–35)
MCHC RBC AUTO-ENTMCNC: 33.7 G/DL (ref 32–34.5)
MCV RBC AUTO: 95 FL (ref 80–99.9)
MICROORGANISM SPEC CULT: ABNORMAL
MONOCYTES NFR BLD: 1.04 K/UL (ref 0.1–0.95)
MONOCYTES NFR BLD: 14 % (ref 2–12)
NEUTROPHILS NFR BLD: 50 % (ref 43–80)
NEUTS SEG NFR BLD: 3.7 K/UL (ref 1.8–7.3)
NON-GYN CYTOLOGY REPORT: NORMAL
PLATELET CONFIRMATION: NORMAL
PLATELET, FLUORESCENCE: 82 K/UL (ref 130–450)
PMV BLD AUTO: 10.6 FL (ref 7–12)
POTASSIUM SERPL-SCNC: 4.3 MMOL/L (ref 3.5–5)
POTASSIUM SERPL-SCNC: 5.2 MMOL/L (ref 3.5–5)
PROT SERPL-MCNC: 5.5 G/DL (ref 6.4–8.3)
PROT SERPL-MCNC: 5.7 G/DL (ref 6.4–8.3)
PROTHROMBIN TIME: 26.8 SEC (ref 9.3–12.4)
RBC # BLD AUTO: 2.81 M/UL (ref 3.8–5.8)
RBC # BLD: ABNORMAL 10*6/UL
SERVICE CMNT-IMP: ABNORMAL
SODIUM SERPL-SCNC: 133 MMOL/L (ref 132–146)
SODIUM SERPL-SCNC: 134 MMOL/L (ref 132–146)
SPECIMEN DESCRIPTION: ABNORMAL
VIT B12 SERPL-MCNC: 1077 PG/ML (ref 211–946)
WBC # BLD: ABNORMAL 10*3/UL
WBC OTHER # BLD: 7.4 K/UL (ref 4.5–11.5)

## 2025-03-07 PROCEDURE — 36415 COLL VENOUS BLD VENIPUNCTURE: CPT

## 2025-03-07 PROCEDURE — 6360000002 HC RX W HCPCS: Performed by: NURSE PRACTITIONER

## 2025-03-07 PROCEDURE — 2580000003 HC RX 258: Performed by: EMERGENCY MEDICINE

## 2025-03-07 PROCEDURE — 84630 ASSAY OF ZINC: CPT

## 2025-03-07 PROCEDURE — 6360000002 HC RX W HCPCS: Performed by: EMERGENCY MEDICINE

## 2025-03-07 PROCEDURE — 82746 ASSAY OF FOLIC ACID SERUM: CPT

## 2025-03-07 PROCEDURE — 6370000000 HC RX 637 (ALT 250 FOR IP): Performed by: STUDENT IN AN ORGANIZED HEALTH CARE EDUCATION/TRAINING PROGRAM

## 2025-03-07 PROCEDURE — 2500000003 HC RX 250 WO HCPCS: Performed by: NURSE PRACTITIONER

## 2025-03-07 PROCEDURE — 85025 COMPLETE CBC W/AUTO DIFF WBC: CPT

## 2025-03-07 PROCEDURE — 2060000000 HC ICU INTERMEDIATE R&B

## 2025-03-07 PROCEDURE — 85018 HEMOGLOBIN: CPT

## 2025-03-07 PROCEDURE — 82525 ASSAY OF COPPER: CPT

## 2025-03-07 PROCEDURE — 6370000000 HC RX 637 (ALT 250 FOR IP): Performed by: NURSE PRACTITIONER

## 2025-03-07 PROCEDURE — 85014 HEMATOCRIT: CPT

## 2025-03-07 PROCEDURE — 36430 TRANSFUSION BLD/BLD COMPNT: CPT

## 2025-03-07 PROCEDURE — 82607 VITAMIN B-12: CPT

## 2025-03-07 PROCEDURE — 85610 PROTHROMBIN TIME: CPT

## 2025-03-07 PROCEDURE — 2580000003 HC RX 258: Performed by: NURSE PRACTITIONER

## 2025-03-07 PROCEDURE — P9016 RBC LEUKOCYTES REDUCED: HCPCS

## 2025-03-07 PROCEDURE — 80053 COMPREHEN METABOLIC PANEL: CPT

## 2025-03-07 PROCEDURE — 83735 ASSAY OF MAGNESIUM: CPT

## 2025-03-07 RX ORDER — SODIUM CHLORIDE 9 MG/ML
INJECTION, SOLUTION INTRAVENOUS PRN
Status: DISCONTINUED | OUTPATIENT
Start: 2025-03-07 | End: 2025-03-11 | Stop reason: HOSPADM

## 2025-03-07 RX ADMIN — LACTULOSE 20 G: 20 SOLUTION ORAL at 20:02

## 2025-03-07 RX ADMIN — OXYCODONE 10 MG: 5 TABLET ORAL at 10:03

## 2025-03-07 RX ADMIN — FERROUS SULFATE TAB 325 MG (65 MG ELEMENTAL FE) 325 MG: 325 (65 FE) TAB at 09:38

## 2025-03-07 RX ADMIN — SODIUM CHLORIDE, PRESERVATIVE FREE 40 MG: 5 INJECTION INTRAVENOUS at 09:40

## 2025-03-07 RX ADMIN — LACTULOSE 20 G: 20 SOLUTION ORAL at 09:39

## 2025-03-07 RX ADMIN — FOLIC ACID 1 MG: 1 TABLET ORAL at 09:38

## 2025-03-07 RX ADMIN — SPIRONOLACTONE 25 MG: 25 TABLET ORAL at 17:13

## 2025-03-07 RX ADMIN — Medication 500 MG: at 09:54

## 2025-03-07 RX ADMIN — TAMSULOSIN HYDROCHLORIDE 0.4 MG: 0.4 CAPSULE ORAL at 09:39

## 2025-03-07 RX ADMIN — WATER 1000 MG: 1 INJECTION INTRAMUSCULAR; INTRAVENOUS; SUBCUTANEOUS at 20:03

## 2025-03-07 RX ADMIN — SODIUM POLYSTYRENE SULFONATE 15 G: 15 SUSPENSION ORAL at 09:39

## 2025-03-07 RX ADMIN — OCTREOTIDE ACETATE 50 MCG/HR: 500 INJECTION, SOLUTION INTRAVENOUS; SUBCUTANEOUS at 20:10

## 2025-03-07 RX ADMIN — MIDODRINE HYDROCHLORIDE 2.5 MG: 5 TABLET ORAL at 17:12

## 2025-03-07 RX ADMIN — RIFAXIMIN 550 MG: 550 TABLET ORAL at 20:02

## 2025-03-07 RX ADMIN — SODIUM CHLORIDE, PRESERVATIVE FREE 5 ML: 5 INJECTION INTRAVENOUS at 00:41

## 2025-03-07 RX ADMIN — METOPROLOL TARTRATE 25 MG: 25 TABLET, FILM COATED ORAL at 09:39

## 2025-03-07 RX ADMIN — SODIUM CHLORIDE, PRESERVATIVE FREE 40 MG: 5 INJECTION INTRAVENOUS at 20:06

## 2025-03-07 RX ADMIN — FERROUS SULFATE TAB 325 MG (65 MG ELEMENTAL FE) 325 MG: 325 (65 FE) TAB at 20:02

## 2025-03-07 RX ADMIN — OXYCODONE 10 MG: 5 TABLET ORAL at 17:13

## 2025-03-07 RX ADMIN — MIDODRINE HYDROCHLORIDE 2.5 MG: 5 TABLET ORAL at 12:54

## 2025-03-07 RX ADMIN — RIFAXIMIN 550 MG: 550 TABLET ORAL at 09:54

## 2025-03-07 RX ADMIN — OCTREOTIDE ACETATE 50 MCG/HR: 500 INJECTION, SOLUTION INTRAVENOUS; SUBCUTANEOUS at 10:02

## 2025-03-07 RX ADMIN — SODIUM CHLORIDE, PRESERVATIVE FREE 10 ML: 5 INJECTION INTRAVENOUS at 20:11

## 2025-03-07 RX ADMIN — ANORECTAL OINTMENT: 15.7; .44; 24; 20.6 OINTMENT TOPICAL at 20:06

## 2025-03-07 RX ADMIN — PETROLATUM: 420 OINTMENT TOPICAL at 20:03

## 2025-03-07 RX ADMIN — SPIRONOLACTONE 25 MG: 25 TABLET ORAL at 09:39

## 2025-03-07 RX ADMIN — FUROSEMIDE 40 MG: 40 TABLET ORAL at 09:38

## 2025-03-07 RX ADMIN — SODIUM CHLORIDE, PRESERVATIVE FREE 10 ML: 5 INJECTION INTRAVENOUS at 09:00

## 2025-03-07 RX ADMIN — OXYCODONE 10 MG: 5 TABLET ORAL at 04:07

## 2025-03-07 RX ADMIN — METOPROLOL TARTRATE 25 MG: 25 TABLET, FILM COATED ORAL at 20:02

## 2025-03-07 RX ADMIN — PETROLATUM: 420 OINTMENT TOPICAL at 16:09

## 2025-03-07 RX ADMIN — MIDODRINE HYDROCHLORIDE 2.5 MG: 5 TABLET ORAL at 09:38

## 2025-03-07 RX ADMIN — ANORECTAL OINTMENT: 15.7; .44; 24; 20.6 OINTMENT TOPICAL at 16:08

## 2025-03-07 ASSESSMENT — PAIN SCALES - GENERAL
PAINLEVEL_OUTOF10: 8
PAINLEVEL_OUTOF10: 9
PAINLEVEL_OUTOF10: 8
PAINLEVEL_OUTOF10: 8

## 2025-03-07 ASSESSMENT — PAIN DESCRIPTION - DESCRIPTORS: DESCRIPTORS: CRUSHING;DISCOMFORT

## 2025-03-07 ASSESSMENT — PAIN - FUNCTIONAL ASSESSMENT: PAIN_FUNCTIONAL_ASSESSMENT: ACTIVITIES ARE NOT PREVENTED

## 2025-03-07 ASSESSMENT — PAIN DESCRIPTION - LOCATION
LOCATION: BACK
LOCATION: BACK
LOCATION: GENERALIZED

## 2025-03-07 ASSESSMENT — PAIN DESCRIPTION - ORIENTATION: ORIENTATION: RIGHT;LEFT;LOWER

## 2025-03-07 NOTE — PROGRESS NOTES
PROGRESS NOTE  By Clinton Cobb M.D.    [facility]  Dr. Angelo Xiao M.D.,  Dr. Kai Tesfaye M.D.,   JULIAN JenningsO.,  Dr. Chadd Mancia M.D.,  Dr. Pranav Lomas D.O.,          Hardik Gray  75 y.o.  male    SUBJECTIVE:  No further complaints.  No nausea vomiting.    OBJECTIVE:    /70   Pulse 75   Temp 98.3 °F (36.8 °C) (Oral)   Resp 18   Ht 1.753 m (5' 9\")   Wt 76.2 kg (168 lb)   SpO2 98%   BMI 24.81 kg/m²     General: NAD/adult Black male.  AAOx3  HEENT: Persistent scleral icterus/moist oral mucosa  Neck: Supple with trachea midline  Chest: Symmetric excursion/nonlabored respirations  Cor: Regular  Abd.: Soft and distended.  Nontender  Extr.:  BLE over 3+ edema  Skin: Warm and dry      DATA:    Monitor data reviewed -sinus rhythm noted.       Lab Results   Component Value Date/Time    WBC 7.4 03/07/2025 04:30 AM    RBC 2.81 03/07/2025 04:30 AM    HGB 10.0 03/07/2025 07:40 AM    HCT 29.8 03/07/2025 07:40 AM    MCV 95.0 03/07/2025 04:30 AM    MCH 32.0 03/07/2025 04:30 AM    MCHC 33.7 03/07/2025 04:30 AM    RDW 20.5 03/07/2025 04:30 AM    PLT 86 02/27/2025 04:16 AM    MPV 10.6 03/07/2025 04:30 AM     Lab Results   Component Value Date/Time     03/07/2025 11:55 AM    K 4.3 03/07/2025 11:55 AM    K 3.5 05/14/2021 01:00 AM     03/07/2025 11:55 AM    CO2 23 03/07/2025 11:55 AM    BUN 13 03/07/2025 11:55 AM    CREATININE 0.8 03/07/2025 11:55 AM    CALCIUM 8.6 03/07/2025 11:55 AM    BILITOT 17.1 03/07/2025 11:55 AM    ALKPHOS 61 03/07/2025 11:55 AM    AST 55 03/07/2025 11:55 AM    ALT 16 03/07/2025 11:55 AM     Lab Results   Component Value Date/Time    LIPASE 30 02/13/2025 01:39 PM     No results found for: \"AMYLASE\"      ASSESSMENT/PLAN:  Patient Active Problem List   Diagnosis    Prostate cancer (HCC)    Dyslipidemia    Essential hypertension    Asymptomatic PVCs    History of MI (myocardial infarction)    Coronary artery disease involving native coronary artery of

## 2025-03-07 NOTE — PLAN OF CARE
Problem: Safety - Adult  Goal: Free from fall injury  3/6/2025 2324 by Johan Carrera RN  Outcome: Progressing  3/6/2025 1246 by Roxanne Paez RN  Outcome: Progressing  Flowsheets (Taken 3/6/2025 1000)  Free From Fall Injury: Instruct family/caregiver on patient safety     Problem: Chronic Conditions and Co-morbidities  Goal: Patient's chronic conditions and co-morbidity symptoms are monitored and maintained or improved  3/6/2025 2324 by Johan Carrera RN  Outcome: Progressing  3/6/2025 1246 by Roxanne Paez RN  Outcome: Progressing  Flowsheets (Taken 3/6/2025 1000)  Care Plan - Patient's Chronic Conditions and Co-Morbidity Symptoms are Monitored and Maintained or Improved: Monitor and assess patient's chronic conditions and comorbid symptoms for stability, deterioration, or improvement     Problem: Pain  Goal: Verbalizes/displays adequate comfort level or baseline comfort level  3/6/2025 2324 by Johan Carrera RN  Outcome: Progressing  3/6/2025 1246 by Roxanne Paez RN  Outcome: Progressing     Problem: Skin/Tissue Integrity  Goal: Skin integrity remains intact  Description: 1.  Monitor for areas of redness and/or skin breakdown  2.  Assess vascular access sites hourly  3.  Every 4-6 hours minimum:  Change oxygen saturation probe site  4.  Every 4-6 hours:  If on nasal continuous positive airway pressure, respiratory therapy assess nares and determine need for appliance change or resting period  3/6/2025 2324 by Johan Carrera RN  Outcome: Progressing  3/6/2025 1246 by Roxanne Paez RN  Outcome: Progressing  Flowsheets (Taken 3/6/2025 1000)  Skin Integrity Remains Intact: Monitor for areas of redness and/or skin breakdown

## 2025-03-07 NOTE — PROGRESS NOTES
[facility] Hospitalist Progress Note    Admitting Date and Time: 3/4/2025  5:00 PM  Admit Dx: GI bleed [K92.2]  Gastrointestinal hemorrhage, unspecified gastrointestinal hemorrhage type [K92.2]  Decompensated cirrhosis (HCC) [K72.90, K74.60]    Subjective:  Patient is being followed for GI bleed [K92.2]  Gastrointestinal hemorrhage, unspecified gastrointestinal hemorrhage type [K92.2]  Decompensated cirrhosis (HCC) [K72.90, K74.60]       Patient was seen and examined  ROS: denies fever, chills, cp, sob, n/v, HA unless stated above.      sodium chloride flush  5-40 mL IntraVENous 2 times per day    pantoprazole (PROTONIX) 40 mg in sodium chloride (PF) 0.9 % 10 mL injection  40 mg IntraVENous Q12H    entecavir  1 mg Oral Daily    ferrous sulfate  325 mg Oral BID    folic acid  1 mg Oral Daily    furosemide  40 mg Oral Daily    lactulose  20 g Oral BID    Magnesium Gluconate  500 mg Oral Daily    metoprolol tartrate  25 mg Oral BID    midodrine  2.5 mg Oral TID WC    rifAXIMin  550 mg Oral BID    spironolactone  25 mg Oral BID    tamsulosin  0.4 mg Oral Daily    cefTRIAXone (ROCEPHIN) IV  1,000 mg IntraVENous Q24H     sodium chloride, , PRN  white petrolatum, , BID PRN  sodium chloride, , PRN  sodium chloride flush, 5-40 mL, PRN  sodium chloride, , PRN  ondansetron, 4 mg, Q8H PRN   Or  ondansetron, 4 mg, Q6H PRN  albuterol, 2.5 mg, Q6H PRN  fluticasone, 1 spray, Daily PRN  oxyCODONE HCl, 10 mg, Q6H PRN  sodium chloride, , PRN         Objective:    /70   Pulse 68   Temp 98.1 °F (36.7 °C) (Oral)   Resp 20   Ht 1.753 m (5' 9\")   Wt 76.2 kg (168 lb)   SpO2 97%   BMI 24.81 kg/m²     General Appearance: alert and oriented to person, place and time and in no acute distress  Skin: warm and dry  Head: normocephalic and atraumatic  Eyes: pupils equal, round, and reactive to light, extraocular eye movements intact, conjunctivae normal  Neck: neck supple and non tender without mass   Pulmonary/Chest: clear to

## 2025-03-07 NOTE — PROGRESS NOTES
Occupational Therapy  OT consult received to eval/treat and chart review complete. Attempted OT evaluation, patient declined, reports he is waiting to eat after being NPO and did not feel like participating at this time. OT to re-attempt at a later date/time as able and appropriate.     Chary Ramirez, OTR/L  License Number: [id number]

## 2025-03-07 NOTE — PLAN OF CARE
Problem: Safety - Adult  Goal: Free from fall injury  Outcome: Progressing  Flowsheets (Taken 3/7/2025 0930)  Free From Fall Injury: Instruct family/caregiver on patient safety     Problem: Chronic Conditions and Co-morbidities  Goal: Patient's chronic conditions and co-morbidity symptoms are monitored and maintained or improved  Outcome: Progressing  Flowsheets (Taken 3/7/2025 0930)  Care Plan - Patient's Chronic Conditions and Co-Morbidity Symptoms are Monitored and Maintained or Improved: Monitor and assess patient's chronic conditions and comorbid symptoms for stability, deterioration, or improvement     Problem: Pain  Goal: Verbalizes/displays adequate comfort level or baseline comfort level  Outcome: Progressing  Flowsheets (Taken 3/7/2025 0930)  Verbalizes/displays adequate comfort level or baseline comfort level: Encourage patient to monitor pain and request assistance     Problem: Skin/Tissue Integrity  Goal: Skin integrity remains intact  Description: 1.  Monitor for areas of redness and/or skin breakdown  2.  Assess vascular access sites hourly  3.  Every 4-6 hours minimum:  Change oxygen saturation probe site  4.  Every 4-6 hours:  If on nasal continuous positive airway pressure, respiratory therapy assess nares and determine need for appliance change or resting period  Outcome: Progressing  Flowsheets (Taken 3/7/2025 0930)  Skin Integrity Remains Intact: Monitor for areas of redness and/or skin breakdown

## 2025-03-07 NOTE — PROGRESS NOTES
Call placed to GI Surgeon on call  regarding prep for EDG and colonoscopy. Patient has only drank one packet of the prep since 5 pm . Much encouragement has been provided with minimal progress.     Spoke with Dr. Mancia received orders to cancel the colonscopy and continue with the Endoscopy for tomororw. Continue NPO status.

## 2025-03-07 NOTE — PROGRESS NOTES
[facility] Quality Flow/Interdisciplinary Rounds Progress Note        Quality Flow Rounds held on March 7, 2025    Disciplines Attending:  Bedside Nurse, , , and Nursing Unit Leadership    Hardik Gray was admitted on 3/4/2025  5:00 PM    Anticipated Discharge Date:       Disposition:    Sandeep Score:  Sandeep Scale Score: 17    [facility] RISK OF UNPLANNED READMISSION 2.0             33.3 Total Score        Discussed patient goal for the day, patient clinical progression, and barriers to discharge.  The following Goal(s) of the Day/Commitment(s) have been identified:   discharge planning, EGD / [place] 3/7, oncology, GI, IV sandostatin, IV ABX      Rafael Jean, RN  March 7, 2025

## 2025-03-07 NOTE — PROGRESS NOTES
Perfect Serve sent to Aleah Johnson regarding h &h due at 11 pm at the same time post transfsion is due as well. New order to obtain post H & H lab. Then obtain reg cbc with 6am labs. Resume  Q8 hr H &H from 6 am lab draw.

## 2025-03-07 NOTE — PROGRESS NOTES
Inpatient Wound Care (initial consult)    Admit Date: 3/4/2025  5:00 PM    Reason for consult:  coccyx    Significant history:  adm right lower back pain and difficulty getting around, current alcohol use    Past Medical History:   Diagnosis Date    Acid reflux     Arthritis     Back pain     CAD (coronary artery disease)     follows with PCP    Cerebral artery occlusion with cerebral infarction ([facility]) 10/2009    affected vision initially; no deficits at this time    COPD (chronic obstructive pulmonary disease) ([facility])     Hematuria     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 Skin  in groin/scrotum area  Wound history:  POA    Findings:  awake, verbally appropriate.  Heels intact- except for left heel with dried dark area, ? Resolving blister      Incontinent large amount loose brown stool. Multiple times  2 pink, superficial skin loss areas upper buttocks    **Informed Consent**    The patient has given verbal consent to have photos taken of wounds and inserted into their chart as part of their permanent medical record for purposes of documentation, treatment management and/or medical review.   All Images taken on 3/7/25 of patient name: Hardik Gray were transmitted and stored on secured Epic  Site located within Media Folder Tab by a registered Epic-[employer] Mobile Application Device.       Plan:barrier cream  Lo air loss pump  Patient will need continued preventative care    Leticia Tan RN 3/7/2025 12:41 PM

## 2025-03-07 NOTE — CARE COORDINATION
Social Work / Discharge Planning :SW followed up with patient and sister and explained role as discharge planner/ transition of care. Patient has GI consulted and awaiting to be scoped. Awaiting therapy input to better assist with discharge planning. HHC [place] vs SNF discussed at length and a SNF list provided. Patient will discuss with family and update SW after Therapy his plan. No pre-cert needed if SNF plan. If HOME need HHC orders and [place] to be contacted. SW to follow. Electronically signed by ARIE Singh on 3/7/25 at 12:32 PM EST     Addendum:Patient did update SW that family Physician passed away. They would like DR Guaman. Joellen from [facility] updated and she will see if he will accept patient. SW to follow. Electronically signed by ARIE Singh on 3/7/25 at 12:44 PM EST

## 2025-03-08 ENCOUNTER — APPOINTMENT (OUTPATIENT)
Dept: NUCLEAR MEDICINE | Age: 75
DRG: 432 | End: 2025-03-08
Payer: OTHER GOVERNMENT

## 2025-03-08 LAB
ABO/RH: NORMAL
ALBUMIN SERPL-MCNC: 2.7 G/DL (ref 3.5–5.2)
ALP SERPL-CCNC: 72 U/L (ref 40–129)
ALT SERPL-CCNC: 16 U/L (ref 0–40)
ANION GAP SERPL CALCULATED.3IONS-SCNC: 9 MMOL/L (ref 7–16)
ANTIBODY SCREEN: NEGATIVE
ARM BAND NUMBER: NORMAL
AST SERPL-CCNC: 50 U/L (ref 0–39)
BASOPHILS # BLD: 0.03 K/UL (ref 0–0.2)
BASOPHILS NFR BLD: 0 % (ref 0–2)
BILIRUB SERPL-MCNC: 18.7 MG/DL (ref 0–1.2)
BLOOD BANK BLOOD PRODUCT EXPIRATION DATE: NORMAL
BLOOD BANK DISPENSE STATUS: NORMAL
BLOOD BANK ISBT PRODUCT BLOOD TYPE: 6200
BLOOD BANK PRODUCT CODE: NORMAL
BLOOD BANK SAMPLE EXPIRATION: NORMAL
BLOOD BANK UNIT TYPE AND RH: NORMAL
BPU ID: NORMAL
BUN SERPL-MCNC: 13 MG/DL (ref 6–23)
CALCIUM SERPL-MCNC: 8.4 MG/DL (ref 8.6–10.2)
CHLORIDE SERPL-SCNC: 101 MMOL/L (ref 98–107)
CO2 SERPL-SCNC: 25 MMOL/L (ref 22–29)
COMPONENT: NORMAL
CREAT SERPL-MCNC: 0.9 MG/DL (ref 0.7–1.2)
CROSSMATCH RESULT: NORMAL
EOSINOPHIL # BLD: 0.23 K/UL (ref 0.05–0.5)
EOSINOPHILS RELATIVE PERCENT: 3 % (ref 0–6)
ERYTHROCYTE [DISTWIDTH] IN BLOOD BY AUTOMATED COUNT: 21.6 % (ref 11.5–15)
GFR, ESTIMATED: >90 ML/MIN/1.73M2
GLUCOSE SERPL-MCNC: 128 MG/DL (ref 74–99)
HCT VFR BLD AUTO: 25.7 % (ref 37–54)
HCT VFR BLD AUTO: 26.3 % (ref 37–54)
HCT VFR BLD AUTO: 28.7 % (ref 37–54)
HGB BLD-MCNC: 8.8 G/DL (ref 12.5–16.5)
HGB BLD-MCNC: 9 G/DL (ref 12.5–16.5)
HGB BLD-MCNC: 9.4 G/DL (ref 12.5–16.5)
IMM GRANULOCYTES # BLD AUTO: 0.04 K/UL (ref 0–0.58)
IMM GRANULOCYTES NFR BLD: 1 % (ref 0–5)
LYMPHOCYTES NFR BLD: 1.67 K/UL (ref 1.5–4)
LYMPHOCYTES RELATIVE PERCENT: 24 % (ref 20–42)
MAGNESIUM SERPL-MCNC: 1.7 MG/DL (ref 1.6–2.6)
MCH RBC QN AUTO: 31 PG (ref 26–35)
MCHC RBC AUTO-ENTMCNC: 32.8 G/DL (ref 32–34.5)
MCV RBC AUTO: 94.7 FL (ref 80–99.9)
MONOCYTES NFR BLD: 1.46 K/UL (ref 0.1–0.95)
MONOCYTES NFR BLD: 21 % (ref 2–12)
NEUTROPHILS NFR BLD: 50 % (ref 43–80)
NEUTS SEG NFR BLD: 3.46 K/UL (ref 1.8–7.3)
PLATELET CONFIRMATION: NORMAL
PLATELET, FLUORESCENCE: 99 K/UL (ref 130–450)
PMV BLD AUTO: 9.6 FL (ref 7–12)
POTASSIUM SERPL-SCNC: 4 MMOL/L (ref 3.5–5)
PROT SERPL-MCNC: 5.9 G/DL (ref 6.4–8.3)
RBC # BLD AUTO: 3.03 M/UL (ref 3.8–5.8)
RBC # BLD: ABNORMAL 10*6/UL
SODIUM SERPL-SCNC: 135 MMOL/L (ref 132–146)
TRANSFUSION STATUS: NORMAL
UNIT DIVISION: 0
UNIT ISSUE DATE/TIME: NORMAL
WBC OTHER # BLD: 6.9 K/UL (ref 4.5–11.5)

## 2025-03-08 PROCEDURE — 80053 COMPREHEN METABOLIC PANEL: CPT

## 2025-03-08 PROCEDURE — 3430000000 HC RX DIAGNOSTIC RADIOPHARMACEUTICAL: Performed by: RADIOLOGY

## 2025-03-08 PROCEDURE — A9503 TC99M MEDRONATE: HCPCS | Performed by: RADIOLOGY

## 2025-03-08 PROCEDURE — 83735 ASSAY OF MAGNESIUM: CPT

## 2025-03-08 PROCEDURE — 85014 HEMATOCRIT: CPT

## 2025-03-08 PROCEDURE — 85018 HEMOGLOBIN: CPT

## 2025-03-08 PROCEDURE — 6370000000 HC RX 637 (ALT 250 FOR IP): Performed by: STUDENT IN AN ORGANIZED HEALTH CARE EDUCATION/TRAINING PROGRAM

## 2025-03-08 PROCEDURE — 78306 BONE IMAGING WHOLE BODY: CPT | Performed by: CLINICAL NURSE SPECIALIST

## 2025-03-08 PROCEDURE — 2060000000 HC ICU INTERMEDIATE R&B

## 2025-03-08 PROCEDURE — 85025 COMPLETE CBC W/AUTO DIFF WBC: CPT

## 2025-03-08 PROCEDURE — 2580000003 HC RX 258: Performed by: NURSE PRACTITIONER

## 2025-03-08 PROCEDURE — 2500000003 HC RX 250 WO HCPCS: Performed by: NURSE PRACTITIONER

## 2025-03-08 PROCEDURE — 2580000003 HC RX 258: Performed by: EMERGENCY MEDICINE

## 2025-03-08 PROCEDURE — 36415 COLL VENOUS BLD VENIPUNCTURE: CPT

## 2025-03-08 PROCEDURE — 6370000000 HC RX 637 (ALT 250 FOR IP): Performed by: NURSE PRACTITIONER

## 2025-03-08 PROCEDURE — 6360000002 HC RX W HCPCS: Performed by: NURSE PRACTITIONER

## 2025-03-08 PROCEDURE — 6360000002 HC RX W HCPCS

## 2025-03-08 PROCEDURE — 6360000002 HC RX W HCPCS: Performed by: EMERGENCY MEDICINE

## 2025-03-08 RX ORDER — MORPHINE SULFATE 2 MG/ML
1 INJECTION, SOLUTION INTRAMUSCULAR; INTRAVENOUS ONCE
Status: COMPLETED | OUTPATIENT
Start: 2025-03-08 | End: 2025-03-08

## 2025-03-08 RX ORDER — TC 99M MEDRONATE 20 MG/10ML
25 INJECTION, POWDER, LYOPHILIZED, FOR SOLUTION INTRAVENOUS
Status: COMPLETED | OUTPATIENT
Start: 2025-03-08 | End: 2025-03-08

## 2025-03-08 RX ADMIN — FERROUS SULFATE TAB 325 MG (65 MG ELEMENTAL FE) 325 MG: 325 (65 FE) TAB at 08:30

## 2025-03-08 RX ADMIN — ANORECTAL OINTMENT: 15.7; .44; 24; 20.6 OINTMENT TOPICAL at 08:32

## 2025-03-08 RX ADMIN — MORPHINE SULFATE 1 MG: 2 INJECTION, SOLUTION INTRAMUSCULAR; INTRAVENOUS at 03:21

## 2025-03-08 RX ADMIN — MIDODRINE HYDROCHLORIDE 2.5 MG: 5 TABLET ORAL at 18:09

## 2025-03-08 RX ADMIN — RIFAXIMIN 550 MG: 550 TABLET ORAL at 08:29

## 2025-03-08 RX ADMIN — PETROLATUM: 420 OINTMENT TOPICAL at 08:32

## 2025-03-08 RX ADMIN — RIFAXIMIN 550 MG: 550 TABLET ORAL at 19:53

## 2025-03-08 RX ADMIN — FERROUS SULFATE TAB 325 MG (65 MG ELEMENTAL FE) 325 MG: 325 (65 FE) TAB at 19:46

## 2025-03-08 RX ADMIN — TAMSULOSIN HYDROCHLORIDE 0.4 MG: 0.4 CAPSULE ORAL at 08:29

## 2025-03-08 RX ADMIN — OXYCODONE 10 MG: 5 TABLET ORAL at 00:07

## 2025-03-08 RX ADMIN — OXYCODONE 10 MG: 5 TABLET ORAL at 07:13

## 2025-03-08 RX ADMIN — OCTREOTIDE ACETATE 50 MCG/HR: 500 INJECTION, SOLUTION INTRAVENOUS; SUBCUTANEOUS at 07:14

## 2025-03-08 RX ADMIN — SODIUM CHLORIDE, PRESERVATIVE FREE 40 MG: 5 INJECTION INTRAVENOUS at 19:46

## 2025-03-08 RX ADMIN — SODIUM CHLORIDE, PRESERVATIVE FREE 10 ML: 5 INJECTION INTRAVENOUS at 19:48

## 2025-03-08 RX ADMIN — ANORECTAL OINTMENT: 15.7; .44; 24; 20.6 OINTMENT TOPICAL at 19:47

## 2025-03-08 RX ADMIN — MIDODRINE HYDROCHLORIDE 2.5 MG: 5 TABLET ORAL at 08:30

## 2025-03-08 RX ADMIN — SPIRONOLACTONE 25 MG: 25 TABLET ORAL at 08:29

## 2025-03-08 RX ADMIN — PETROLATUM: 420 OINTMENT TOPICAL at 19:47

## 2025-03-08 RX ADMIN — LACTULOSE 20 G: 20 SOLUTION ORAL at 19:46

## 2025-03-08 RX ADMIN — Medication 500 MG: at 08:30

## 2025-03-08 RX ADMIN — FOLIC ACID 1 MG: 1 TABLET ORAL at 08:29

## 2025-03-08 RX ADMIN — LACTULOSE 20 G: 20 SOLUTION ORAL at 08:31

## 2025-03-08 RX ADMIN — METOPROLOL TARTRATE 25 MG: 25 TABLET, FILM COATED ORAL at 08:29

## 2025-03-08 RX ADMIN — TC 99M MEDRONATE 25 MILLICURIE: 20 INJECTION, POWDER, LYOPHILIZED, FOR SOLUTION INTRAVENOUS at 11:12

## 2025-03-08 RX ADMIN — OCTREOTIDE ACETATE 50 MCG/HR: 500 INJECTION, SOLUTION INTRAVENOUS; SUBCUTANEOUS at 18:38

## 2025-03-08 RX ADMIN — FUROSEMIDE 40 MG: 40 TABLET ORAL at 08:29

## 2025-03-08 RX ADMIN — SODIUM CHLORIDE, PRESERVATIVE FREE 40 MG: 5 INJECTION INTRAVENOUS at 08:30

## 2025-03-08 RX ADMIN — OXYCODONE 10 MG: 5 TABLET ORAL at 18:16

## 2025-03-08 RX ADMIN — MIDODRINE HYDROCHLORIDE 2.5 MG: 5 TABLET ORAL at 12:28

## 2025-03-08 RX ADMIN — SPIRONOLACTONE 25 MG: 25 TABLET ORAL at 18:09

## 2025-03-08 RX ADMIN — WATER 1000 MG: 1 INJECTION INTRAMUSCULAR; INTRAVENOUS; SUBCUTANEOUS at 19:46

## 2025-03-08 ASSESSMENT — PAIN DESCRIPTION - ORIENTATION
ORIENTATION: RIGHT
ORIENTATION: RIGHT;LEFT
ORIENTATION: RIGHT
ORIENTATION: LEFT;RIGHT
ORIENTATION: RIGHT

## 2025-03-08 ASSESSMENT — PAIN DESCRIPTION - LOCATION
LOCATION: LEG
LOCATION: BACK
LOCATION: ABDOMEN;BACK
LOCATION: BACK;ABDOMEN
LOCATION: BACK;ABDOMEN

## 2025-03-08 ASSESSMENT — PAIN DESCRIPTION - DESCRIPTORS
DESCRIPTORS: ACHING
DESCRIPTORS: ACHING
DESCRIPTORS: DISCOMFORT
DESCRIPTORS: CRUSHING;DISCOMFORT

## 2025-03-08 ASSESSMENT — PAIN DESCRIPTION - FREQUENCY: FREQUENCY: CONTINUOUS

## 2025-03-08 ASSESSMENT — PAIN SCALES - GENERAL
PAINLEVEL_OUTOF10: 10
PAINLEVEL_OUTOF10: 8
PAINLEVEL_OUTOF10: 7
PAINLEVEL_OUTOF10: 10
PAINLEVEL_OUTOF10: 4

## 2025-03-08 ASSESSMENT — PAIN - FUNCTIONAL ASSESSMENT
PAIN_FUNCTIONAL_ASSESSMENT: ACTIVITIES ARE NOT PREVENTED

## 2025-03-08 ASSESSMENT — PAIN DESCRIPTION - PAIN TYPE: TYPE: CHRONIC PAIN

## 2025-03-08 ASSESSMENT — PAIN DESCRIPTION - ONSET: ONSET: ON-GOING

## 2025-03-08 NOTE — PROGRESS NOTES
PROGRESS NOTE  By Bob Harrison, STEFANIE    [facility]  Dr. Angelo Xiao M.D.,  Dr. Kai Tesfaye M.D.,   TETO Jennings.CORNEL.,  Dr. Chadd Mancia M.D.,  Dr. Pranav Lomas D.O.,          Hardik Gray  75 y.o.  male    SUBJECTIVE:  Patient resting in bed.  No acute distress.    OBJECTIVE:    /79   Pulse 64   Temp 98.6 °F (37 °C) (Oral)   Resp 18   Ht 1.753 m (5' 9\")   Wt 76.2 kg (168 lb)   SpO2 97%   BMI 24.81 kg/m²     General: NAD/adult Black male.  AAOx3  HEENT: Persistent scleral icterus/moist oral mucosa  Neck: Supple with trachea midline  Chest: Symmetric excursion/nonlabored respirations  Cor: Regular  Abd.: Soft and distended.  Nontender  Extr.:  BLE over 3+ edema  Skin: Warm and dry      DATA:    Monitor data reviewed -sinus rhythm noted.       Lab Results   Component Value Date/Time    WBC 6.9 03/08/2025 10:10 AM    RBC 3.03 03/08/2025 10:10 AM    HGB 9.4 03/08/2025 10:10 AM    HCT 28.7 03/08/2025 10:10 AM    MCV 94.7 03/08/2025 10:10 AM    MCH 31.0 03/08/2025 10:10 AM    MCHC 32.8 03/08/2025 10:10 AM    RDW 21.6 03/08/2025 10:10 AM    PLT 86 02/27/2025 04:16 AM    MPV 9.6 03/08/2025 10:10 AM     Lab Results   Component Value Date/Time     03/08/2025 10:10 AM    K 4.0 03/08/2025 10:10 AM    K 3.5 05/14/2021 01:00 AM     03/08/2025 10:10 AM    CO2 25 03/08/2025 10:10 AM    BUN 13 03/08/2025 10:10 AM    CREATININE 0.9 03/08/2025 10:10 AM    CALCIUM 8.4 03/08/2025 10:10 AM    BILITOT 18.7 03/08/2025 10:10 AM    ALKPHOS 72 03/08/2025 10:10 AM    AST 50 03/08/2025 10:10 AM    ALT 16 03/08/2025 10:10 AM     Lab Results   Component Value Date/Time    LIPASE 30 02/13/2025 01:39 PM     No results found for: \"AMYLASE\"      ASSESSMENT/PLAN:  Patient Active Problem List   Diagnosis    Prostate cancer (HCC)    Dyslipidemia    Essential hypertension    Asymptomatic PVCs    History of MI (myocardial infarction)    Coronary artery disease involving native coronary artery of native

## 2025-03-08 NOTE — PLAN OF CARE
Problem: Safety - Adult  Goal: Free from fall injury  3/8/2025 1154 by Bhavana Fragoso RN  Outcome: Progressing  3/8/2025 0446 by Johan Carrera RN  Outcome: Progressing     Problem: Chronic Conditions and Co-morbidities  Goal: Patient's chronic conditions and co-morbidity symptoms are monitored and maintained or improved  3/8/2025 1154 by Bhavana Fragoso RN  Outcome: Progressing  3/8/2025 0446 by Johan Carrera RN  Outcome: Progressing     Problem: Pain  Goal: Verbalizes/displays adequate comfort level or baseline comfort level  3/8/2025 1154 by Bhavana Fragoso RN  Outcome: Progressing  3/8/2025 0446 by Johan Carrera RN  Outcome: Progressing     Problem: Skin/Tissue Integrity  Goal: Skin integrity remains intact  Description: 1.  Monitor for areas of redness and/or skin breakdown  2.  Assess vascular access sites hourly  3.  Every 4-6 hours minimum:  Change oxygen saturation probe site  4.  Every 4-6 hours:  If on nasal continuous positive airway pressure, respiratory therapy assess nares and determine need for appliance change or resting period  3/8/2025 1154 by Bhavana Fragoso RN  Outcome: Progressing  3/8/2025 0446 by Johan Carrera RN  Outcome: Progressing

## 2025-03-08 NOTE — PROGRESS NOTES
Perfect Serve sent to Aleah Johnson NP via secure message regarding 10/10 pain to right side of abdomen. Awaiting call back       0315-New order received for morphine 1mg for pain.

## 2025-03-08 NOTE — PROGRESS NOTES
Physical Therapy  Facility/Department: [address] INTERMEDIATE    Name: Hardik Gray  : 1950  MRN: [id number]  Date of Service: 3/8/2025    Attempted to see pt for PT evaluation, transport present to take pt off unit.   Lila Stevens PT [id number]

## 2025-03-08 NOTE — PROGRESS NOTES
Kala Guerra MD   ·   MD Ro Cummings APRN  ·  DAREN Mortensen      [facility] [place]  [address] [place] [address]  P: [phone]  F: [phone] [facility] [place]  [address] [place] [address]  P: [phone]  F: [phone]  [facility] [place]  [address] [place] [address]  P: [phone]  F: [phone]           Hematology/oncology inpatient follow-up:            Patient Name: Hardik Gray  YOB: 1950    DATE OF ADMISSION: 3/4/2025  DATE OF CONSULTATION: 3/6/2025  CONSULTING PROVIDER: Dr. Whitehead  REASON FOR CONSULTATION: Chronic anemia in light of cirrhosis  PCP: Xavi Swenson    [facility]      CHIEF COMPLAINT:  Abdominal pain    Subjective:  The patient was seen and examined, sitting up in bed, having breakfast, he denies any bleeding, he has dark urine.    ONCOLOGIC HISTORY:   cT1c , iPSA 8.3, GS 3+4=7 - INT risk prostate adenocarcinoma treatment [id number] cGy in 44 fractions ( +/ - / including boost pending clinical characteristics and applicable NCCN guidelines; total dose recorded per [facility] record).  Completed 9/19/2018   [facility] glenn [facility] 8.3 prompting biopsy which demonstrated GS 3+4=7 diease (4/27/18, 30 g gland). No nodules were palpable (cT1c), SG II-B. Pt declined any consideration for Lupron but there is less significant benefit in his case anyway with low- IR disease per the NCCN.       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

## 2025-03-08 NOTE — PROGRESS NOTES
[facility] Hospitalist Progress Note    Admitting Date and Time: 3/4/2025  5:00 PM  Admit Dx: GI bleed [K92.2]  Gastrointestinal hemorrhage, unspecified gastrointestinal hemorrhage type [K92.2]  Decompensated cirrhosis (HCC) [K72.90, K74.60]    Subjective:  Patient is being followed for GI bleed [K92.2]  Gastrointestinal hemorrhage, unspecified gastrointestinal hemorrhage type [K92.2]  Decompensated cirrhosis (HCC) [K72.90, K74.60]   Pt feels okay  Per RN: no additional concerns    ROS: denies fever, chills, cp, sob, n/v, HA unless otherwise noted above     menthol-zinc oxide   Topical BID    white petrolatum   Topical BID    sodium chloride flush  5-40 mL IntraVENous 2 times per day    pantoprazole (PROTONIX) 40 mg in sodium chloride (PF) 0.9 % 10 mL injection  40 mg IntraVENous Q12H    entecavir  1 mg Oral Daily    ferrous sulfate  325 mg Oral BID    folic acid  1 mg Oral Daily    furosemide  40 mg Oral Daily    lactulose  20 g Oral BID    Magnesium Gluconate  500 mg Oral Daily    metoprolol tartrate  25 mg Oral BID    midodrine  2.5 mg Oral TID WC    rifAXIMin  550 mg Oral BID    spironolactone  25 mg Oral BID    tamsulosin  0.4 mg Oral Daily    cefTRIAXone (ROCEPHIN) IV  1,000 mg IntraVENous Q24H     sodium chloride, , PRN  sodium chloride, , PRN  sodium chloride flush, 5-40 mL, PRN  sodium chloride, , PRN  ondansetron, 4 mg, Q8H PRN   Or  ondansetron, 4 mg, Q6H PRN  albuterol, 2.5 mg, Q6H PRN  fluticasone, 1 spray, Daily PRN  oxyCODONE HCl, 10 mg, Q6H PRN  sodium chloride, , PRN         Objective:  /72   Pulse 73   Temp 98.1 °F (36.7 °C) (Oral)   Resp 18   Ht 1.753 m (5' 9\")   Wt 76.2 kg (168 lb)   SpO2 97%   BMI 24.81 kg/m²     General Appearance: alert and oriented to person, place and time and in NAD, sitting in bed  Skin: warm and dry  Head: normocephalic and atraumatic  Eyes: PERRL, EOMI, conjunctivae normal  Neck: neck supple, trachea midline   Pulmonary/Chest: CTAB, no w/r/r, normal

## 2025-03-08 NOTE — PLAN OF CARE
Problem: Safety - Adult  Goal: Free from fall injury  3/8/2025 0446 by Johan Carrera RN  Outcome: Progressing  3/7/2025 1515 by Roxanne Paez RN  Outcome: Progressing  Flowsheets (Taken 3/7/2025 0930)  Free From Fall Injury: Instruct family/caregiver on patient safety     Problem: Chronic Conditions and Co-morbidities  Goal: Patient's chronic conditions and co-morbidity symptoms are monitored and maintained or improved  3/8/2025 0446 by Johan Carrera RN  Outcome: Progressing  3/7/2025 1515 by Roxanne Paez RN  Outcome: Progressing  Flowsheets (Taken 3/7/2025 0930)  Care Plan - Patient's Chronic Conditions and Co-Morbidity Symptoms are Monitored and Maintained or Improved: Monitor and assess patient's chronic conditions and comorbid symptoms for stability, deterioration, or improvement     Problem: Pain  Goal: Verbalizes/displays adequate comfort level or baseline comfort level  3/8/2025 0446 by Johan Carrera RN  Outcome: Progressing  3/7/2025 1515 by Roxanne Paez RN  Outcome: Progressing  Flowsheets (Taken 3/7/2025 0930)  Verbalizes/displays adequate comfort level or baseline comfort level: Encourage patient to monitor pain and request assistance     Problem: Skin/Tissue Integrity  Goal: Skin integrity remains intact  Description: 1.  Monitor for areas of redness and/or skin breakdown  2.  Assess vascular access sites hourly  3.  Every 4-6 hours minimum:  Change oxygen saturation probe site  4.  Every 4-6 hours:  If on nasal continuous positive airway pressure, respiratory therapy assess nares and determine need for appliance change or resting period  3/8/2025 0446 by Johan Carrera RN  Outcome: Progressing  3/7/2025 1515 by Roxanne Paez RN  Outcome: Progressing  Flowsheets (Taken 3/7/2025 0930)  Skin Integrity Remains Intact: Monitor for areas of redness and/or skin breakdown

## 2025-03-09 LAB
ALBUMIN SERPL-MCNC: 2.5 G/DL (ref 3.5–5.2)
ALP SERPL-CCNC: 87 U/L (ref 40–129)
ALT SERPL-CCNC: 14 U/L (ref 0–40)
ANION GAP SERPL CALCULATED.3IONS-SCNC: 11 MMOL/L (ref 7–16)
AST SERPL-CCNC: 53 U/L (ref 0–39)
BASOPHILS # BLD: 0.07 K/UL (ref 0–0.2)
BASOPHILS NFR BLD: 1 % (ref 0–2)
BILIRUB SERPL-MCNC: 18 MG/DL (ref 0–1.2)
BUN SERPL-MCNC: 14 MG/DL (ref 6–23)
CALCIUM SERPL-MCNC: 8.3 MG/DL (ref 8.6–10.2)
CHLORIDE SERPL-SCNC: 101 MMOL/L (ref 98–107)
CO2 SERPL-SCNC: 21 MMOL/L (ref 22–29)
CREAT SERPL-MCNC: 0.9 MG/DL (ref 0.7–1.2)
EOSINOPHIL # BLD: 0.07 K/UL (ref 0.05–0.5)
EOSINOPHILS RELATIVE PERCENT: 1 % (ref 0–6)
ERYTHROCYTE [DISTWIDTH] IN BLOOD BY AUTOMATED COUNT: 22.3 % (ref 11.5–15)
GFR, ESTIMATED: >90 ML/MIN/1.73M2
GLUCOSE SERPL-MCNC: 116 MG/DL (ref 74–99)
HCT VFR BLD AUTO: 27.5 % (ref 37–54)
HCT VFR BLD AUTO: 27.7 % (ref 37–54)
HGB BLD-MCNC: 9.3 G/DL (ref 12.5–16.5)
HGB BLD-MCNC: 9.4 G/DL (ref 12.5–16.5)
LYMPHOCYTES NFR BLD: 2.55 K/UL (ref 1.5–4)
LYMPHOCYTES RELATIVE PERCENT: 38 % (ref 20–42)
MAGNESIUM SERPL-MCNC: 1.6 MG/DL (ref 1.6–2.6)
MCH RBC QN AUTO: 32.8 PG (ref 26–35)
MCHC RBC AUTO-ENTMCNC: 33.9 G/DL (ref 32–34.5)
MCV RBC AUTO: 96.5 FL (ref 80–99.9)
MONOCYTES NFR BLD: 1.27 K/UL (ref 0.1–0.95)
MONOCYTES NFR BLD: 19 % (ref 2–12)
NEUTROPHILS NFR BLD: 41 % (ref 43–80)
NEUTS SEG NFR BLD: 2.75 K/UL (ref 1.8–7.3)
PLATELET # BLD AUTO: 82 K/UL (ref 130–450)
PLATELET CONFIRMATION: NORMAL
PMV BLD AUTO: 10.1 FL (ref 7–12)
POTASSIUM SERPL-SCNC: 4.6 MMOL/L (ref 3.5–5)
PROT SERPL-MCNC: 5.8 G/DL (ref 6.4–8.3)
RBC # BLD AUTO: 2.87 M/UL (ref 3.8–5.8)
RBC # BLD: ABNORMAL 10*6/UL
SODIUM SERPL-SCNC: 133 MMOL/L (ref 132–146)
WBC # BLD: ABNORMAL 10*3/UL
WBC OTHER # BLD: 6.7 K/UL (ref 4.5–11.5)

## 2025-03-09 PROCEDURE — 6370000000 HC RX 637 (ALT 250 FOR IP): Performed by: NURSE PRACTITIONER

## 2025-03-09 PROCEDURE — 83735 ASSAY OF MAGNESIUM: CPT

## 2025-03-09 PROCEDURE — 6360000002 HC RX W HCPCS: Performed by: NURSE PRACTITIONER

## 2025-03-09 PROCEDURE — 85014 HEMATOCRIT: CPT

## 2025-03-09 PROCEDURE — 85018 HEMOGLOBIN: CPT

## 2025-03-09 PROCEDURE — 6370000000 HC RX 637 (ALT 250 FOR IP): Performed by: STUDENT IN AN ORGANIZED HEALTH CARE EDUCATION/TRAINING PROGRAM

## 2025-03-09 PROCEDURE — 6360000002 HC RX W HCPCS: Performed by: EMERGENCY MEDICINE

## 2025-03-09 PROCEDURE — 2580000003 HC RX 258: Performed by: EMERGENCY MEDICINE

## 2025-03-09 PROCEDURE — 2580000003 HC RX 258: Performed by: NURSE PRACTITIONER

## 2025-03-09 PROCEDURE — 2500000003 HC RX 250 WO HCPCS: Performed by: NURSE PRACTITIONER

## 2025-03-09 PROCEDURE — 36415 COLL VENOUS BLD VENIPUNCTURE: CPT

## 2025-03-09 PROCEDURE — 80053 COMPREHEN METABOLIC PANEL: CPT

## 2025-03-09 PROCEDURE — 85025 COMPLETE CBC W/AUTO DIFF WBC: CPT

## 2025-03-09 PROCEDURE — 2060000000 HC ICU INTERMEDIATE R&B

## 2025-03-09 RX ORDER — PENTOXIFYLLINE 400 MG/1
400 TABLET, EXTENDED RELEASE ORAL
Status: DISCONTINUED | OUTPATIENT
Start: 2025-03-09 | End: 2025-03-11 | Stop reason: HOSPADM

## 2025-03-09 RX ADMIN — SPIRONOLACTONE 25 MG: 25 TABLET ORAL at 08:13

## 2025-03-09 RX ADMIN — SODIUM CHLORIDE, PRESERVATIVE FREE 40 MG: 5 INJECTION INTRAVENOUS at 08:18

## 2025-03-09 RX ADMIN — MIDODRINE HYDROCHLORIDE 2.5 MG: 5 TABLET ORAL at 08:13

## 2025-03-09 RX ADMIN — PETROLATUM: 420 OINTMENT TOPICAL at 08:22

## 2025-03-09 RX ADMIN — OCTREOTIDE ACETATE 50 MCG/HR: 500 INJECTION, SOLUTION INTRAVENOUS; SUBCUTANEOUS at 04:22

## 2025-03-09 RX ADMIN — SODIUM CHLORIDE, PRESERVATIVE FREE 10 ML: 5 INJECTION INTRAVENOUS at 08:20

## 2025-03-09 RX ADMIN — Medication 500 MG: at 08:12

## 2025-03-09 RX ADMIN — FERROUS SULFATE TAB 325 MG (65 MG ELEMENTAL FE) 325 MG: 325 (65 FE) TAB at 08:13

## 2025-03-09 RX ADMIN — ANORECTAL OINTMENT: 15.7; .44; 24; 20.6 OINTMENT TOPICAL at 08:21

## 2025-03-09 RX ADMIN — FOLIC ACID 1 MG: 1 TABLET ORAL at 08:13

## 2025-03-09 RX ADMIN — PETROLATUM: 420 OINTMENT TOPICAL at 20:06

## 2025-03-09 RX ADMIN — RIFAXIMIN 550 MG: 550 TABLET ORAL at 08:12

## 2025-03-09 RX ADMIN — FERROUS SULFATE TAB 325 MG (65 MG ELEMENTAL FE) 325 MG: 325 (65 FE) TAB at 20:01

## 2025-03-09 RX ADMIN — SODIUM CHLORIDE, PRESERVATIVE FREE 10 ML: 5 INJECTION INTRAVENOUS at 20:05

## 2025-03-09 RX ADMIN — FUROSEMIDE 40 MG: 40 TABLET ORAL at 08:13

## 2025-03-09 RX ADMIN — ANORECTAL OINTMENT: 15.7; .44; 24; 20.6 OINTMENT TOPICAL at 20:06

## 2025-03-09 RX ADMIN — OXYCODONE 10 MG: 5 TABLET ORAL at 08:10

## 2025-03-09 RX ADMIN — OXYCODONE 10 MG: 5 TABLET ORAL at 22:58

## 2025-03-09 RX ADMIN — LACTULOSE 20 G: 20 SOLUTION ORAL at 08:12

## 2025-03-09 RX ADMIN — PENTOXIFYLLINE 400 MG: 400 TABLET, EXTENDED RELEASE ORAL at 16:51

## 2025-03-09 RX ADMIN — LACTULOSE 20 G: 20 SOLUTION ORAL at 20:02

## 2025-03-09 RX ADMIN — METOPROLOL TARTRATE 25 MG: 25 TABLET, FILM COATED ORAL at 08:13

## 2025-03-09 RX ADMIN — RIFAXIMIN 550 MG: 550 TABLET ORAL at 20:01

## 2025-03-09 RX ADMIN — OXYCODONE 10 MG: 5 TABLET ORAL at 01:26

## 2025-03-09 RX ADMIN — OXYCODONE 10 MG: 5 TABLET ORAL at 16:48

## 2025-03-09 RX ADMIN — SPIRONOLACTONE 25 MG: 25 TABLET ORAL at 16:48

## 2025-03-09 RX ADMIN — TAMSULOSIN HYDROCHLORIDE 0.4 MG: 0.4 CAPSULE ORAL at 08:13

## 2025-03-09 RX ADMIN — WATER 1000 MG: 1 INJECTION INTRAMUSCULAR; INTRAVENOUS; SUBCUTANEOUS at 20:02

## 2025-03-09 RX ADMIN — SODIUM CHLORIDE, PRESERVATIVE FREE 40 MG: 5 INJECTION INTRAVENOUS at 20:02

## 2025-03-09 RX ADMIN — MIDODRINE HYDROCHLORIDE 2.5 MG: 5 TABLET ORAL at 11:09

## 2025-03-09 RX ADMIN — METOPROLOL TARTRATE 25 MG: 25 TABLET, FILM COATED ORAL at 20:01

## 2025-03-09 RX ADMIN — MIDODRINE HYDROCHLORIDE 2.5 MG: 5 TABLET ORAL at 16:48

## 2025-03-09 ASSESSMENT — PAIN - FUNCTIONAL ASSESSMENT
PAIN_FUNCTIONAL_ASSESSMENT: ACTIVITIES ARE NOT PREVENTED
PAIN_FUNCTIONAL_ASSESSMENT: ACTIVITIES ARE NOT PREVENTED

## 2025-03-09 ASSESSMENT — PAIN DESCRIPTION - ORIENTATION
ORIENTATION: RIGHT;LEFT;UPPER;LOWER
ORIENTATION: RIGHT;LEFT;LOWER;UPPER
ORIENTATION: RIGHT;LEFT
ORIENTATION: MID;LOWER;RIGHT;LEFT

## 2025-03-09 ASSESSMENT — PAIN DESCRIPTION - LOCATION
LOCATION: LEG
LOCATION: GROIN;LEG
LOCATION: LEG
LOCATION: BACK;LEG

## 2025-03-09 ASSESSMENT — PAIN SCALES - GENERAL
PAINLEVEL_OUTOF10: 3
PAINLEVEL_OUTOF10: 8
PAINLEVEL_OUTOF10: 0
PAINLEVEL_OUTOF10: 9
PAINLEVEL_OUTOF10: 4
PAINLEVEL_OUTOF10: 8
PAINLEVEL_OUTOF10: 8

## 2025-03-09 ASSESSMENT — PAIN DESCRIPTION - DESCRIPTORS
DESCRIPTORS: DISCOMFORT;SORE;ACHING
DESCRIPTORS: DISCOMFORT;SORE;ACHING
DESCRIPTORS: ACHING
DESCRIPTORS: ACHING

## 2025-03-09 NOTE — PLAN OF CARE
Problem: Safety - Adult  Goal: Free from fall injury  3/9/2025 1955 by Monet Tripathi RN  Outcome: Progressing  3/9/2025 1000 by Loan Shaffer RN  Outcome: Progressing  Flowsheets (Taken 3/9/2025 0958)  Free From Fall Injury: Instruct family/caregiver on patient safety     Problem: Chronic Conditions and Co-morbidities  Goal: Patient's chronic conditions and co-morbidity symptoms are monitored and maintained or improved  3/9/2025 1955 by Monet Tripathi RN  Outcome: Progressing  3/9/2025 1000 by Loan Shaffer RN  Outcome: Progressing     Problem: Pain  Goal: Verbalizes/displays adequate comfort level or baseline comfort level  3/9/2025 1955 by Monet Tripathi RN  Outcome: Progressing  3/9/2025 1000 by Loan Shaffer RN  Outcome: Progressing     Problem: Skin/Tissue Integrity  Goal: Skin integrity remains intact  Description: 1.  Monitor for areas of redness and/or skin breakdown  2.  Assess vascular access sites hourly  3.  Every 4-6 hours minimum:  Change oxygen saturation probe site  4.  Every 4-6 hours:  If on nasal continuous positive airway pressure, respiratory therapy assess nares and determine need for appliance change or resting period  3/9/2025 1955 by Monet Tripathi RN  Outcome: Progressing  3/9/2025 1000 by Loan Shaffer RN  Outcome: Progressing  Flowsheets (Taken 3/9/2025 0958)  Skin Integrity Remains Intact: Monitor for areas of redness and/or skin breakdown

## 2025-03-09 NOTE — PLAN OF CARE
Problem: Safety - Adult  Goal: Free from fall injury  3/9/2025 0136 by Monet Tripathi RN  Outcome: Progressing  3/8/2025 1154 by Bhavana Fragoso RN  Outcome: Progressing     Problem: Chronic Conditions and Co-morbidities  Goal: Patient's chronic conditions and co-morbidity symptoms are monitored and maintained or improved  3/9/2025 0136 by Monet Tripathi RN  Outcome: Progressing  3/8/2025 1154 by Bhavana Fragoso RN  Outcome: Progressing     Problem: Pain  Goal: Verbalizes/displays adequate comfort level or baseline comfort level  3/9/2025 0136 by Monet Tripathi RN  Outcome: Progressing  3/8/2025 1154 by Bhavana Fragoso RN  Outcome: Progressing     Problem: Skin/Tissue Integrity  Goal: Skin integrity remains intact  Description: 1.  Monitor for areas of redness and/or skin breakdown  2.  Assess vascular access sites hourly  3.  Every 4-6 hours minimum:  Change oxygen saturation probe site  4.  Every 4-6 hours:  If on nasal continuous positive airway pressure, respiratory therapy assess nares and determine need for appliance change or resting period  3/9/2025 0136 by Monet Tripathi, RN  Outcome: Progressing  3/8/2025 1154 by Bhavana Fragoso RN  Outcome: Progressing

## 2025-03-09 NOTE — PROGRESS NOTES
PROGRESS NOTE  By Bob Harrison, STEFANIE    [facility]  Dr. Angelo Xiao M.D.,  Dr. Kai Tesfaye M.D.,   Dr. Louis Zayas D.O.,  Dr. Chadd Mancia M.D.,  Dr. Pranav Lomas D.O.,          Hardik Gray  75 y.o.  male    SUBJECTIVE:  Patient resting in bed.  Family at bedside.  Patient states that he would like to get up into the chair.  No bowel movements today.  Denies nausea or vomiting.  Some increased abdominal distention, denies significant pain.  Complains only of bloating.  Alert and oriented.    OBJECTIVE:    /70   Pulse 78   Temp 97.9 °F (36.6 °C) (Oral)   Resp 18   Ht 1.753 m (5' 9\")   Wt 76.2 kg (168 lb)   SpO2 100%   BMI 24.81 kg/m²     General: NAD/adult Black male.  AAOx3  HEENT: Persistent scleral icterus/moist oral mucosa  Neck: Supple with trachea midline  Chest: Symmetric excursion/nonlabored respirations  Cor: Regular  Abd.: Soft and distended.  Nontender  Extr.:  BLE over 3+ edema  Skin: Warm and dry      DATA:    Monitor data reviewed -sinus rhythm noted.       Lab Results   Component Value Date/Time    WBC 6.7 03/09/2025 10:31 AM    RBC 2.87 03/09/2025 10:31 AM    HGB 9.4 03/09/2025 10:31 AM    HCT 27.7 03/09/2025 10:31 AM    MCV 96.5 03/09/2025 10:31 AM    MCH 32.8 03/09/2025 10:31 AM    MCHC 33.9 03/09/2025 10:31 AM    RDW 22.3 03/09/2025 10:31 AM    PLT 82 03/09/2025 10:31 AM    MPV 10.1 03/09/2025 10:31 AM     Lab Results   Component Value Date/Time     03/09/2025 06:49 AM    K 4.6 03/09/2025 06:49 AM    K 3.5 05/14/2021 01:00 AM     03/09/2025 06:49 AM    CO2 21 03/09/2025 06:49 AM    BUN 14 03/09/2025 06:49 AM    CREATININE 0.9 03/09/2025 06:49 AM    CALCIUM 8.3 03/09/2025 06:49 AM    BILITOT 18.0 03/09/2025 06:49 AM    ALKPHOS 87 03/09/2025 06:49 AM    AST 53 03/09/2025 06:49 AM    ALT 14 03/09/2025 06:49 AM     Lab Results   Component Value Date/Time    LIPASE 30 02/13/2025 01:39 PM     No results found for: \"AMYLASE\"      ASSESSMENT/PLAN:  Patient

## 2025-03-09 NOTE — PROGRESS NOTES
[facility] Hospitalist Progress Note    Admitting Date and Time: 3/4/2025  5:00 PM  Admit Dx: GI bleed [K92.2]  Gastrointestinal hemorrhage, unspecified gastrointestinal hemorrhage type [K92.2]  Decompensated cirrhosis (HCC) [K72.90, K74.60]    Subjective:  Patient is being followed for GI bleed [K92.2]  Gastrointestinal hemorrhage, unspecified gastrointestinal hemorrhage type [K92.2]  Decompensated cirrhosis (HCC) [K72.90, K74.60]   Pt feels okay  Per RN: no additional concerns    ROS: denies fever, chills, cp, sob, n/v, HA unless otherwise noted above     menthol-zinc oxide   Topical BID    white petrolatum   Topical BID    sodium chloride flush  5-40 mL IntraVENous 2 times per day    pantoprazole (PROTONIX) 40 mg in sodium chloride (PF) 0.9 % 10 mL injection  40 mg IntraVENous Q12H    entecavir  1 mg Oral Daily    ferrous sulfate  325 mg Oral BID    folic acid  1 mg Oral Daily    furosemide  40 mg Oral Daily    lactulose  20 g Oral BID    Magnesium Gluconate  500 mg Oral Daily    metoprolol tartrate  25 mg Oral BID    midodrine  2.5 mg Oral TID WC    rifAXIMin  550 mg Oral BID    spironolactone  25 mg Oral BID    tamsulosin  0.4 mg Oral Daily    cefTRIAXone (ROCEPHIN) IV  1,000 mg IntraVENous Q24H     sodium chloride, , PRN  sodium chloride, , PRN  sodium chloride flush, 5-40 mL, PRN  sodium chloride, , PRN  ondansetron, 4 mg, Q8H PRN   Or  ondansetron, 4 mg, Q6H PRN  albuterol, 2.5 mg, Q6H PRN  fluticasone, 1 spray, Daily PRN  oxyCODONE HCl, 10 mg, Q6H PRN  sodium chloride, , PRN         Objective:  /75   Pulse 70   Temp 98 °F (36.7 °C) (Oral)   Resp 18   Ht 1.753 m (5' 9\")   Wt 76.2 kg (168 lb)   SpO2 94%   BMI 24.81 kg/m²     General Appearance: alert and oriented to person, place and time and in NAD, sitting in bed  Skin: warm and dry  Head: normocephalic and atraumatic  Eyes: PERRL, EOMI, conjunctivae normal  Neck: neck supple, trachea midline   Pulmonary/Chest: CTAB, no w/r/r, normal

## 2025-03-09 NOTE — PROGRESS NOTES
Kala Guerra MD   ·   MD Ro Cummings APRN  ·  DAREN Mortensen      [place] [facility] in [place]  [address] [place] [address]  P: [phone]  F: [phone] [place] [facility] [place]  [address] [place] [address]  P: [phone]  F: [phone]  [place] [facility] in [place]  [address] [place] [address]  P: [phone]  F: [phone]           Hematology/oncology inpatient follow-up:            Patient Name: Hardik Gray  YOB: 1950    DATE OF ADMISSION: 3/4/2025  DATE OF CONSULTATION: 3/6/2025  CONSULTING PROVIDER: Dr. Whitehead  REASON FOR CONSULTATION: Chronic anemia in light of cirrhosis  PCP: Xavi Swenson    [facility]      CHIEF COMPLAINT:  Abdominal pain    Subjective:  The patient was seen and examined, no major events overnight, sitting up in bed, having breakfast, although appetite is not great, he denies any bleeding, he has dark urine.    ONCOLOGIC HISTORY:   cT1c , iPSA 8.3, GS 3+4=7 - INT risk prostate adenocarcinoma treatment [id number] cGy in 44 fractions ( +/ - / including boost pending clinical characteristics and applicable NCCN guidelines; total dose recorded per [facility] record).  Completed 9/19/2018   [facility].3 prompting biopsy which demonstrated GS 3+4=7 diease (4/27/18, 30 g gland). No nodules were palpable (cT1c), SG II-B. Pt declined any consideration for Lupron but there is less significant benefit in his case anyway with low- IR disease per the NCCN.       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

## 2025-03-09 NOTE — PROGRESS NOTES
Attempted to get patient out of bed and into chair, however he refused. He stated \" I will try tomorrow\".

## 2025-03-09 NOTE — PROGRESS NOTES
Palliative Care Department  [phone]  Palliative Care Progress Note  Provider Dodie Miller, APRN - CNP    Hardik Gray  [id number]  Hospital Day: 6  Date of Initial Consult: 3/6/2025  Referring Provider: Kenneth Whitehead DO  Palliative Medicine was consulted for assistance with: Goals of care and CODE STATUS    HPI:   Hardik Gray is a 75 y.o. with a medical history of prostate cancer, anemia, hyperbilirubinemia, hep B, HTN, esophageal varices, chronic anemia and recurrent GI bleed who was admitted on 3/4/2025 from home with a CHIEF COMPLAINT of right lower back pain and difficulty getting around.  Patient underwent EGD 2/17, 2/26 was and flex sig 2/17 and 2/26.  Patient had esophageal varices x 5 banded and APC of rectum.  Required total of 10 units PRBCs and 2 units FFP during admission.  Patient was discharged in stable condition.  Patient back to ED for evaluation of right lower back pain and difficulty getting around.  Patient has been unable to walk and care for himself.  Patient does report fall at home but denies hitting his head.  In ED patient reports dark red blood in stool.  Urine positive for UTI.  Patient admitted for further medical management.  IR consulted for paracentesis.  ASSESSMENT/PLAN:     Pertinent Hospital Diagnoses     GI bleed  Alcoholic cirrhosis of the liver with ascites  Anemia  Acute cystitis with hematuria    Palliative Care Encounter / Counseling Regarding Goals of Care  Please see detailed goals of care discussion as below  At this time, Hardik Gray, Does have capacity for medical decision-making.  Capacity is time limited and situation/question specific  During encounter there was no surrogate medical decision-maker needed  Outcome of goals of care meeting:   Confirmed full CODE STATUS  Confirmed he wants to continue all care, to include potential paracentesis, scopes, blood transfusions, and open to traveling to [facility] for urology    Advanced Directives: no POA or living will in

## 2025-03-10 PROBLEM — E60 ZINC DEFICIENCY: Status: ACTIVE | Noted: 2025-03-10

## 2025-03-10 PROBLEM — E61.0 COPPER DEFICIENCY: Status: ACTIVE | Noted: 2025-03-10

## 2025-03-10 LAB
ALBUMIN SERPL-MCNC: 2.4 G/DL (ref 3.5–5.2)
ALP SERPL-CCNC: 105 U/L (ref 40–129)
ALT SERPL-CCNC: 14 U/L (ref 0–40)
ANION GAP SERPL CALCULATED.3IONS-SCNC: 10 MMOL/L (ref 7–16)
AST SERPL-CCNC: 48 U/L (ref 0–39)
ATYPICAL LYMPHOCYTE ABSOLUTE COUNT: 0.06 K/UL (ref 0–0.46)
ATYPICAL LYMPHOCYTES: 1 % (ref 0–4)
BASOPHILS # BLD: 0.06 K/UL (ref 0–0.2)
BASOPHILS NFR BLD: 1 % (ref 0–2)
BILIRUB SERPL-MCNC: 16.8 MG/DL (ref 0–1.2)
BUN SERPL-MCNC: 13 MG/DL (ref 6–23)
CALCIUM SERPL-MCNC: 8.2 MG/DL (ref 8.6–10.2)
CHLORIDE SERPL-SCNC: 101 MMOL/L (ref 98–107)
CO2 SERPL-SCNC: 24 MMOL/L (ref 22–29)
COPPER SERPL-MCNC: 40.3 UG/DL (ref 70–140)
CREAT SERPL-MCNC: 0.8 MG/DL (ref 0.7–1.2)
EOSINOPHIL # BLD: 0.43 K/UL (ref 0.05–0.5)
EOSINOPHILS RELATIVE PERCENT: 7 % (ref 0–6)
ERYTHROCYTE [DISTWIDTH] IN BLOOD BY AUTOMATED COUNT: 22.5 % (ref 11.5–15)
GFR, ESTIMATED: >90 ML/MIN/1.73M2
GLUCOSE SERPL-MCNC: 89 MG/DL (ref 74–99)
HCT VFR BLD AUTO: 26.2 % (ref 37–54)
HCT VFR BLD AUTO: 30.2 % (ref 37–54)
HGB BLD-MCNC: 10.3 G/DL (ref 12.5–16.5)
HGB BLD-MCNC: 8.9 G/DL (ref 12.5–16.5)
LYMPHOCYTES NFR BLD: 2.17 K/UL (ref 1.5–4)
LYMPHOCYTES RELATIVE PERCENT: 35 % (ref 20–42)
MAGNESIUM SERPL-MCNC: 1.5 MG/DL (ref 1.6–2.6)
MCH RBC QN AUTO: 33 PG (ref 26–35)
MCHC RBC AUTO-ENTMCNC: 34 G/DL (ref 32–34.5)
MCV RBC AUTO: 97 FL (ref 80–99.9)
METAMYELOCYTES ABSOLUTE COUNT: 0.12 K/UL (ref 0–0.12)
METAMYELOCYTES: 2 % (ref 0–1)
MICROORGANISM SPEC CULT: NO GROWTH
MICROORGANISM/AGENT SPEC: NORMAL
MONOCYTES NFR BLD: 0.5 K/UL (ref 0.1–0.95)
MONOCYTES NFR BLD: 8 % (ref 2–12)
NEUTROPHILS NFR BLD: 46 % (ref 43–80)
NEUTS SEG NFR BLD: 2.85 K/UL (ref 1.8–7.3)
PLATELET CONFIRMATION: NORMAL
PLATELET, FLUORESCENCE: 82 K/UL (ref 130–450)
PMV BLD AUTO: 10.2 FL (ref 7–12)
POTASSIUM SERPL-SCNC: 4 MMOL/L (ref 3.5–5)
PROT SERPL-MCNC: 5.7 G/DL (ref 6.4–8.3)
RBC # BLD AUTO: 2.7 M/UL (ref 3.8–5.8)
RBC # BLD: ABNORMAL 10*6/UL
SERVICE CMNT-IMP: NORMAL
SODIUM SERPL-SCNC: 135 MMOL/L (ref 132–146)
SPECIMEN DESCRIPTION: NORMAL
WBC # BLD: ABNORMAL 10*3/UL
WBC OTHER # BLD: 6.2 K/UL (ref 4.5–11.5)
ZINC SERPL-MCNC: 35.2 UG/DL (ref 60–120)

## 2025-03-10 PROCEDURE — 97161 PT EVAL LOW COMPLEX 20 MIN: CPT

## 2025-03-10 PROCEDURE — 6370000000 HC RX 637 (ALT 250 FOR IP): Performed by: NURSE PRACTITIONER

## 2025-03-10 PROCEDURE — 6370000000 HC RX 637 (ALT 250 FOR IP): Performed by: STUDENT IN AN ORGANIZED HEALTH CARE EDUCATION/TRAINING PROGRAM

## 2025-03-10 PROCEDURE — 36415 COLL VENOUS BLD VENIPUNCTURE: CPT

## 2025-03-10 PROCEDURE — 85018 HEMOGLOBIN: CPT

## 2025-03-10 PROCEDURE — 85025 COMPLETE CBC W/AUTO DIFF WBC: CPT

## 2025-03-10 PROCEDURE — 2060000000 HC ICU INTERMEDIATE R&B

## 2025-03-10 PROCEDURE — 2500000003 HC RX 250 WO HCPCS: Performed by: NURSE PRACTITIONER

## 2025-03-10 PROCEDURE — 80053 COMPREHEN METABOLIC PANEL: CPT

## 2025-03-10 PROCEDURE — 2580000003 HC RX 258: Performed by: NURSE PRACTITIONER

## 2025-03-10 PROCEDURE — 85014 HEMATOCRIT: CPT

## 2025-03-10 PROCEDURE — 83735 ASSAY OF MAGNESIUM: CPT

## 2025-03-10 PROCEDURE — 6370000000 HC RX 637 (ALT 250 FOR IP): Performed by: INTERNAL MEDICINE

## 2025-03-10 PROCEDURE — 97165 OT EVAL LOW COMPLEX 30 MIN: CPT

## 2025-03-10 PROCEDURE — 6360000002 HC RX W HCPCS: Performed by: NURSE PRACTITIONER

## 2025-03-10 RX ORDER — LACTULOSE 10 G/15ML
20 SOLUTION ORAL 3 TIMES DAILY
Status: DISCONTINUED | OUTPATIENT
Start: 2025-03-10 | End: 2025-03-11 | Stop reason: HOSPADM

## 2025-03-10 RX ORDER — OXYCODONE HYDROCHLORIDE 5 MG/1
TABLET ORAL
Status: DISPENSED
Start: 2025-03-10 | End: 2025-03-10

## 2025-03-10 RX ORDER — ZINC SULFATE 50(220)MG
50 CAPSULE ORAL DAILY
Status: DISCONTINUED | OUTPATIENT
Start: 2025-03-10 | End: 2025-03-11 | Stop reason: HOSPADM

## 2025-03-10 RX ORDER — OXYCODONE HCL 10 MG/1
TABLET, FILM COATED, EXTENDED RELEASE ORAL
Status: DISCONTINUED
Start: 2025-03-10 | End: 2025-03-10 | Stop reason: WASHOUT

## 2025-03-10 RX ADMIN — FOLIC ACID 1 MG: 1 TABLET ORAL at 08:07

## 2025-03-10 RX ADMIN — SODIUM CHLORIDE, PRESERVATIVE FREE 10 ML: 5 INJECTION INTRAVENOUS at 19:50

## 2025-03-10 RX ADMIN — ZINC SULFATE 220 MG (50 MG) CAPSULE 50 MG: CAPSULE at 20:32

## 2025-03-10 RX ADMIN — OXYCODONE 10 MG: 5 TABLET ORAL at 08:17

## 2025-03-10 RX ADMIN — PENTOXIFYLLINE 400 MG: 400 TABLET, EXTENDED RELEASE ORAL at 17:42

## 2025-03-10 RX ADMIN — WATER 1000 MG: 1 INJECTION INTRAMUSCULAR; INTRAVENOUS; SUBCUTANEOUS at 19:49

## 2025-03-10 RX ADMIN — PETROLATUM: 420 OINTMENT TOPICAL at 08:08

## 2025-03-10 RX ADMIN — PENTOXIFYLLINE 400 MG: 400 TABLET, EXTENDED RELEASE ORAL at 12:03

## 2025-03-10 RX ADMIN — METOPROLOL TARTRATE 25 MG: 25 TABLET, FILM COATED ORAL at 19:49

## 2025-03-10 RX ADMIN — MIDODRINE HYDROCHLORIDE 2.5 MG: 5 TABLET ORAL at 17:42

## 2025-03-10 RX ADMIN — RIFAXIMIN 550 MG: 550 TABLET ORAL at 20:32

## 2025-03-10 RX ADMIN — ANORECTAL OINTMENT: 15.7; .44; 24; 20.6 OINTMENT TOPICAL at 19:49

## 2025-03-10 RX ADMIN — SODIUM CHLORIDE, PRESERVATIVE FREE 40 MG: 5 INJECTION INTRAVENOUS at 08:07

## 2025-03-10 RX ADMIN — PETROLATUM: 420 OINTMENT TOPICAL at 19:50

## 2025-03-10 RX ADMIN — MIDODRINE HYDROCHLORIDE 2.5 MG: 5 TABLET ORAL at 12:03

## 2025-03-10 RX ADMIN — RIFAXIMIN 550 MG: 550 TABLET ORAL at 08:06

## 2025-03-10 RX ADMIN — Medication 500 MG: at 08:07

## 2025-03-10 RX ADMIN — FERROUS SULFATE TAB 325 MG (65 MG ELEMENTAL FE) 325 MG: 325 (65 FE) TAB at 08:07

## 2025-03-10 RX ADMIN — LACTULOSE 20 G: 20 SOLUTION ORAL at 08:06

## 2025-03-10 RX ADMIN — SODIUM CHLORIDE, PRESERVATIVE FREE 5 ML: 5 INJECTION INTRAVENOUS at 08:08

## 2025-03-10 RX ADMIN — MIDODRINE HYDROCHLORIDE 2.5 MG: 5 TABLET ORAL at 08:06

## 2025-03-10 RX ADMIN — PENTOXIFYLLINE 400 MG: 400 TABLET, EXTENDED RELEASE ORAL at 08:07

## 2025-03-10 RX ADMIN — LACTULOSE 20 G: 20 SOLUTION ORAL at 13:29

## 2025-03-10 RX ADMIN — SODIUM CHLORIDE, PRESERVATIVE FREE 40 MG: 5 INJECTION INTRAVENOUS at 19:49

## 2025-03-10 RX ADMIN — SODIUM CHLORIDE, PRESERVATIVE FREE 10 ML: 5 INJECTION INTRAVENOUS at 08:07

## 2025-03-10 RX ADMIN — METOPROLOL TARTRATE 25 MG: 25 TABLET, FILM COATED ORAL at 08:06

## 2025-03-10 RX ADMIN — OXYCODONE 10 MG: 5 TABLET ORAL at 19:48

## 2025-03-10 RX ADMIN — TAMSULOSIN HYDROCHLORIDE 0.4 MG: 0.4 CAPSULE ORAL at 08:06

## 2025-03-10 RX ADMIN — ANORECTAL OINTMENT: 15.7; .44; 24; 20.6 OINTMENT TOPICAL at 08:08

## 2025-03-10 RX ADMIN — FERROUS SULFATE TAB 325 MG (65 MG ELEMENTAL FE) 325 MG: 325 (65 FE) TAB at 19:49

## 2025-03-10 RX ADMIN — SPIRONOLACTONE 25 MG: 25 TABLET ORAL at 08:07

## 2025-03-10 RX ADMIN — FUROSEMIDE 40 MG: 40 TABLET ORAL at 08:06

## 2025-03-10 RX ADMIN — SPIRONOLACTONE 25 MG: 25 TABLET ORAL at 17:42

## 2025-03-10 ASSESSMENT — PAIN DESCRIPTION - DESCRIPTORS
DESCRIPTORS: ACHING;DISCOMFORT
DESCRIPTORS: ACHING;DISCOMFORT;SORE
DESCRIPTORS: DISCOMFORT;ACHING
DESCRIPTORS: ACHING;DISCOMFORT

## 2025-03-10 ASSESSMENT — PAIN - FUNCTIONAL ASSESSMENT
PAIN_FUNCTIONAL_ASSESSMENT: ACTIVITIES ARE NOT PREVENTED
PAIN_FUNCTIONAL_ASSESSMENT: PREVENTS OR INTERFERES SOME ACTIVE ACTIVITIES AND ADLS
PAIN_FUNCTIONAL_ASSESSMENT: ACTIVITIES ARE NOT PREVENTED
PAIN_FUNCTIONAL_ASSESSMENT: PREVENTS OR INTERFERES SOME ACTIVE ACTIVITIES AND ADLS

## 2025-03-10 ASSESSMENT — PAIN DESCRIPTION - ORIENTATION
ORIENTATION: RIGHT
ORIENTATION: RIGHT
ORIENTATION: RIGHT;LEFT
ORIENTATION: RIGHT;LEFT

## 2025-03-10 ASSESSMENT — PAIN DESCRIPTION - FREQUENCY
FREQUENCY: CONTINUOUS
FREQUENCY: CONTINUOUS

## 2025-03-10 ASSESSMENT — PAIN DESCRIPTION - PAIN TYPE
TYPE: CHRONIC PAIN

## 2025-03-10 ASSESSMENT — PAIN SCALES - GENERAL
PAINLEVEL_OUTOF10: 5
PAINLEVEL_OUTOF10: 5
PAINLEVEL_OUTOF10: 8
PAINLEVEL_OUTOF10: 5
PAINLEVEL_OUTOF10: 3
PAINLEVEL_OUTOF10: 8
PAINLEVEL_OUTOF10: 0

## 2025-03-10 ASSESSMENT — PAIN DESCRIPTION - LOCATION
LOCATION: LEG
LOCATION: GROIN
LOCATION: LEG

## 2025-03-10 ASSESSMENT — PAIN DESCRIPTION - ONSET
ONSET: ON-GOING
ONSET: ON-GOING

## 2025-03-10 ASSESSMENT — PAIN SCALES - WONG BAKER: WONGBAKER_NUMERICALRESPONSE: NO HURT

## 2025-03-10 NOTE — CARE COORDINATION
Social Work / Discharge Planning : Patient FLOWER Escoto followed  up with SW and provided patient two SNF choices: 1.) [facility] and 2.) [facility]. Messages left with both liaisons. Await response. SW to follow. Electronically signed by ARIE Singh on 3/10/25 at 10:30 AM EDT     Addendum: [facility] liaisons did return SW call and received the referral for  rehab. Await response. SW to follow. Electronically signed by ARIE Singh on 3/10/25 at 10:47 AM EDT      Addendum:Letty from [facility] declined patient. Await if [facility] can accept. DONNA to follow. Electronically signed by ARIE Signh on 3/10/25 at 12:50 PM EDT

## 2025-03-10 NOTE — DISCHARGE INSTR - COC
Continuity of Care Form    Patient Name: Hardik Gray   :  1950  MRN:  [id number]    Admit date:  3/4/2025  Discharge date:  25    Code Status Order: Full Code   Advance Directives:     Admitting Physician:  Alexx Mares MD  PCP: Xavi Swenson MD    Discharging Nurse: Alejandra Talley RN  Discharging Hospital Unit/Room#: 0632/0632-A  Discharging Unit Phone Number: [phone]    Emergency Contact:   Extended Emergency Contact Information  Primary Emergency Contact: Dawson Salamanca  Home Phone: [phone]  Mobile Phone: [phone]  Relation: Other  Secondary Emergency Contact: Missy Olivier  Home Phone: [phone]  Relation: Other    Past Surgical History:  Past Surgical History:   Procedure Laterality Date    BLADDER SURGERY N/A 3/22/2023    CYSTOSCOPY RETROGRADE PYELOGRAM URETHERAL DILATION SUAREZ INSERTION performed by Rashaad Salvador MD at [facility] OR    CORONARY ANGIOPLASTY WITH STENT PLACEMENT      @ [facility]    CYSTOSCOPY N/A 3/22/2021    CYSTOSCOPY, RETROGRADE PYELOGRAM, URETHRAL DIALATION, TURBT performed by Rashaad Salvador MD at [facility] OR    FOOT SURGERY      lt foot    FOOT SURGERY      rt foot as well    PROSTATE BIOPSY      SIGMOIDOSCOPY N/A 2024    SIGMOIDOSCOPY CONTROL HEMORRHAGE performed by PARVEEN Mancia MD at [facility] ENDOSCOPY    SIGMOIDOSCOPY N/A 2025    SIGMOIDOSCOPY DIAGNOSTIC FLEXIBLE performed by Pranav Lomas DO at [facility] ENDOSCOPY    SIGMOIDOSCOPY  2025    SIGMOIDOSCOPY CONTROL HEMORRHAGE performed by Pranav Lomas DO at [facility] ENDOSCOPY    UPPER GASTROINTESTINAL ENDOSCOPY N/A 2/3/2025    ESOPHAGOGASTRODUODENOSCOPY BAND LIGATION performed by Pranav Lomas DO at [facility] ENDOSCOPY    UPPER GASTROINTESTINAL ENDOSCOPY N/A 2025    ESOPHAGOGASTRODUODENOSCOPY BAND LIGATION performed by Pranav Lomas DO at [facility] ENDOSCOPY    UPPER GASTROINTESTINAL ENDOSCOPY  2025    ESOPHAGOGASTRODUODENOSCOPY performed by Pranav Lomas DO at [facility] ENDOSCOPY       Immunization History:

## 2025-03-10 NOTE — PROGRESS NOTES
Staff attempted to get pt out of bed to sit in chair. Pt declined at this time and stated \"maybe later.\"

## 2025-03-10 NOTE — PROGRESS NOTES
Kala Guerra MD   ·   MD Ro Cummings APRN  ·  DAREN Mortensen      [place] [facility] in [place]  [address] [place] [address]  P: [phone]  F: [phone] [place] [facility] in [place]  [address] [place] [address]  P: [phone]  F: [phone]  [place] [facility] in [place]  [address] [place] [address]  P: [phone]  F: [phone]           Hematology/oncology inpatient follow-up:            Patient Name: Hardik Gray  YOB: 1950    DATE OF ADMISSION: 3/4/2025  DATE OF CONSULTATION: 3/6/2025  CONSULTING PROVIDER: Dr. Whitehead  REASON FOR CONSULTATION: Chronic anemia in light of cirrhosis  PCP: Xavi Swenson    [facility]      CHIEF COMPLAINT:  Abdominal pain    Subjective:  Feels ok. No major events. Pt feeling a little better.       ONCOLOGIC HISTORY:   cT1c , iPSA 8.3, GS 3+4=7 - INT risk prostate adenocarcinoma treatment [id number] cGy in 44 fractions ( +/ - / including boost pending clinical characteristics and applicable NCCN guidelines; total dose recorded per [facility] record).  Completed 9/19/2018   [facility] glenn [facility] 8.3 prompting biopsy which demonstrated GS 3+4=7 diease (4/27/18, 30 g gland). No nodules were palpable (cT1c), SG II-B. Pt declined any consideration for Lupron but there is less significant benefit in his case anyway with low- IR disease per the NCCN.       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

## 2025-03-10 NOTE — PROGRESS NOTES
Physical Therapy  Facility/Department: [address] INTERMEDIATE  Physical Therapy Initial Assessment    Name: Hardik Gray  : 1950  MRN: [id number]  Date of Service: 3/10/2025        Patient Diagnosis(es): The primary encounter diagnosis was Decompensated cirrhosis (HCC). A diagnosis of Gastrointestinal hemorrhage, unspecified gastrointestinal hemorrhage type was also pertinent to this visit.  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  Past Surgical History:  has a past surgical history that includes Prostate biopsy; Foot surgery; Foot surgery; Coronary angioplasty with stent (); Cystoscopy (N/A, 3/22/2021); Bladder surgery (N/A, 3/22/2023); sigmoidoscopy (N/A, 2024); Upper gastrointestinal endoscopy (N/A, 2/3/2025); Upper gastrointestinal endoscopy (N/A, 2025); sigmoidoscopy (N/A, 2025); Upper gastrointestinal endoscopy (2025); and sigmoidoscopy (2025).         Requires PT Follow-Up: Yes    Evaluating Therapist: Marsha Lopez PT     Referring Provider:  Jacobo Reilly MD     PT order : PT eval and treat     Room #: 632  DIAGNOSIS: The primary encounter diagnosis was Decompensated cirrhosis (HCC). A diagnosis of Gastrointestinal hemorrhage, unspecified gastrointestinal hemorrhage type was also pertinent to this visit.  PRECAUTIONS: falls     Social:  Pt lives  alone in a  third  floor apartment with  steps and  1  rails to enter.  Prior to admission pt walked with  ww      Initial Evaluation  Date:  3/10/2025  Treatment      Short

## 2025-03-10 NOTE — PROGRESS NOTES
P Quality Flow/Interdisciplinary Rounds Progress Note        Quality Flow Rounds held on March 10, 2025    Disciplines Attending:  Bedside Nurse, , , and Nursing Unit Leadership    Hardik Gray was admitted on 3/4/2025  5:00 PM    Anticipated Discharge Date:       Disposition:    Sandeep Score:  Sandeep Scale Score: 16    [facility] RISK OF UNPLANNED READMISSION 2.0             32 Total Score        Discussed patient goal for the day, patient clinical progression, and barriers to discharge.  The following Goal(s) of the Day/Commitment(s) have been identified:   discharge planning, oncology, GI, palliative, IV ABX, PT/OT      Rafael Jean RN  March 10, 2025

## 2025-03-10 NOTE — PROGRESS NOTES
Progress  Note  Chief Complaint   Patient presents with    Unable to ambulate     Started two weeks ago, had one fall, on thinners, did not hit head, looking for rehab placement     Historical Issues:  Current Facility-Administered Medications   Medication Dose Route Frequency Provider Last Rate Last Admin    lactulose (CHRONULAC) 10 GM/15ML solution 20 g  20 g Oral TID Milton Stevenson MD        pentoxifylline (TRENTAL) extended release tablet 400 mg  400 mg Oral TID  Juan A Harrison APRN - CNP   400 mg at 03/10/25 1203    0.9 % sodium chloride infusion   IntraVENous PRN Aleah Johnson APRN - CNP        menthol-zinc oxide (CALMOSEPTINE) 0.44-20.6 % ointment   Topical BID Kenneth Whitehead, DO   Given at 03/10/25 0808    white petrolatum ointment   Topical BID Kenneth Whitehead, DO   Given at 03/10/25 0808    0.9 % sodium chloride infusion   IntraVENous PRN Kenneth Whitehead, DO        sodium chloride flush 0.9 % injection 5-40 mL  5-40 mL IntraVENous 2 times per day Liliana Coyle APRN - CNP   5 mL at 03/10/25 0808    sodium chloride flush 0.9 % injection 5-40 mL  5-40 mL IntraVENous PRN Liliana Coyle APRN - CNP        0.9 % sodium chloride infusion   IntraVENous PRN Liliana Coyle APRN - CNP        ondansetron (ZOFRAN-ODT) disintegrating tablet 4 mg  4 mg Oral Q8H PRN Liliana Coyle APRN - CNP        Or    ondansetron (ZOFRAN) injection 4 mg  4 mg IntraVENous Q6H PRN Liliana Coyle APRN - CNP        pantoprazole (PROTONIX) 40 mg in sodium chloride (PF) 0.9 % 10 mL injection  40 mg IntraVENous Q12H Liliana Coyle APRN - CNP   40 mg at 03/10/25 0807    albuterol (PROVENTIL) (2.5 MG/3ML) 0.083% nebulizer solution 2.5 mg  2.5 mg Nebulization Q6H PRN Liliana Coyle APRN - CNP        entecavir (BARACLUDE) tablet 1 mg (Patient Supplied)  1 mg Oral Daily Liliana Coyle APRN - CNP        ferrous sulfate (IRON 325) tablet 325 mg  325 mg Oral BID Liliana Coyle APRN - CNP   325 mg at

## 2025-03-10 NOTE — PROGRESS NOTES
PROGRESS NOTE  By Clinton Cobb M.D.    [facility]  Dr. Angelo Xiao M.D.,  Dr. Kai Tesfaye M.D.,   Dr. Louis Zayas D.O.,  Dr. Chadd Mancia M.D.,  Dr. Pranav Lomas D.O.,          Hardik Gray  75 y.o.  male    SUBJECTIVE:  Overall feels better    OBJECTIVE:    /74   Pulse 78   Temp 98 °F (36.7 °C) (Oral)   Resp 18   Ht 1.753 m (5' 9\")   Wt 76.2 kg (168 lb)   SpO2 99%   BMI 24.81 kg/m²     General: NAD/adult Black male.  AAOx3  HEENT: Persistent scleral icterus/moist oral mucosa/poor dentition  Neck: Supple/trachea midline  Chest: Symmetric excursion/CTAB  Cor: Regular  Abd.: Soft and mildly to moderately distended.  Nontender  Extr.:  BLE 3+ edema  Skin: Warm and dry      DATA:    Monitor data reviewed -sinus rhythm noted.       Lab Results   Component Value Date/Time    WBC 6.7 03/09/2025 10:31 AM    RBC 2.87 03/09/2025 10:31 AM    HGB 9.3 03/09/2025 10:46 PM    HCT 27.5 03/09/2025 10:46 PM    MCV 96.5 03/09/2025 10:31 AM    MCH 32.8 03/09/2025 10:31 AM    MCHC 33.9 03/09/2025 10:31 AM    RDW 22.3 03/09/2025 10:31 AM    PLT 82 03/09/2025 10:31 AM    MPV 10.1 03/09/2025 10:31 AM     Lab Results   Component Value Date/Time     03/09/2025 06:49 AM    K 4.6 03/09/2025 06:49 AM    K 3.5 05/14/2021 01:00 AM     03/09/2025 06:49 AM    CO2 21 03/09/2025 06:49 AM    BUN 14 03/09/2025 06:49 AM    CREATININE 0.9 03/09/2025 06:49 AM    CALCIUM 8.3 03/09/2025 06:49 AM    BILITOT 18.0 03/09/2025 06:49 AM    ALKPHOS 87 03/09/2025 06:49 AM    AST 53 03/09/2025 06:49 AM    ALT 14 03/09/2025 06:49 AM     Lab Results   Component Value Date/Time    LIPASE 30 02/13/2025 01:39 PM     No results found for: \"AMYLASE\"      ASSESSMENT/PLAN:  Patient Active Problem List   Diagnosis    Prostate cancer (HCC)    Dyslipidemia    Essential hypertension    Asymptomatic PVCs    History of MI (myocardial infarction)    Coronary artery disease involving native coronary artery of native heart without

## 2025-03-11 VITALS
SYSTOLIC BLOOD PRESSURE: 105 MMHG | WEIGHT: 168 LBS | DIASTOLIC BLOOD PRESSURE: 80 MMHG | OXYGEN SATURATION: 100 % | TEMPERATURE: 98.2 F | RESPIRATION RATE: 16 BRPM | HEART RATE: 84 BPM | HEIGHT: 69 IN | BODY MASS INDEX: 24.88 KG/M2

## 2025-03-11 LAB
ALBUMIN SERPL-MCNC: 2.4 G/DL (ref 3.5–5.2)
ALP SERPL-CCNC: 96 U/L (ref 40–129)
ALT SERPL-CCNC: 15 U/L (ref 0–40)
ANION GAP SERPL CALCULATED.3IONS-SCNC: 11 MMOL/L (ref 7–16)
AST SERPL-CCNC: 48 U/L (ref 0–39)
BASOPHILS # BLD: 0 K/UL (ref 0–0.2)
BASOPHILS NFR BLD: 0 % (ref 0–2)
BILIRUB SERPL-MCNC: 16.4 MG/DL (ref 0–1.2)
BUN SERPL-MCNC: 11 MG/DL (ref 6–23)
CALCIUM SERPL-MCNC: 8.3 MG/DL (ref 8.6–10.2)
CHLORIDE SERPL-SCNC: 99 MMOL/L (ref 98–107)
CO2 SERPL-SCNC: 22 MMOL/L (ref 22–29)
CREAT SERPL-MCNC: 0.8 MG/DL (ref 0.7–1.2)
EOSINOPHIL # BLD: 0.48 K/UL (ref 0.05–0.5)
EOSINOPHILS RELATIVE PERCENT: 8 % (ref 0–6)
ERYTHROCYTE [DISTWIDTH] IN BLOOD BY AUTOMATED COUNT: 22.7 % (ref 11.5–15)
GFR, ESTIMATED: >90 ML/MIN/1.73M2
GLUCOSE SERPL-MCNC: 103 MG/DL (ref 74–99)
HCT VFR BLD AUTO: 25.8 % (ref 37–54)
HGB BLD-MCNC: 8.8 G/DL (ref 12.5–16.5)
LYMPHOCYTES NFR BLD: 1.32 K/UL (ref 1.5–4)
LYMPHOCYTES RELATIVE PERCENT: 22 % (ref 20–42)
MAGNESIUM SERPL-MCNC: 1.4 MG/DL (ref 1.6–2.6)
MCH RBC QN AUTO: 33.2 PG (ref 26–35)
MCHC RBC AUTO-ENTMCNC: 34.1 G/DL (ref 32–34.5)
MCV RBC AUTO: 97.4 FL (ref 80–99.9)
MONOCYTES NFR BLD: 0.6 K/UL (ref 0.1–0.95)
MONOCYTES NFR BLD: 10 % (ref 2–12)
NEUTROPHILS NFR BLD: 60 % (ref 43–80)
NEUTS SEG NFR BLD: 3.6 K/UL (ref 1.8–7.3)
PLATELET CONFIRMATION: NORMAL
PLATELET, FLUORESCENCE: 77 K/UL (ref 130–450)
PMV BLD AUTO: 9.8 FL (ref 7–12)
POTASSIUM SERPL-SCNC: 3.7 MMOL/L (ref 3.5–5)
PROT SERPL-MCNC: 5.6 G/DL (ref 6.4–8.3)
RBC # BLD AUTO: 2.65 M/UL (ref 3.8–5.8)
RBC # BLD: ABNORMAL 10*6/UL
SODIUM SERPL-SCNC: 132 MMOL/L (ref 132–146)
WBC OTHER # BLD: 6 K/UL (ref 4.5–11.5)

## 2025-03-11 PROCEDURE — 6370000000 HC RX 637 (ALT 250 FOR IP): Performed by: NURSE PRACTITIONER

## 2025-03-11 PROCEDURE — 80053 COMPREHEN METABOLIC PANEL: CPT

## 2025-03-11 PROCEDURE — 2500000003 HC RX 250 WO HCPCS: Performed by: NURSE PRACTITIONER

## 2025-03-11 PROCEDURE — 6360000002 HC RX W HCPCS: Performed by: NURSE PRACTITIONER

## 2025-03-11 PROCEDURE — 85025 COMPLETE CBC W/AUTO DIFF WBC: CPT

## 2025-03-11 PROCEDURE — 83735 ASSAY OF MAGNESIUM: CPT

## 2025-03-11 PROCEDURE — 6370000000 HC RX 637 (ALT 250 FOR IP): Performed by: STUDENT IN AN ORGANIZED HEALTH CARE EDUCATION/TRAINING PROGRAM

## 2025-03-11 RX ORDER — PENTOXIFYLLINE 400 MG/1
400 TABLET, EXTENDED RELEASE ORAL
DISCHARGE
Start: 2025-03-11

## 2025-03-11 RX ORDER — ZINC SULFATE 50(220)MG
50 CAPSULE ORAL DAILY
DISCHARGE
Start: 2025-03-12

## 2025-03-11 RX ORDER — CEFDINIR 300 MG/1
300 CAPSULE ORAL 2 TIMES DAILY
DISCHARGE
Start: 2025-03-11 | End: 2025-03-14

## 2025-03-11 RX ORDER — ENTECAVIR 1 MG/1
1 TABLET, FILM COATED ORAL DAILY
DISCHARGE
Start: 2025-03-11

## 2025-03-11 RX ORDER — LACTULOSE 10 G/15ML
20 SOLUTION ORAL 3 TIMES DAILY
DISCHARGE
Start: 2025-03-11

## 2025-03-11 RX ORDER — OXYCODONE HYDROCHLORIDE 10 MG/1
5 TABLET ORAL EVERY 8 HOURS PRN
Qty: 6 TABLET | Status: SHIPPED | DISCHARGE
Start: 2025-03-11 | End: 2025-03-13

## 2025-03-11 RX ADMIN — PENTOXIFYLLINE 400 MG: 400 TABLET, EXTENDED RELEASE ORAL at 08:57

## 2025-03-11 RX ADMIN — FOLIC ACID 1 MG: 1 TABLET ORAL at 08:56

## 2025-03-11 RX ADMIN — MIDODRINE HYDROCHLORIDE 2.5 MG: 5 TABLET ORAL at 12:45

## 2025-03-11 RX ADMIN — OXYCODONE 10 MG: 5 TABLET ORAL at 08:59

## 2025-03-11 RX ADMIN — Medication 500 MG: at 08:57

## 2025-03-11 RX ADMIN — TAMSULOSIN HYDROCHLORIDE 0.4 MG: 0.4 CAPSULE ORAL at 08:56

## 2025-03-11 RX ADMIN — SODIUM CHLORIDE, PRESERVATIVE FREE 40 MG: 5 INJECTION INTRAVENOUS at 08:57

## 2025-03-11 RX ADMIN — MIDODRINE HYDROCHLORIDE 2.5 MG: 5 TABLET ORAL at 08:56

## 2025-03-11 RX ADMIN — FUROSEMIDE 40 MG: 40 TABLET ORAL at 08:56

## 2025-03-11 RX ADMIN — SODIUM CHLORIDE, PRESERVATIVE FREE 10 ML: 5 INJECTION INTRAVENOUS at 08:57

## 2025-03-11 RX ADMIN — OXYCODONE 10 MG: 5 TABLET ORAL at 01:53

## 2025-03-11 RX ADMIN — ANORECTAL OINTMENT: 15.7; .44; 24; 20.6 OINTMENT TOPICAL at 08:58

## 2025-03-11 RX ADMIN — RIFAXIMIN 550 MG: 550 TABLET ORAL at 08:56

## 2025-03-11 RX ADMIN — PETROLATUM: 420 OINTMENT TOPICAL at 08:58

## 2025-03-11 RX ADMIN — PENTOXIFYLLINE 400 MG: 400 TABLET, EXTENDED RELEASE ORAL at 12:45

## 2025-03-11 RX ADMIN — METOPROLOL TARTRATE 25 MG: 25 TABLET, FILM COATED ORAL at 08:57

## 2025-03-11 RX ADMIN — FERROUS SULFATE TAB 325 MG (65 MG ELEMENTAL FE) 325 MG: 325 (65 FE) TAB at 08:57

## 2025-03-11 RX ADMIN — SPIRONOLACTONE 25 MG: 25 TABLET ORAL at 08:57

## 2025-03-11 RX ADMIN — ZINC SULFATE 220 MG (50 MG) CAPSULE 50 MG: CAPSULE at 08:57

## 2025-03-11 ASSESSMENT — PAIN DESCRIPTION - DESCRIPTORS
DESCRIPTORS: ACHING;SQUEEZING;DISCOMFORT
DESCRIPTORS: DISCOMFORT

## 2025-03-11 ASSESSMENT — PAIN SCALES - GENERAL
PAINLEVEL_OUTOF10: 8
PAINLEVEL_OUTOF10: 10

## 2025-03-11 ASSESSMENT — PAIN DESCRIPTION - PAIN TYPE: TYPE: CHRONIC PAIN

## 2025-03-11 ASSESSMENT — PAIN DESCRIPTION - ORIENTATION
ORIENTATION: RIGHT;LEFT
ORIENTATION: RIGHT;LEFT

## 2025-03-11 ASSESSMENT — PAIN DESCRIPTION - LOCATION
LOCATION: LEG
LOCATION: LEG

## 2025-03-11 NOTE — CARE COORDINATION
Social Work / Discharge Planning : Ro from [facility] did accept patient. N 17 generated, HENS and transport forms completed. NO pre-cert needed. Patient updated. SW to follow. Electronically signed by ARIE Singh on 3/11/25 at 8:07 AM EDT     Addendum: Patient has a d/c. Patient to be d/c on Xifaxin. Checking to see if facility CAN accept with this med. [facility] to update SW if they can accept patient on this med. Provider updated. SO aware that may need another SNF.SW to follow. Electronically signed by ARIE Singh on 3/11/25 at 11:28 AM EDT     Addendum: [facility] cannot accept patient on Xifaxin. Patient and GF updated. Call out to Bina at  [facility]. They CAN do this medication. Bina will contact  if they have a bed. Patient aware and in agreement for [facility] if they can accept. Await to hear from Bina. SW to follow. Electronically signed by ARIE Singh on 3/11/25 at 12:02 PM EDT      Addendum: [facility] accepted patient and can admit today between 2:00 and 4:00 [facility]. SO [facility] aware of d/c today to today. SW [facility] and [facility]. Bina from [facility] updated. SW to follow. Electronically signed by ARIE Singh on 3/11/25 at 1:11 PM EDT

## 2025-03-11 NOTE — CARE COORDINATION
Discharge orders noted.  Follow-up in 1 week after discharge.  Patient/family/facility to call for appointment and with questions/concerns at [phone].  Thank you for the opportunity to participate in the care of Mr. Isaac Cobb MD

## 2025-03-11 NOTE — PLAN OF CARE
Problem: Safety - Adult  Goal: Free from fall injury  3/10/2025 2241 by Lynnette Huang RN  Outcome: Progressing  3/10/2025 0932 by Dodie Morris RN  Outcome: Progressing     Problem: Chronic Conditions and Co-morbidities  Goal: Patient's chronic conditions and co-morbidity symptoms are monitored and maintained or improved  3/10/2025 2241 by Lynnette Huang RN  Outcome: Progressing  3/10/2025 0932 by Dodie Morris RN  Outcome: Progressing  Flowsheets (Taken 3/10/2025 0803)  Care Plan - Patient's Chronic Conditions and Co-Morbidity Symptoms are Monitored and Maintained or Improved:   Monitor and assess patient's chronic conditions and comorbid symptoms for stability, deterioration, or improvement   Collaborate with multidisciplinary team to address chronic and comorbid conditions and prevent exacerbation or deterioration   Update acute care plan with appropriate goals if chronic or comorbid symptoms are exacerbated and prevent overall improvement and discharge     Problem: Pain  Goal: Verbalizes/displays adequate comfort level or baseline comfort level  3/10/2025 2241 by Lynnette Huang RN  Outcome: Progressing  3/10/2025 0932 by Dodie Morris RN  Outcome: Progressing     Problem: Skin/Tissue Integrity  Goal: Skin integrity remains intact  Description: 1.  Monitor for areas of redness and/or skin breakdown  2.  Assess vascular access sites hourly  3.  Every 4-6 hours minimum:  Change oxygen saturation probe site  4.  Every 4-6 hours:  If on nasal continuous positive airway pressure, respiratory therapy assess nares and determine need for appliance change or resting period  3/10/2025 2241 by Lynnette Huang RN  Outcome: Progressing  3/10/2025 0932 by Dodie Morris RN  Outcome: Progressing  Flowsheets (Taken 3/10/2025 0803)  Skin Integrity Remains Intact:   Assess vascular access sites hourly   Monitor for areas of redness and/or skin breakdown   Every 4-6 hours minimum: Change oxygen saturation probe site

## 2025-03-11 NOTE — PROGRESS NOTES
P Quality Flow/Interdisciplinary Rounds Progress Note        Quality Flow Rounds held on March 11, 2025    Disciplines Attending:  Bedside Nurse, , , and Nursing Unit Leadership    Hardik Gray was admitted on 3/4/2025  5:00 PM    Anticipated Discharge Date:       Disposition:    Sandeep Score:  Sandeep Scale Score: 15    [facility] RISK OF UNPLANNED READMISSION 2.0             34 Total Score        Discussed patient goal for the day, patient clinical progression, and barriers to discharge.  The following Goal(s) of the Day/Commitment(s) have been identified:   discharge planning, GI, palliative, oncology, PT/OT, IV ABX      Rafael Jean RN  March 11, 2025

## 2025-03-11 NOTE — PROGRESS NOTES
Palliative Care Department  [phone]  Palliative Care Progress Note  Provider Shayla Rivera, APRN - CNP    Hardik Gray  [id number]  Hospital Day: 8  Date of Initial Consult: 3/6/2025  Referring Provider: Kenneth Whitehead DO  Palliative Medicine was consulted for assistance with: Goals of care and CODE STATUS    HPI:   Hardik Gray is a 75 y.o. with a medical history of prostate cancer, anemia, hyperbilirubinemia, hep B, HTN, esophageal varices, chronic anemia and recurrent GI bleed who was admitted on 3/4/2025 from home with a CHIEF COMPLAINT of right lower back pain and difficulty getting around.  Patient underwent EGD 2/17, 2/26 was and flex sig 2/17 and 2/26.  Patient had esophageal varices x 5 banded and APC of rectum.  Required total of 10 units PRBCs and 2 units FFP during admission.  Patient was discharged in stable condition.  Patient back to ED for evaluation of right lower back pain and difficulty getting around.  Patient has been unable to walk and care for himself.  Patient does report fall at home but denies hitting his head.  In ED patient reports dark red blood in stool.  Urine positive for UTI.  Patient admitted for further medical management.  IR consulted for paracentesis.  ASSESSMENT/PLAN:     Pertinent Hospital Diagnoses     GI bleed  Alcoholic cirrhosis of the liver with ascites  Anemia  Acute cystitis with hematuria    Palliative Care Encounter / Counseling Regarding Goals of Care  Please see detailed goals of care discussion as below  At this time, Hardik Gray, Does have capacity for medical decision-making.  Capacity is time limited and situation/question specific  During encounter there was no surrogate medical decision-maker needed  Outcome of goals of care meeting:   Confirmed full CODE STATUS  Confirmed he wants to continue all care, to include potential paracentesis, scopes, blood transfusions, and open to traveling to [facility] for urology    Advanced Directives: no POA or living will in

## 2025-03-11 NOTE — PLAN OF CARE
Problem: Safety - Adult  Goal: Free from fall injury  Outcome: Completed     Problem: Chronic Conditions and Co-morbidities  Goal: Patient's chronic conditions and co-morbidity symptoms are monitored and maintained or improved  Outcome: Completed     Problem: Pain  Goal: Verbalizes/displays adequate comfort level or baseline comfort level  Outcome: Completed     Problem: Skin/Tissue Integrity  Goal: Skin integrity remains intact  Description: 1.  Monitor for areas of redness and/or skin breakdown  2.  Assess vascular access sites hourly  3.  Every 4-6 hours minimum:  Change oxygen saturation probe site  4.  Every 4-6 hours:  If on nasal continuous positive airway pressure, respiratory therapy assess nares and determine need for appliance change or resting period  Outcome: Completed  Flowsheets (Taken 3/11/2025 0118 by Emilie Peters RN)  Skin Integrity Remains Intact: Monitor for areas of redness and/or skin breakdown

## 2025-03-11 NOTE — DISCHARGE SUMMARY
CT ABDOMEN PELVIS W IV CONTRAST Additional Contrast? None  Result Date: 3/4/2025  1. Cirrhosis with moderate ascites. 2. Diverticulosis. 3. Prostatomegaly. 4. Circumferential bladder wall thickening may be secondary to underdistention or cystitis. Correlation with urinalysis is recommended. 5. Diffuse subcutaneous edema.     CT HEAD WO CONTRAST  Result Date: 3/4/2025  No acute intracranial abnormality. No acute cervical spine fracture or traumatic malalignment.     CT CERVICAL SPINE WO CONTRAST  Result Date: 3/4/2025  No acute intracranial abnormality. No acute cervical spine fracture or traumatic malalignment.     XR CHEST (2 VW)  Result Date: 3/4/2025  Cardiomegaly with pulmonary vascular congestion.        Pending Labs     Order Current Status    CBC with Auto Differential Collected (03/04/25 2013)    Cytology, non gyne Collected (03/06/25 1104)    Hemoglobin and Hematocrit Collected (03/11/25 0143)    PREPARE RBC (CROSSMATCH), 1 Units Collected (03/05/25 0145)    Protime-INR Collected (03/04/25 1753)        Discharge Medications    Discharge Medication List as of 3/11/2025  1:53 PM    START taking these medications    pentoxifylline (TRENTAL) 400 MG extended release tablet  Take 1 tablet by mouth 3 times daily (with meals)  [place] [facility]    white petrolatum OINT ointment  Apply topically in the morning and at bedtime, Topical, 2 times daily Starting Tue 3/11/2025, [place] [facility]    zinc sulfate (ZINCATE) 220 (50 Zn)  mg capsule - elemental zinc  Take 1 capsule by mouth daily  [place] [facility]    cefdinir (OMNICEF) 300 MG capsule  Take 1 capsule by mouth 2 times daily for 6 doses  DC to SNF          Discharge Medication List as of 3/11/2025  1:53 PM    CONTINUE these medications which have CHANGED    entecavir (BARACLUDE) 1 MG tablet  Take 1 tablet by mouth daily  [place] to [facility]    rifAXIMin (XIFAXAN) 550 MG tablet  Take 1 tablet by mouth in the morning and at bedtime  [place] to [facility]    lactulose (CHRONULAC) 10 GM/15ML

## 2025-03-12 NOTE — PROGRESS NOTES
Physician Progress Note      PATIENT:               JACK LÓPEZ  CSN #:                  [id number]  :                       1950  ADMIT DATE:       3/4/2025 5:00 PM  DISCH DATE:        3/11/2025 3:35 PM  RESPONDING  PROVIDER #:        Milton RIBEIRO MD          QUERY TEXT:    Pt admitted with acute cystitis with hematuria.  Pt noted to have chronic   indwelling urinary catheter. If possible, please document in the progress   notes and discharge summary if you are evaluating and/or treating any of the   following:    The medical record reflects the following:  Risk Factors: indwelling rodrigues catheter  Clinical Indicators: H&P \"Patient discharged in stable condition with a Rodrigues   catheter that was replaced during admission...New Rodrigues catheter inserted and   urine sent for UA and culture.  Urine positive for infection.\"  Treatment: change rodrigues, Rocephin  Options provided:  -- UTI due to chronic indwelling urinary catheter  -- UTI not due to indwelling urinary catheter  -- Other - I will add my own diagnosis  -- Disagree - Not applicable / Not valid  -- Disagree - Clinically unable to determine / Unknown  -- Refer to Clinical Documentation Reviewer    PROVIDER RESPONSE TEXT:    Asymptomatic bacturia    Query created by: Cristina Perez on 3/6/2025 2:38 PM      Electronically signed by:  Milton RIBEIRO MD 3/12/2025 1:37 PM

## 2025-03-14 ENCOUNTER — HOSPITAL ENCOUNTER (OUTPATIENT)
Dept: ULTRASOUND IMAGING | Age: 75
Discharge: HOME | End: 2025-03-16
Payer: MEDICARE

## 2025-03-14 VITALS
OXYGEN SATURATION: 98 % | DIASTOLIC BLOOD PRESSURE: 67 MMHG | SYSTOLIC BLOOD PRESSURE: 130 MMHG | HEART RATE: 81 BPM | RESPIRATION RATE: 16 BRPM

## 2025-03-14 DIAGNOSIS — R18.8 OTHER ASCITES: ICD-10-CM

## 2025-03-14 PROCEDURE — 49083 ABD PARACENTESIS W/IMAGING: CPT

## 2025-03-14 PROCEDURE — 6360000002 HC RX W HCPCS: Performed by: PHYSICIAN ASSISTANT

## 2025-03-14 RX ORDER — LIDOCAINE HYDROCHLORIDE 10 MG/ML
INJECTION, SOLUTION INFILTRATION; PERINEURAL PRN
Status: COMPLETED | OUTPATIENT
Start: 2025-03-14 | End: 2025-03-14

## 2025-03-14 RX ORDER — OXYCODONE HYDROCHLORIDE 5 MG/1
5 TABLET ORAL EVERY 8 HOURS PRN
COMMUNITY

## 2025-03-14 RX ADMIN — LIDOCAINE HYDROCHLORIDE 10 ML: 10 INJECTION, SOLUTION INFILTRATION; PERINEURAL at 14:14

## 2025-03-14 NOTE — DISCHARGE INSTRUCTIONS
drug interactions.   Plan ahead for refills so you don't run out.   Lifestyle Changes    You and your doctor will plan lifestyle changes that will aid in your recovery. Keep in mind include that:   Fluid accumulation may continue until the reason for the extra fluid is diagnosed. Multiple paracenteses may be necessary until this happens.   A catheter may be inserted into the abdominal area if you have chronic fluid accumulation.   Possible complications include:   Bleeding   Persistent leakage of ascitic fluid   Low blood pressure   Peritonitis   Accidental piercing of other structures in the abdomen, such as the intestine, liver, spleen, stomach, bladder, or blood vessels   Follow-up    Schedule a follow-up appointment as directed by your doctor.    Call Your Doctor If Any of the Following Occurs   Monitor your recovery once you leave the hospital. If you have a problem, alert your doctor. If any of the following occur, call your doctor:   Signs of infection, including fever and chills   Redness, swelling, increasing pain, excessive bleeding, or fluid from the paracentesis site   Pain that you can't control with the medications you've been given   Cough, shortness of breath, feeling of faint, or chest pain   Swelling of the abdomen   In case of an emergency, call 911 immediately.     ***    Last Reviewed: December 2010 Mian Rodríugez MD   Updated: 12/1/2010

## 2025-03-14 NOTE — OR NURSING
Patient arrived from facility to IR for ultrasound guided paracentesis. Vitals obtained and consent signed per patient. Stacey Nielsen PA-C in to speak with the patient about the procedure, all questions answered. Abdomen scanned and prepped. 1% Lidocaine administered to procedural site. 5 Hungarian 10 cm centesis catheter inserted to LLQ with ultrasound guidance, catheter connected to suction. Patient tolerated procedure well. 2900 ml drained of bright yellow colored ascitic fluid. Centesis catheter removed post procedure. Puncture site cleansed and dry dressing applied. No bleeding, swelling or complications noted. Post procedure vitals obtained. Nurse to nurse report called. Patient transported out of the dept and back to his facility with all personal belongings and no complications.

## 2025-03-14 NOTE — PROCEDURES
Procedure: Ultrasound Guided Paracentesis    Diagnosis: Ascites    Findings: Large volume ascites, successful catheter placement with clear yellow ascitic fluid return    Specimen: None at this time.  Total amount of fluid removed to be reported in PACS.    Anesthesia: Local infiltration of 10cc 1% Lidocaine without epi    EBL: Minimal.    Complication: None immediately post procedure.    Plan: Discharge back to nursing home following paracentesis.      Comments:    See radiology dictation in PACs for image review and additional procedural information.

## 2025-03-17 ENCOUNTER — TELEPHONE (OUTPATIENT)
Dept: INFUSION THERAPY | Age: 75
End: 2025-03-17

## 2025-03-17 NOTE — TELEPHONE ENCOUNTER
BELTRAN FROM [facility] CALLED TO LET THE OFFICE KNOW THIS PT BEGAN HOSPICE CARE TODAY. APPT CANCELLED.

## 2025-03-23 ENCOUNTER — APPOINTMENT (OUTPATIENT)
Dept: ULTRASOUND IMAGING | Age: 75
End: 2025-03-23
Payer: OTHER GOVERNMENT

## 2025-03-23 ENCOUNTER — HOSPITAL ENCOUNTER (EMERGENCY)
Age: 75
Discharge: HOME | End: 2025-03-23
Attending: STUDENT IN AN ORGANIZED HEALTH CARE EDUCATION/TRAINING PROGRAM
Payer: OTHER GOVERNMENT

## 2025-03-23 VITALS
WEIGHT: 150 LBS | RESPIRATION RATE: 14 BRPM | OXYGEN SATURATION: 98 % | HEIGHT: 69 IN | TEMPERATURE: 98 F | HEART RATE: 81 BPM | DIASTOLIC BLOOD PRESSURE: 71 MMHG | SYSTOLIC BLOOD PRESSURE: 121 MMHG | BODY MASS INDEX: 22.22 KG/M2

## 2025-03-23 DIAGNOSIS — M79.89 LEG SWELLING: ICD-10-CM

## 2025-03-23 DIAGNOSIS — E83.42 HYPOMAGNESEMIA: ICD-10-CM

## 2025-03-23 DIAGNOSIS — M71.22 SYNOVIAL CYST OF LEFT POPLITEAL SPACE: ICD-10-CM

## 2025-03-23 DIAGNOSIS — R31.9 HEMATURIA, UNSPECIFIED TYPE: Primary | ICD-10-CM

## 2025-03-23 DIAGNOSIS — E87.6 HYPOKALEMIA: ICD-10-CM

## 2025-03-23 LAB
ANION GAP SERPL CALCULATED.3IONS-SCNC: 15 MMOL/L (ref 7–16)
BACTERIA URNS QL MICRO: ABNORMAL
BASOPHILS # BLD: 0 K/UL (ref 0–0.2)
BASOPHILS NFR BLD: 0 % (ref 0–2)
BILIRUB UR QL STRIP: ABNORMAL
BUN SERPL-MCNC: 12 MG/DL (ref 6–23)
CALCIUM SERPL-MCNC: 8.9 MG/DL (ref 8.6–10.2)
CASE NUMBER:: NORMAL
CHLORIDE SERPL-SCNC: 96 MMOL/L (ref 98–107)
CLARITY UR: ABNORMAL
CO2 SERPL-SCNC: 29 MMOL/L (ref 22–29)
COLOR UR: ABNORMAL
CREAT SERPL-MCNC: 0.8 MG/DL (ref 0.7–1.2)
EOSINOPHIL # BLD: 0 K/UL (ref 0.05–0.5)
EOSINOPHILS RELATIVE PERCENT: 0 % (ref 0–6)
ERYTHROCYTE [DISTWIDTH] IN BLOOD BY AUTOMATED COUNT: 26 % (ref 11.5–15)
GFR, ESTIMATED: >90 ML/MIN/1.73M2
GLUCOSE SERPL-MCNC: 92 MG/DL (ref 74–99)
GLUCOSE UR STRIP-MCNC: 250 MG/DL
HCT VFR BLD AUTO: 23 % (ref 37–54)
HGB BLD-MCNC: 7.9 G/DL (ref 12.5–16.5)
HGB UR QL STRIP.AUTO: ABNORMAL
KETONES UR STRIP-MCNC: ABNORMAL MG/DL
LEUKOCYTE ESTERASE UR QL STRIP: ABNORMAL
LYMPHOCYTES NFR BLD: 0.83 K/UL (ref 1.5–4)
LYMPHOCYTES RELATIVE PERCENT: 16 % (ref 20–42)
MAGNESIUM SERPL-MCNC: 1.3 MG/DL (ref 1.6–2.6)
MCH RBC QN AUTO: 35.4 PG (ref 26–35)
MCHC RBC AUTO-ENTMCNC: 34.3 G/DL (ref 32–34.5)
MCV RBC AUTO: 103.1 FL (ref 80–99.9)
MONOCYTES NFR BLD: 1.13 K/UL (ref 0.1–0.95)
MONOCYTES NFR BLD: 22 % (ref 2–12)
NEUTROPHILS NFR BLD: 62 % (ref 43–80)
NEUTS SEG NFR BLD: 3.14 K/UL (ref 1.8–7.3)
NITRITE UR QL STRIP: POSITIVE
PH UR STRIP: 6.5 [PH] (ref 5–8)
PLATELET CONFIRMATION: NORMAL
PLATELET, FLUORESCENCE: 74 K/UL (ref 130–450)
PMV BLD AUTO: 11.2 FL (ref 7–12)
POTASSIUM SERPL-SCNC: 2.8 MMOL/L (ref 3.5–5)
PROT UR STRIP-MCNC: >=300 MG/DL
RBC # BLD AUTO: 2.23 M/UL (ref 3.8–5.8)
RBC # BLD: ABNORMAL 10*6/UL
RBC #/AREA URNS HPF: ABNORMAL /HPF
SODIUM SERPL-SCNC: 140 MMOL/L (ref 132–146)
SP GR UR STRIP: 1.02 (ref 1–1.03)
UROBILINOGEN UR STRIP-ACNC: 4 EU/DL (ref 0–1)
WBC # BLD: ABNORMAL 10*3/UL
WBC #/AREA URNS HPF: ABNORMAL /HPF
WBC OTHER # BLD: 5.1 K/UL (ref 4.5–11.5)

## 2025-03-23 PROCEDURE — 6370000000 HC RX 637 (ALT 250 FOR IP): Performed by: STUDENT IN AN ORGANIZED HEALTH CARE EDUCATION/TRAINING PROGRAM

## 2025-03-23 PROCEDURE — 6370000000 HC RX 637 (ALT 250 FOR IP)

## 2025-03-23 PROCEDURE — 51798 US URINE CAPACITY MEASURE: CPT

## 2025-03-23 PROCEDURE — 87086 URINE CULTURE/COLONY COUNT: CPT

## 2025-03-23 PROCEDURE — 81001 URINALYSIS AUTO W/SCOPE: CPT

## 2025-03-23 PROCEDURE — 51702 INSERT TEMP BLADDER CATH: CPT

## 2025-03-23 PROCEDURE — 6360000002 HC RX W HCPCS: Performed by: STUDENT IN AN ORGANIZED HEALTH CARE EDUCATION/TRAINING PROGRAM

## 2025-03-23 PROCEDURE — 80048 BASIC METABOLIC PNL TOTAL CA: CPT

## 2025-03-23 PROCEDURE — 96374 THER/PROPH/DIAG INJ IV PUSH: CPT

## 2025-03-23 PROCEDURE — 99284 EMERGENCY DEPT VISIT MOD MDM: CPT

## 2025-03-23 PROCEDURE — 85025 COMPLETE CBC W/AUTO DIFF WBC: CPT

## 2025-03-23 PROCEDURE — 93971 EXTREMITY STUDY: CPT

## 2025-03-23 PROCEDURE — 83735 ASSAY OF MAGNESIUM: CPT

## 2025-03-23 PROCEDURE — 88112 CYTOPATH CELL ENHANCE TECH: CPT

## 2025-03-23 RX ORDER — CEFDINIR 300 MG/1
300 CAPSULE ORAL 2 TIMES DAILY
Qty: 14 CAPSULE | Refills: 0 | Status: SHIPPED | OUTPATIENT
Start: 2025-03-23 | End: 2025-03-30

## 2025-03-23 RX ORDER — LANOLIN ALCOHOL/MO/W.PET/CERES
400 CREAM (GRAM) TOPICAL ONCE
Status: COMPLETED | OUTPATIENT
Start: 2025-03-23 | End: 2025-03-23

## 2025-03-23 RX ORDER — POTASSIUM CHLORIDE 1500 MG/1
40 TABLET, EXTENDED RELEASE ORAL ONCE
Status: COMPLETED | OUTPATIENT
Start: 2025-03-23 | End: 2025-03-23

## 2025-03-23 RX ORDER — MORPHINE SULFATE 4 MG/ML
4 INJECTION, SOLUTION INTRAMUSCULAR; INTRAVENOUS ONCE
Status: COMPLETED | OUTPATIENT
Start: 2025-03-23 | End: 2025-03-23

## 2025-03-23 RX ORDER — CEFDINIR 300 MG/1
300 CAPSULE ORAL ONCE
Status: COMPLETED | OUTPATIENT
Start: 2025-03-23 | End: 2025-03-23

## 2025-03-23 RX ADMIN — CEFDINIR 300 MG: 300 CAPSULE ORAL at 12:17

## 2025-03-23 RX ADMIN — MORPHINE SULFATE 4 MG: 4 INJECTION, SOLUTION INTRAMUSCULAR; INTRAVENOUS at 07:45

## 2025-03-23 RX ADMIN — POTASSIUM CHLORIDE 40 MEQ: 1500 TABLET, EXTENDED RELEASE ORAL at 10:46

## 2025-03-23 RX ADMIN — MAGNESIUM OXIDE 400 MG (241.3 MG MAGNESIUM) TABLET 400 MG: TABLET at 10:46

## 2025-03-23 ASSESSMENT — PAIN - FUNCTIONAL ASSESSMENT: PAIN_FUNCTIONAL_ASSESSMENT: 0-10

## 2025-03-23 ASSESSMENT — PAIN SCALES - GENERAL
PAINLEVEL_OUTOF10: 7
PAINLEVEL_OUTOF10: 10
PAINLEVEL_OUTOF10: 9

## 2025-03-23 ASSESSMENT — PAIN DESCRIPTION - LOCATION: LOCATION: LEG

## 2025-03-23 ASSESSMENT — PAIN DESCRIPTION - ORIENTATION: ORIENTATION: RIGHT;LEFT

## 2025-03-23 NOTE — CONSULTS
3/23/2025 9:11 AM  Service: Urology  Group: [facility] (Chris/Omid/Madeleine)    Hardik Gray  [id number]     Chief Complaint:    Gross hematuria     History of Present Illness:      The patient is a 75 y.o. male patient well known to us who typically sees Dr. Salvador and who presented to ER from [facility] where he is for rehab with gross hematuria and LLE edema.  He is known to our service and was seen in consult on 1/19/2025 for urinary retention and difficult Rodrigues placement and again on 3/6/2025 for hematuria with rodrigues. He has significant urologic history and has undergone cystoscopy with urethral dilation in the past for rodrigues placement for retention. He also has a history of prostate cancer and has undergone radiation in the past with [facility]. He has not seen anyone recently in [place] per our conversation today.  His rodrigues was removed in ER and they were unable to replace. We were asked to see for GH and possible rodrigues replacement. He has a Hx of PCA with radiation at the [facility], urethral dilatation, chronic urethral stricture. 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

## 2025-03-23 NOTE — ED PROVIDER NOTES
[facility] EMERGENCY DEPARTMENT  EMERGENCY DEPARTMENT ENCOUNTER        Pt Name: Hardik Gray  MRN: [id number]  Birthdate 1950  Date of evaluation: 3/23/2025  Provider: Aram Yates II, DO  PCP: Wendy Gallegos MD  Note Started: 6:10 AM EDT 3/23/25    CHIEF COMPLAINT       Chief Complaint   Patient presents with    Hematuria     Blood in catheter    Leg Swelling     Left leg swelling       HISTORY OF PRESENT ILLNESS: 1 or more Elements            Hardik Gray is a 75 y.o. male history of cirrhosis, prostate cancer, HTN, CAD, COPD, history of prostate cancer in the past s/p radiation treatments, presents to ER for complaint of gross hematuria and his chronic indwelling Suarez.  Patient reports chronic suprapubic pain, mild amount of pain in the left leg.  No flank pain, no fevers or chills, no cough, congestion, no chest pain or shortness of breath, no nausea, vomiting, diarrhea.    Nursing Notes were all reviewed and agreed with or any disagreements were addressed in the HPI.      REVIEW OF EXTERNAL NOTE :       Records reviewed for medical hx, surgical, hx, and medication lists      Chart Review/External Note Review    Last Echo reviewed by Me:  Lab Results   Component Value Date    LVEF 53 10/30/2020             Controlled Substance Monitoring:    Acute and Chronic Pain Monitoring:        No data to display                    REVIEW OF SYSTEMS :      Positives and Pertinent negatives as per HPI.     SURGICAL HISTORY     Past Surgical History:   Procedure Laterality Date    BLADDER SURGERY N/A 3/22/2023    CYSTOSCOPY RETROGRADE PYELOGRAM URETHERAL DILATION SUAREZ INSERTION performed by Rashaad Salvador MD at [facility] OR    CORONARY ANGIOPLASTY WITH STENT PLACEMENT  2002    @ [facility]    CYSTOSCOPY N/A 3/22/2021    CYSTOSCOPY, RETROGRADE PYELOGRAM, URETHRAL DIALATION, TURBT performed by Rashaad Salvador MD at [facility] OR    FOOT SURGERY      lt foot    FOOT SURGERY      rt foot as well    PROSTATE

## 2025-03-24 LAB
MICROORGANISM SPEC CULT: ABNORMAL
SERVICE CMNT-IMP: ABNORMAL
SPECIMEN DESCRIPTION: ABNORMAL

## 2025-03-25 LAB — NON-GYN CYTOLOGY REPORT: NORMAL

## (undated) DEVICE — CONMED COLONOSCOPE SHEATHED CYTOLOGY BRUSH, RING HANDLE, 3 MM X 230 CM: Brand: CONMED

## (undated) DEVICE — CATHETER URET 5FR L70CM OPN END SGL LUMN INJ HUB FLEXIMA

## (undated) DEVICE — GAUZE SPONGES,8 PLY: Brand: CURITY

## (undated) DEVICE — CYSTO PACK: Brand: MEDLINE INDUSTRIES, INC.

## (undated) DEVICE — 4-PORT MANIFOLD: Brand: NEPTUNE 2

## (undated) DEVICE — SPONGE GZ W4XL4IN RAYON POLY CVR W/NONWOVEN FAB STRL 2/PK

## (undated) DEVICE — SOLUTION IRRIG 3000ML 0.9% SOD CHL USP UROMATIC PLAS CONT

## (undated) DEVICE — TUBING, SUCTION, 3/16" X 12', STRAIGHT: Brand: MEDLINE

## (undated) DEVICE — BIPOLAR ELECTROHEMOSTASIS CATHETER: Brand: INJECTION GOLD PROBE

## (undated) DEVICE — LIGATOR ENDOSCP DIA8.6-11.5MM MULT DISP SPDBND LIGATOR SUP

## (undated) DEVICE — SOLUTION IRRIG 3000ML STRL H2O USP UROMATIC PLAS CONT

## (undated) DEVICE — SOLUTION SURG PREP ANTIMICROBIAL 4 OZ SKIN WND EXIDINE

## (undated) DEVICE — CATHETER URETH 18FR BLLN 5CC STD LTX 2 W F TWO OPP DRNGE

## (undated) DEVICE — SYRINGE MED 30ML STD CLR PLAS LUERLOCK TIP N CTRL DISP

## (undated) DEVICE — RESECTOSCOPE 28FR SHEATH AND OBTURATOR

## (undated) DEVICE — GOWN,SIRUS,FABRNF,2XL,18/CS: Brand: MEDLINE

## (undated) DEVICE — GAUZE,SPONGE,4"X4",8PLY,STRL,LF,10/TRAY: Brand: MEDLINE

## (undated) DEVICE — ELECTRODE PT RET AD L9FT HI MOIST COND ADH HYDRGEL CORDED

## (undated) DEVICE — GUIDEWIRE ENDO L150CM DIA0.035IN STR TIP

## (undated) DEVICE — SNARE ENDOSCP L240CM LOOP W13MM SHTH DIA2.4MM SM OVL FLX

## (undated) DEVICE — GRADUATE TRIANG MEASURE 1000ML BLK PRNT

## (undated) DEVICE — FIAPC® PROBE W/ FILTER 2200 A OD 2.3MM/6.9FR; L 2.2M/7.2FT: Brand: ERBE

## (undated) DEVICE — HOSE CONN FOR WST MGMT SYS NEPTUNE 2

## (undated) DEVICE — BAG DRNGE COMB PK

## (undated) DEVICE — SNARE ENDOSCP M L240CM LOOP W27MM SHTH DIA2.4MM OVL FLX

## (undated) DEVICE — MEDIA CONTRAST ISOVUE  300 10X50ML

## (undated) DEVICE — ENDOSCOPY PUMP TUBING/ CAP SET: Brand: ERBE

## (undated) DEVICE — CATHETER ELECHEMSTAS 10FR L300CM WRK CHN 3.7MM STD PLUG

## (undated) DEVICE — SOLUTION IV IRRIG WATER 1000ML POUR BRL 2F7114

## (undated) DEVICE — CATHETER URETH BLLN 5CC 20FR F 2 W SPEC COUNCL MOD SHT OPN

## (undated) DEVICE — CAMERA STRYKER 1488

## (undated) DEVICE — FORCEPS BX L240CM JAW DIA2.4MM ORNG L CAP W/ NDL DISP RAD

## (undated) DEVICE — SET SURG BASIN MAYO REUSABLE

## (undated) DEVICE — GLOVE ORANGE PI 8   MSG9080

## (undated) DEVICE — BLOCK BITE 60FR RUBBER ADLT DENTAL

## (undated) DEVICE — Z INACTIVE USE 2635503 SOLUTION IRRIG 3000ML ST H2O USP UROMATIC PLAS CONT

## (undated) DEVICE — DRAINBAG,ANTI-REFLUX TOWER,L/F,2000ML,LL: Brand: MEDLINE

## (undated) DEVICE — CATHETER SCLERO L240CM NDL 25GA L4MM SHTH DIA2.3MM CNTRST

## (undated) DEVICE — MEDICINE CUP, GRADUATED, STER: Brand: MEDLINE

## (undated) DEVICE — SET CYSTOSCOPE 21FR

## (undated) DEVICE — HF-RESECTION ELECTRODE PLASMALOOP LOOP, MEDIUM, 24 FR., 12°/16°, ESG TURIS: Brand: OLYMPUS

## (undated) DEVICE — FIAPC® PROBE W/ FILTER 2200 C OD 2.3MM/6.9FR; L 2.2M/7.2FT: Brand: ERBE

## (undated) DEVICE — LENS CYSTOSCOPE 30 DEG